# Patient Record
Sex: FEMALE | Race: WHITE | NOT HISPANIC OR LATINO | Employment: OTHER | URBAN - NONMETROPOLITAN AREA
[De-identification: names, ages, dates, MRNs, and addresses within clinical notes are randomized per-mention and may not be internally consistent; named-entity substitution may affect disease eponyms.]

---

## 2017-03-20 RX ORDER — SIMVASTATIN 40 MG
40 TABLET ORAL NIGHTLY
Qty: 90 TABLET | Refills: 3 | Status: SHIPPED | OUTPATIENT
Start: 2017-03-20 | End: 2018-03-31 | Stop reason: SDUPTHER

## 2017-03-24 ENCOUNTER — LAB (OUTPATIENT)
Dept: LAB | Facility: OTHER | Age: 67
End: 2017-03-24

## 2017-03-24 DIAGNOSIS — E55.9 VITAMIN D DEFICIENCY: ICD-10-CM

## 2017-03-24 DIAGNOSIS — I10 ESSENTIAL HYPERTENSION: ICD-10-CM

## 2017-03-24 DIAGNOSIS — E78.5 HYPERLIPIDEMIA: ICD-10-CM

## 2017-03-24 DIAGNOSIS — D53.1 MEGALOBLASTIC ANEMIA: ICD-10-CM

## 2017-03-24 DIAGNOSIS — R73.01 FASTING HYPERGLYCEMIA: ICD-10-CM

## 2017-03-24 DIAGNOSIS — D50.9 IRON DEFICIENCY ANEMIA: ICD-10-CM

## 2017-03-24 LAB
25(OH)D3 SERPL-MCNC: 64.1 NG/ML (ref 30–100)
ALBUMIN SERPL-MCNC: 4.2 G/DL (ref 3.2–5.5)
ALBUMIN/GLOB SERPL: 1.2 G/DL (ref 1–3)
ALP SERPL-CCNC: 41 U/L (ref 15–121)
ALT SERPL W P-5'-P-CCNC: 15 U/L (ref 10–60)
ANION GAP SERPL CALCULATED.3IONS-SCNC: 12 MMOL/L (ref 5–15)
ARTICHOKE IGE QN: 140 MG/DL (ref 0–129)
AST SERPL-CCNC: 18 U/L (ref 10–60)
BASOPHILS # BLD AUTO: 0.03 10*3/MM3 (ref 0–0.2)
BASOPHILS NFR BLD AUTO: 0.4 % (ref 0–2)
BILIRUB SERPL-MCNC: 0.7 MG/DL (ref 0.2–1)
BUN BLD-MCNC: 28 MG/DL (ref 8–25)
BUN/CREAT SERPL: 28 (ref 7–25)
CALCIUM SPEC-SCNC: 10.1 MG/DL (ref 8.4–10.8)
CHLORIDE SERPL-SCNC: 101 MMOL/L (ref 100–112)
CO2 SERPL-SCNC: 25 MMOL/L (ref 20–32)
CREAT BLD-MCNC: 1 MG/DL (ref 0.4–1.3)
DEPRECATED RDW RBC AUTO: 43.3 FL (ref 36.4–46.3)
EOSINOPHIL # BLD AUTO: 0.24 10*3/MM3 (ref 0–0.7)
EOSINOPHIL NFR BLD AUTO: 3.5 % (ref 0–7)
ERYTHROCYTE [DISTWIDTH] IN BLOOD BY AUTOMATED COUNT: 13.5 % (ref 11.5–14.5)
FERRITIN SERPL-MCNC: 16.7 NG/ML (ref 11.1–264)
GFR SERPL CREATININE-BSD FRML MDRD: 55 ML/MIN/1.73 (ref 45–104)
GLOBULIN UR ELPH-MCNC: 3.6 GM/DL (ref 2.5–4.6)
GLUCOSE BLD-MCNC: 96 MG/DL (ref 70–100)
HBA1C MFR BLD: 5.89 % (ref 4–5.6)
HCT VFR BLD AUTO: 37.2 % (ref 35–45)
HGB BLD-MCNC: 12.5 G/DL (ref 12–15.5)
LYMPHOCYTES # BLD AUTO: 1.44 10*3/MM3 (ref 0.6–4.2)
LYMPHOCYTES NFR BLD AUTO: 21.1 % (ref 10–50)
MCH RBC QN AUTO: 30.5 PG (ref 26.5–34)
MCHC RBC AUTO-ENTMCNC: 33.6 G/DL (ref 31.4–36)
MCV RBC AUTO: 90.7 FL (ref 80–98)
MONOCYTES # BLD AUTO: 0.48 10*3/MM3 (ref 0–0.9)
MONOCYTES NFR BLD AUTO: 7 % (ref 0–12)
NEUTROPHILS # BLD AUTO: 4.62 10*3/MM3 (ref 2–8.6)
NEUTROPHILS NFR BLD AUTO: 68 % (ref 37–80)
PLATELET # BLD AUTO: 294 10*3/MM3 (ref 150–450)
PMV BLD AUTO: 8.7 FL (ref 8–12)
POTASSIUM BLD-SCNC: 4 MMOL/L (ref 3.4–5.4)
PROT SERPL-MCNC: 7.8 G/DL (ref 6.7–8.2)
RBC # BLD AUTO: 4.1 10*6/MM3 (ref 3.77–5.16)
SODIUM BLD-SCNC: 138 MMOL/L (ref 134–146)
VIT B12 BLD-MCNC: 670 PG/ML (ref 239–931)
WBC NRBC COR # BLD: 6.81 10*3/MM3 (ref 3.2–9.8)

## 2017-03-24 PROCEDURE — 36415 COLL VENOUS BLD VENIPUNCTURE: CPT | Performed by: INTERNAL MEDICINE

## 2017-03-24 PROCEDURE — 83036 HEMOGLOBIN GLYCOSYLATED A1C: CPT | Performed by: INTERNAL MEDICINE

## 2017-03-24 PROCEDURE — 82607 VITAMIN B-12: CPT | Performed by: INTERNAL MEDICINE

## 2017-03-24 PROCEDURE — 83721 ASSAY OF BLOOD LIPOPROTEIN: CPT | Performed by: INTERNAL MEDICINE

## 2017-03-24 PROCEDURE — 82306 VITAMIN D 25 HYDROXY: CPT | Performed by: INTERNAL MEDICINE

## 2017-03-24 PROCEDURE — 85025 COMPLETE CBC W/AUTO DIFF WBC: CPT | Performed by: INTERNAL MEDICINE

## 2017-03-24 PROCEDURE — 80053 COMPREHEN METABOLIC PANEL: CPT | Performed by: INTERNAL MEDICINE

## 2017-03-24 PROCEDURE — 82728 ASSAY OF FERRITIN: CPT | Performed by: INTERNAL MEDICINE

## 2017-03-31 ENCOUNTER — OFFICE VISIT (OUTPATIENT)
Dept: FAMILY MEDICINE CLINIC | Facility: CLINIC | Age: 67
End: 2017-03-31

## 2017-03-31 VITALS
SYSTOLIC BLOOD PRESSURE: 150 MMHG | HEART RATE: 68 BPM | HEIGHT: 61 IN | TEMPERATURE: 97.6 F | BODY MASS INDEX: 47.05 KG/M2 | WEIGHT: 249.2 LBS | DIASTOLIC BLOOD PRESSURE: 80 MMHG

## 2017-03-31 DIAGNOSIS — I10 ESSENTIAL HYPERTENSION: ICD-10-CM

## 2017-03-31 DIAGNOSIS — Z12.31 ENCOUNTER FOR SCREENING MAMMOGRAM FOR MALIGNANT NEOPLASM OF BREAST: Primary | ICD-10-CM

## 2017-03-31 DIAGNOSIS — E66.01 MORBID OBESITY DUE TO EXCESS CALORIES (HCC): ICD-10-CM

## 2017-03-31 DIAGNOSIS — D50.9 IRON DEFICIENCY ANEMIA, UNSPECIFIED IRON DEFICIENCY ANEMIA TYPE: ICD-10-CM

## 2017-03-31 DIAGNOSIS — D53.1 MEGALOBLASTIC ANEMIA: ICD-10-CM

## 2017-03-31 DIAGNOSIS — E03.9 ACQUIRED HYPOTHYROIDISM: ICD-10-CM

## 2017-03-31 DIAGNOSIS — E55.9 VITAMIN D DEFICIENCY: ICD-10-CM

## 2017-03-31 DIAGNOSIS — E78.5 HYPERLIPIDEMIA, UNSPECIFIED HYPERLIPIDEMIA TYPE: Primary | ICD-10-CM

## 2017-03-31 DIAGNOSIS — R73.01 FASTING HYPERGLYCEMIA: ICD-10-CM

## 2017-03-31 DIAGNOSIS — G47.33 OBSTRUCTIVE SLEEP APNEA SYNDROME: ICD-10-CM

## 2017-03-31 PROCEDURE — 99214 OFFICE O/P EST MOD 30 MIN: CPT | Performed by: INTERNAL MEDICINE

## 2017-03-31 RX ORDER — NIACIN 500 MG
500 TABLET ORAL NIGHTLY
COMMUNITY
End: 2018-08-16

## 2017-03-31 RX ORDER — ASPIRIN 81 MG/1
81 TABLET, CHEWABLE ORAL DAILY
COMMUNITY

## 2017-03-31 NOTE — PROGRESS NOTES
"Subjective     History of Present Illness     Lois Parmar is a 67 y.o. female here for a 6-month follow up on hypothyroidism, hyperlipidemia, hypertriglyceridemia, impaired fasting glucose, osteoarthritis of the knees and hips among other issues complicated by obesity. She continues on CPAP for sleep apnea.  She had a left total knee replacement 12/2015.  She reports the right knee has allowed her to exercise this winter.   She has an appointment with Dr. Augustine next week, for which she reports he may inject the right knee.  She continues to follow with Dr. Boyle, cardiologist.           She reports legs cramps in bilateral lower extremities, most frequently occurring in the morning when she first gets out of bed.  I recommended she take an OTC magnesium supplement once daily.  She takes breaks from statin therapy episodically due to myalgia symptoms.     She is requesting a repeat DEXA.  Last DEXA was completed 03/2014 with normal results.   She decided to wait and have DEXA repeated in 2018.       She was advised by nurse practioner in Dr. Clifton's office to stop her potassium due to a level \"over 900\".  She recommended that she stop the potassium supplement.  In review of labs, her potassium has been and continues to be at goal and I recommended she continue the spironolactone but remain off of the potassium.  I asked her to have the provider send me a copy of office notes.      Blood pressure is above goal.  She checks it at home and systolic is less than 140.  She has lost 13 pounds in the past six months.   She reports she has been following a low carbohydrate diet and exercising at [facility] in [place] where she particpates in water aerobics.     Six months ago, I recommended she try to increase the frequency of her ferrous sulfate to try to get four doses weekly.  She has not been able to tolerate four times weekly and reports taking oral iron 2-3 times weekly.  She feels like she " "can possible tolerate more after stopping narcotic medication, which caused constipation.   Hemoglobin and iron have drifted down with current labs.  I recommended she try four times again and at least three times weekly.      She has reduced her statin to q.o.d. Dosing due to myalgias.      The patient's relevant past medical, surgical, and social history was reviewed in Epic.   Lab results are reviewed with the patient today.  Fasting glucose 196. A1c is 5.89.  Renal and liver function normal.  Vitamin D level is above goal with a OTC vitamin D 5000 unit supplement three times daily.  Potassium is at goal.         Review of Systems   Constitutional: Negative for chills, fatigue and fever.   HENT: Negative for congestion, ear pain, postnasal drip, sinus pressure and sore throat.    Respiratory: Negative for cough, shortness of breath and wheezing.    Cardiovascular: Negative for chest pain, palpitations and leg swelling.   Gastrointestinal: Negative for abdominal pain, blood in stool, constipation, diarrhea, nausea and vomiting.   Endocrine: Negative for cold intolerance, heat intolerance, polydipsia and polyuria.   Genitourinary: Negative for dysuria, frequency, hematuria and urgency.   Skin: Negative for rash.   Neurological: Negative for syncope and weakness.          Objective     Vitals:    03/31/17 1037   BP: 150/80   Pulse: 68   Temp: 97.6 °F (36.4 °C)   TempSrc: Oral   Weight: 249 lb 3.2 oz (113 kg)   Height: 61\" (154.9 cm)       Physical Exam   Constitutional: She is oriented to person, place, and time. She appears well-developed and well-nourished. No distress.   HENT:   Head: Normocephalic and atraumatic.   Nose: Right sinus exhibits no maxillary sinus tenderness and no frontal sinus tenderness. Left sinus exhibits no maxillary sinus tenderness and no frontal sinus tenderness.   Mouth/Throat: Uvula is midline, oropharynx is clear and moist and mucous membranes are normal. No oral lesions. No tonsillar " exudate.   Eyes: Conjunctivae and EOM are normal. Pupils are equal, round, and reactive to light.   Neck: Trachea normal. Neck supple. No JVD present. Carotid bruit is not present. No tracheal deviation present. No thyroid mass and no thyromegaly present.   Cardiovascular: Normal rate, regular rhythm and normal heart sounds.   No extrasystoles are present. PMI is not displaced.    No murmur heard.  Pulmonary/Chest: Effort normal and breath sounds normal. No accessory muscle usage. No respiratory distress. She has no decreased breath sounds. She has no wheezes. She has no rhonchi. She has no rales.   Abdominal: Soft. Bowel sounds are normal. She exhibits no distension. There is no hepatosplenomegaly. There is no tenderness.       Vascular Status -  Her exam exhibits right foot vasculature normal. Her exam exhibits no right foot edema. Her exam exhibits left foot vasculature normal. Her exam exhibits no left foot edema.  Lymphadenopathy:     She has no cervical adenopathy.   Neurological: She is alert and oriented to person, place, and time. No cranial nerve deficit. Coordination normal.   Skin: Skin is warm, dry and intact. No rash noted. No cyanosis. Nails show no clubbing.   Psychiatric: She has a normal mood and affect. Her speech is normal and behavior is normal. Thought content normal.   Vitals reviewed.       Future Appointments  Date Time Provider Department Center   4/18/2017 9:20 AM Curt Augustine MD [facility] None   10/2/2017 11:00 AM Marshal Warner MD [facility] None       PHQ-9 Depression Screening 3/31/2017   Little interest or pleasure in doing things 0   Feeling down, depressed, or hopeless 0   Trouble falling or staying asleep, or sleeping too much 0   Feeling tired or having little energy 1   Poor appetite or overeating 0   Feeling bad about yourself - or that you are a failure or have let yourself or your family down 0   Trouble concentrating on things, such as reading the newspaper or  "watching television 0   Moving or speaking so slowly that other people could have noticed. Or the opposite - being so fidgety or restless that you have been moving around a lot more than usual 0   Thoughts that you would be better off dead, or of hurting yourself in some way 0   PHQ-9 Total Score 1   If you checked off any problems, how difficult have these problems made it for you to do your work, take care of things at home, or get along with other people? Not difficult at all       Assessment/Plan      Decrease OTC vitamin D to 5000 units daily.      I asked her to have the provider she has been seeing in Dr. Clifton's office send me a copy of office notes.  The patient refers to her as a \"hormone specialist\".    Recommended adding an OTC magnesium supplement once daily for the leg cramps.    She can continue to hold the potassium supplement.  Continue the spironolactone.    Encouraged her to continue with diet, exercise, and aggressive weight loss efforts.       Scribed for Dr. Warner by Adore Marrero [facility].     Diagnoses and all orders for this visit:    Hyperlipidemia, unspecified hyperlipidemia type  -     Magnesium; Future  -     Lipid Panel; Future    Essential hypertension  -     Magnesium; Future  -     Comprehensive Metabolic Panel; Future    Morbid obesity due to excess calories  -     Magnesium; Future    Vitamin D deficiency  -     Vitamin D 25 Hydroxy; Future  -     Magnesium; Future    Acquired hypothyroidism - On Synthroid  -     Magnesium; Future  -     TSH; Future  -     T4, Free; Future    Impaired fasting glycaemia  -     Magnesium; Future  -     Hemoglobin A1c; Future    Iron deficiency anemia, unspecified iron deficiency anemia type  -     Ferritin; Future  -     CBC Auto Differential; Future  -     Magnesium; Future    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  -     metFORMIN (GLUCOPHAGE) 500 MG tablet; Take 500 mg by mouth. 2 tabs twice daily  -     aspirin 81 MG chewable tablet; Chew 81 mg Daily.  -     niacin 500 MG tablet; Take 500 mg by mouth Every Night.  -     vitamin d (CHOLECALIFEROL) 5000 UNITS capsule; Take 1 capsule by mouth Daily.      Lab on 03/24/2017   Component Date Value Ref Range Status   • LDL Cholesterol  03/24/2017 140* 0 - 129 mg/dL Final   • Hemoglobin A1C 03/24/2017 5.89* 4 - 5.6 % Final   • WBC 03/24/2017 6.81  3.20 - 9.80 10*3/mm3 Final   • RBC 03/24/2017 4.10  3.77 - 5.16 10*6/mm3 Final   • Hemoglobin 03/24/2017 12.5  12.0 - 15.5 g/dL Final   • Hematocrit 03/24/2017 37.2  35.0 - 45.0 % Final   • MCV 03/24/2017 90.7  80.0 - 98.0 fL Final   • MCH 03/24/2017 30.5  26.5 - 34.0 pg Final   • MCHC 03/24/2017 33.6  31.4 - 36.0 g/dL Final   • RDW 03/24/2017 13.5  11.5 - 14.5 % Final   • RDW-SD 03/24/2017 43.3  36.4 - 46.3 fl Final   • MPV 03/24/2017 8.7  8.0 - 12.0 fL Final   • Platelets 03/24/2017 294  150 - 450 10*3/mm3 Final   • Neutrophil % 03/24/2017 68.0  37.0 - 80.0 % Final   • Lymphocyte % 03/24/2017 21.1  10.0 - 50.0 % Final   • Monocyte % 03/24/2017 7.0  0.0 - 12.0 % Final   • Eosinophil % 03/24/2017 3.5  0.0 - 7.0 % Final   • Basophil % 03/24/2017 0.4  0.0 - 2.0 % Final   • Neutrophils, Absolute 03/24/2017 4.62  2.00 - 8.60 10*3/mm3 Final   • Lymphocytes, Absolute 03/24/2017 1.44  0.60 - 4.20 10*3/mm3 Final   • Monocytes, Absolute 03/24/2017 0.48  0.00 - 0.90 10*3/mm3 Final   • Eosinophils, Absolute 03/24/2017 0.24  0.00 - 0.70 10*3/mm3 Final   • Basophils, Absolute 03/24/2017 0.03  0.00 - 0.20 10*3/mm3 Final   • Vitamin B-12 03/24/2017 670  239 - 931 pg/mL Final   • Glucose 03/24/2017 96  70 - 100 mg/dL Final   • BUN 03/24/2017 28* 8 - 25 mg/dL Final   • Creatinine 03/24/2017 1.00  0.40 - 1.30 mg/dL Final   • Sodium 03/24/2017 138  134 - 146  mmol/L Final   • Potassium 03/24/2017 4.0  3.4 - 5.4 mmol/L Final   • Chloride 03/24/2017 101  100 - 112 mmol/L Final   • CO2 03/24/2017 25.0  20.0 - 32.0 mmol/L Final   • Calcium 03/24/2017 10.1  8.4 - 10.8 mg/dL Final   • Total Protein 03/24/2017 7.8  6.7 - 8.2 g/dL Final   • Albumin 03/24/2017 4.20  3.20 - 5.50 g/dL Final   • ALT (SGPT) 03/24/2017 15  10 - 60 U/L Final   • AST (SGOT) 03/24/2017 18  10 - 60 U/L Final   • Alkaline Phosphatase 03/24/2017 41  15 - 121 U/L Final   • Total Bilirubin 03/24/2017 0.7  0.2 - 1.0 mg/dL Final   • eGFR Non African Amer 03/24/2017 55  45 - 104 mL/min/1.73 Final   • Globulin 03/24/2017 3.6  2.5 - 4.6 gm/dL Final   • A/G Ratio 03/24/2017 1.2  1.0 - 3.0 g/dL Final   • BUN/Creatinine Ratio 03/24/2017 28.0* 7.0 - 25.0 Final   • Anion Gap 03/24/2017 12.0  5.0 - 15.0 mmol/L Final   • Ferritin 03/24/2017 16.70  11.10 - 264.00 ng/mL Final   • 25 Hydroxy, Vitamin D 03/24/2017 64.1  30.0 - 100.0 ng/ml Final   ]

## 2017-04-13 DIAGNOSIS — M17.12 PRIMARY OSTEOARTHRITIS OF LEFT KNEE: Primary | ICD-10-CM

## 2017-04-18 ENCOUNTER — OFFICE VISIT (OUTPATIENT)
Dept: ORTHOPEDIC SURGERY | Facility: CLINIC | Age: 67
End: 2017-04-18

## 2017-04-18 VITALS — WEIGHT: 247.8 LBS | HEIGHT: 61 IN | BODY MASS INDEX: 46.78 KG/M2

## 2017-04-18 DIAGNOSIS — M25.561 CHRONIC PAIN OF RIGHT KNEE: Primary | ICD-10-CM

## 2017-04-18 DIAGNOSIS — G89.29 CHRONIC PAIN OF RIGHT KNEE: Primary | ICD-10-CM

## 2017-04-18 DIAGNOSIS — Z96.652 PRESENCE OF TOTAL LEFT KNEE JOINT PROSTHESIS: ICD-10-CM

## 2017-04-18 DIAGNOSIS — E66.01 MORBID OBESITY WITH BMI OF 45.0-49.9, ADULT (HCC): ICD-10-CM

## 2017-04-18 DIAGNOSIS — E11.9 TYPE 2 DIABETES MELLITUS WITHOUT COMPLICATION, WITHOUT LONG-TERM CURRENT USE OF INSULIN (HCC): ICD-10-CM

## 2017-04-18 DIAGNOSIS — M17.11 PRIMARY OSTEOARTHRITIS OF RIGHT KNEE: ICD-10-CM

## 2017-04-18 PROCEDURE — 20610 DRAIN/INJ JOINT/BURSA W/O US: CPT | Performed by: ORTHOPAEDIC SURGERY

## 2017-04-18 PROCEDURE — 99213 OFFICE O/P EST LOW 20 MIN: CPT | Performed by: ORTHOPAEDIC SURGERY

## 2017-04-18 RX ORDER — POTASSIUM CHLORIDE 750 MG/1
TABLET, EXTENDED RELEASE ORAL
COMMUNITY
Start: 2017-03-04 | End: 2017-04-18

## 2017-04-18 RX ORDER — METFORMIN HYDROCHLORIDE EXTENDED-RELEASE TABLETS 1000 MG/1
TABLET, FILM COATED, EXTENDED RELEASE ORAL
COMMUNITY
Start: 2017-03-13 | End: 2017-04-18

## 2017-04-18 RX ORDER — PHENYLEPHRINE HCL 10 MG/1
10 TABLET, FILM COATED ORAL EVERY 4 HOURS PRN
COMMUNITY
End: 2018-08-16

## 2017-04-18 RX ORDER — UBIDECARENONE 100 MG
100 CAPSULE ORAL DAILY
COMMUNITY
End: 2020-07-17

## 2017-04-18 RX ADMIN — LIDOCAINE HYDROCHLORIDE 2 ML: 10 INJECTION, SOLUTION INFILTRATION; PERINEURAL at 10:47

## 2017-04-18 RX ADMIN — TRIAMCINOLONE ACETONIDE 40 MG: 40 INJECTION, SUSPENSION INTRA-ARTICULAR; INTRAMUSCULAR at 10:47

## 2017-04-18 NOTE — PROGRESS NOTES
Lois Parmar is a 67 y.o. female returns for     Chief Complaint   Patient presents with   • Left Knee - Follow-up     Xray done today       HISTORY OF PRESENT ILLNESS:   patient states doing well no problems.   Occasional popping.  Stable on exam.  She has been having more pain in her right knee recently.     CONCURRENT MEDICAL HISTORY:    Past Medical History:   Diagnosis Date   • Acquired hypothyroidism - On Synthroid    • Allergic rhinitis    • Asthma - mild intermittent    • Carpal tunnel syndrome - Bilateral    • Degeneration of cervical intervertebral disc    • Disorder of lumbar spine    • Essential hypertension    • Fatigue    • Hyperlipidemia - Improved with Zocor    • Impaired fasting glycaemia    • Iron deficiency anemia - Improved    • Lumbar radiculopathy    • Megaloblastic anemia due to vitamin B>12< deficiency    • Morbid obesity    • Osteoarthritis of knee - Bilateral    • Osteoarthritis of multiple joints - Left knee    • Pain in left hip    • Sleep apnea - On CPAP    • Spasm of back muscles    • Vitamin D deficiency        Allergies   Allergen Reactions   • Antihistamines, Chlorpheniramine-Type    • Codeine      Heart race   • Lortab [Hydrocodone-Acetaminophen]      Hallucinations   • Other      Decongestants: Heart Race   • Tape      Skin sores         Current Outpatient Prescriptions:   •  aspirin 81 MG chewable tablet, Chew 81 mg Daily., Disp: , Rfl:   •  Calcium Carb-Cholecalciferol (CALCIUM 600 + D) 600-200 MG-UNIT tablet, 2 tablets daily., Disp: , Rfl:   •  coenzyme Q10 100 MG capsule, Take 100 mg by mouth Daily., Disp: , Rfl:   •  DULoxetine (CYMBALTA) 60 MG capsule, Take 60 mg by mouth daily., Disp: , Rfl:   •  Ferrous Sulfate (IRON) 325 (65 FE) MG tablet, Take 1 tablet by mouth., Disp: , Rfl:   •  lansoprazole (PREVACID) 30 MG capsule, Take 30 mg by mouth every morning., Disp: , Rfl:   •  loratadine (CLARITIN) 10 MG tablet, Take 10 mg by mouth daily., Disp: , Rfl:   •  metFORMIN  (GLUCOPHAGE) 500 MG tablet, Take 500 mg by mouth. 2 tabs twice daily, Disp: , Rfl:   •  niacin 500 MG tablet, Take 500 mg by mouth Every Night., Disp: , Rfl:   •  Nutritional Supplements (DHEA PO), Take 100 mg by mouth., Disp: , Rfl:   •  Omega-3 Fatty Acids (FISH OIL) 1000 MG capsule capsule, Take  by mouth Daily With Breakfast., Disp: , Rfl:   •  phenylephrine (SUDAFED PE) 10 MG tablet, Take 10 mg by mouth Every 4 (Four) Hours As Needed for Congestion., Disp: , Rfl:   •  progesterone (PROMETRIUM) 200 MG capsule, Take 200 mg by mouth 2 (Two) Times a Day., Disp: , Rfl:   •  ranitidine (ZANTAC) 150 MG tablet, Take 1 tablet by mouth Every Night., Disp: 90 tablet, Rfl: 3  •  Red Yeast Rice Extract (RED YEAST RICE PO), Take 1,000 mg by mouth., Disp: , Rfl:   •  simvastatin (ZOCOR) 40 MG tablet, Take 1 tablet by mouth Every Night., Disp: 90 tablet, Rfl: 3  •  spironolactone (ALDACTONE) 25 MG tablet, Take 25 mg by mouth daily. For Fluid., Disp: , Rfl:   •  thyroid 60 MG PO tablet, Take 60 mg by mouth Daily., Disp: , Rfl:   •  valsartan-hydrochlorothiazide (DIOVAN HCT) 320-25 MG per tablet, Take 1 tablet by mouth daily., Disp: , Rfl:   •  vitamin B-12 (CYANOCOBALAMIN) 500 MCG tablet, Take 500 mcg by mouth daily., Disp: , Rfl:   •  vitamin d (CHOLECALIFEROL) 5000 UNITS capsule, Take 1 capsule by mouth Daily., Disp: 30 capsule, Rfl: 11  •  glucose blood test strip, tests BID, Diagnosis: 250.00, 401.9, Disp: , Rfl:     Past Surgical History:   Procedure Laterality Date   • ENDOSCOPY  09/17/2012    Normal esophagus.  Gastritis.  Normal duodenum   • ENDOSCOPY AND COLONOSCOPY  09/17/2012    Internal & external hemorrhoids found.   • KNEE SURGERY  12/07/2015    Left total knee arthroplasty.   • LUMBAR LAMINECTOMY  2011    s/p lumbar laminectomy and fusion   • SHOULDER SURGERY  12/07/2015    Arthroscopy of the left shoudler with rotator cuff repair, Vijay procedure, and subacromial decompression.       ROS  No fevers or chills.   "No chest pain or shortness of air.  No GI or  disturbances.    PHYSICAL EXAMINATION:       Ht 61\" (154.9 cm)  Wt 247 lb 12.8 oz (112 kg)  BMI 46.82 kg/m2    Physical Exam   Constitutional: She is oriented to person, place, and time. She appears well-developed and well-nourished.   Neurological: She is alert and oriented to person, place, and time.   Psychiatric: She has a normal mood and affect. Her behavior is normal. Judgment and thought content normal.       GAIT:     []  Normal  [x]  Antalgic    Assistive device: [x]  None  []  Walker     []  Crutches  []  Cane     []  Wheelchair  []  Stretcher    Right Knee Exam     Tenderness   Right knee tenderness location: diffuse.    Range of Motion   Extension: -5   Flexion: 120     Muscle Strength     The patient has normal right knee strength.    Tests   Drawer:       Anterior - negative    Posterior - negative  Varus: negative  Valgus: negative    Other   Sensation: normal  Pulse: present  Swelling: mild    Comments:  Crepitation on motion.  Mild to moderate pain through arc of motion      Left Knee Exam     Tenderness   The patient is experiencing no tenderness.         Range of Motion   Extension: 0   Flexion: 120     Tests   Drawer:       Anterior - negative       Varus: negative  Valgus: negative    Other   Erythema: absent  Sensation: normal  Pulse: present  Swelling: none              Xr Knee 1 Or 2 View Left    Result Date: 4/18/2017  Narrative: AP bilateral standing with lateral of the left knee shows acceptable position and alignment of left total knee arthroplasty.  No sign of implant loosening or failure.  No acute bony abnormality is noted.  Lateral view shows acceptable sizing with out signs of loosening.  Right knee shows severe osteoarthritic changes with medial joint space collapse and tricompartmental osteoarthritic changes. Impression: Stable left total knee arthroplasty, severe osteoarthritic changes in the right knee with bone-on-bone findings. " 04/18/17 at 12:29 PM by Curt Augustine MD     Xr Knee Standing Left    Result Date: 4/18/2017  Narrative: AP bilateral standing with lateral of the left knee shows acceptable position and alignment of left total knee arthroplasty.  No sign of implant loosening or failure.  No acute bony abnormality is noted.  Lateral view shows acceptable sizing with out signs of loosening.  Right knee shows severe osteoarthritic changes with medial joint space collapse and tricompartmental osteoarthritic changes. Impression: Stable left total knee arthroplasty, severe osteoarthritic changes in the right knee with bone-on-bone findings. 04/18/17 at 12:30 PM by Curt Augustine MD             ASSESSMENT:    Diagnoses and all orders for this visit:    Chronic pain of right knee  -     Large Joint Arthrocentesis    Type 2 diabetes mellitus without complication, without long-term current use of insulin    Primary osteoarthritis of right knee  -     Large Joint Arthrocentesis    Presence of total left knee joint prosthesis    Morbid obesity with BMI of 45.0-49.9, adult    Other orders  -     Discontinue: potassium chloride (K-DUR,KLOR-CON) 10 MEQ CR tablet;   -     Discontinue: metFORMIN (FORTAMET) 1000 MG (OSM) 24 hr tablet;   -     phenylephrine (SUDAFED PE) 10 MG tablet; Take 10 mg by mouth Every 4 (Four) Hours As Needed for Congestion.  -     Nutritional Supplements (DHEA PO); Take 100 mg by mouth.  -     Red Yeast Rice Extract (RED YEAST RICE PO); Take 1,000 mg by mouth.  -     coenzyme Q10 100 MG capsule; Take 100 mg by mouth Daily.        Large Joint Arthrocentesis  Date/Time: 4/18/2017 10:47 AM  Consent given by: patient  Site marked: site marked  Timeout: Immediately prior to procedure a time out was called to verify the correct patient, procedure, equipment, support staff and site/side marked as required   Supporting Documentation  Indications: pain   Procedure Details  Location: knee - R knee  Preparation:  Patient was prepped and draped in the usual sterile fashion  Needle size: 22 G  Approach: anteromedial  Medications administered: 2 mL lidocaine 1 %; 40 mg triamcinolone acetonide 40 MG/ML  Patient tolerance: patient tolerated the procedure well with no immediate complications        PLAN    We will inject the right knee today.  We discussed continued motion and activity as tolerated.  Continued strengthening as tolerated.  We discussed continued arthritic treatments for the right knee as her pain dictates.  No restrictions and follow up on an as-needed basis.        Curt Augustine MD

## 2017-04-20 RX ORDER — TRIAMCINOLONE ACETONIDE 40 MG/ML
40 INJECTION, SUSPENSION INTRA-ARTICULAR; INTRAMUSCULAR
Status: COMPLETED | OUTPATIENT
Start: 2017-04-18 | End: 2017-04-18

## 2017-04-20 RX ORDER — LIDOCAINE HYDROCHLORIDE 10 MG/ML
2 INJECTION, SOLUTION INFILTRATION; PERINEURAL
Status: COMPLETED | OUTPATIENT
Start: 2017-04-18 | End: 2017-04-18

## 2017-05-01 RX ORDER — SPIRONOLACTONE 25 MG/1
TABLET ORAL
Qty: 90 TABLET | Refills: 2 | Status: SHIPPED | OUTPATIENT
Start: 2017-05-01 | End: 2018-04-04 | Stop reason: SDUPTHER

## 2017-05-01 RX ORDER — LANSOPRAZOLE 30 MG
CAPSULE,DELAYED RELEASE (ENTERIC COATED) ORAL
Qty: 90 CAPSULE | Refills: 2 | Status: SHIPPED | OUTPATIENT
Start: 2017-05-01 | End: 2017-11-27 | Stop reason: SDUPTHER

## 2017-05-01 RX ORDER — DULOXETIN HYDROCHLORIDE 60 MG/1
CAPSULE, DELAYED RELEASE ORAL
Qty: 90 CAPSULE | Refills: 2 | Status: SHIPPED | OUTPATIENT
Start: 2017-05-01 | End: 2018-04-11 | Stop reason: SDUPTHER

## 2017-09-20 RX ORDER — RANITIDINE 150 MG/1
TABLET ORAL
Qty: 90 TABLET | Refills: 3 | Status: SHIPPED | OUTPATIENT
Start: 2017-09-20 | End: 2018-11-28 | Stop reason: SDUPTHER

## 2017-09-25 ENCOUNTER — LAB (OUTPATIENT)
Dept: LAB | Facility: OTHER | Age: 67
End: 2017-09-25

## 2017-09-25 DIAGNOSIS — E78.5 HYPERLIPIDEMIA, UNSPECIFIED HYPERLIPIDEMIA TYPE: ICD-10-CM

## 2017-09-25 DIAGNOSIS — G47.33 OBSTRUCTIVE SLEEP APNEA SYNDROME: ICD-10-CM

## 2017-09-25 DIAGNOSIS — E66.01 MORBID OBESITY DUE TO EXCESS CALORIES (HCC): ICD-10-CM

## 2017-09-25 DIAGNOSIS — D53.1 MEGALOBLASTIC ANEMIA: ICD-10-CM

## 2017-09-25 DIAGNOSIS — E55.9 VITAMIN D DEFICIENCY: ICD-10-CM

## 2017-09-25 DIAGNOSIS — I10 ESSENTIAL HYPERTENSION: ICD-10-CM

## 2017-09-25 DIAGNOSIS — D50.9 IRON DEFICIENCY ANEMIA, UNSPECIFIED IRON DEFICIENCY ANEMIA TYPE: ICD-10-CM

## 2017-09-25 DIAGNOSIS — E03.9 ACQUIRED HYPOTHYROIDISM: ICD-10-CM

## 2017-09-25 DIAGNOSIS — R73.01 FASTING HYPERGLYCEMIA: ICD-10-CM

## 2017-09-25 LAB
25(OH)D3 SERPL-MCNC: 40.1 NG/ML (ref 30–100)
ALBUMIN SERPL-MCNC: 3.9 G/DL (ref 3.2–5.5)
ALBUMIN/GLOB SERPL: 1.2 G/DL (ref 1–3)
ALP SERPL-CCNC: 35 U/L (ref 15–121)
ALT SERPL W P-5'-P-CCNC: 12 U/L (ref 10–60)
ANION GAP SERPL CALCULATED.3IONS-SCNC: 10 MMOL/L (ref 5–15)
AST SERPL-CCNC: 18 U/L (ref 10–60)
BASOPHILS # BLD AUTO: 0.04 10*3/MM3 (ref 0–0.2)
BASOPHILS NFR BLD AUTO: 0.5 % (ref 0–2)
BILIRUB SERPL-MCNC: 0.5 MG/DL (ref 0.2–1)
BUN BLD-MCNC: 24 MG/DL (ref 8–25)
BUN/CREAT SERPL: 26.7 (ref 7–25)
CALCIUM SPEC-SCNC: 9.4 MG/DL (ref 8.4–10.8)
CHLORIDE SERPL-SCNC: 101 MMOL/L (ref 100–112)
CHOLEST SERPL-MCNC: 172 MG/DL (ref 150–200)
CO2 SERPL-SCNC: 28 MMOL/L (ref 20–32)
CREAT BLD-MCNC: 0.9 MG/DL (ref 0.4–1.3)
DEPRECATED RDW RBC AUTO: 47.6 FL (ref 36.4–46.3)
EOSINOPHIL # BLD AUTO: 0.36 10*3/MM3 (ref 0–0.7)
EOSINOPHIL NFR BLD AUTO: 4.8 % (ref 0–7)
ERYTHROCYTE [DISTWIDTH] IN BLOOD BY AUTOMATED COUNT: 14.4 % (ref 11.5–14.5)
FERRITIN SERPL-MCNC: 16.3 NG/ML (ref 11.1–264)
GFR SERPL CREATININE-BSD FRML MDRD: 62 ML/MIN/1.73 (ref 45–104)
GLOBULIN UR ELPH-MCNC: 3.2 GM/DL (ref 2.5–4.6)
GLUCOSE BLD-MCNC: 96 MG/DL (ref 70–100)
HBA1C MFR BLD: 5.5 % (ref 4–5.6)
HCT VFR BLD AUTO: 35.3 % (ref 35–45)
HDLC SERPL-MCNC: 39 MG/DL (ref 35–100)
HGB BLD-MCNC: 11.8 G/DL (ref 12–15.5)
LDLC SERPL CALC-MCNC: 93 MG/DL
LDLC/HDLC SERPL: 2.39 {RATIO}
LYMPHOCYTES # BLD AUTO: 1.19 10*3/MM3 (ref 0.6–4.2)
LYMPHOCYTES NFR BLD AUTO: 15.9 % (ref 10–50)
MAGNESIUM SERPL-MCNC: 1.8 MG/DL (ref 1.8–2.5)
MCH RBC QN AUTO: 31.2 PG (ref 26.5–34)
MCHC RBC AUTO-ENTMCNC: 33.4 G/DL (ref 31.4–36)
MCV RBC AUTO: 93.4 FL (ref 80–98)
MONOCYTES # BLD AUTO: 0.53 10*3/MM3 (ref 0–0.9)
MONOCYTES NFR BLD AUTO: 7.1 % (ref 0–12)
NEUTROPHILS # BLD AUTO: 5.37 10*3/MM3 (ref 2–8.6)
NEUTROPHILS NFR BLD AUTO: 71.7 % (ref 37–80)
PLATELET # BLD AUTO: 290 10*3/MM3 (ref 150–450)
PMV BLD AUTO: 9.1 FL (ref 8–12)
POTASSIUM BLD-SCNC: 4.3 MMOL/L (ref 3.4–5.4)
PROT SERPL-MCNC: 7.1 G/DL (ref 6.7–8.2)
RBC # BLD AUTO: 3.78 10*6/MM3 (ref 3.77–5.16)
SODIUM BLD-SCNC: 139 MMOL/L (ref 134–146)
T4 FREE SERPL-MCNC: 0.61 NG/DL (ref 0.78–2.19)
TRIGL SERPL-MCNC: 198 MG/DL (ref 35–160)
TSH SERPL DL<=0.05 MIU/L-ACNC: 3.62 MIU/ML (ref 0.46–4.68)
VIT B12 BLD-MCNC: 807 PG/ML (ref 239–931)
VLDLC SERPL-MCNC: 39.6 MG/DL
WBC NRBC COR # BLD: 7.49 10*3/MM3 (ref 3.2–9.8)

## 2017-09-25 PROCEDURE — 80053 COMPREHEN METABOLIC PANEL: CPT | Performed by: INTERNAL MEDICINE

## 2017-09-25 PROCEDURE — 83735 ASSAY OF MAGNESIUM: CPT | Performed by: INTERNAL MEDICINE

## 2017-09-25 PROCEDURE — 85025 COMPLETE CBC W/AUTO DIFF WBC: CPT | Performed by: INTERNAL MEDICINE

## 2017-09-25 PROCEDURE — 36415 COLL VENOUS BLD VENIPUNCTURE: CPT | Performed by: INTERNAL MEDICINE

## 2017-09-25 PROCEDURE — 82607 VITAMIN B-12: CPT | Performed by: INTERNAL MEDICINE

## 2017-09-25 PROCEDURE — 84443 ASSAY THYROID STIM HORMONE: CPT | Performed by: INTERNAL MEDICINE

## 2017-09-25 PROCEDURE — 84439 ASSAY OF FREE THYROXINE: CPT | Performed by: INTERNAL MEDICINE

## 2017-09-25 PROCEDURE — 83036 HEMOGLOBIN GLYCOSYLATED A1C: CPT | Performed by: INTERNAL MEDICINE

## 2017-09-25 PROCEDURE — 82306 VITAMIN D 25 HYDROXY: CPT | Performed by: INTERNAL MEDICINE

## 2017-09-25 PROCEDURE — 80061 LIPID PANEL: CPT | Performed by: INTERNAL MEDICINE

## 2017-09-25 PROCEDURE — 82728 ASSAY OF FERRITIN: CPT | Performed by: INTERNAL MEDICINE

## 2017-10-03 NOTE — PATIENT INSTRUCTIONS
Exercising to Lose Weight  Exercising can help you to lose weight. In order to lose weight through exercise, you need to do vigorous-intensity exercise. You can tell that you are exercising with vigorous intensity if you are breathing very hard and fast and cannot hold a conversation while exercising.  Moderate-intensity exercise helps to maintain your current weight. You can tell that you are exercising at a moderate level if you have a higher heart rate and faster breathing, but you are still able to hold a conversation.  HOW OFTEN SHOULD I EXERCISE?  Choose an activity that you enjoy and set realistic goals. Your health care provider can help you to make an activity plan that works for you. Exercise regularly as directed by your health care provider. This may include:  · Doing resistance training twice each week, such as:    Push-ups.    Sit-ups.    Lifting weights.    Using resistance bands.  · Doing a given intensity of exercise for a given amount of time. Choose from these options:    150 minutes of moderate-intensity exercise every week.    75 minutes of vigorous-intensity exercise every week.    A mix of moderate-intensity and vigorous-intensity exercise every week.  Children, pregnant women, people who are out of shape, people who are overweight, and older adults may need to consult a health care provider for individual recommendations. If you have any sort of medical condition, be sure to consult your health care provider before starting a new exercise program.  WHAT ARE SOME ACTIVITIES THAT CAN HELP ME TO LOSE WEIGHT?   · Walking at a rate of at least 4.5 miles an hour.  · Jogging or running at a rate of 5 miles per hour.  · Biking at a rate of at least 10 miles per hour.  · Lap swimming.  · Roller-skating or in-line skating.  · Cross-country skiing.  · Vigorous competitive sports, such as football, basketball, and soccer.  · Jumping rope.  · Aerobic dancing.  HOW CAN I BE MORE ACTIVE IN MY DAY-TO-DAY  ACTIVITIES?  · Use the stairs instead of the elevator.  · Take a walk during your lunch break.  · If you drive, park your car farther away from work or school.  · If you take public transportation, get off one stop early and walk the rest of the way.  · Make all of your phone calls while standing up and walking around.  · Get up, stretch, and walk around every 30 minutes throughout the day.  WHAT GUIDELINES SHOULD I FOLLOW WHILE EXERCISING?  · Do not exercise so much that you hurt yourself, feel dizzy, or get very short of breath.  · Consult your health care provider prior to starting a new exercise program.  · Wear comfortable clothes and shoes with good support.  · Drink plenty of water while you exercise to prevent dehydration or heat stroke. Body water is lost during exercise and must be replaced.  · Work out until you breathe faster and your heart beats faster.     This information is not intended to replace advice given to you by your health care provider. Make sure you discuss any questions you have with your health care provider.     Document Released: 01/20/2012 Document Revised: 01/08/2016 Document Reviewed: 05/21/2015  [employer] Interactive Patient Education ©2017 [employer]      Calorie Counting for Weight Loss  Calories are energy you get from the things you eat and drink. Your body uses this energy to keep you going throughout the day. The number of calories you eat affects your weight. When you eat more calories than your body needs, your body stores the extra calories as fat. When you eat fewer calories than your body needs, your body burns fat to get the energy it needs.  Calorie counting means keeping track of how many calories you eat and drink each day. If you make sure to eat fewer calories than your body needs, you should lose weight. In order for calorie counting to work, you will need to eat the number of calories that are right for you in a day to lose a healthy amount of weight per week.  A healthy amount of weight to lose per week is usually 1-2 lb (0.5-0.9 kg). A dietitian can determine how many calories you need in a day and give you suggestions on how to reach your calorie goal.   WHAT IS MY MY PLAN?  My goal is to have __________ calories per day.   If I have this many calories per day, I should lose around __________ pounds per week.  WHAT DO I NEED TO KNOW ABOUT CALORIE COUNTING?  In order to meet your daily calorie goal, you will need to:  · Find out how many calories are in each food you would like to eat. Try to do this before you eat.  · Decide how much of the food you can eat.  · Write down what you ate and how many calories it had. Doing this is called keeping a food log.  WHERE DO I FIND CALORIE INFORMATION?  The number of calories in a food can be found on a Nutrition Facts label. Note that all the information on a label is based on a specific serving of the food. If a food does not have a Nutrition Facts label, try to look up the calories online or ask your dietitian for help.  HOW DO I DECIDE HOW MUCH TO EAT?  To decide how much of the food you can eat, you will need to consider both the number of calories in one serving and the size of one serving. This information can be found on the Nutrition Facts label. If a food does not have a Nutrition Facts label, look up the information online or ask your dietitian for help.  Remember that calories are listed per serving. If you choose to have more than one serving of a food, you will have to multiply the calories per serving by the amount of servings you plan to eat. For example, the label on a package of bread might say that a serving size is 1 slice and that there are 90 calories in a serving. If you eat 1 slice, you will have eaten 90 calories. If you eat 2 slices, you will have eaten 180 calories.  HOW DO I KEEP A FOOD LOG?  After each meal, record the following information in your food log:  · What you ate.  · How much of it you  ate.  · How many calories it had.  · Then, add up your calories.  Keep your food log near you, such as in a small notebook in your pocket. Another option is to use a mobile shad or website. Some programs will calculate calories for you and show you how many calories you have left each time you add an item to the log.  WHAT ARE SOME CALORIE COUNTING TIPS?  · Use your calories on foods and drinks that will fill you up and not leave you hungry. Some examples of this include foods like nuts and nut butters, vegetables, lean proteins, and high-fiber foods (more than 5 g fiber per serving).  · Eat nutritious foods and avoid empty calories. Empty calories are calories you get from foods or beverages that do not have many nutrients, such as candy and soda. It is better to have a nutritious high-calorie food (such as an avocado) than a food with few nutrients (such as a bag of chips).  · Know how many calories are in the foods you eat most often. This way, you do not have to look up how many calories they have each time you eat them.  · Look out for foods that may seem like low-calorie foods but are really high-calorie foods, such as baked goods, soda, and fat-free candy.  · Pay attention to calories in drinks. Drinks such as sodas, specialty coffee drinks, alcohol, and juices have a lot of calories yet do not fill you up. Choose low-calorie drinks like water and diet drinks.  · Focus your calorie counting efforts on higher calorie items. Logging the calories in a garden salad that contains only vegetables is less important than calculating the calories in a milk shake.  · Find a way of tracking calories that works for you. Get creative. Most people who are successful find ways to keep track of how much they eat in a day, even if they do not count every calorie.  WHAT ARE SOME PORTION CONTROL TIPS?  · Know how many calories are in a serving. This will help you know how many servings of a certain food you can have.  · Use a  measuring cup to measure serving sizes. This is helpful when you start out. With time, you will be able to estimate serving sizes for some foods.  · Take some time to put servings of different foods on your favorite plates, bowls, and cups so you know what a serving looks like.  · Try not to eat straight from a bag or box. Doing this can lead to overeating. Put the amount you would like to eat in a cup or on a plate to make sure you are eating the right portion.  · Use smaller plates, glasses, and bowls to prevent overeating. This is a quick and easy way to practice portion control. If your plate is smaller, less food can fit on it.  · Try not to multitask while eating, such as watching TV or using your computer. If it is time to eat, sit down at a table and enjoy your food. Doing this will help you to start recognizing when you are full. It will also make you more aware of what and how much you are eating.  HOW CAN I CALORIE COUNT WHEN EATING OUT?  · Ask for smaller portion sizes or child-sized portions.  · Consider sharing an entree and sides instead of getting your own entree.  · If you get your own entree, eat only half. Ask for a box at the beginning of your meal and put the rest of your entree in it so you are not tempted to eat it.  · Look for the calories on the menu. If calories are listed, choose the lower calorie options.  · Choose dishes that include vegetables, fruits, whole grains, low-fat dairy products, and lean protein. Focusing on smart food choices from each of the 5 food groups can help you stay on track at restaurants.  · Choose items that are boiled, broiled, grilled, or steamed.  · Choose water, milk, unsweetened iced tea, or other drinks without added sugars. If you want an alcoholic beverage, choose a lower calorie option. For example, a regular martha can have up to 700 calories and a glass of wine has around 150.  · Stay away from items that are buttered, battered, fried, or served with  "cream sauce. Items labeled \"crispy\" are usually fried, unless stated otherwise.  · Ask for dressings, sauces, and syrups on the side. These are usually very high in calories, so do not eat much of them.  · Watch out for salads. Many people think salads are a healthy option, but this is often not the case. Many salads come with sullivan, fried chicken, lots of cheese, fried chips, and dressing. All of these items have a lot of calories. If you want a salad, choose a garden salad and ask for grilled meats or steak. Ask for the dressing on the side, or ask for olive oil and vinegar or lemon to use as dressing.  · Estimate how many servings of a food you are given. For example, a serving of cooked rice is ½ cup or about the size of half a tennis ball or one cupcake wrapper. Knowing serving sizes will help you be aware of how much food you are eating at restaurants. The list below tells you how big or small some common portion sizes are based on everyday objects.    1 oz--4 stacked dice.    3 oz--1 deck of cards.    1 tsp--1 dice.    1 Tbsp--½ a Ping-Pong ball.    2 Tbsp--1 Ping-Pong ball.    ½ cup--1 tennis ball or 1 cupcake wrapper.    1 cup--1 baseball.     This information is not intended to replace advice given to you by your health care provider. Make sure you discuss any questions you have with your health care provider.     Document Released: 12/18/2006 Document Revised: 01/08/2016 Document Reviewed: 10/23/2014  [employer] Interactive Patient Education ©2017 [employer]    "

## 2017-10-03 NOTE — PROGRESS NOTES
Subjective        History of Present Illness     Lois Parmar is a 67 y.o. female who presents for 6-month follow up on hypothyroidism, hyperlipidemia, hypertriglyceridemia, impaired fasting glucose, osteoarthritis of the knees and hips among other issues complicated by obesity. She continues on CPAP for sleep apnea.  She continues to follow with Dr. Boyle, cardiologist.           Last DEXA was completed 03/2014 with normal results.   She decided to wait and have DEXA repeated in 2018.       When she was here six months ago, she reported legs cramps in bilateral lower extremities, for which I recommended she take an OTC magnesium supplement once daily.  She takes breaks from statin therapy episodically due to myalgia symptoms.  she reports the muscle cramps and myalgia symptoms are all better.    Six months ago, I had her decrease OTC vitamin D to 5000 units daily.  Vitamin D is at goal on her current oral supplement at 40.1.     The patient's relevant past medical, surgical, and social history was reviewed in Epic.   Lab results are reviewed with the patient today.  CBC unremarkable.   Fasting glucose 96.  A1c is 5.5.  Total cholesterol 172. HDL 39.  LDL 93.  Triglycerides 198. Magnesium 1.8.  Vitamin B-12 at goal at 807.  Liver and renal function normal.  Free T4 level is low at 0.6.  TSH is 3.6.     Review of Systems   Constitutional: Negative for chills, fatigue and fever.   HENT: Negative for congestion, ear pain, postnasal drip, sinus pressure and sore throat.    Respiratory: Negative for cough, shortness of breath and wheezing.    Cardiovascular: Negative for chest pain, palpitations and leg swelling.   Gastrointestinal: Negative for abdominal pain, blood in stool, constipation, diarrhea, nausea and vomiting.   Endocrine: Negative for cold intolerance, heat intolerance, polydipsia and polyuria.   Genitourinary: Negative for dysuria, frequency, hematuria and urgency.   Skin: Negative for rash.  "  Neurological: Negative for syncope and weakness.      Objective     /88  Pulse 80  Temp 98.8 °F (37.1 °C) (Oral)   Ht 61\" (154.9 cm)  Wt 247 lb (112 kg)  BMI 46.67 kg/m2    Physical Exam   Constitutional: She is oriented to person, place, and time. She appears well-developed and well-nourished. No distress.   HENT:   Head: Normocephalic and atraumatic.   Nose: Right sinus exhibits no maxillary sinus tenderness and no frontal sinus tenderness. Left sinus exhibits no maxillary sinus tenderness and no frontal sinus tenderness.   Mouth/Throat: Uvula is midline, oropharynx is clear and moist and mucous membranes are normal. No oral lesions. No tonsillar exudate.   Eyes: Conjunctivae and EOM are normal. Pupils are equal, round, and reactive to light.   Neck: Trachea normal. Neck supple. No JVD present. Carotid bruit is not present. No tracheal deviation present. No thyroid mass and no thyromegaly present.   Cardiovascular: Normal rate, regular rhythm and normal heart sounds.   No extrasystoles are present. PMI is not displaced.    No murmur heard.  Pulmonary/Chest: Effort normal and breath sounds normal. No accessory muscle usage. No respiratory distress. She has no decreased breath sounds. She has no wheezes. She has no rhonchi. She has no rales.   Abdominal: Soft. Bowel sounds are normal. She exhibits no distension. There is no hepatosplenomegaly. There is no tenderness.       Vascular Status -  Her exam exhibits right foot vasculature normal. Her exam exhibits no right foot edema. Her exam exhibits left foot vasculature normal. Her exam exhibits no left foot edema.  Lymphadenopathy:     She has no cervical adenopathy.   Neurological: She is alert and oriented to person, place, and time. No cranial nerve deficit. Coordination normal.   Skin: Skin is warm, dry and intact. No rash noted. No cyanosis. Nails show no clubbing.   Psychiatric: She has a normal mood and affect. Her speech is normal and behavior is " normal. Thought content normal.   Vitals reviewed.          Assessment/Plan      Continue simvastatin and dietary efforts.    Continue metformin and the low carbohydrate diet.  Increase/resume walking for exercise.    Continue the oral iron in the oral B12 replacement.  Continue the magnesium oral replacement.    Continue the Diovan HCT.  Pursue sodium restriction and weight loss.    Her hypothyroidism appears to be undertreated currently.  I reviewed the results with the patient.  She reports that Nuvia Mohan is someone who works in Dr. Clifton's office.  The patient reports that Nuvia Mohan is now managing her hypothyroidism.  She stopped the Synthroid and is treating her with [place] Thyroid.  I suggested that she notify this person of her current labs in order to receive the appropriate medication adjustments.  I briefly discussed the controversy regarding using [place] Thyroid versus levothyroxine.  The patient reports that she thinks she feels better on the [place] Thyroid.  This same individual and Dr. Clifton's office has also started her on progesterone, apparently in the form of a subcutaneous injection.  She is not receiving any estrogen replacement, which I believe would be inappropriate at her age of 67.  I asked her if Dr. kovacs, her gynecologist is aware of this, and she is not sure.  I suggested she discuss this with Dr. Kovacs.    Continue other medications and vitamin and mineral supplements to treat additional medical problems which we addressed today.  She will return in six months for follow up with fasting labs one week prior.     Pursue more aggressive diet, exercise, and weight loss efforts.  Written literature regarding diet and weight loss included in patient's AVS today.      Scribed for Dr. Warner by Adore Marrero, [facility].     Diagnoses and all orders for this visit:    Mixed hyperlipidemia  -     LDL Cholesterol, Direct; Future    Mild intermittent asthma without complication    Essential  hypertension  -     CBC Auto Differential; Future  -     Comprehensive Metabolic Panel; Future    Acquired hypothyroidism - Now on [place] Thyroid managed by Nuvia Head at Dr. 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  -     metFORMIN (FORTAMET) 1000 MG (OSM) 24 hr tablet; Take 1,000 mg by mouth 2 (Two) Times a Day.      Lab on 09/25/2017   Component Date Value Ref Range Status   • Vitamin B-12 09/25/2017 807  239 - 931 pg/mL Final   • Ferritin 09/25/2017 16.30  11.10 - 264.00 ng/mL Final   • WBC 09/25/2017 7.49  3.20 - 9.80 10*3/mm3 Final   • RBC 09/25/2017 3.78  3.77 - 5.16 10*6/mm3 Final   • Hemoglobin 09/25/2017 11.8* 12.0 - 15.5 g/dL Final   • Hematocrit 09/25/2017 35.3  35.0 - 45.0 % Final   • MCV 09/25/2017 93.4  80.0 - 98.0 fL Final   • MCH 09/25/2017 31.2  26.5 - 34.0 pg Final   • MCHC 09/25/2017 33.4  31.4 - 36.0 g/dL Final   • RDW 09/25/2017 14.4  11.5 - 14.5 % Final   • RDW-SD 09/25/2017 47.6* 36.4 - 46.3 fl Final   • MPV 09/25/2017 9.1  8.0 - 12.0 fL Final   • Platelets 09/25/2017 290  150 - 450 10*3/mm3 Final   • Neutrophil % 09/25/2017 71.7  37.0 - 80.0 % Final   • Lymphocyte % 09/25/2017 15.9  10.0 - 50.0 % Final   • Monocyte % 09/25/2017 7.1  0.0 - 12.0 % Final   • Eosinophil % 09/25/2017 4.8  0.0 - 7.0 % Final   • Basophil % 09/25/2017 0.5  0.0 - 2.0 % Final   • Neutrophils, Absolute 09/25/2017 5.37  2.00 - 8.60 10*3/mm3 Final   • Lymphocytes, Absolute 09/25/2017 1.19  0.60 - 4.20 10*3/mm3 Final   • Monocytes, Absolute 09/25/2017 0.53   0.00 - 0.90 10*3/mm3 Final   • Eosinophils, Absolute 09/25/2017 0.36  0.00 - 0.70 10*3/mm3 Final   • Basophils, Absolute 09/25/2017 0.04  0.00 - 0.20 10*3/mm3 Final   • 25 Hydroxy, Vitamin D 09/25/2017 40.1  30.0 - 100.0 ng/ml Final   • Magnesium 09/25/2017 1.8  1.8 - 2.5 mg/dL Final   • Hemoglobin A1C 09/25/2017 5.5  4 - 5.6 % Final   • Total Cholesterol 09/25/2017 172  150 - 200 mg/dL Final   • Triglycerides 09/25/2017 198* 35 - 160 mg/dL Final   • HDL Cholesterol 09/25/2017 39  35 - 100 mg/dL Final   • LDL Cholesterol  09/25/2017 93  mg/dL Final   • VLDL Cholesterol 09/25/2017 39.6  mg/dL Final   • LDL/HDL Ratio 09/25/2017 2.39   Final   • Glucose 09/25/2017 96  70 - 100 mg/dL Final   • BUN 09/25/2017 24  8 - 25 mg/dL Final   • Creatinine 09/25/2017 0.90  0.40 - 1.30 mg/dL Final   • Sodium 09/25/2017 139  134 - 146 mmol/L Final   • Potassium 09/25/2017 4.3  3.4 - 5.4 mmol/L Final   • Chloride 09/25/2017 101  100 - 112 mmol/L Final   • CO2 09/25/2017 28.0  20.0 - 32.0 mmol/L Final   • Calcium 09/25/2017 9.4  8.4 - 10.8 mg/dL Final   • Total Protein 09/25/2017 7.1  6.7 - 8.2 g/dL Final   • Albumin 09/25/2017 3.90  3.20 - 5.50 g/dL Final   • ALT (SGPT) 09/25/2017 12  10 - 60 U/L Final   • AST (SGOT) 09/25/2017 18  10 - 60 U/L Final   • Alkaline Phosphatase 09/25/2017 35  15 - 121 U/L Final   • Total Bilirubin 09/25/2017 0.5  0.2 - 1.0 mg/dL Final   • eGFR Non  Amer 09/25/2017 62  45 - 104 mL/min/1.73 Final   • Globulin 09/25/2017 3.2  2.5 - 4.6 gm/dL Final   • A/G Ratio 09/25/2017 1.2  1.0 - 3.0 g/dL Final   • BUN/Creatinine Ratio 09/25/2017 26.7* 7.0 - 25.0 Final   • Anion Gap 09/25/2017 10.0  5.0 - 15.0 mmol/L Final   • TSH 09/25/2017 3.620  0.460 - 4.680 mIU/mL Final   • Free T4 09/25/2017 0.61* 0.78 - 2.19 ng/dL Final   ]

## 2017-10-04 ENCOUNTER — OFFICE VISIT (OUTPATIENT)
Dept: FAMILY MEDICINE CLINIC | Facility: CLINIC | Age: 67
End: 2017-10-04

## 2017-10-04 ENCOUNTER — CLINICAL SUPPORT (OUTPATIENT)
Dept: FAMILY MEDICINE CLINIC | Facility: CLINIC | Age: 67
End: 2017-10-04

## 2017-10-04 VITALS
SYSTOLIC BLOOD PRESSURE: 140 MMHG | DIASTOLIC BLOOD PRESSURE: 88 MMHG | WEIGHT: 247 LBS | HEIGHT: 61 IN | TEMPERATURE: 98.8 F | HEART RATE: 80 BPM | BODY MASS INDEX: 46.63 KG/M2

## 2017-10-04 DIAGNOSIS — E03.9 ACQUIRED HYPOTHYROIDISM: Chronic | ICD-10-CM

## 2017-10-04 DIAGNOSIS — E66.01 MORBID OBESITY (HCC): Chronic | ICD-10-CM

## 2017-10-04 DIAGNOSIS — E83.42 HYPOMAGNESEMIA: Chronic | ICD-10-CM

## 2017-10-04 DIAGNOSIS — R73.01 FASTING HYPERGLYCEMIA: Chronic | ICD-10-CM

## 2017-10-04 DIAGNOSIS — I10 ESSENTIAL HYPERTENSION: Chronic | ICD-10-CM

## 2017-10-04 DIAGNOSIS — D53.1 MEGALOBLASTIC ANEMIA: Chronic | ICD-10-CM

## 2017-10-04 DIAGNOSIS — G47.33 OBSTRUCTIVE SLEEP APNEA SYNDROME: Chronic | ICD-10-CM

## 2017-10-04 DIAGNOSIS — Z23 PNEUMOCOCCAL VACCINATION ADMINISTERED AT CURRENT VISIT: ICD-10-CM

## 2017-10-04 DIAGNOSIS — Z23 FLU VACCINE NEED: Primary | ICD-10-CM

## 2017-10-04 DIAGNOSIS — D50.8 IRON DEFICIENCY ANEMIA SECONDARY TO INADEQUATE DIETARY IRON INTAKE: Chronic | ICD-10-CM

## 2017-10-04 DIAGNOSIS — E55.9 VITAMIN D DEFICIENCY: ICD-10-CM

## 2017-10-04 DIAGNOSIS — J45.20 MILD INTERMITTENT ASTHMA WITHOUT COMPLICATION: Chronic | ICD-10-CM

## 2017-10-04 DIAGNOSIS — E78.2 MIXED HYPERLIPIDEMIA: Primary | Chronic | ICD-10-CM

## 2017-10-04 PROCEDURE — 90662 IIV NO PRSV INCREASED AG IM: CPT | Performed by: INTERNAL MEDICINE

## 2017-10-04 PROCEDURE — 90670 PCV13 VACCINE IM: CPT | Performed by: INTERNAL MEDICINE

## 2017-10-04 PROCEDURE — 99214 OFFICE O/P EST MOD 30 MIN: CPT | Performed by: INTERNAL MEDICINE

## 2017-10-04 PROCEDURE — G0008 ADMIN INFLUENZA VIRUS VAC: HCPCS | Performed by: INTERNAL MEDICINE

## 2017-10-04 PROCEDURE — G0009 ADMIN PNEUMOCOCCAL VACCINE: HCPCS | Performed by: INTERNAL MEDICINE

## 2017-10-04 RX ORDER — METFORMIN HYDROCHLORIDE EXTENDED-RELEASE TABLETS 1000 MG/1
1000 TABLET, FILM COATED, EXTENDED RELEASE ORAL 2 TIMES DAILY
COMMUNITY
Start: 2017-09-12 | End: 2018-04-11 | Stop reason: ALTCHOICE

## 2017-10-04 RX ORDER — MULTIVITAMIN WITH IRON
1 TABLET ORAL 2 TIMES DAILY
COMMUNITY
End: 2018-08-16

## 2017-11-27 RX ORDER — VALSARTAN AND HYDROCHLOROTHIAZIDE 320; 25 MG/1; MG/1
1 TABLET, FILM COATED ORAL DAILY
Qty: 90 TABLET | Refills: 3 | Status: SHIPPED | OUTPATIENT
Start: 2017-11-27 | End: 2018-07-24 | Stop reason: ALTCHOICE

## 2017-11-27 RX ORDER — LANSOPRAZOLE 30 MG
30 CAPSULE,DELAYED RELEASE (ENTERIC COATED) ORAL DAILY
Qty: 90 CAPSULE | Refills: 3 | Status: SHIPPED | OUTPATIENT
Start: 2017-11-27 | End: 2018-04-04 | Stop reason: SDUPTHER

## 2018-02-28 ENCOUNTER — TRANSCRIBE ORDERS (OUTPATIENT)
Dept: LAB | Facility: OTHER | Age: 68
End: 2018-02-28

## 2018-02-28 DIAGNOSIS — R53.83 OTHER FATIGUE: Primary | ICD-10-CM

## 2018-04-02 RX ORDER — SIMVASTATIN 40 MG
40 TABLET ORAL NIGHTLY
Qty: 90 TABLET | Refills: 0 | Status: SHIPPED | OUTPATIENT
Start: 2018-04-02 | End: 2018-05-10 | Stop reason: SDUPTHER

## 2018-04-04 ENCOUNTER — LAB (OUTPATIENT)
Dept: LAB | Facility: OTHER | Age: 68
End: 2018-04-04

## 2018-04-04 DIAGNOSIS — I10 ESSENTIAL HYPERTENSION: Chronic | ICD-10-CM

## 2018-04-04 DIAGNOSIS — E78.2 MIXED HYPERLIPIDEMIA: Chronic | ICD-10-CM

## 2018-04-04 DIAGNOSIS — D50.8 IRON DEFICIENCY ANEMIA SECONDARY TO INADEQUATE DIETARY IRON INTAKE: Chronic | ICD-10-CM

## 2018-04-04 DIAGNOSIS — E83.42 HYPOMAGNESEMIA: Chronic | ICD-10-CM

## 2018-04-04 DIAGNOSIS — R53.83 OTHER FATIGUE: ICD-10-CM

## 2018-04-04 DIAGNOSIS — E03.9 ACQUIRED HYPOTHYROIDISM: Chronic | ICD-10-CM

## 2018-04-04 DIAGNOSIS — E55.9 VITAMIN D DEFICIENCY: ICD-10-CM

## 2018-04-04 DIAGNOSIS — D53.1 MEGALOBLASTIC ANEMIA: Chronic | ICD-10-CM

## 2018-04-04 LAB
25(OH)D3 SERPL-MCNC: 35.8 NG/ML (ref 30–100)
ALBUMIN SERPL-MCNC: 4.3 G/DL (ref 3.2–5.5)
ALBUMIN/GLOB SERPL: 1.2 G/DL (ref 1–3)
ALP SERPL-CCNC: 44 U/L (ref 15–121)
ALT SERPL W P-5'-P-CCNC: 12 U/L (ref 10–60)
ANION GAP SERPL CALCULATED.3IONS-SCNC: 11 MMOL/L (ref 5–15)
ARTICHOKE IGE QN: 104 MG/DL (ref 0–129)
AST SERPL-CCNC: 20 U/L (ref 10–60)
BASOPHILS # BLD AUTO: 0.03 10*3/MM3 (ref 0–0.2)
BASOPHILS NFR BLD AUTO: 0.4 % (ref 0–2)
BILIRUB SERPL-MCNC: 0.5 MG/DL (ref 0.2–1)
BUN BLD-MCNC: 22 MG/DL (ref 8–25)
BUN/CREAT SERPL: 24.4 (ref 7–25)
CALCIUM SPEC-SCNC: 9.4 MG/DL (ref 8.4–10.8)
CHLORIDE SERPL-SCNC: 99 MMOL/L (ref 100–112)
CO2 SERPL-SCNC: 26 MMOL/L (ref 20–32)
CREAT BLD-MCNC: 0.9 MG/DL (ref 0.4–1.3)
DEPRECATED RDW RBC AUTO: 46.7 FL (ref 36.4–46.3)
EOSINOPHIL # BLD AUTO: 0.23 10*3/MM3 (ref 0–0.7)
EOSINOPHIL NFR BLD AUTO: 2.8 % (ref 0–7)
ERYTHROCYTE [DISTWIDTH] IN BLOOD BY AUTOMATED COUNT: 14.4 % (ref 11.5–14.5)
FERRITIN SERPL-MCNC: 12.5 NG/ML (ref 11.1–264)
FSH SERPL-ACNC: 9.3 MIU/ML
GFR SERPL CREATININE-BSD FRML MDRD: 62 ML/MIN/1.73 (ref 45–104)
GLOBULIN UR ELPH-MCNC: 3.5 GM/DL (ref 2.5–4.6)
GLUCOSE BLD-MCNC: 89 MG/DL (ref 70–100)
HCT VFR BLD AUTO: 37.8 % (ref 35–45)
HGB BLD-MCNC: 12.7 G/DL (ref 12–15.5)
LYMPHOCYTES # BLD AUTO: 1.28 10*3/MM3 (ref 0.6–4.2)
LYMPHOCYTES NFR BLD AUTO: 15.8 % (ref 10–50)
MAGNESIUM SERPL-MCNC: 1.7 MG/DL (ref 1.8–2.5)
MCH RBC QN AUTO: 30.5 PG (ref 26.5–34)
MCHC RBC AUTO-ENTMCNC: 33.6 G/DL (ref 31.4–36)
MCV RBC AUTO: 90.9 FL (ref 80–98)
MONOCYTES # BLD AUTO: 0.56 10*3/MM3 (ref 0–0.9)
MONOCYTES NFR BLD AUTO: 6.9 % (ref 0–12)
NEUTROPHILS # BLD AUTO: 6.01 10*3/MM3 (ref 2–8.6)
NEUTROPHILS NFR BLD AUTO: 74.1 % (ref 37–80)
PLATELET # BLD AUTO: 360 10*3/MM3 (ref 150–450)
PMV BLD AUTO: 8.7 FL (ref 8–12)
POTASSIUM BLD-SCNC: 3.9 MMOL/L (ref 3.4–5.4)
PROT SERPL-MCNC: 7.8 G/DL (ref 6.7–8.2)
RBC # BLD AUTO: 4.16 10*6/MM3 (ref 3.77–5.16)
SODIUM BLD-SCNC: 136 MMOL/L (ref 134–146)
T4 FREE SERPL-MCNC: 0.6 NG/DL (ref 0.78–2.19)
TSH SERPL DL<=0.05 MIU/L-ACNC: 3.24 MIU/ML (ref 0.46–4.68)
VIT B12 BLD-MCNC: 742 PG/ML (ref 239–931)
WBC NRBC COR # BLD: 8.11 10*3/MM3 (ref 3.2–9.8)

## 2018-04-04 PROCEDURE — 82670 ASSAY OF TOTAL ESTRADIOL: CPT | Performed by: NURSE PRACTITIONER

## 2018-04-04 PROCEDURE — 82728 ASSAY OF FERRITIN: CPT | Performed by: INTERNAL MEDICINE

## 2018-04-04 PROCEDURE — 84443 ASSAY THYROID STIM HORMONE: CPT | Performed by: INTERNAL MEDICINE

## 2018-04-04 PROCEDURE — 84144 ASSAY OF PROGESTERONE: CPT | Performed by: NURSE PRACTITIONER

## 2018-04-04 PROCEDURE — 36415 COLL VENOUS BLD VENIPUNCTURE: CPT | Performed by: INTERNAL MEDICINE

## 2018-04-04 PROCEDURE — 84403 ASSAY OF TOTAL TESTOSTERONE: CPT | Performed by: NURSE PRACTITIONER

## 2018-04-04 PROCEDURE — 82306 VITAMIN D 25 HYDROXY: CPT | Performed by: INTERNAL MEDICINE

## 2018-04-04 PROCEDURE — 83735 ASSAY OF MAGNESIUM: CPT | Performed by: INTERNAL MEDICINE

## 2018-04-04 PROCEDURE — 82607 VITAMIN B-12: CPT | Performed by: INTERNAL MEDICINE

## 2018-04-04 PROCEDURE — 83721 ASSAY OF BLOOD LIPOPROTEIN: CPT | Performed by: INTERNAL MEDICINE

## 2018-04-04 PROCEDURE — 80053 COMPREHEN METABOLIC PANEL: CPT | Performed by: INTERNAL MEDICINE

## 2018-04-04 PROCEDURE — 85025 COMPLETE CBC W/AUTO DIFF WBC: CPT | Performed by: INTERNAL MEDICINE

## 2018-04-04 PROCEDURE — 84481 FREE ASSAY (FT-3): CPT | Performed by: NURSE PRACTITIONER

## 2018-04-04 PROCEDURE — 83001 ASSAY OF GONADOTROPIN (FSH): CPT | Performed by: NURSE PRACTITIONER

## 2018-04-04 PROCEDURE — 84439 ASSAY OF FREE THYROXINE: CPT | Performed by: INTERNAL MEDICINE

## 2018-04-04 RX ORDER — SPIRONOLACTONE 25 MG/1
TABLET ORAL
Qty: 90 TABLET | Refills: 1 | Status: SHIPPED | OUTPATIENT
Start: 2018-04-04 | End: 2018-10-17 | Stop reason: SDUPTHER

## 2018-04-04 RX ORDER — LANSOPRAZOLE 30 MG
CAPSULE,DELAYED RELEASE (ENTERIC COATED) ORAL
Qty: 90 CAPSULE | Refills: 1 | Status: SHIPPED | OUTPATIENT
Start: 2018-04-04 | End: 2018-11-28 | Stop reason: SDUPTHER

## 2018-04-06 LAB
ESTRADIOL SERPL HS-MCNC: 136.1 PG/ML
PROGEST SERPL-MCNC: 5 NG/ML
T3FREE SERPL-MCNC: 4.2 PG/ML (ref 2–4.4)
TESTOST SERPL-MCNC: 164 NG/DL (ref 3–41)

## 2018-04-10 NOTE — PROGRESS NOTES
"Subjective        History of Present Illness     Lois Parmar is a 68 y.o. female who presents for 6-month follow up on hypothyroidism, hyperlipidemia, hypertriglyceridemia, osteoarthritis of the knees and hips among other issues complicated by obesity. She continues on CPAP for sleep apnea.  She continues to follow with Dr. Boyle, cardiologist.  She reports compliance with CPAP for sleep apnea.  She has had a history of impaired fasting glucose, but has been able to resolve this currently with reducing carbohydrates.        She struggle with osteoarthritis of the knees.  She had left TKR in 12/2015.  She has had injections of the right knee by Dr. Augustine, which helps relieve pain.     She is having left sided cervical pain radiating down to the level of the left of the elbow.  She describes the pain as \"throbbing pain\"  Pain is producing muscle spasm.  Symptoms are consistent with cervical DJD.  She had surgery in the past by Dr. Cooper.   She has used a topical essential oil, Deep Blue, with some temporary relief of pain.  We will initiate conservative measures with NSAIDs, muscle relaxers, and get x-rays of cervical spine.      Blood pressure is slightly above goal today.  She reports blood pressure normally runs at goal at home.  Weight is up 3 pounds over the past six months.  She plans on going back to the gym and resume water aerobics, which was successful with weight loss in the past.  She normally stops during the winter months.      When she was here six months ago, her hypothyroidism appeared to be undertreated.  I reviewed the results with the patient and she reported that Nuvia Mohan is someone who works in Dr. Clifton's office and she was managing her hypothyroidism.  She stopped the Synthroid and is treating her with Thelma Thyroid.  I suggested that she notify her of her abnormal labs in order to receive the appropriate medication adjustments.  She saw Nuvia Mohan yesterday for a follow up.  Her " "thyroid    She has some eczematous patches of skin on left anterior martinez, for which Dr. Boyle referred her to dermatology.  I asked her to have the dermatologist send a copy of the office note.      She takes iron supplement three days weekly, which has improved her hemoglobin.  She has some occasional bright red blood with stools,, but reports this is usually with bowel movement       She continues on progesterone and continues to follow with Dr. Kovacs for gynecological issues.    Last DEXA was completed 03/2014 with normal results.   She is due a repeat DEXA.   She reports her sisters all have abnormal bone density.      The patient's relevant past medical, surgical, and social history was reviewed in Epic.   Lab results are reviewed with the patient today.  CBC and CMP unremarkable.  Renal and liver function normal.  . Magnesium low normal at 1.7.     Review of Systems   Constitutional: Negative for chills, fatigue and fever.   HENT: Negative for congestion, ear pain, postnasal drip, sinus pressure and sore throat.    Respiratory: Negative for cough, shortness of breath and wheezing.    Cardiovascular: Negative for chest pain, palpitations and leg swelling.   Gastrointestinal: Negative for abdominal pain, blood in stool, constipation, diarrhea, nausea and vomiting.   Endocrine: Negative for cold intolerance, heat intolerance, polydipsia and polyuria.   Genitourinary: Negative for dysuria, frequency, hematuria and urgency.   Skin: Negative for rash.   Neurological: Negative for syncope and weakness.        Objective     Vitals:    04/11/18 1051   BP: 150/90   Pulse: 68   Temp: 98.5 °F (36.9 °C)   TempSrc: Oral   Weight: 114 kg (250 lb 9.6 oz)   Height: 154.9 cm (61\")     Physical Exam   Constitutional: She is oriented to person, place, and time. She appears well-developed and well-nourished. No distress.   Morbid obese female.    HENT:   Head: Normocephalic and atraumatic.   Nose: Right sinus exhibits no " maxillary sinus tenderness and no frontal sinus tenderness. Left sinus exhibits no maxillary sinus tenderness and no frontal sinus tenderness.   Mouth/Throat: Uvula is midline, oropharynx is clear and moist and mucous membranes are normal. No oral lesions. No tonsillar exudate.   Eyes: Conjunctivae and EOM are normal. Pupils are equal, round, and reactive to light.   Neck: Trachea normal. Neck supple. No JVD present. Carotid bruit is not present. No tracheal deviation present. No thyroid mass and no thyromegaly present.   Cardiovascular: Normal rate, regular rhythm, normal heart sounds and intact distal pulses.   No extrasystoles are present. PMI is not displaced.    No murmur heard.  Pulmonary/Chest: Effort normal and breath sounds normal. No accessory muscle usage. No respiratory distress. She has no decreased breath sounds. She has no wheezes. She has no rhonchi. She has no rales.   Abdominal: Soft. Bowel sounds are normal. She exhibits no distension. There is no hepatosplenomegaly. There is no tenderness.   Obese abdomen limits exam.      Vascular Status -  Her right foot exhibits normal foot vasculature  and no edema. Her left foot exhibits normal foot vasculature  and no edema.  Lymphadenopathy:     She has no cervical adenopathy.   Neurological: She is alert and oriented to person, place, and time. No cranial nerve deficit. Coordination normal.   Skin: Skin is warm, dry and intact. No rash noted. No cyanosis. Nails show no clubbing.   Psychiatric: She has a normal mood and affect. Her speech is normal and behavior is normal. Judgment and thought content normal.   Vitals reviewed.      Assessment/Plan      We will initiate conservative measures for the cervical DJD.  She will get x-rays of the cervical spine today.  A prescription is sent for Mobic 15 mg q.d. To take one q.d. with food for up to a month and Zanaflex 4 mg to take 1/2 to 1 q.h.s. p.r.n. muscle spasm.  I demonstrated home exercises for deep  tendon massage to help relieve the cervical muscle spasm.  If no improvement with conservative issues, we will get an MRI.     We will continue to follow the magnesium level, which is low end of normal.      Continue simvastatin and dietary efforts.       Continue metformin and the low carbohydrate diet.  I recommended she resume the water aerobics for exercise.     Continue the oral iron and oral vitamin B12 replacement.       Continue the Diovan HCT.  Pursue sodium restriction and weight loss.    An order is sent for her to schedule repeat DEXA.      Continue other medications and vitamin and mineral supplements to treat additional medical problems which we addressed today.      Return in six months for follow up with fasting labs one week prior.       Scribed for Dr. Warner by Adore Marrero [facility].     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  -     DULoxetine (CYMBALTA) 60 MG capsule; Take 1 capsule by mouth Daily. Brand name only  -     tiZANidine (ZANAFLEX) 4 MG  tablet; 1/2-1 qhs prn muscle spasms  -     meloxicam (MOBIC) 15 MG tablet; Take 1 tablet by mouth Daily As Needed (pain). Take with food        Lab on 04/04/2018   Component Date Value Ref Range Status   • Vitamin B-12 04/04/2018 742  239 - 931 pg/mL Final   • Glucose 04/04/2018 89  70 - 100 mg/dL Final   • BUN 04/04/2018 22  8 - 25 mg/dL Final   • Creatinine 04/04/2018 0.90  0.40 - 1.30 mg/dL Final   • Sodium 04/04/2018 136  134 - 146 mmol/L Final   • Potassium 04/04/2018 3.9  3.4 - 5.4 mmol/L Final   • Chloride 04/04/2018 99* 100 - 112 mmol/L Final   • CO2 04/04/2018 26.0  20.0 - 32.0 mmol/L Final   • Calcium 04/04/2018 9.4  8.4 - 10.8 mg/dL Final   • Total Protein 04/04/2018 7.8  6.7 - 8.2 g/dL Final   • Albumin 04/04/2018 4.30  3.20 - 5.50 g/dL Final   • ALT (SGPT) 04/04/2018 12  10 - 60 U/L Final   • AST (SGOT) 04/04/2018 20  10 - 60 U/L Final   • Alkaline Phosphatase 04/04/2018 44  15 - 121 U/L Final   • Total Bilirubin 04/04/2018 0.5  0.2 - 1.0 mg/dL Final   • eGFR Non African Amer 04/04/2018 62  45 - 104 mL/min/1.73 Final   • Globulin 04/04/2018 3.5  2.5 - 4.6 gm/dL Final   • A/G Ratio 04/04/2018 1.2  1.0 - 3.0 g/dL Final   • BUN/Creatinine Ratio 04/04/2018 24.4  7.0 - 25.0 Final   • Anion Gap 04/04/2018 11.0  5.0 - 15.0 mmol/L Final   • Ferritin 04/04/2018 12.50  11.10 - 264.00 ng/mL Final   • LDL Cholesterol  04/04/2018 104  0 - 129 mg/dL Final   • Magnesium 04/04/2018 1.7* 1.8 - 2.5 mg/dL Final   • 25 Hydroxy, Vitamin D 04/04/2018 35.8  30.0 - 100.0 ng/ml Final   • Free T4 04/04/2018 0.60* 0.78 - 2.19 ng/dL Final   • TSH 04/04/2018 3.240  0.460 - 4.680 mIU/mL Final   • Estradiol 04/06/2018 136.1  pg/mL Final   • Progesterone 04/06/2018 5.0  ng/mL Final   • Testosterone, Total 04/06/2018 164* 3 - 41 ng/dL Final   • T3, Free 04/06/2018 4.2  2.0 - 4.4 pg/mL Final   • FSH 04/04/2018 9.30  mIU/mL Final   • WBC 04/04/2018 8.11  3.20 - 9.80 10*3/mm3 Final   • RBC 04/04/2018 4.16  3.77 - 5.16 10*6/mm3 Final   •  Hemoglobin 04/04/2018 12.7  12.0 - 15.5 g/dL Final   • Hematocrit 04/04/2018 37.8  35.0 - 45.0 % Final   • MCV 04/04/2018 90.9  80.0 - 98.0 fL Final   • MCH 04/04/2018 30.5  26.5 - 34.0 pg Final   • MCHC 04/04/2018 33.6  31.4 - 36.0 g/dL Final   • RDW 04/04/2018 14.4  11.5 - 14.5 % Final   • RDW-SD 04/04/2018 46.7* 36.4 - 46.3 fl Final   • MPV 04/04/2018 8.7  8.0 - 12.0 fL Final   • Platelets 04/04/2018 360  150 - 450 10*3/mm3 Final   • Neutrophil % 04/04/2018 74.1  37.0 - 80.0 % Final   • Lymphocyte % 04/04/2018 15.8  10.0 - 50.0 % Final   • Monocyte % 04/04/2018 6.9  0.0 - 12.0 % Final   • Eosinophil % 04/04/2018 2.8  0.0 - 7.0 % Final   • Basophil % 04/04/2018 0.4  0.0 - 2.0 % Final   • Neutrophils, Absolute 04/04/2018 6.01  2.00 - 8.60 10*3/mm3 Final   • Lymphocytes, Absolute 04/04/2018 1.28  0.60 - 4.20 10*3/mm3 Final   • Monocytes, Absolute 04/04/2018 0.56  0.00 - 0.90 10*3/mm3 Final   • Eosinophils, Absolute 04/04/2018 0.23  0.00 - 0.70 10*3/mm3 Final   • Basophils, Absolute 04/04/2018 0.03  0.00 - 0.20 10*3/mm3 Final   ]

## 2018-04-11 ENCOUNTER — OFFICE VISIT (OUTPATIENT)
Dept: FAMILY MEDICINE CLINIC | Facility: CLINIC | Age: 68
End: 2018-04-11

## 2018-04-11 VITALS
DIASTOLIC BLOOD PRESSURE: 90 MMHG | WEIGHT: 250.6 LBS | HEART RATE: 68 BPM | BODY MASS INDEX: 47.31 KG/M2 | HEIGHT: 61 IN | TEMPERATURE: 98.5 F | SYSTOLIC BLOOD PRESSURE: 150 MMHG

## 2018-04-11 DIAGNOSIS — E03.9 ACQUIRED HYPOTHYROIDISM: Chronic | ICD-10-CM

## 2018-04-11 DIAGNOSIS — G47.33 OBSTRUCTIVE SLEEP APNEA SYNDROME: Chronic | ICD-10-CM

## 2018-04-11 DIAGNOSIS — D53.1 MEGALOBLASTIC ANEMIA: Chronic | ICD-10-CM

## 2018-04-11 DIAGNOSIS — E55.9 VITAMIN D DEFICIENCY: Chronic | ICD-10-CM

## 2018-04-11 DIAGNOSIS — E78.2 MIXED HYPERLIPIDEMIA: Primary | Chronic | ICD-10-CM

## 2018-04-11 DIAGNOSIS — E83.42 HYPOMAGNESEMIA: Chronic | ICD-10-CM

## 2018-04-11 DIAGNOSIS — D50.8 IRON DEFICIENCY ANEMIA SECONDARY TO INADEQUATE DIETARY IRON INTAKE: Chronic | ICD-10-CM

## 2018-04-11 DIAGNOSIS — M47.812 DJD (DEGENERATIVE JOINT DISEASE), CERVICAL: ICD-10-CM

## 2018-04-11 DIAGNOSIS — I10 ESSENTIAL HYPERTENSION: Chronic | ICD-10-CM

## 2018-04-11 DIAGNOSIS — E66.01 MORBID OBESITY (HCC): Chronic | ICD-10-CM

## 2018-04-11 DIAGNOSIS — Z13.820 OSTEOPOROSIS SCREENING: ICD-10-CM

## 2018-04-11 DIAGNOSIS — M62.838 MUSCLE SPASMS OF NECK: ICD-10-CM

## 2018-04-11 PROCEDURE — 99214 OFFICE O/P EST MOD 30 MIN: CPT | Performed by: INTERNAL MEDICINE

## 2018-04-11 RX ORDER — DULOXETIN HYDROCHLORIDE 60 MG/1
60 CAPSULE, DELAYED RELEASE ORAL DAILY
Qty: 14 CAPSULE | Refills: 0 | Status: SHIPPED | OUTPATIENT
Start: 2018-04-11 | End: 2018-08-16 | Stop reason: SDUPTHER

## 2018-04-11 RX ORDER — TIZANIDINE 4 MG/1
TABLET ORAL
Qty: 30 TABLET | Refills: 0 | Status: SHIPPED | OUTPATIENT
Start: 2018-04-11 | End: 2018-08-16

## 2018-04-11 RX ORDER — DULOXETIN HYDROCHLORIDE 60 MG/1
60 CAPSULE, DELAYED RELEASE ORAL DAILY
Qty: 90 CAPSULE | Refills: 3 | Status: SHIPPED | OUTPATIENT
Start: 2018-04-11 | End: 2019-04-03 | Stop reason: SDUPTHER

## 2018-04-11 RX ORDER — DULOXETIN HYDROCHLORIDE 60 MG/1
60 CAPSULE, DELAYED RELEASE ORAL DAILY
Qty: 30 CAPSULE | Refills: 0 | Status: SHIPPED | OUTPATIENT
Start: 2018-04-11 | End: 2018-08-16 | Stop reason: SDUPTHER

## 2018-04-11 RX ORDER — METFORMIN HYDROCHLORIDE 500 MG/1
1000 TABLET, EXTENDED RELEASE ORAL 2 TIMES DAILY
COMMUNITY
End: 2019-04-26 | Stop reason: DRUGHIGH

## 2018-04-11 RX ORDER — MELOXICAM 15 MG/1
15 TABLET ORAL DAILY PRN
Qty: 30 TABLET | Refills: 0 | Status: SHIPPED | OUTPATIENT
Start: 2018-04-11 | End: 2018-08-16

## 2018-04-11 NOTE — PATIENT INSTRUCTIONS
Exercising to Lose Weight  Exercising can help you to lose weight. In order to lose weight through exercise, you need to do vigorous-intensity exercise. You can tell that you are exercising with vigorous intensity if you are breathing very hard and fast and cannot hold a conversation while exercising.  Moderate-intensity exercise helps to maintain your current weight. You can tell that you are exercising at a moderate level if you have a higher heart rate and faster breathing, but you are still able to hold a conversation.  How often should I exercise?  Choose an activity that you enjoy and set realistic goals. Your health care provider can help you to make an activity plan that works for you. Exercise regularly as directed by your health care provider. This may include:  · Doing resistance training twice each week, such as:  ¨ Push-ups.  ¨ Sit-ups.  ¨ Lifting weights.  ¨ Using resistance bands.  · Doing a given intensity of exercise for a given amount of time. Choose from these options:  ¨ 150 minutes of moderate-intensity exercise every week.  ¨ 75 minutes of vigorous-intensity exercise every week.  ¨ A mix of moderate-intensity and vigorous-intensity exercise every week.  Children, pregnant women, people who are out of shape, people who are overweight, and older adults may need to consult a health care provider for individual recommendations. If you have any sort of medical condition, be sure to consult your health care provider before starting a new exercise program.  What are some activities that can help me to lose weight?  · Walking at a rate of at least 4.5 miles an hour.  · Jogging or running at a rate of 5 miles per hour.  · Biking at a rate of at least 10 miles per hour.  · Lap swimming.  · Roller-skating or in-line skating.  · Cross-country skiing.  · Vigorous competitive sports, such as football, basketball, and soccer.  · Jumping rope.  · Aerobic dancing.  How can I be more active in my day-to-day  activities?  · Use the stairs instead of the elevator.  · Take a walk during your lunch break.  · If you drive, park your car farther away from work or school.  · If you take public transportation, get off one stop early and walk the rest of the way.  · Make all of your phone calls while standing up and walking around.  · Get up, stretch, and walk around every 30 minutes throughout the day.  What guidelines should I follow while exercising?  · Do not exercise so much that you hurt yourself, feel dizzy, or get very short of breath.  · Consult your health care provider prior to starting a new exercise program.  · Wear comfortable clothes and shoes with good support.  · Drink plenty of water while you exercise to prevent dehydration or heat stroke. Body water is lost during exercise and must be replaced.  · Work out until you breathe faster and your heart beats faster.  This information is not intended to replace advice given to you by your health care provider. Make sure you discuss any questions you have with your health care provider.  Document Released: 01/20/2012 Document Revised: 05/25/2017 Document Reviewed: 05/21/2015  [employer] Interactive Patient Education © 2017 [employer]      Calorie Counting for Weight Loss  Calories are units of energy. Your body needs a certain amount of calories from food to keep you going throughout the day. When you eat more calories than your body needs, your body stores the extra calories as fat. When you eat fewer calories than your body needs, your body burns fat to get the energy it needs.  Calorie counting means keeping track of how many calories you eat and drink each day. Calorie counting can be helpful if you need to lose weight. If you make sure to eat fewer calories than your body needs, you should lose weight. Ask your health care provider what a healthy weight is for you.  For calorie counting to work, you will need to eat the right number of calories in a day in order  to lose a healthy amount of weight per week. A dietitian can help you determine how many calories you need in a day and will give you suggestions on how to reach your calorie goal.  · A healthy amount of weight to lose per week is usually 1-2 lb (0.5-0.9 kg). This usually means that your daily calorie intake should be reduced by 500-750 calories.  · Eating 1,200 - 1,500 calories per day can help most women lose weight.  · Eating 1,500 - 1,800 calories per day can help most men lose weight.  What is my plan?  My goal is to have __________ calories per day.  If I have this many calories per day, I should lose around __________ pounds per week.  What do I need to know about calorie counting?  In order to meet your daily calorie goal, you will need to:  · Find out how many calories are in each food you would like to eat. Try to do this before you eat.  · Decide how much of the food you plan to eat.  · Write down what you ate and how many calories it had. Doing this is called keeping a food log.  To successfully lose weight, it is important to balance calorie counting with a healthy lifestyle that includes regular activity. Aim for 150 minutes of moderate exercise (such as walking) or 75 minutes of vigorous exercise (such as running) each week.  Where do I find calorie information?     The number of calories in a food can be found on a Nutrition Facts label. If a food does not have a Nutrition Facts label, try to look up the calories online or ask your dietitian for help.  Remember that calories are listed per serving. If you choose to have more than one serving of a food, you will have to multiply the calories per serving by the amount of servings you plan to eat. For example, the label on a package of bread might say that a serving size is 1 slice and that there are 90 calories in a serving. If you eat 1 slice, you will have eaten 90 calories. If you eat 2 slices, you will have eaten 180 calories.  How do I keep a food  "log?  Immediately after each meal, record the following information in your food log:  · What you ate. Don't forget to include toppings, sauces, and other extras on the food.  · How much you ate. This can be measured in cups, ounces, or number of items.  · How many calories each food and drink had.  · The total number of calories in the meal.  Keep your food log near you, such as in a small notebook in your pocket, or use a mobile shad or website. Some programs will calculate calories for you and show you how many calories you have left for the day to meet your goal.  What are some calorie counting tips?  · Use your calories on foods and drinks that will fill you up and not leave you hungry:  ¨ Some examples of foods that fill you up are nuts and nut butters, vegetables, lean proteins, and high-fiber foods like whole grains. High-fiber foods are foods with more than 5 g fiber per serving.  ¨ Drinks such as sodas, specialty coffee drinks, alcohol, and juices have a lot of calories, yet do not fill you up.  · Eat nutritious foods and avoid empty calories. Empty calories are calories you get from foods or beverages that do not have many vitamins or protein, such as candy, sweets, and soda. It is better to have a nutritious high-calorie food (such as an avocado) than a food with few nutrients (such as a bag of chips).  · Know how many calories are in the foods you eat most often. This will help you calculate calorie counts faster.  · Pay attention to calories in drinks. Low-calorie drinks include water and unsweetened drinks.  · Pay attention to nutrition labels for \"low fat\" or \"fat free\" foods. These foods sometimes have the same amount of calories or more calories than the full fat versions. They also often have added sugar, starch, or salt, to make up for flavor that was removed with the fat.  · Find a way of tracking calories that works for you. Get creative. Try different apps or programs if writing down calories " does not work for you.  What are some portion control tips?  · Know how many calories are in a serving. This will help you know how many servings of a certain food you can have.  · Use a measuring cup to measure serving sizes. You could also try weighing out portions on a kitchen scale. With time, you will be able to estimate serving sizes for some foods.  · Take some time to put servings of different foods on your favorite plates, bowls, and cups so you know what a serving looks like.  · Try not to eat straight from a bag or box. Doing this can lead to overeating. Put the amount you would like to eat in a cup or on a plate to make sure you are eating the right portion.  · Use smaller plates, glasses, and bowls to prevent overeating.  · Try not to multitask (for example, watch TV or use your computer) while eating. If it is time to eat, sit down at a table and enjoy your food. This will help you to know when you are full. It will also help you to be aware of what you are eating and how much you are eating.  What are tips for following this plan?  Reading food labels   · Check the calorie count compared to the serving size. The serving size may be smaller than what you are used to eating.  · Check the source of the calories. Make sure the food you are eating is high in vitamins and protein and low in saturated and trans fats.  Shopping   · Read nutrition labels while you shop. This will help you make healthy decisions before you decide to purchase your food.  · Make a grocery list and stick to it.  Cooking   · Try to cook your favorite foods in a healthier way. For example, try baking instead of frying.  · Use low-fat dairy products.  Meal planning   · Use more fruits and vegetables. Half of your plate should be fruits and vegetables.  · Include lean proteins like poultry and fish.  How do I count calories when eating out?  · Ask for smaller portion sizes.  · Consider sharing an entree and sides instead of getting  your own entree.  · If you get your own entree, eat only half. Ask for a box at the beginning of your meal and put the rest of your entree in it so you are not tempted to eat it.  · If calories are listed on the menu, choose the lower calorie options.  · Choose dishes that include vegetables, fruits, whole grains, low-fat dairy products, and lean protein.  · Choose items that are boiled, broiled, grilled, or steamed. Stay away from items that are buttered, battered, fried, or served with cream sauce. Items labeled “crispy” are usually fried, unless stated otherwise.  · Choose water, low-fat milk, unsweetened iced tea, or other drinks without added sugar. If you want an alcoholic beverage, choose a lower calorie option such as a glass of wine or light beer.  · Ask for dressings, sauces, and syrups on the side. These are usually high in calories, so you should limit the amount you eat.  · If you want a salad, choose a garden salad and ask for grilled meats. Avoid extra toppings like sullivan, cheese, or fried items. Ask for the dressing on the side, or ask for olive oil and vinegar or lemon to use as dressing.  · Estimate how many servings of a food you are given. For example, a serving of cooked rice is ½ cup or about the size of half a baseball. Knowing serving sizes will help you be aware of how much food you are eating at restaurants. The list below tells you how big or small some common portion sizes are based on everyday objects:  ¨ 1 oz--4 stacked dice.  ¨ 3 oz--1 deck of cards.  ¨ 1 tsp--1 die.  ¨ 1 Tbsp--½ a ping-pong ball.  ¨ 2 Tbsp--1 ping-pong ball.  ¨ ½ cup--½ baseball.  ¨ 1 cup--1 baseball.  Summary  · Calorie counting means keeping track of how many calories you eat and drink each day. If you eat fewer calories than your body needs, you should lose weight.  · A healthy amount of weight to lose per week is usually 1-2 lb (0.5-0.9 kg). This usually means reducing your daily calorie intake by 500-750  calories.  · The number of calories in a food can be found on a Nutrition Facts label. If a food does not have a Nutrition Facts label, try to look up the calories online or ask your dietitian for help.  · Use your calories on foods and drinks that will fill you up, and not on foods and drinks that will leave you hungry.  · Use smaller plates, glasses, and bowls to prevent overeating.  This information is not intended to replace advice given to you by your health care provider. Make sure you discuss any questions you have with your health care provider.  Document Released: 12/18/2006 Document Revised: 11/17/2017 Document Reviewed: 11/17/2017  [employer] Patient Education © 2017 [employer]

## 2018-05-11 RX ORDER — SIMVASTATIN 40 MG
TABLET ORAL
Qty: 90 TABLET | Refills: 3 | Status: SHIPPED | OUTPATIENT
Start: 2018-05-11 | End: 2020-04-24 | Stop reason: SDUPTHER

## 2018-07-24 RX ORDER — OLMESARTAN MEDOXOMIL AND HYDROCHLOROTHIAZIDE 40/25 40; 25 MG/1; MG/1
1 TABLET ORAL DAILY
Qty: 90 TABLET | Refills: 0 | Status: SHIPPED | OUTPATIENT
Start: 2018-07-24 | End: 2020-10-13 | Stop reason: ALTCHOICE

## 2018-08-16 ENCOUNTER — OFFICE VISIT (OUTPATIENT)
Dept: ORTHOPEDIC SURGERY | Facility: CLINIC | Age: 68
End: 2018-08-16

## 2018-08-16 VITALS — BODY MASS INDEX: 49.07 KG/M2 | WEIGHT: 259.9 LBS | HEIGHT: 61 IN

## 2018-08-16 DIAGNOSIS — M25.552 HIP PAIN, ACUTE, LEFT: ICD-10-CM

## 2018-08-16 DIAGNOSIS — M25.561 CHRONIC PAIN OF BOTH KNEES: ICD-10-CM

## 2018-08-16 DIAGNOSIS — M25.512 ACUTE PAIN OF BOTH SHOULDERS: Primary | ICD-10-CM

## 2018-08-16 DIAGNOSIS — G89.29 CHRONIC PAIN OF BOTH KNEES: ICD-10-CM

## 2018-08-16 DIAGNOSIS — Z96.652 PRESENCE OF TOTAL KNEE JOINT PROSTHESIS, LEFT: ICD-10-CM

## 2018-08-16 DIAGNOSIS — M25.562 CHRONIC PAIN OF BOTH KNEES: ICD-10-CM

## 2018-08-16 DIAGNOSIS — M25.511 ACUTE PAIN OF BOTH SHOULDERS: Primary | ICD-10-CM

## 2018-08-16 DIAGNOSIS — M70.62 TROCHANTERIC BURSITIS OF LEFT HIP: ICD-10-CM

## 2018-08-16 DIAGNOSIS — M25.551 HIP PAIN, ACUTE, RIGHT: ICD-10-CM

## 2018-08-16 PROCEDURE — 99214 OFFICE O/P EST MOD 30 MIN: CPT | Performed by: NURSE PRACTITIONER

## 2018-08-16 PROCEDURE — 20610 DRAIN/INJ JOINT/BURSA W/O US: CPT | Performed by: NURSE PRACTITIONER

## 2018-08-16 RX ORDER — MELOXICAM 15 MG/1
15 TABLET ORAL DAILY
Qty: 30 TABLET | Refills: 1 | Status: SHIPPED | OUTPATIENT
Start: 2018-08-16 | End: 2018-09-19 | Stop reason: SDUPTHER

## 2018-08-16 RX ORDER — ACETAMINOPHEN 500 MG
500 TABLET ORAL EVERY 6 HOURS PRN
COMMUNITY
End: 2019-04-26 | Stop reason: ALTCHOICE

## 2018-08-16 RX ADMIN — LIDOCAINE HYDROCHLORIDE 2 ML: 20 INJECTION, SOLUTION INFILTRATION; PERINEURAL at 12:15

## 2018-08-16 RX ADMIN — TRIAMCINOLONE ACETONIDE 80 MG: 40 INJECTION, SUSPENSION INTRA-ARTICULAR; INTRAMUSCULAR at 12:15

## 2018-08-16 NOTE — PROGRESS NOTES
Lois Parmar is a 68 y.o. female   Primary provider:  Marshal Warner MD       Chief Complaint   Patient presents with   • Left Knee - Pain   • Right Knee - Pain   • Left Hip - Pain   • Right Hip - Pain   • Left Shoulder - Pain   • Right Shoulder - Pain   • Establish Care       HISTORY OF PRESENT ILLNESS:      68-year-old female patient presents to office for evaluation of pain in her bilateral shoulders, pain in her bilateral hips and pain in her bilateral knees after sustaining a fall on a  concrete porch 3 weeks ago.  Patient has a history of a left total knee arthroplasty.  She has also had prior rotator cuff repair in the right shoulder.  Patient states her pain has not really improved since her fall 3 weeks ago.  She is especially having pain in the left hip.  Pain is described as constant and severe.  Pain is described as aching in nature.  Pain is worse with standing, walking, lying on her left side and palpation of the left hip.  Pain improves mildly with rest and ice therapy.  Patient has also been taking Aleve for the past 2 weeks with some mild improvement.      Pain   This is a new problem. The current episode started 1 to 4 weeks ago (About 3 weeks ago, fall on a concrete porch). The problem occurs constantly. The problem has been unchanged. Associated symptoms include arthralgias, joint swelling, myalgias and neck pain. Associated symptoms comments: Aching pain. The symptoms are aggravated by standing and walking (palpation of left hip, lying on left side). She has tried ice, rest and NSAIDs for the symptoms. The treatment provided mild relief.        CONCURRENT MEDICAL HISTORY:    Past Medical History:   Diagnosis Date   • Acquired hypothyroidism - On Synthroid    • Allergic rhinitis    • Anemia    • Asthma - mild intermittent    • Cardiac disease    • Carpal tunnel syndrome - Bilateral    • Colitis    • Degeneration of cervical intervertebral disc    • Diabetes (CMS/HCC)    • Disorder of lumbar  spine    • Essential hypertension    • Fatigue    • Hyperlipidemia - Improved with Zocor    • Impaired fasting glycaemia    • Iron deficiency anemia - Improved    • Lumbar radiculopathy    • Megaloblastic anemia due to vitamin B>12< deficiency    • Morbid obesity (CMS/HCC)    • Osteoarthritis of knee - Bilateral    • Osteoarthritis of multiple joints - Left knee    • Osteoporosis    • Pain in left hip    • Sleep apnea - On CPAP    • Spasm of back muscles    • Thyroid dysfunction    • Vitamin D deficiency        Allergies   Allergen Reactions   • Antihistamines, Chlorpheniramine-Type    • Codeine      Heart race   • Latex    • Lortab [Hydrocodone-Acetaminophen]      Hallucinations   • Other      Decongestants: Heart Race   • Tape      Skin sores         Current Outpatient Prescriptions:   •  acetaminophen (TYLENOL) 500 MG tablet, Take 500 mg by mouth Every 6 (Six) Hours As Needed for Mild Pain ., Disp: , Rfl:   •  aspirin 81 MG chewable tablet, Chew 81 mg Daily., Disp: , Rfl:   •  Calcium Carb-Cholecalciferol (CALCIUM 600 + D) 600-200 MG-UNIT tablet, 2 tablets daily., Disp: , Rfl:   •  coenzyme Q10 100 MG capsule, Take 100 mg by mouth Daily., Disp: , Rfl:   •  DULoxetine (CYMBALTA) 60 MG capsule, Take 1 capsule by mouth Daily. Brand name only, Disp: 90 capsule, Rfl: 3  •  Ferrous Sulfate (IRON) 325 (65 FE) MG tablet, Take 1 tablet by mouth., Disp: , Rfl:   •  glucose blood test strip, tests BID, Diagnosis: 250.00, 401.9, Disp: , Rfl:   •  loratadine (CLARITIN) 10 MG tablet, Take 10 mg by mouth daily., Disp: , Rfl:   •  metFORMIN ER (GLUCOPHAGE-XR) 500 MG 24 hr tablet, Take 500 mg by mouth 2 (Two) Times a Day., Disp: , Rfl:   •  olmesartan-hydrochlorothiazide (BENICAR HCT) 40-25 MG per tablet, Take 1 tablet by mouth Daily., Disp: 90 tablet, Rfl: 0  •  Omega-3 Fatty Acids (FISH OIL) 1000 MG capsule capsule, Take 1,000 mg by mouth 2 (Two) Times a Day With Meals., Disp: , Rfl:   •  PREVACID 30 MG capsule, TAKE 1 CAPSULE  EVERY MORNING, Disp: 90 capsule, Rfl: 1  •  progesterone (PROMETRIUM) 200 MG capsule, Take 200 mg by mouth 2 (Two) Times a Day., Disp: , Rfl:   •  raNITIdine (ZANTAC) 150 MG tablet, TAKE 1 TABLET BY MOUTH EVERY NIGHT., Disp: 90 tablet, Rfl: 3  •  simvastatin (ZOCOR) 40 MG tablet, TAKE 1 TABLET EVERY NIGHT, Disp: 90 tablet, Rfl: 3  •  spironolactone (ALDACTONE) 25 MG tablet, TAKE 1 TABLET EVERY DAY  FOR  FLUID, Disp: 90 tablet, Rfl: 1  •  Testosterone (TESTOPEL) 75 MG implant pellet, by Implant route 1 (One) Time., Disp: , Rfl:   •  thyroid 60 MG PO tablet, Take 60 mg by mouth Daily., Disp: , Rfl:   •  vitamin B-12 (CYANOCOBALAMIN) 500 MCG tablet, Take 500 mcg by mouth daily., Disp: , Rfl:   •  vitamin d (CHOLECALIFEROL) 5000 UNITS capsule, Take 1 capsule by mouth Daily., Disp: 30 capsule, Rfl: 11  •  meloxicam (MOBIC) 15 MG tablet, Take 1 tablet by mouth Daily for 30 days., Disp: 30 tablet, Rfl: 1    Past Surgical History:   Procedure Laterality Date   • CARPAL TUNNEL RELEASE Right    • ENDOSCOPY  09/17/2012    Normal esophagus.  Gastritis.  Normal duodenum   • ENDOSCOPY AND COLONOSCOPY  09/17/2012    Internal & external hemorrhoids found.   • HYSTERECTOMY     • KNEE SURGERY  12/07/2015    Left total knee arthroplasty.   • LUMBAR LAMINECTOMY  2011    s/p lumbar laminectomy and fusion   • NECK SURGERY     • OOPHORECTOMY     • SHOULDER SURGERY  12/07/2015    Arthroscopy of the left shoudler with rotator cuff repair, Vijay procedure, and subacromial decompression.   • SHOULDER SURGERY Right        Family History   Problem Relation Age of Onset   • Breast cancer Mother    • Ovarian cancer Mother    • Breast cancer Sister    • Breast cancer Other    • Heart disease Other    • Hypertension Other    • Lung disease Other    • Diabetes Other    • Cancer Other        Social History     Social History   • Marital status:      Spouse name: N/A   • Number of children: N/A   • Years of education: N/A     Occupational  "History   • Not on file.     Social History Main Topics   • Smoking status: Never Smoker   • Smokeless tobacco: Never Used   • Alcohol use No   • Drug use: No   • Sexual activity: Not on file     Other Topics Concern   • Not on file     Social History Narrative   • No narrative on file        Review of Systems   Constitutional: Negative.    HENT: Positive for hearing loss and postnasal drip.    Eyes: Negative.    Respiratory: Negative.    Cardiovascular: Negative.    Gastrointestinal: Negative.    Endocrine: Negative.    Genitourinary: Negative.    Musculoskeletal: Positive for arthralgias, joint swelling, myalgias and neck pain.        Stiffness, muscle pain. Right shoulder pain. Left shoulder pain. Right hip pain. Left hip pain. Right knee pain. Left hip pain.    Skin: Negative.    Allergic/Immunologic: Negative.    Neurological: Negative.    Hematological: Negative.    Psychiatric/Behavioral: Positive for sleep disturbance.       PHYSICAL EXAMINATION:       Ht 154.9 cm (61\")   Wt 118 kg (259 lb 14.4 oz)   BMI 49.11 kg/m²     Physical Exam   Constitutional: She is oriented to person, place, and time. Vital signs are normal. She appears well-developed and well-nourished. She is active and cooperative. She does not appear ill. No distress.   HENT:   Head: Normocephalic.   Pulmonary/Chest: Effort normal. No respiratory distress.   Abdominal: Soft. She exhibits no distension.   Musculoskeletal: She exhibits edema (Mild, right knee) and tenderness (Right knee (mild), Left hip (moderate)). She exhibits no deformity.        Right knee: She exhibits no effusion.        Left knee: She exhibits no effusion.   Neurological: She is alert and oriented to person, place, and time. GCS eye subscore is 4. GCS verbal subscore is 5. GCS motor subscore is 6.   Skin: Skin is warm, dry and intact. Capillary refill takes less than 2 seconds. No erythema.   Psychiatric: She has a normal mood and affect. Her speech is normal and " behavior is normal. Judgment and thought content normal. Cognition and memory are normal.   Vitals reviewed.      GAIT:     []  Normal  [x]  Antalgic    Assistive device: [x]  None  []  Walker     []  Crutches  []  Cane     []  Wheelchair  []  Stretcher    Right Knee Exam     Tenderness   The patient is experiencing tenderness in the medial joint line (Diffuse (mild)).    Range of Motion   Extension: 0   Flexion: 110     Muscle Strength     The patient has normal right knee strength.    Tests   Malinda:  Medial - negative Lateral - negative  Varus: negative  Valgus: negative    Other   Erythema: absent  Sensation: normal  Pulse: present  Swelling: mild  Other tests: no effusion present    Comments:  Mild pain and crepitus with range of motion. No effusion.  Mild swelling.  Stable joint exam.      Left Knee Exam     Tenderness   The patient is experiencing no tenderness.         Range of Motion   Extension: 0   Flexion: 120     Muscle Strength     The patient has normal left knee strength.    Other   Erythema: absent  Scars: present ( well-healed surgical scar to the anterior knee)  Sensation: normal  Pulse: present  Swelling: mild  Effusion: no effusion present    Comments:  No pain with range of motion.  No effusion.  Mild swelling.  Surgical incision is well-healed.  Stable joint exam.      Right Hip Exam     Tenderness   The patient is experiencing no tenderness.         Range of Motion   The patient has normal right hip ROM.    Muscle Strength   The patient has normal right hip strength.    Tests   CHRISTI: negative  Fadir:  Negative FADIR test    Other   Erythema: absent  Sensation: normal  Pulse: present    Comments:  No pain with range of motion.  Stable joint exam.      Left Hip Exam     Tenderness   The patient is experiencing tenderness in the lateral and greater trochanter.    Range of Motion   The patient has normal left hip ROM.    Muscle Strength   The patient has normal left hip strength.     Tests    CHRISTI: negative  Fadir:  Negative FADIR test    Other   Erythema: absent  Sensation: normal  Pulse: present    Comments:  Mild/minimal pain with range of motion, elicited primarily in the lateral hip.  Stable joint exam.      Right Shoulder Exam     Tenderness   The patient is experiencing tenderness in the acromioclavicular joint (Mild, diffuse).    Range of Motion   Active Abduction: 140   Passive Abduction: 160   Forward Flexion: 170   External Rotation: 80   Internal Rotation 90 degrees: 70     Muscle Strength   Abduction: 4/5   Supraspinatus: 4/5     Tests   Cross Arm: positive (Mild)  Drop Arm: negative  Hawkin's test: positive (Mild)  Impingement: positive (Mild)    Comments:  Mild pain with range of motion.  Overall good motion of the shoulder.  Stable joint exam.      Left Shoulder Exam     Tenderness   Left shoulder tenderness location: Diffuse, mild.    Range of Motion   Active Abduction: 140   Passive Abduction: 160   Forward Flexion: 170   External Rotation: 80   Internal Rotation 90 degrees: 70     Muscle Strength   The patient has normal left shoulder strength.    Tests   Cross Arm: negative  Drop Arm: negative  Hawkin's test: negative  Impingement: negative    Other   Erythema: absent  Sensation: normal  Pulse: present     Comments:  Mild pain with range of motion.  Overall good motion of the shoulder.  Stable joint exam.            Large Joint Arthrocentesis  Date/Time: 8/16/2018 12:15 PM  Consent given by: patient  Site marked: site marked  Timeout: Immediately prior to procedure a time out was called to verify the correct patient, procedure, equipment, support staff and site/side marked as required   Supporting Documentation  Indications: pain   Procedure Details  Location: hip - L greater trochanteric bursa  Preparation: Patient was prepped and draped in the usual sterile fashion  Needle size: 22 G  Approach: lateral  Medications administered: 2 mL lidocaine 2%; 80 mg triamcinolone acetonide 40  MG/ML  Patient tolerance: patient tolerated the procedure well with no immediate complications          Xr Knee Bilateral Ap Standing    Result Date: 8/16/2018  Narrative: AP standing view of bilateral knees reveals postsurgical changes to the left knee status post total knee arthroplasty.  Prostheses are well-positioned with no evidence of hardware failure or loosening noted.  There are significant degenerative changes noted in the right knee with severe loss of medial compartmental joint spacing and bone-on-bone findings.  There are marginal osteophytes noted in both the lateral medial compartments of the right knee.  No significant changes are noted when compared with prior images done on 4/18/2017.  No evidence of acute fracture.  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  No acute radiologic abnormalities are noted at this time.08/16/18 at 4:30 PM by ODILIA Campoverde     Xr Knee 1 Or 2 View Bilateral    Result Date: 8/16/2018  Narrative: Lateral views of the bilateral knees reveal postsurgical changes to the left knee status post knee arthroplasty.  Prostheses are well positioned with no evidence of hardware failure or loosening noted.  No evidence of acute fracture or periprosthetic fracture is noted.  There are significant degenerative changes noted in the right knee with loss of patellofemoral joint spacing and osteophyte formations noted to the superior and inferior poles of the patella.  There are also osteophyte formations noted in the posterior compartment of the knee.  No acute radiologic abnormalities are noted at this time.  No significant changes are noted in the left knee when compared with prior images from 4/18/2017.  No prior images of the right knee are available for comparison.08/16/18 at 4:35 PM by  ODILIA Campoverde     Xr Shoulder 2+ View Bilateral    Result Date: 8/16/2018  Narrative: 3 views of the bilateral shoulders reveal no evidence of acute fracture or dislocation.  There are degenerative changes and hypertrophy noted to the acromioclavicular joint in the right shoulder.  There are milder degenerative changes noted to the acromioclavicular joint in the left shoulder with a distal clavicular spur projecting inferiorly.  Glenohumeral joint spacing is maintained bilaterally.  Visualized lung fields are clear bilaterally.  Soft tissues appear unremarkable.  No acute radiologic abnormalities are noted at this time.  No comparison images are available for review.08/16/18 at 4:41 PM by ODILIA Campoverde     Xr Hips Bilateral With Or Without Pelvis 5 View    Result Date: 8/16/2018  Narrative: AP and lateral views of the bilateral hips and AP view of the pelvis reveal no evidence of acute fracture or dislocation.  There are mild degenerative changes noted in the left hip that appear unchanged from prior imaging done on 3/29/2016.  Joint spacing is well maintained in the right hip.  There are postsurgical changes noted in the lower lumbar spine with evidence of prior fusion and hardware in place.  No significant changes are noted when compared with prior images from 3/29/2016.08/16/18 at 4:49 PM by ODILIA Campoverde         ASSESSMENT:    Diagnoses and all orders for this visit:    Acute pain of both shoulders  -     XR Shoulder 2+ View Bilateral; Future  -     Large Joint Arthrocentesis    Chronic pain of both knees  -     XR Knee 1 or 2 View Bilateral; Future  -     XR Knee Bilateral AP Standing; Future  -     Large Joint Arthrocentesis    Hip pain, acute, left  -     XR Hips Bilateral With or Without Pelvis 5 View; Future  -     Large Joint Arthrocentesis    Hip pain, acute, right    Trochanteric bursitis of left hip    Presence of total knee joint prosthesis, left    Other orders  -     meloxicam  (MOBIC) 15 MG tablet; Take 1 tablet by mouth Daily for 30 days.    PLAN    X-rays of bilateral knees, bilateral shoulders and bilateral hips/pelvis are reviewed today with no acute findings noted.  Patient reports she was concerned about possible fractures due to her fall, considering her pain hasn't really improved much in the past 3 weeks.  Patient is primarily complaining of left hip pain.  It hurts worse when she walks and stands.  It is also tender to palpation on exam today.  Her symptoms are consistent with trochanteric bursitis.  Recommend injection of the left hip bursa today with steroid for pain control. Her shoulder joints, hip joints and knee joints are all stable on exam today.  She is able to ambulate without difficulty. She has been taking Aleve with some mild improvement. Recommend discontinuing Aleve and starting Meloxicam daily for some consistent dosing of NSAIDs. I think this may help all her joint pain better. Discussed with patient that if the hip bursa injection helps, but she continues to have pain in her right knee and shoulders, injections may help them as well. Patient verbalizes understanding. It is mostly her left hip hurting today and she wants to wait and see how much the bursa injection helps. She was also worried about a possible fracture and about the left knee implant, which appears stable on both exam and x-ray. Recommend rest and activity modification as tolerated and based on her pain. Discussed with patient that she can progress her activity as tolerated. Also discussed use of cane for modified weight-bearing as needed. Recommend ice therapy to the left hip intermittently 3 to 4 times daily for 20 minutes at a time to minimize pain/inflammation. Follow up in 4 weeks for recheck.     Return in about 4 weeks (around 9/13/2018) for Recheck.      This document has been electronically signed by ODILIA Campoverde on August 20, 2018 11:31 AM      ODILIA Campoverde

## 2018-08-20 PROBLEM — M25.512 ACUTE PAIN OF BOTH SHOULDERS: Status: ACTIVE | Noted: 2018-08-20

## 2018-08-20 PROBLEM — M25.511 ACUTE PAIN OF BOTH SHOULDERS: Status: ACTIVE | Noted: 2018-08-20

## 2018-08-20 PROBLEM — G89.29 CHRONIC PAIN OF BOTH KNEES: Status: ACTIVE | Noted: 2018-08-20

## 2018-08-20 PROBLEM — M25.561 CHRONIC PAIN OF BOTH KNEES: Status: ACTIVE | Noted: 2018-08-20

## 2018-08-20 PROBLEM — Z96.652 PRESENCE OF TOTAL KNEE JOINT PROSTHESIS, LEFT: Status: ACTIVE | Noted: 2018-08-20

## 2018-08-20 PROBLEM — M70.62 TROCHANTERIC BURSITIS OF LEFT HIP: Status: ACTIVE | Noted: 2018-08-20

## 2018-08-20 PROBLEM — M25.562 CHRONIC PAIN OF BOTH KNEES: Status: ACTIVE | Noted: 2018-08-20

## 2018-08-20 PROBLEM — M25.551 HIP PAIN, ACUTE, RIGHT: Status: ACTIVE | Noted: 2018-08-20

## 2018-08-20 PROBLEM — M25.552 HIP PAIN, ACUTE, LEFT: Status: ACTIVE | Noted: 2018-08-20

## 2018-08-20 RX ORDER — TRIAMCINOLONE ACETONIDE 40 MG/ML
80 INJECTION, SUSPENSION INTRA-ARTICULAR; INTRAMUSCULAR
Status: COMPLETED | OUTPATIENT
Start: 2018-08-16 | End: 2018-08-16

## 2018-08-20 RX ORDER — LIDOCAINE HYDROCHLORIDE 20 MG/ML
2 INJECTION, SOLUTION INFILTRATION; PERINEURAL
Status: COMPLETED | OUTPATIENT
Start: 2018-08-16 | End: 2018-08-16

## 2018-09-19 RX ORDER — MELOXICAM 15 MG/1
TABLET ORAL
Qty: 30 TABLET | Refills: 1 | Status: SHIPPED | OUTPATIENT
Start: 2018-09-19 | End: 2018-11-26

## 2018-10-08 ENCOUNTER — LAB (OUTPATIENT)
Dept: LAB | Facility: OTHER | Age: 68
End: 2018-10-08

## 2018-10-08 DIAGNOSIS — I10 ESSENTIAL HYPERTENSION: Chronic | ICD-10-CM

## 2018-10-08 DIAGNOSIS — E83.42 HYPOMAGNESEMIA: Chronic | ICD-10-CM

## 2018-10-08 DIAGNOSIS — D53.1 MEGALOBLASTIC ANEMIA: Chronic | ICD-10-CM

## 2018-10-08 DIAGNOSIS — E78.2 MIXED HYPERLIPIDEMIA: Chronic | ICD-10-CM

## 2018-10-08 DIAGNOSIS — E55.9 VITAMIN D DEFICIENCY: Chronic | ICD-10-CM

## 2018-10-08 DIAGNOSIS — E03.9 ACQUIRED HYPOTHYROIDISM: Chronic | ICD-10-CM

## 2018-10-08 DIAGNOSIS — D50.8 IRON DEFICIENCY ANEMIA SECONDARY TO INADEQUATE DIETARY IRON INTAKE: Chronic | ICD-10-CM

## 2018-10-08 LAB
25(OH)D3 SERPL-MCNC: 37.9 NG/ML (ref 30–100)
ALBUMIN SERPL-MCNC: 4.2 G/DL (ref 3.5–5)
ALBUMIN/GLOB SERPL: 1.3 G/DL (ref 1.1–1.8)
ALP SERPL-CCNC: 37 U/L (ref 38–126)
ALT SERPL W P-5'-P-CCNC: 14 U/L
ANION GAP SERPL CALCULATED.3IONS-SCNC: 8 MMOL/L (ref 5–15)
AST SERPL-CCNC: 14 U/L (ref 14–36)
BASOPHILS # BLD AUTO: 0.02 10*3/MM3 (ref 0–0.2)
BASOPHILS NFR BLD AUTO: 0.3 % (ref 0–2)
BILIRUB SERPL-MCNC: 0.5 MG/DL (ref 0.2–1.3)
BUN BLD-MCNC: 29 MG/DL (ref 7–17)
BUN/CREAT SERPL: 25.9 (ref 7–25)
CALCIUM SPEC-SCNC: 9.7 MG/DL (ref 8.4–10.2)
CHLORIDE SERPL-SCNC: 106 MMOL/L (ref 98–107)
CHOLEST SERPL-MCNC: 137 MG/DL (ref 150–200)
CO2 SERPL-SCNC: 28 MMOL/L (ref 22–30)
CREAT BLD-MCNC: 1.12 MG/DL (ref 0.52–1.04)
DEPRECATED RDW RBC AUTO: 50.4 FL (ref 36.4–46.3)
EOSINOPHIL # BLD AUTO: 0.2 10*3/MM3 (ref 0–0.7)
EOSINOPHIL NFR BLD AUTO: 2.8 % (ref 0–7)
ERYTHROCYTE [DISTWIDTH] IN BLOOD BY AUTOMATED COUNT: 14.9 % (ref 11.5–14.5)
FERRITIN SERPL-MCNC: 9.9 NG/ML (ref 11.1–264)
GFR SERPL CREATININE-BSD FRML MDRD: 48 ML/MIN/1.73 (ref 45–104)
GLOBULIN UR ELPH-MCNC: 3.3 GM/DL (ref 2.3–3.5)
GLUCOSE BLD-MCNC: 105 MG/DL (ref 74–99)
HCT VFR BLD AUTO: 33.8 % (ref 35–45)
HDLC SERPL-MCNC: 42 MG/DL (ref 40–59)
HGB BLD-MCNC: 11 G/DL (ref 12–15.5)
LDLC SERPL CALC-MCNC: 65 MG/DL
LDLC/HDLC SERPL: 1.54 {RATIO} (ref 0–3.22)
LYMPHOCYTES # BLD AUTO: 1.15 10*3/MM3 (ref 0.6–4.2)
LYMPHOCYTES NFR BLD AUTO: 15.9 % (ref 10–50)
MAGNESIUM SERPL-MCNC: 1.7 MG/DL (ref 1.6–2.3)
MCH RBC QN AUTO: 31.5 PG (ref 26.5–34)
MCHC RBC AUTO-ENTMCNC: 32.5 G/DL (ref 31.4–36)
MCV RBC AUTO: 96.8 FL (ref 80–98)
MONOCYTES # BLD AUTO: 0.61 10*3/MM3 (ref 0–0.9)
MONOCYTES NFR BLD AUTO: 8.4 % (ref 0–12)
NEUTROPHILS # BLD AUTO: 5.24 10*3/MM3 (ref 2–8.6)
NEUTROPHILS NFR BLD AUTO: 72.6 % (ref 37–80)
PLATELET # BLD AUTO: 252 10*3/MM3 (ref 150–450)
PMV BLD AUTO: 8.4 FL (ref 8–12)
POTASSIUM BLD-SCNC: 4.6 MMOL/L (ref 3.4–5)
PROT SERPL-MCNC: 7.5 G/DL (ref 6.3–8.2)
RBC # BLD AUTO: 3.49 10*6/MM3 (ref 3.77–5.16)
SODIUM BLD-SCNC: 142 MMOL/L (ref 137–145)
T4 FREE SERPL-MCNC: 0.73 NG/DL (ref 0.78–2.19)
TRIGL SERPL-MCNC: 151 MG/DL
TSH SERPL DL<=0.05 MIU/L-ACNC: 1.39 MIU/ML (ref 0.46–4.68)
VIT B12 BLD-MCNC: 641 PG/ML (ref 239–931)
VLDLC SERPL-MCNC: 30.2 MG/DL
WBC NRBC COR # BLD: 7.22 10*3/MM3 (ref 3.2–9.8)

## 2018-10-08 PROCEDURE — 84439 ASSAY OF FREE THYROXINE: CPT | Performed by: INTERNAL MEDICINE

## 2018-10-08 PROCEDURE — 82607 VITAMIN B-12: CPT | Performed by: INTERNAL MEDICINE

## 2018-10-08 PROCEDURE — 82306 VITAMIN D 25 HYDROXY: CPT | Performed by: INTERNAL MEDICINE

## 2018-10-08 PROCEDURE — 80053 COMPREHEN METABOLIC PANEL: CPT | Performed by: INTERNAL MEDICINE

## 2018-10-08 PROCEDURE — 82728 ASSAY OF FERRITIN: CPT | Performed by: INTERNAL MEDICINE

## 2018-10-08 PROCEDURE — 83735 ASSAY OF MAGNESIUM: CPT | Performed by: INTERNAL MEDICINE

## 2018-10-08 PROCEDURE — 85025 COMPLETE CBC W/AUTO DIFF WBC: CPT | Performed by: INTERNAL MEDICINE

## 2018-10-08 PROCEDURE — 84443 ASSAY THYROID STIM HORMONE: CPT | Performed by: INTERNAL MEDICINE

## 2018-10-08 PROCEDURE — 80061 LIPID PANEL: CPT | Performed by: INTERNAL MEDICINE

## 2018-10-08 PROCEDURE — 36415 COLL VENOUS BLD VENIPUNCTURE: CPT | Performed by: INTERNAL MEDICINE

## 2018-10-17 ENCOUNTER — OFFICE VISIT (OUTPATIENT)
Dept: FAMILY MEDICINE CLINIC | Facility: CLINIC | Age: 68
End: 2018-10-17

## 2018-10-17 VITALS
HEART RATE: 60 BPM | SYSTOLIC BLOOD PRESSURE: 144 MMHG | WEIGHT: 261.6 LBS | HEIGHT: 61 IN | BODY MASS INDEX: 49.39 KG/M2 | TEMPERATURE: 98.2 F | DIASTOLIC BLOOD PRESSURE: 80 MMHG

## 2018-10-17 DIAGNOSIS — I10 ESSENTIAL HYPERTENSION: Primary | Chronic | ICD-10-CM

## 2018-10-17 DIAGNOSIS — D50.8 IRON DEFICIENCY ANEMIA SECONDARY TO INADEQUATE DIETARY IRON INTAKE: Chronic | ICD-10-CM

## 2018-10-17 DIAGNOSIS — E66.01 MORBID OBESITY (HCC): Chronic | ICD-10-CM

## 2018-10-17 DIAGNOSIS — D53.1 MEGALOBLASTIC ANEMIA: Chronic | ICD-10-CM

## 2018-10-17 DIAGNOSIS — G47.33 OBSTRUCTIVE SLEEP APNEA SYNDROME: Chronic | ICD-10-CM

## 2018-10-17 DIAGNOSIS — E78.2 MIXED HYPERLIPIDEMIA: Chronic | ICD-10-CM

## 2018-10-17 DIAGNOSIS — I25.2 HISTORY OF MYOCARDIAL INFARCT AT AGE GREATER THAN 60 YEARS: Chronic | ICD-10-CM

## 2018-10-17 DIAGNOSIS — E83.42 HYPOMAGNESEMIA: Chronic | ICD-10-CM

## 2018-10-17 DIAGNOSIS — R73.01 FASTING HYPERGLYCEMIA: Chronic | ICD-10-CM

## 2018-10-17 DIAGNOSIS — R73.01 IFG (IMPAIRED FASTING GLUCOSE): Chronic | ICD-10-CM

## 2018-10-17 DIAGNOSIS — E55.9 VITAMIN D DEFICIENCY: Chronic | ICD-10-CM

## 2018-10-17 DIAGNOSIS — E03.9 ACQUIRED HYPOTHYROIDISM: Chronic | ICD-10-CM

## 2018-10-17 PROCEDURE — 99214 OFFICE O/P EST MOD 30 MIN: CPT | Performed by: INTERNAL MEDICINE

## 2018-10-17 PROCEDURE — G0008 ADMIN INFLUENZA VIRUS VAC: HCPCS | Performed by: INTERNAL MEDICINE

## 2018-10-17 PROCEDURE — 90662 IIV NO PRSV INCREASED AG IM: CPT | Performed by: INTERNAL MEDICINE

## 2018-10-17 RX ORDER — SPIRONOLACTONE 25 MG/1
25 TABLET ORAL DAILY
Qty: 90 TABLET | Refills: 3 | Status: SHIPPED | OUTPATIENT
Start: 2018-10-17 | End: 2018-11-28 | Stop reason: SDUPTHER

## 2018-10-17 RX ORDER — LABETALOL 200 MG/1
100 TABLET, FILM COATED ORAL 2 TIMES DAILY
COMMUNITY
End: 2020-10-13 | Stop reason: DRUGHIGH

## 2018-10-17 NOTE — PROGRESS NOTES
Subjective     History of Present Illness     Lois Parmar is a 68 y.o. female who presents 6-month follow up on hypothyroidism, hyperlipidemia, hypertriglyceridemia, osteoarthritis of the knees and hips among other issues complicated by obesity.  She continues on CPAP for sleep apnea.  She continues to follow with Dr. Boyle, cardiologist    She reports onset of bilateral hip pain, shoulder, and knee pain approximately one month ago, which started after a fall.  She was seen by SHARONDA Schofield at Dr. Augustine' office who performed x-rays of bilateral knees, bilateral shoulders and bilateral hips/pelvis, which revealed no acute findings noted.   She was given a steroid injection and Mobic, which is giving some relief.     She is following with nurse practitioner at Dr. Clifton's office who is prescribing hormones including testosterone and reports she has felt much better with the therapy.  She is also following her thyroid function.  She prescribed [place] [facility].        DEXA 04/2018 reveals bone density remains within normal limits.  She is up to date on pneumonia vaccines.  She is given influenza today.     Labs reveal worsening anemia with iron drifting down with ferritin 9.9 despite oral iron three times weekly.  She has had constipation when trying to take the iron more frequently.  She denies melena or evidence of GI blood loss.   She required iron infusions in the past.   Hemoglobin has drifted down from 12.7 to 11 in the past six months.        Dr. Boyle is her cardiologist.  Patient reports Dr. Boyle states she had evidence of mild MI a couple of years ago.  Blood pressure is 144/80.  Dr. Boyle added labetaolol to her Benicar HCT.   She is having some chronic fatigue.  I recommended she contact Dr. Boyle  Weight is up 11 pounds in the past six months.     The patient's relevant past medical, surgical, and social history was reviewed in Epic.   Lab results are reviewed with the patient today.  Fasting  "glucose 105.  Liver function normal.  Thyroid low end of normal range. Total cholesterol 137.   HDL 42.  LDL 65.           Review of Systems   Constitutional: Negative for chills, fatigue and fever.   HENT: Negative for congestion, ear pain, postnasal drip, sinus pressure and sore throat.    Respiratory: Negative for cough, shortness of breath and wheezing.    Cardiovascular: Negative for chest pain, palpitations and leg swelling.   Gastrointestinal: Negative for abdominal pain, blood in stool, constipation, diarrhea, nausea and vomiting.   Endocrine: Negative for cold intolerance, heat intolerance, polydipsia and polyuria.   Genitourinary: Negative for dysuria, frequency, hematuria and urgency.   Skin: Negative for rash.   Neurological: Negative for syncope and weakness.        Objective     Vitals:    10/17/18 1422   BP: 144/80   Pulse: 60   Temp: 98.2 °F (36.8 °C)   TempSrc: Oral   Weight: 119 kg (261 lb 9.6 oz)   Height: 154.9 cm (61\")     Physical Exam   Constitutional: She is oriented to person, place, and time. She appears well-developed and well-nourished. No distress.   Obese female.    HENT:   Head: Normocephalic and atraumatic.   Nose: Right sinus exhibits no maxillary sinus tenderness and no frontal sinus tenderness. Left sinus exhibits no maxillary sinus tenderness and no frontal sinus tenderness.   Mouth/Throat: Uvula is midline, oropharynx is clear and moist and mucous membranes are normal. No oral lesions. No tonsillar exudate.   Eyes: Pupils are equal, round, and reactive to light. Conjunctivae and EOM are normal.   Neck: Trachea normal. Neck supple. No JVD present. Carotid bruit is not present. No tracheal deviation present. No thyroid mass and no thyromegaly present.   Cardiovascular: Normal rate, regular rhythm, normal heart sounds and intact distal pulses.   No extrasystoles are present. PMI is not displaced.    No murmur heard.  Pulmonary/Chest: Effort normal and breath sounds normal. No " accessory muscle usage. No respiratory distress. She has no decreased breath sounds. She has no wheezes. She has no rhonchi. She has no rales.   Abdominal: Soft. Bowel sounds are normal. She exhibits no distension. There is no hepatosplenomegaly. There is no tenderness.   Obese abdomen limits exam.      Vascular Status -  Her right foot exhibits abnormal foot edema (Trace edema bilateral ankles. ). Her right foot exhibits normal foot vasculature . Her left foot exhibits abnormal foot edema. Her left foot exhibits normal foot vasculature .  Lymphadenopathy:     She has no cervical adenopathy.   Neurological: She is alert and oriented to person, place, and time. No cranial nerve deficit. Coordination normal.   Skin: Skin is warm, dry and intact. No rash noted. No cyanosis. Nails show no clubbing.   Psychiatric: She has a normal mood and affect. Her speech is normal and behavior is normal. Judgment and thought content normal.   Vitals reviewed.        Assessment/Plan      Increase the oral iron to daily.  She can use stool softeners for any constipation resulting from the daily oral iron.  We will have her collect stool samples x 3.      Continue simvastatin and dietary efforts.      She is given influenza vaccine today.      She reports Dr. Clifton's office is managing hormones and thyroid function.  She is taking [place] Thyroid.  Her thyroid labs are not at goal.  She is given a copy of the results to take with her to her next visit.  When she returns in 3 months, I would like to talk to her about seeing an endocrinologist     Continue metformin.  Pursue low carbohydrate/low sugar diet and increase physical activity.       Continue other medications and vitamin and mineral supplements to treat additional medical problems which we addressed today. Return in three months for a recheck on the anemia.            Scribed for Dr. Warner by Adore Marrero [facility].     Diagnoses and all orders for this visit:    Essential  "hypertension    Mixed hyperlipidemia    Obstructive sleep apnea syndrome    Morbid obesity (CMS/HCC)    Vitamin D deficiency    Acquired hypothyroidism - On [place] thyroid managed by \"hormone doctor\" in Dr. 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 1/2 tab twice daily  -     spironolactone (ALDACTONE) 25 MG tablet; Take 1 tablet by mouth Daily.  -     Fluzone High Dose =>65Years        Lab on 10/08/2018   Component Date Value Ref Range Status   • Vitamin B-12 10/08/2018 641  239 - 931 pg/mL Final   • WBC 10/08/2018 7.22  3.20 - 9.80 10*3/mm3 Final   • RBC 10/08/2018 3.49* 3.77 - 5.16 10*6/mm3 Final   • Hemoglobin 10/08/2018 11.0* 12.0 - 15.5 g/dL Final   • Hematocrit 10/08/2018 33.8* 35.0 - 45.0 % Final   • MCV 10/08/2018 96.8  80.0 - 98.0 fL Final   • MCH 10/08/2018 31.5  26.5 - 34.0 pg Final   • MCHC 10/08/2018 32.5  31.4 - 36.0 g/dL Final   • RDW 10/08/2018 14.9* 11.5 - 14.5 % Final   • RDW-SD 10/08/2018 50.4* 36.4 - 46.3 fl Final   • MPV 10/08/2018 8.4  8.0 - 12.0 fL Final   • Platelets 10/08/2018 252  150 - 450 10*3/mm3 Final   • Neutrophil % 10/08/2018 72.6  37.0 - 80.0 % Final   • Lymphocyte % 10/08/2018 15.9  10.0 - 50.0 % Final   • Monocyte % 10/08/2018 8.4  0.0 - 12.0 % Final   • Eosinophil % 10/08/2018 2.8  0.0 - 7.0 % Final   • Basophil % 10/08/2018 0.3  0.0 - 2.0 % Final   • Neutrophils, Absolute 10/08/2018 5.24  2.00 - 8.60 10*3/mm3 Final   • Lymphocytes, Absolute 10/08/2018 1.15  0.60 - 4.20 10*3/mm3 Final   • Monocytes, Absolute " 10/08/2018 0.61  0.00 - 0.90 10*3/mm3 Final   • Eosinophils, Absolute 10/08/2018 0.20  0.00 - 0.70 10*3/mm3 Final   • Basophils, Absolute 10/08/2018 0.02  0.00 - 0.20 10*3/mm3 Final   • Glucose 10/08/2018 105* 74 - 99 mg/dL Final   • BUN 10/08/2018 29* 7 - 17 mg/dL Final   • Creatinine 10/08/2018 1.12* 0.52 - 1.04 mg/dL Final   • Sodium 10/08/2018 142  137 - 145 mmol/L Final   • Potassium 10/08/2018 4.6  3.4 - 5.0 mmol/L Final   • Chloride 10/08/2018 106  98 - 107 mmol/L Final   • CO2 10/08/2018 28.0  22.0 - 30.0 mmol/L Final   • Calcium 10/08/2018 9.7  8.4 - 10.2 mg/dL Final   • Total Protein 10/08/2018 7.5  6.3 - 8.2 g/dL Final   • Albumin 10/08/2018 4.20  3.50 - 5.00 g/dL Final   • ALT (SGPT) 10/08/2018 14  <=35 U/L Final   • AST (SGOT) 10/08/2018 14  14 - 36 U/L Final   • Alkaline Phosphatase 10/08/2018 37* 38 - 126 U/L Final   • Total Bilirubin 10/08/2018 0.5  0.2 - 1.3 mg/dL Final   • eGFR Non  Amer 10/08/2018 48  45 - 104 mL/min/1.73 Final   • Globulin 10/08/2018 3.3  2.3 - 3.5 gm/dL Final   • A/G Ratio 10/08/2018 1.3  1.1 - 1.8 g/dL Final   • BUN/Creatinine Ratio 10/08/2018 25.9* 7.0 - 25.0 Final   • Anion Gap 10/08/2018 8.0  5.0 - 15.0 mmol/L Final   • Ferritin 10/08/2018 9.90* 11.10 - 264.00 ng/mL Final   • Total Cholesterol 10/08/2018 137* 150 - 200 mg/dL Final   • Triglycerides 10/08/2018 151* <=150 mg/dL Final   • HDL Cholesterol 10/08/2018 42  40 - 59 mg/dL Final   • LDL Cholesterol  10/08/2018 65  <=100 mg/dL Final   • VLDL Cholesterol 10/08/2018 30.2  mg/dL Final   • LDL/HDL Ratio 10/08/2018 1.54  0.00 - 3.22 Final   • TSH 10/08/2018 1.390  0.460 - 4.680 mIU/mL Final   • Free T4 10/08/2018 0.73* 0.78 - 2.19 ng/dL Final   • 25 Hydroxy, Vitamin D 10/08/2018 37.9  30.0 - 100.0 ng/ml Final   • Magnesium 10/08/2018 1.7  1.6 - 2.3 mg/dL Final   ]

## 2018-10-17 NOTE — PATIENT INSTRUCTIONS
Exercising to Lose Weight  Exercising can help you to lose weight. In order to lose weight through exercise, you need to do vigorous-intensity exercise. You can tell that you are exercising with vigorous intensity if you are breathing very hard and fast and cannot hold a conversation while exercising.  Moderate-intensity exercise helps to maintain your current weight. You can tell that you are exercising at a moderate level if you have a higher heart rate and faster breathing, but you are still able to hold a conversation.  How often should I exercise?  Choose an activity that you enjoy and set realistic goals. Your health care provider can help you to make an activity plan that works for you. Exercise regularly as directed by your health care provider. This may include:  · Doing resistance training twice each week, such as:  ? Push-ups.  ? Sit-ups.  ? Lifting weights.  ? Using resistance bands.  · Doing a given intensity of exercise for a given amount of time. Choose from these options:  ? 150 minutes of moderate-intensity exercise every week.  ? 75 minutes of vigorous-intensity exercise every week.  ? A mix of moderate-intensity and vigorous-intensity exercise every week.    Children, pregnant women, people who are out of shape, people who are overweight, and older adults may need to consult a health care provider for individual recommendations. If you have any sort of medical condition, be sure to consult your health care provider before starting a new exercise program.  What are some activities that can help me to lose weight?  · Walking at a rate of at least 4.5 miles an hour.  · Jogging or running at a rate of 5 miles per hour.  · Biking at a rate of at least 10 miles per hour.  · Lap swimming.  · Roller-skating or in-line skating.  · Cross-country skiing.  · Vigorous competitive sports, such as football, basketball, and soccer.  · Jumping rope.  · Aerobic dancing.  How can I be more active in my day-to-day  activities?  · Use the stairs instead of the elevator.  · Take a walk during your lunch break.  · If you drive, park your car farther away from work or school.  · If you take public transportation, get off one stop early and walk the rest of the way.  · Make all of your phone calls while standing up and walking around.  · Get up, stretch, and walk around every 30 minutes throughout the day.  What guidelines should I follow while exercising?  · Do not exercise so much that you hurt yourself, feel dizzy, or get very short of breath.  · Consult your health care provider prior to starting a new exercise program.  · Wear comfortable clothes and shoes with good support.  · Drink plenty of water while you exercise to prevent dehydration or heat stroke. Body water is lost during exercise and must be replaced.  · Work out until you breathe faster and your heart beats faster.  This information is not intended to replace advice given to you by your health care provider. Make sure you discuss any questions you have with your health care provider.  Document Released: 01/20/2012 Document Revised: 05/25/2017 Document Reviewed: 05/21/2015  [employer] Interactive Patient Education © 2018 [employer]      Calorie Counting for Weight Loss  Calories are units of energy. Your body needs a certain amount of calories from food to keep you going throughout the day. When you eat more calories than your body needs, your body stores the extra calories as fat. When you eat fewer calories than your body needs, your body burns fat to get the energy it needs.  Calorie counting means keeping track of how many calories you eat and drink each day. Calorie counting can be helpful if you need to lose weight. If you make sure to eat fewer calories than your body needs, you should lose weight. Ask your health care provider what a healthy weight is for you.  For calorie counting to work, you will need to eat the right number of calories in a day in order  to lose a healthy amount of weight per week. A dietitian can help you determine how many calories you need in a day and will give you suggestions on how to reach your calorie goal.  · A healthy amount of weight to lose per week is usually 1-2 lb (0.5-0.9 kg). This usually means that your daily calorie intake should be reduced by 500-750 calories.  · Eating 1,200 - 1,500 calories per day can help most women lose weight.  · Eating 1,500 - 1,800 calories per day can help most men lose weight.    What do I need to know about calorie counting?  In order to meet your daily calorie goal, you will need to:  · Find out how many calories are in each food you would like to eat. Try to do this before you eat.  · Decide how much of the food you plan to eat.  · Write down what you ate and how many calories it had. Doing this is called keeping a food log.    To successfully lose weight, it is important to balance calorie counting with a healthy lifestyle that includes regular activity. Aim for 150 minutes of moderate exercise (such as walking) or 75 minutes of vigorous exercise (such as running) each week.  Where do I find calorie information?    The number of calories in a food can be found on a Nutrition Facts label. If a food does not have a Nutrition Facts label, try to look up the calories online or ask your dietitian for help.  Remember that calories are listed per serving. If you choose to have more than one serving of a food, you will have to multiply the calories per serving by the amount of servings you plan to eat. For example, the label on a package of bread might say that a serving size is 1 slice and that there are 90 calories in a serving. If you eat 1 slice, you will have eaten 90 calories. If you eat 2 slices, you will have eaten 180 calories.  How do I keep a food log?  Immediately after each meal, record the following information in your food log:  · What you ate. Don't forget to include toppings, sauces, and  "other extras on the food.  · How much you ate. This can be measured in cups, ounces, or number of items.  · How many calories each food and drink had.  · The total number of calories in the meal.    Keep your food log near you, such as in a small notebook in your pocket, or use a mobile shad or website. Some programs will calculate calories for you and show you how many calories you have left for the day to meet your goal.  What are some calorie counting tips?  · Use your calories on foods and drinks that will fill you up and not leave you hungry:  ? Some examples of foods that fill you up are nuts and nut butters, vegetables, lean proteins, and high-fiber foods like whole grains. High-fiber foods are foods with more than 5 g fiber per serving.  ? Drinks such as sodas, specialty coffee drinks, alcohol, and juices have a lot of calories, yet do not fill you up.  · Eat nutritious foods and avoid empty calories. Empty calories are calories you get from foods or beverages that do not have many vitamins or protein, such as candy, sweets, and soda. It is better to have a nutritious high-calorie food (such as an avocado) than a food with few nutrients (such as a bag of chips).  · Know how many calories are in the foods you eat most often. This will help you calculate calorie counts faster.  · Pay attention to calories in drinks. Low-calorie drinks include water and unsweetened drinks.  · Pay attention to nutrition labels for \"low fat\" or \"fat free\" foods. These foods sometimes have the same amount of calories or more calories than the full fat versions. They also often have added sugar, starch, or salt, to make up for flavor that was removed with the fat.  · Find a way of tracking calories that works for you. Get creative. Try different apps or programs if writing down calories does not work for you.  What are some portion control tips?  · Know how many calories are in a serving. This will help you know how many servings of " a certain food you can have.  · Use a measuring cup to measure serving sizes. You could also try weighing out portions on a kitchen scale. With time, you will be able to estimate serving sizes for some foods.  · Take some time to put servings of different foods on your favorite plates, bowls, and cups so you know what a serving looks like.  · Try not to eat straight from a bag or box. Doing this can lead to overeating. Put the amount you would like to eat in a cup or on a plate to make sure you are eating the right portion.  · Use smaller plates, glasses, and bowls to prevent overeating.  · Try not to multitask (for example, watch TV or use your computer) while eating. If it is time to eat, sit down at a table and enjoy your food. This will help you to know when you are full. It will also help you to be aware of what you are eating and how much you are eating.  What are tips for following this plan?  Reading food labels  · Check the calorie count compared to the serving size. The serving size may be smaller than what you are used to eating.  · Check the source of the calories. Make sure the food you are eating is high in vitamins and protein and low in saturated and trans fats.  Shopping  · Read nutrition labels while you shop. This will help you make healthy decisions before you decide to purchase your food.  · Make a grocery list and stick to it.  Cooking  · Try to cook your favorite foods in a healthier way. For example, try baking instead of frying.  · Use low-fat dairy products.  Meal planning  · Use more fruits and vegetables. Half of your plate should be fruits and vegetables.  · Include lean proteins like poultry and fish.  How do I count calories when eating out?  · Ask for smaller portion sizes.  · Consider sharing an entree and sides instead of getting your own entree.  · If you get your own entree, eat only half. Ask for a box at the beginning of your meal and put the rest of your entree in it so you are  not tempted to eat it.  · If calories are listed on the menu, choose the lower calorie options.  · Choose dishes that include vegetables, fruits, whole grains, low-fat dairy products, and lean protein.  · Choose items that are boiled, broiled, grilled, or steamed. Stay away from items that are buttered, battered, fried, or served with cream sauce. Items labeled “crispy” are usually fried, unless stated otherwise.  · Choose water, low-fat milk, unsweetened iced tea, or other drinks without added sugar. If you want an alcoholic beverage, choose a lower calorie option such as a glass of wine or light beer.  · Ask for dressings, sauces, and syrups on the side. These are usually high in calories, so you should limit the amount you eat.  · If you want a salad, choose a garden salad and ask for grilled meats. Avoid extra toppings like sullivan, cheese, or fried items. Ask for the dressing on the side, or ask for olive oil and vinegar or lemon to use as dressing.  · Estimate how many servings of a food you are given. For example, a serving of cooked rice is ½ cup or about the size of half a baseball. Knowing serving sizes will help you be aware of how much food you are eating at restaurants. The list below tells you how big or small some common portion sizes are based on everyday objects:  ? 1 oz--4 stacked dice.  ? 3 oz--1 deck of cards.  ? 1 tsp--1 die.  ? 1 Tbsp--½ a ping-pong ball.  ? 2 Tbsp--1 ping-pong ball.  ? ½ cup--½ baseball.  ? 1 cup--1 baseball.  Summary  · Calorie counting means keeping track of how many calories you eat and drink each day. If you eat fewer calories than your body needs, you should lose weight.  · A healthy amount of weight to lose per week is usually 1-2 lb (0.5-0.9 kg). This usually means reducing your daily calorie intake by 500-750 calories.  · The number of calories in a food can be found on a Nutrition Facts label. If a food does not have a Nutrition Facts label, try to look up the calories  online or ask your dietitian for help.  · Use your calories on foods and drinks that will fill you up, and not on foods and drinks that will leave you hungry.  · Use smaller plates, glasses, and bowls to prevent overeating.  This information is not intended to replace advice given to you by your health care provider. Make sure you discuss any questions you have with your health care provider.  Document Released: 12/18/2006 Document Revised: 11/17/2017 Document Reviewed: 11/17/2017  [employer] Interactive Patient Education © 2018 [employer]

## 2018-10-25 ENCOUNTER — LAB (OUTPATIENT)
Dept: LAB | Facility: OTHER | Age: 68
End: 2018-10-25

## 2018-10-25 DIAGNOSIS — D50.8 IRON DEFICIENCY ANEMIA SECONDARY TO INADEQUATE DIETARY IRON INTAKE: Chronic | ICD-10-CM

## 2018-10-25 LAB
COLLECT DATE SP2 STL: ABNORMAL
COLLECT DATE SP3 STL: ABNORMAL
COLLECT DATE STL: ABNORMAL
HEMOCCULT STL QL: NEGATIVE
HEMOCCULT STL QL: NEGATIVE
HEMOCCULT STL QL: POSITIVE
Lab: ABNORMAL

## 2018-10-25 PROCEDURE — 82272 OCCULT BLD FECES 1-3 TESTS: CPT | Performed by: INTERNAL MEDICINE

## 2018-10-26 ENCOUNTER — TELEPHONE (OUTPATIENT)
Dept: FAMILY MEDICINE CLINIC | Facility: CLINIC | Age: 68
End: 2018-10-26

## 2018-10-26 DIAGNOSIS — R19.5 OCCULT BLOOD POSITIVE STOOL: Primary | ICD-10-CM

## 2018-10-26 NOTE — TELEPHONE ENCOUNTER
----- Message from Marshal Warner MD sent at 10/25/2018  2:17 PM CDT -----  Inform the patient that one of her 3 stool samples were Hemoccult positive.  In light of this and the decreased hemoglobin, I recommend referral to Dr. Sutton or her surgeon of choice for repeat colonoscopy.  Last colonoscopy was in 2012.  EB

## 2018-11-06 ENCOUNTER — CONSULT (OUTPATIENT)
Dept: SURGERY | Facility: CLINIC | Age: 68
End: 2018-11-06

## 2018-11-06 VITALS
HEART RATE: 94 BPM | WEIGHT: 261 LBS | DIASTOLIC BLOOD PRESSURE: 82 MMHG | SYSTOLIC BLOOD PRESSURE: 142 MMHG | TEMPERATURE: 98.4 F | BODY MASS INDEX: 49.28 KG/M2 | HEIGHT: 61 IN

## 2018-11-06 DIAGNOSIS — R19.5 HEME POSITIVE STOOL: ICD-10-CM

## 2018-11-06 DIAGNOSIS — D64.9 ANEMIA, UNSPECIFIED TYPE: Primary | ICD-10-CM

## 2018-11-06 PROCEDURE — 99204 OFFICE O/P NEW MOD 45 MIN: CPT | Performed by: SURGERY

## 2018-11-06 RX ORDER — DEXTROSE AND SODIUM CHLORIDE 5; .45 G/100ML; G/100ML
100 INJECTION, SOLUTION INTRAVENOUS CONTINUOUS
Status: CANCELLED | OUTPATIENT
Start: 2018-11-30

## 2018-11-06 NOTE — PATIENT INSTRUCTIONS

## 2018-11-06 NOTE — PROGRESS NOTES
Subjective   Lois Parmar is a 68 y.o. female     History of Present Illness recently noted to have mild iron deficiency anemia and patient was noted to have heme positive stool on one of 3 cards.  Patient had a similar problem proximal to 6 years ago and EGD and colonoscopy at that time done by the GI service.  Has not had any workup since then.  Denies any abdominal pain.  She occasionally has some bright red bleeding per rectum thought secondary to hemorrhoids.  No family history of colon cancer no upper GI symptoms.  Not on any blood thinners        Review of Systems   Constitutional: Negative.    HENT: Positive for hearing loss.    Eyes:        Vision Changes   Respiratory: Positive for shortness of breath.    Cardiovascular: Positive for palpitations.   Gastrointestinal: Positive for blood in stool and constipation.   Endocrine: Negative.    Genitourinary: Negative.    Musculoskeletal: Positive for arthralgias and back pain.        Muscle Weakness,Bilateral Leg Pain   Skin: Negative.    Allergic/Immunologic: Negative.    Neurological: Positive for headaches.   Hematological: Negative.    Psychiatric/Behavioral: Negative.      Past Medical History:   Diagnosis Date   • Acquired hypothyroidism - On Synthroid    • Allergic rhinitis    • Anemia    • Asthma - mild intermittent    • Cardiac disease    • Carpal tunnel syndrome - Bilateral    • Colitis    • Degeneration of cervical intervertebral disc    • Diabetes (CMS/HCC)    • Disorder of lumbar spine    • Essential hypertension    • Fatigue    • History of myocardial infarct at age greater than 60 years 10/17/2018   • Hyperlipidemia - Improved with Zocor    • IFG (impaired fasting glucose) 10/17/2018   • Impaired fasting glycaemia    • Iron deficiency anemia - Improved    • Lumbar radiculopathy    • Megaloblastic anemia due to vitamin B>12< deficiency    • Morbid obesity (CMS/HCC)    • Osteoarthritis of knee - Bilateral    • Osteoarthritis of multiple joints -  Left knee    • Osteoporosis    • Pain in left hip    • Sleep apnea - On CPAP    • Spasm of back muscles    • Thyroid dysfunction    • Vitamin D deficiency      Past Surgical History:   Procedure Laterality Date   • CARPAL TUNNEL RELEASE Right    • ENDOSCOPY  09/17/2012    Normal esophagus.  Gastritis.  Normal duodenum   • ENDOSCOPY AND COLONOSCOPY  09/17/2012    Internal & external hemorrhoids found.   • HYSTERECTOMY     • KNEE SURGERY  12/07/2015    Left total knee arthroplasty.   • LUMBAR LAMINECTOMY  2011    s/p lumbar laminectomy and fusion   • NECK SURGERY     • OOPHORECTOMY     • SHOULDER SURGERY  12/07/2015    Arthroscopy of the left shoudler with rotator cuff repair, Vijay procedure, and subacromial decompression.   • SHOULDER SURGERY Right      Family History   Problem Relation Age of Onset   • Breast cancer Mother    • Ovarian cancer Mother    • Breast cancer Sister    • Breast cancer Other    • Heart disease Other    • Hypertension Other    • Lung disease Other    • Diabetes Other    • Cancer Other      Social History     Social History   • Marital status:      Spouse name: N/A   • Number of children: N/A   • Years of education: N/A     Occupational History   • Not on file.     Social History Main Topics   • Smoking status: Never Smoker   • Smokeless tobacco: Never Used   • Alcohol use No   • Drug use: No   • Sexual activity: Not on file     Other Topics Concern   • Not on file     Social History Narrative   • No narrative on file     Allergies   Allergen Reactions   • Lortab [Hydrocodone-Acetaminophen] Hallucinations   • Codeine Unknown (See Comments)     Heart race   • Antihistamines, Chlorpheniramine-Type Unknown (See Comments)     Heart races   • Latex Rash   • Other Unknown (See Comments)     Decongestants: Heart Race   • Tape Rash       Home Medications:  Prior to Admission medications    Medication Sig Start Date End Date Taking? Authorizing Provider   acetaminophen (TYLENOL) 500 MG tablet  Take 500 mg by mouth Every 6 (Six) Hours As Needed for Mild Pain .   Yes Artis Smith MD   aspirin 81 MG chewable tablet Chew 81 mg Daily.   Yes Artis Smith MD   Calcium Carb-Cholecalciferol (CALCIUM 600 + D) 600-200 MG-UNIT tablet 2 tablets daily.   Yes Artis Smith MD   coenzyme Q10 100 MG capsule Take 100 mg by mouth Daily.   Yes Artis Smith MD   DULoxetine (CYMBALTA) 60 MG capsule Take 1 capsule by mouth Daily. Brand name only 4/11/18  Yes Marshal Warner MD   Ferrous Sulfate (IRON) 325 (65 FE) MG tablet Take 1 tablet by mouth.   Yes Artis Smith MD   glucose blood test strip tests BID, Diagnosis: 250.00, 401.9   Yes Artis Smith MD   labetalol (NORMODYNE) 200 MG tablet Take 200 mg by mouth. 1/2 tab twice daily   Yes Artis Smith MD   loratadine (CLARITIN) 10 MG tablet Take 10 mg by mouth daily.   Yes Artis Smith MD   meloxicam (MOBIC) 15 MG tablet TAKE 1 TABLET BY MOUTH DAILY 9/19/18  Yes Cari Boss APRN   metFORMIN ER (GLUCOPHAGE-XR) 500 MG 24 hr tablet Take 1,000 mg by mouth 2 (Two) Times a Day.   Yes Artis Smith MD   olmesartan-hydrochlorothiazide (BENICAR HCT) 40-25 MG per tablet Take 1 tablet by mouth Daily. 7/24/18  Yes Marshal Warner MD   Omega-3 Fatty Acids (FISH OIL) 1000 MG capsule capsule Take 1,000 mg by mouth 2 (Two) Times a Day With Meals.   Yes Artis Smith MD   PREVACID 30 MG capsule TAKE 1 CAPSULE EVERY MORNING 4/4/18  Yes Marshal Warner MD   progesterone (PROMETRIUM) 200 MG capsule Take 200 mg by mouth 2 (Two) Times a Day.   Yes Artis Smith MD   raNITIdine (ZANTAC) 150 MG tablet TAKE 1 TABLET BY MOUTH EVERY NIGHT. 9/20/17  Yes Marshal Warner MD   simvastatin (ZOCOR) 40 MG tablet TAKE 1 TABLET EVERY NIGHT 5/11/18  Yes Marshal Warner MD   spironolactone (ALDACTONE) 25 MG tablet Take 1 tablet by mouth Daily. 10/17/18  Yes Marshal Warner MD   Testosterone (TESTOPEL) 75 MG implant pellet by  Implant route 1 (One) Time.   Yes ProviderArtis MD   thyroid 60 MG PO tablet Take 60 mg by mouth Daily.   Yes Provider, MD Arits   vitamin B-12 (CYANOCOBALAMIN) 500 MCG tablet Take 500 mcg by mouth daily.   Yes Artis Smith MD   vitamin d (CHOLECALIFEROL) 5000 UNITS capsule Take 1,000 Units by mouth Daily. 3/31/17  Yes Marshal Warner MD       Objective   Physical Exam   Constitutional: She is oriented to person, place, and time. She appears well-developed and well-nourished. No distress.   HENT:   Head: Normocephalic and atraumatic.   Nose: Nose normal.   Eyes: Conjunctivae are normal.   Neck: Normal range of motion. No tracheal deviation present. No thyromegaly present.   Cardiovascular: Normal rate, regular rhythm and normal heart sounds.    No murmur heard.  Pulmonary/Chest: Effort normal and breath sounds normal. No respiratory distress. She has no wheezes. She has no rales. She exhibits no tenderness.   Abdominal: Soft. She exhibits no distension. There is no tenderness. There is no rebound and no guarding. No hernia.   Musculoskeletal: She exhibits no tenderness or deformity.   Neurological: She is alert and oriented to person, place, and time.   Skin: Skin is warm and dry. No rash noted.   Psychiatric: She has a normal mood and affect. Her behavior is normal. Judgment and thought content normal.   Vitals reviewed.      Assessment/Plan iron deficiency anemia and heme positive stool.  Agree EGD and colonoscopies appropriate.  I fully discussed both procedures with her along with alternatives risk and benefits.  We also discussed the prep and the importance of the prep.  She clearly understands and wishes to proceed with both EGD and colonoscopy      The primary encounter diagnosis was Anemia, unspecified type. A diagnosis of Heme positive stool was also pertinent to this visit.                     This document has been electronically signed by Jimmie Sutton MD on November 6, 2018  10:29 AM

## 2018-11-08 ENCOUNTER — TELEPHONE (OUTPATIENT)
Dept: SURGERY | Facility: CLINIC | Age: 68
End: 2018-11-08

## 2018-11-08 NOTE — TELEPHONE ENCOUNTER
Is [facility] for cscope  11/30/18.. Has a artificial knee and will have to take antibiotics before procedure...     Will you call into clinic pharm  [phone] and let pt know when this is done

## 2018-11-14 DIAGNOSIS — Z12.31 ENCOUNTER FOR SCREENING MAMMOGRAM FOR MALIGNANT NEOPLASM OF BREAST: Primary | ICD-10-CM

## 2018-11-28 RX ORDER — SPIRONOLACTONE 25 MG/1
TABLET ORAL
Qty: 90 TABLET | Refills: 1 | Status: SHIPPED | OUTPATIENT
Start: 2018-11-28 | End: 2019-06-24 | Stop reason: SDUPTHER

## 2018-11-28 RX ORDER — RANITIDINE 150 MG/1
TABLET ORAL
Qty: 90 TABLET | Refills: 3 | Status: SHIPPED | OUTPATIENT
Start: 2018-11-28 | End: 2019-04-19 | Stop reason: SDUPTHER

## 2018-11-28 RX ORDER — LANSOPRAZOLE 30 MG
CAPSULE,DELAYED RELEASE (ENTERIC COATED) ORAL
Qty: 90 CAPSULE | Refills: 1 | Status: SHIPPED | OUTPATIENT
Start: 2018-11-28 | End: 2019-04-24 | Stop reason: SDUPTHER

## 2018-11-30 ENCOUNTER — ANESTHESIA (OUTPATIENT)
Dept: GASTROENTEROLOGY | Facility: HOSPITAL | Age: 68
End: 2018-11-30

## 2018-11-30 ENCOUNTER — ANESTHESIA EVENT (OUTPATIENT)
Dept: GASTROENTEROLOGY | Facility: HOSPITAL | Age: 68
End: 2018-11-30

## 2018-11-30 ENCOUNTER — HOSPITAL ENCOUNTER (OUTPATIENT)
Facility: HOSPITAL | Age: 68
Setting detail: HOSPITAL OUTPATIENT SURGERY
Discharge: HOME | End: 2018-11-30
Attending: SURGERY | Admitting: SURGERY

## 2018-11-30 VITALS
HEART RATE: 79 BPM | OXYGEN SATURATION: 96 % | BODY MASS INDEX: 48.24 KG/M2 | SYSTOLIC BLOOD PRESSURE: 120 MMHG | RESPIRATION RATE: 16 BRPM | DIASTOLIC BLOOD PRESSURE: 57 MMHG | WEIGHT: 255.5 LBS | HEIGHT: 61 IN

## 2018-11-30 DIAGNOSIS — D64.9 ANEMIA, UNSPECIFIED TYPE: ICD-10-CM

## 2018-11-30 DIAGNOSIS — R19.5 HEME POSITIVE STOOL: ICD-10-CM

## 2018-11-30 LAB — GLUCOSE BLDC GLUCOMTR-MCNC: 108 MG/DL (ref 70–130)

## 2018-11-30 PROCEDURE — 45380 COLONOSCOPY AND BIOPSY: CPT | Performed by: SURGERY

## 2018-11-30 PROCEDURE — 88305 TISSUE EXAM BY PATHOLOGIST: CPT | Performed by: SURGERY

## 2018-11-30 PROCEDURE — 88305 TISSUE EXAM BY PATHOLOGIST: CPT | Performed by: PATHOLOGY

## 2018-11-30 PROCEDURE — 25010000002 PROPOFOL 10 MG/ML EMULSION: Performed by: NURSE ANESTHETIST, CERTIFIED REGISTERED

## 2018-11-30 PROCEDURE — 82962 GLUCOSE BLOOD TEST: CPT

## 2018-11-30 RX ORDER — PROMETHAZINE HYDROCHLORIDE 25 MG/ML
12.5 INJECTION, SOLUTION INTRAMUSCULAR; INTRAVENOUS ONCE AS NEEDED
Status: DISCONTINUED | OUTPATIENT
Start: 2018-11-30 | End: 2018-11-30 | Stop reason: HOSPADM

## 2018-11-30 RX ORDER — PROPOFOL 10 MG/ML
VIAL (ML) INTRAVENOUS AS NEEDED
Status: DISCONTINUED | OUTPATIENT
Start: 2018-11-30 | End: 2018-11-30 | Stop reason: SURG

## 2018-11-30 RX ORDER — PROMETHAZINE HYDROCHLORIDE 25 MG/1
25 SUPPOSITORY RECTAL ONCE AS NEEDED
Status: DISCONTINUED | OUTPATIENT
Start: 2018-11-30 | End: 2018-11-30 | Stop reason: HOSPADM

## 2018-11-30 RX ORDER — DEXTROSE AND SODIUM CHLORIDE 5; .45 G/100ML; G/100ML
100 INJECTION, SOLUTION INTRAVENOUS CONTINUOUS
Status: DISCONTINUED | OUTPATIENT
Start: 2018-11-30 | End: 2018-11-30 | Stop reason: HOSPADM

## 2018-11-30 RX ORDER — ONDANSETRON 2 MG/ML
4 INJECTION INTRAMUSCULAR; INTRAVENOUS ONCE AS NEEDED
Status: DISCONTINUED | OUTPATIENT
Start: 2018-11-30 | End: 2018-11-30 | Stop reason: HOSPADM

## 2018-11-30 RX ORDER — LIDOCAINE HYDROCHLORIDE 10 MG/ML
INJECTION, SOLUTION INFILTRATION; PERINEURAL AS NEEDED
Status: DISCONTINUED | OUTPATIENT
Start: 2018-11-30 | End: 2018-11-30 | Stop reason: SURG

## 2018-11-30 RX ORDER — PROMETHAZINE HYDROCHLORIDE 25 MG/1
25 TABLET ORAL ONCE AS NEEDED
Status: DISCONTINUED | OUTPATIENT
Start: 2018-11-30 | End: 2018-11-30 | Stop reason: HOSPADM

## 2018-11-30 RX ORDER — DEXAMETHASONE SODIUM PHOSPHATE 4 MG/ML
8 INJECTION, SOLUTION INTRA-ARTICULAR; INTRALESIONAL; INTRAMUSCULAR; INTRAVENOUS; SOFT TISSUE ONCE AS NEEDED
Status: DISCONTINUED | OUTPATIENT
Start: 2018-11-30 | End: 2018-11-30 | Stop reason: HOSPADM

## 2018-11-30 RX ADMIN — PROPOFOL 50 MG: 10 INJECTION, EMULSION INTRAVENOUS at 10:40

## 2018-11-30 RX ADMIN — PROPOFOL 50 MG: 10 INJECTION, EMULSION INTRAVENOUS at 10:08

## 2018-11-30 RX ADMIN — PROPOFOL 50 MG: 10 INJECTION, EMULSION INTRAVENOUS at 10:10

## 2018-11-30 RX ADMIN — PROPOFOL 50 MG: 10 INJECTION, EMULSION INTRAVENOUS at 10:27

## 2018-11-30 RX ADMIN — PROPOFOL 50 MG: 10 INJECTION, EMULSION INTRAVENOUS at 10:18

## 2018-11-30 RX ADMIN — PROPOFOL 50 MG: 10 INJECTION, EMULSION INTRAVENOUS at 10:32

## 2018-11-30 RX ADMIN — LIDOCAINE HYDROCHLORIDE 50 MG: 10 INJECTION, SOLUTION INFILTRATION; PERINEURAL at 10:06

## 2018-11-30 RX ADMIN — PROPOFOL 50 MG: 10 INJECTION, EMULSION INTRAVENOUS at 10:36

## 2018-11-30 RX ADMIN — PROPOFOL 50 MG: 10 INJECTION, EMULSION INTRAVENOUS at 10:09

## 2018-11-30 RX ADMIN — DEXTROSE AND SODIUM CHLORIDE 100 ML/HR: 5; 450 INJECTION, SOLUTION INTRAVENOUS at 08:30

## 2018-11-30 RX ADMIN — PROPOFOL 50 MG: 10 INJECTION, EMULSION INTRAVENOUS at 10:20

## 2018-11-30 RX ADMIN — PROPOFOL 50 MG: 10 INJECTION, EMULSION INTRAVENOUS at 10:12

## 2018-11-30 RX ADMIN — PROPOFOL 50 MG: 10 INJECTION, EMULSION INTRAVENOUS at 10:22

## 2018-11-30 RX ADMIN — PROPOFOL 50 MG: 10 INJECTION, EMULSION INTRAVENOUS at 10:25

## 2018-11-30 RX ADMIN — PROPOFOL 50 MG: 10 INJECTION, EMULSION INTRAVENOUS at 10:29

## 2018-11-30 RX ADMIN — PROPOFOL 50 MG: 10 INJECTION, EMULSION INTRAVENOUS at 10:15

## 2018-11-30 NOTE — ANESTHESIA PREPROCEDURE EVALUATION
Anesthesia Evaluation     NPO Solid Status: > 8 hours  NPO Liquid Status: > 8 hours           Airway   Mallampati: III  TM distance: >3 FB  Neck ROM: full  No difficulty expected  Dental - normal exam     Pulmonary - normal exam   Cardiovascular - normal exam        Neuro/Psych  GI/Hepatic/Renal/Endo      Musculoskeletal     Abdominal    Substance History      OB/GYN          Other                        Anesthesia Plan    ASA 3     MAC     intravenous induction   Anesthetic plan, all risks, benefits, and alternatives have been provided, discussed and informed consent has been obtained with: patient.

## 2018-11-30 NOTE — ANESTHESIA POSTPROCEDURE EVALUATION
Patient: Lois Parmar    Procedure Summary     Date:  11/30/18 Room / Location:  [facility] ENDOSCOPY 2 / [facility] ENDOSCOPY    Anesthesia Start:  1006 Anesthesia Stop:  1044    Procedures:       COLONOSCOPY (N/A )      ESOPHAGOGASTRODUODENOSCOPY WITH ANESTHESIA (N/A ) Diagnosis:       Heme positive stool      Anemia, unspecified type      (Heme positive stool [R19.5])      (Anemia, unspecified type [D64.9])    Surgeon:  Jimmie Sutton MD Provider:  Mendoza Hyde CRNA    Anesthesia Type:  MAC ASA Status:  3          Anesthesia Type: MAC  Last vitals  BP   147/72 (11/30/18 0818)   Temp       Pulse   90 (11/30/18 0818)   Resp   18 (11/30/18 0818)     SpO2   95 % (11/30/18 0818)     Post Anesthesia Care and Evaluation    Patient location during evaluation: bedside  Patient participation: complete - patient cannot participate  Level of consciousness: awake  Pain score: 0  Pain management: adequate  Airway patency: patent  Anesthetic complications: No anesthetic complications  PONV Status: none  Cardiovascular status: acceptable  Respiratory status: acceptable  Hydration status: acceptable

## 2018-12-03 PROBLEM — D12.6 TUBULAR ADENOMA OF COLON: Chronic | Status: ACTIVE | Noted: 2018-12-03

## 2018-12-03 LAB
LAB AP CASE REPORT: NORMAL
PATH REPORT.FINAL DX SPEC: NORMAL
PATH REPORT.GROSS SPEC: NORMAL

## 2018-12-07 ENCOUNTER — OFFICE VISIT (OUTPATIENT)
Dept: SURGERY | Facility: CLINIC | Age: 68
End: 2018-12-07

## 2018-12-07 VITALS
DIASTOLIC BLOOD PRESSURE: 84 MMHG | WEIGHT: 260.2 LBS | BODY MASS INDEX: 49.12 KG/M2 | SYSTOLIC BLOOD PRESSURE: 132 MMHG | TEMPERATURE: 98.5 F | HEART RATE: 87 BPM | HEIGHT: 61 IN

## 2018-12-07 DIAGNOSIS — D64.9 ANEMIA, UNSPECIFIED TYPE: Primary | ICD-10-CM

## 2018-12-07 DIAGNOSIS — Z86.010 HX OF ADENOMATOUS COLONIC POLYPS: ICD-10-CM

## 2018-12-07 PROCEDURE — 99024 POSTOP FOLLOW-UP VISIT: CPT | Performed by: SURGERY

## 2018-12-07 RX ORDER — EZETIMIBE 10 MG/1
10 TABLET ORAL DAILY
COMMUNITY
End: 2018-12-12

## 2018-12-07 RX ORDER — RANITIDINE 150 MG/1
150 TABLET ORAL NIGHTLY
COMMUNITY
End: 2020-05-29 | Stop reason: RX

## 2018-12-07 RX ORDER — DOCUSATE SODIUM 100 MG/1
100 CAPSULE, LIQUID FILLED ORAL 2 TIMES DAILY PRN
COMMUNITY

## 2018-12-07 NOTE — PATIENT INSTRUCTIONS

## 2018-12-07 NOTE — PROGRESS NOTES
68-year-old female here to follow-up after recent EGD and colonoscopy.  This was done for history of anemia.  Patient had a similar workup few years ago that was unremarkable.  On EGD she had a small hiatal hernia and she had mild gastritis and biopsy was H. pylori negative.  On colonoscopy her prep was adequate to identify larger polyps and she had 2 small 4 mm smaller adenomatous polyps removed from her ascending colon.  She has had some diverticulosis in sigmoid colon.  I went over these findings with her.  She will follow up with her primary care provider regarding further workup.  We talked about considering referral to GI to evaluate her small bowel.  She should have another colonoscopy in 3 years based on these adenomatous polyps found her colon.

## 2018-12-12 ENCOUNTER — OFFICE VISIT (OUTPATIENT)
Dept: ORTHOPEDIC SURGERY | Facility: CLINIC | Age: 68
End: 2018-12-12

## 2018-12-12 VITALS — HEIGHT: 61 IN | WEIGHT: 258 LBS | BODY MASS INDEX: 48.71 KG/M2

## 2018-12-12 DIAGNOSIS — M25.512 CHRONIC LEFT SHOULDER PAIN: Primary | ICD-10-CM

## 2018-12-12 DIAGNOSIS — M25.562 CHRONIC PAIN OF BOTH KNEES: ICD-10-CM

## 2018-12-12 DIAGNOSIS — M25.552 HIP PAIN, CHRONIC, LEFT: ICD-10-CM

## 2018-12-12 DIAGNOSIS — G89.29 CHRONIC BILATERAL LOW BACK PAIN WITH BILATERAL SCIATICA: ICD-10-CM

## 2018-12-12 DIAGNOSIS — M54.41 CHRONIC BILATERAL LOW BACK PAIN WITH BILATERAL SCIATICA: ICD-10-CM

## 2018-12-12 DIAGNOSIS — Z96.652 PRESENCE OF TOTAL KNEE JOINT PROSTHESIS, LEFT: ICD-10-CM

## 2018-12-12 DIAGNOSIS — M25.561 CHRONIC PAIN OF BOTH KNEES: ICD-10-CM

## 2018-12-12 DIAGNOSIS — M51.36 DEGENERATIVE DISC DISEASE, LUMBAR: ICD-10-CM

## 2018-12-12 DIAGNOSIS — M25.551 HIP PAIN, CHRONIC, RIGHT: ICD-10-CM

## 2018-12-12 DIAGNOSIS — G89.29 HIP PAIN, CHRONIC, RIGHT: ICD-10-CM

## 2018-12-12 DIAGNOSIS — M54.42 CHRONIC BILATERAL LOW BACK PAIN WITH BILATERAL SCIATICA: ICD-10-CM

## 2018-12-12 DIAGNOSIS — M70.62 TROCHANTERIC BURSITIS OF LEFT HIP: ICD-10-CM

## 2018-12-12 DIAGNOSIS — G89.29 CHRONIC LEFT SHOULDER PAIN: Primary | ICD-10-CM

## 2018-12-12 DIAGNOSIS — G89.29 CHRONIC PAIN OF BOTH KNEES: ICD-10-CM

## 2018-12-12 DIAGNOSIS — G89.29 HIP PAIN, CHRONIC, LEFT: ICD-10-CM

## 2018-12-12 PROCEDURE — 96372 THER/PROPH/DIAG INJ SC/IM: CPT | Performed by: NURSE PRACTITIONER

## 2018-12-12 PROCEDURE — 99214 OFFICE O/P EST MOD 30 MIN: CPT | Performed by: NURSE PRACTITIONER

## 2018-12-12 RX ORDER — KETOROLAC TROMETHAMINE 30 MG/ML
60 INJECTION, SOLUTION INTRAMUSCULAR; INTRAVENOUS ONCE
Status: COMPLETED | OUTPATIENT
Start: 2018-12-12 | End: 2018-12-12

## 2018-12-12 RX ORDER — TRAMADOL HYDROCHLORIDE 50 MG/1
50 TABLET ORAL EVERY 8 HOURS PRN
Qty: 40 TABLET | Refills: 0 | Status: SHIPPED | OUTPATIENT
Start: 2018-12-12 | End: 2018-12-22

## 2018-12-12 RX ORDER — TRIAMCINOLONE ACETONIDE 40 MG/ML
80 INJECTION, SUSPENSION INTRA-ARTICULAR; INTRAMUSCULAR ONCE
Status: COMPLETED | OUTPATIENT
Start: 2018-12-12 | End: 2018-12-12

## 2018-12-12 RX ADMIN — KETOROLAC TROMETHAMINE 60 MG: 30 INJECTION, SOLUTION INTRAMUSCULAR; INTRAVENOUS at 12:07

## 2018-12-12 RX ADMIN — TRIAMCINOLONE ACETONIDE 80 MG: 40 INJECTION, SUSPENSION INTRA-ARTICULAR; INTRAMUSCULAR at 12:06

## 2018-12-12 NOTE — PROGRESS NOTES
"Lois Parmar is a 68 y.o. female returns for     Chief Complaint   Patient presents with   • Left Shoulder - Follow-up   • Right Shoulder - Follow-up   • Left Hip - Follow-up   • Right Hip - Follow-up   • Right Knee - Follow-up   • Left Knee - Follow-up       HISTORY OF PRESENT ILLNESS: Patient presents to office for follow up of bilateral hip pain and low back pain. Patient has not been seen in office since 8/16/2018. She sustained a fall 3 weeks prior to that with increased pain. She reports she has not improved since last office visit. She primarily complains of lumbar and thoracic spine pain and bilateral hip pain that radiates into her legs. She has increased pain with activity. Patient was given a left hip bursa injection for symptoms consistent with trochanteric bursitis at last office visit on 8/16/2018, which helped with the tenderness in her hip but not her current pain. The patient has a history of degenerative disc disease and prior back surgeries. She also continues to complain of some left shoulder pain.      CONCURRENT MEDICAL HISTORY:    The following portions of the patient's history were reviewed and updated as appropriate: allergies, current medications, past family history, past medical history, past social history, past surgical history and problem list.     ROS  No fevers or chills.  No chest pain or shortness of air.  No GI or  disturbances. Back pain. Left hip pain. Right hip pain. Left shoulder pain.     PHYSICAL EXAMINATION:       Ht 154.9 cm (61\")   Wt 117 kg (258 lb)   BMI 48.75 kg/m²     Physical Exam   Constitutional: She is oriented to person, place, and time. Vital signs are normal. She appears well-developed and well-nourished. She is active and cooperative. She does not appear ill. No distress.   HENT:   Head: Normocephalic.   Pulmonary/Chest: Effort normal. No respiratory distress.   Abdominal: Soft. She exhibits no distension.   Musculoskeletal: She exhibits tenderness " (Lumbar spine, Left shoulder). She exhibits no edema or deformity.   Neurological: She is alert and oriented to person, place, and time. GCS eye subscore is 4. GCS verbal subscore is 5. GCS motor subscore is 6.   Skin: Skin is warm, dry and intact. Capillary refill takes less than 2 seconds. No erythema.   Psychiatric: She has a normal mood and affect. Her speech is normal and behavior is normal. Judgment and thought content normal. Cognition and memory are normal.   Vitals reviewed.      GAIT:     []  Normal  [x]  Antalgic    Assistive device: []  None  []  Walker     []  Crutches  [x]  Cane     []  Wheelchair  []  Stretcher    Back Exam     Tenderness   The patient is experiencing tenderness in the lumbar, sacroiliac and thoracic.    Range of Motion   Extension: abnormal   Flexion: abnormal   Rotation right: abnormal   Rotation left: abnormal     Muscle Strength   Right Quadriceps:  4/5   Left Quadriceps:  4/5     Tests   Straight leg raise right: positive at 90 deg  Straight leg raise left: positive at 90 deg    Reflexes   Patellar: normal    Other   Sensation: normal  Gait: antalgic   Erythema: no back redness  Scars: present (well-healed surgical scar lumbar spine)    Comments:  Pain and limitations with range of motion. No focal neurological deficits noted.       Right Shoulder Exam     Tenderness   The patient is experiencing no tenderness.    Range of Motion   Active abduction: 130   Passive abduction: 150   Extension: 40   External rotation: 70   Forward flexion: 120   Internal rotation 90 degrees: 70     Muscle Strength   Abduction: 4/5   Supraspinatus: 4/5     Tests   Beverly test: negative  Cross arm: positive  Impingement: negative  Drop arm: negative    Comments:  Mild pain and limitations with range of motion. Stable joint exam.       Left Shoulder Exam     Tenderness   The patient is experiencing tenderness in the acromioclavicular joint (Mild, diffuse).    Range of Motion   Active abduction: 100    Passive abduction: 140   Extension: 40   External rotation: 70   Forward flexion: 120   Internal rotation 90 degrees: 60     Muscle Strength   Abduction: 4/5   Supraspinatus: 4/5     Tests   Beverly test: positive  Cross arm: positive  Impingement: positive  Drop arm: negative    Other   Erythema: absent  Sensation: normal  Pulse: present     Comments:  Pain and limitations with range of motion. Stable joint exam.             Xr Spine Lumbar Ap & Lateral    Result Date: 12/13/2018  Narrative: PROCEDURE: Lumbar spine 2 views REASON FOR EXAM: low back pain, M54.42 Lumbago with sciatica, left side M54.41 Lumbago with sciatica, right side G89.29 Other chronic pain FINDINGS: Postsurgical changes are seen at the L4-L5 and L5-S1 disc space level consistent with prior discectomy with bone graft placement at the L4-5 disc space level and metallic intervertebral fusion device at L5-S1 level. Bilateral posterior marshall fusion from the L4 through the S1 vertebral body. Satisfactory anatomical alignment. Postsurgical changes consistent with bilateral decompressive laminectomies at the L5 vertebral body level.. Lumbar spine curvature convex left which may be positional versus secondary to levoscoliosis. Otherwise vertebral body heights and alignment are unremarkable. There is no evidence of acute fracture or dislocation. T12-L1, L1-L2, and L2-L3 moderate to severe loss of disc space heights with associated anterior and lateral osteophytosis consistent with moderate to severe degenerative disc disease these levels. Otherwise intervertebral disc spaces are intact.     Impression: 1. Extensive lower lumbar spine postsurgical changes as described above.. 2.Lumbar spine curvature convex left which may be positional versus secondary to levoscoliosis. . 3.T12-L1, L1-L2, and L2-L3 moderate to severe loss of disc space heights with associated anterior and lateral osteophytosis consistent with moderate to severe degenerative disc disease  these levels.. Electronically signed by:  Mitch Jimenez MD  12/13/2018 9:19 AM CST Workstation: [id number]    Xr Hips Bilateral With Or Without Pelvis 5 View    Result Date: 12/13/2018  Narrative: Procedure: AP pelvis and bilateral hip 2 views Reason for exam: Bilateral hip pain. FINDINGS: Bony structures of the pelvis and bilateral hips are normal. There is no evidence of fracture or dislocation. Soft tissue structures unremarkable.     Impression: Negative pelvis and bilateral hips. Electronically signed by:  Mitch Jimenez MD  12/13/2018 9:21 AM CST Workstation: [id number]        ASSESSMENT:    Diagnoses and all orders for this visit:    Chronic left shoulder pain  -     triamcinolone acetonide (KENALOG-40) injection 80 mg; Inject 2 mL into the appropriate muscle as directed by prescriber 1 (One) Time.  -     ketorolac (TORADOL) injection 60 mg; Inject 2 mL into the appropriate muscle as directed by prescriber 1 (One) Time.    Chronic pain of both knees  -     triamcinolone acetonide (KENALOG-40) injection 80 mg; Inject 2 mL into the appropriate muscle as directed by prescriber 1 (One) Time.  -     ketorolac (TORADOL) injection 60 mg; Inject 2 mL into the appropriate muscle as directed by prescriber 1 (One) Time.    Trochanteric bursitis of left hip  -     triamcinolone acetonide (KENALOG-40) injection 80 mg; Inject 2 mL into the appropriate muscle as directed by prescriber 1 (One) Time.  -     ketorolac (TORADOL) injection 60 mg; Inject 2 mL into the appropriate muscle as directed by prescriber 1 (One) Time.    Presence of total knee joint prosthesis, left  -     triamcinolone acetonide (KENALOG-40) injection 80 mg; Inject 2 mL into the appropriate muscle as directed by prescriber 1 (One) Time.  -     ketorolac (TORADOL) injection 60 mg; Inject 2 mL into the appropriate muscle as directed by prescriber 1 (One) Time.    Hip pain, chronic, left  -     XR Hips Bilateral With or Without Pelvis 5 View; Future  -     triamcinolone  acetonide (KENALOG-40) injection 80 mg; Inject 2 mL into the appropriate muscle as directed by prescriber 1 (One) Time.  -     ketorolac (TORADOL) injection 60 mg; Inject 2 mL into the appropriate muscle as directed by prescriber 1 (One) Time.    Hip pain, chronic, right  -     XR Hips Bilateral With or Without Pelvis 5 View; Future  -     triamcinolone acetonide (KENALOG-40) injection 80 mg; Inject 2 mL into the appropriate muscle as directed by prescriber 1 (One) Time.  -     ketorolac (TORADOL) injection 60 mg; Inject 2 mL into the appropriate muscle as directed by prescriber 1 (One) Time.    Chronic bilateral low back pain with bilateral sciatica  -     XR Spine Lumbar AP & Lateral  -     triamcinolone acetonide (KENALOG-40) injection 80 mg; Inject 2 mL into the appropriate muscle as directed by prescriber 1 (One) Time.  -     ketorolac (TORADOL) injection 60 mg; Inject 2 mL into the appropriate muscle as directed by prescriber 1 (One) Time.    Degenerative disc disease, lumbar    Other orders  -     traMADol (ULTRAM) 50 MG tablet; Take 1 tablet by mouth Every 8 (Eight) Hours As Needed for Moderate Pain  for up to 10 days.    PLAN    X-rays of lumbar spine and bilateral hips/pelvis reviewed with no acute findings noted. There are extensive degenerative changes noted in the lumbar spine that appear to have progressed since her last x-rays were done. We discussed that this is likely the source of her increased back and bilateral hip pain. The implanted hardware appears stable. The patient wants to follow up with her neurosurgeon at this point due to these advanced degenerative changes. I have offered to order MRI imaging but she wants to wait and see what imaging her neurosurgeon recommends. Recommend rest and activity modification as tolerated and based on her pain. Recommend continued use of her cane for modified weightbearing, improved balance and ambulatory assistance. Recommend alternating use of ice and/or  heat therapy to the low back as needed to minimize pain. Continue Meloxicam daily as previously prescribed. Tramadol is prescribed today to take as needed for moderate to severe pain. Follow up as needed for any new or worsening symptoms or any concerns. Patient can also follow up in regards to her left shoulder, although this is currently the lesser of her complaints. Otherwise, follow up with neurosurgery for further workup and treatment recommendations for her back pain.     This patient has tried and failed a course of multiple treatment modalities which include the use of anti-inflammatories, acetaminophen, rest/activity modification, heat therapy and modified weightbearing with use of a cane. Therefore, I will recommend a course of narcotic pain medication for this patient until their pain has been sufficiently reduced to a level that I deem acceptable to be treated with alternative treatment options. [facility] reviewed # [id number].     Return if symptoms worsen or fail to improve.        This document has been electronically signed by ODILIA Campoverde on December 17, 2018 8:14 PM      ODILIA Campoverde

## 2018-12-17 PROBLEM — M51.36 DEGENERATIVE DISC DISEASE, LUMBAR: Status: ACTIVE | Noted: 2018-12-17

## 2019-01-10 ENCOUNTER — LAB (OUTPATIENT)
Dept: LAB | Facility: OTHER | Age: 69
End: 2019-01-10

## 2019-01-10 DIAGNOSIS — D50.8 IRON DEFICIENCY ANEMIA SECONDARY TO INADEQUATE DIETARY IRON INTAKE: Chronic | ICD-10-CM

## 2019-01-10 LAB
BASOPHILS # BLD AUTO: 0.02 10*3/MM3 (ref 0–0.2)
BASOPHILS NFR BLD AUTO: 0.2 % (ref 0–2)
DEPRECATED RDW RBC AUTO: 47.2 FL (ref 36.4–46.3)
EOSINOPHIL # BLD AUTO: 0.17 10*3/MM3 (ref 0–0.7)
EOSINOPHIL NFR BLD AUTO: 1.8 % (ref 0–7)
ERYTHROCYTE [DISTWIDTH] IN BLOOD BY AUTOMATED COUNT: 14.1 % (ref 11.5–14.5)
FERRITIN SERPL-MCNC: 15.7 NG/ML (ref 11.1–264)
HCT VFR BLD AUTO: 35.8 % (ref 35–45)
HGB BLD-MCNC: 11.7 G/DL (ref 12–15.5)
IRON 24H UR-MRATE: 80 MCG/DL (ref 37–170)
IRON SATN MFR SERPL: 20 % (ref 15–50)
LYMPHOCYTES # BLD AUTO: 1.52 10*3/MM3 (ref 0.6–4.2)
LYMPHOCYTES NFR BLD AUTO: 15.9 % (ref 10–50)
MCH RBC QN AUTO: 31.4 PG (ref 26.5–34)
MCHC RBC AUTO-ENTMCNC: 32.7 G/DL (ref 31.4–36)
MCV RBC AUTO: 96 FL (ref 80–98)
MONOCYTES # BLD AUTO: 0.83 10*3/MM3 (ref 0–0.9)
MONOCYTES NFR BLD AUTO: 8.7 % (ref 0–12)
NEUTROPHILS # BLD AUTO: 6.99 10*3/MM3 (ref 2–8.6)
NEUTROPHILS NFR BLD AUTO: 73.4 % (ref 37–80)
PLATELET # BLD AUTO: 307 10*3/MM3 (ref 150–450)
PMV BLD AUTO: 8.8 FL (ref 8–12)
RBC # BLD AUTO: 3.73 10*6/MM3 (ref 3.77–5.16)
TIBC SERPL-MCNC: 402 MCG/DL (ref 265–497)
WBC NRBC COR # BLD: 9.53 10*3/MM3 (ref 3.2–9.8)

## 2019-01-10 PROCEDURE — 85025 COMPLETE CBC W/AUTO DIFF WBC: CPT | Performed by: INTERNAL MEDICINE

## 2019-01-10 PROCEDURE — 83540 ASSAY OF IRON: CPT | Performed by: INTERNAL MEDICINE

## 2019-01-10 PROCEDURE — 82728 ASSAY OF FERRITIN: CPT | Performed by: INTERNAL MEDICINE

## 2019-01-10 PROCEDURE — 83550 IRON BINDING TEST: CPT | Performed by: INTERNAL MEDICINE

## 2019-01-10 PROCEDURE — 36415 COLL VENOUS BLD VENIPUNCTURE: CPT | Performed by: INTERNAL MEDICINE

## 2019-01-17 ENCOUNTER — OFFICE VISIT (OUTPATIENT)
Dept: FAMILY MEDICINE CLINIC | Facility: CLINIC | Age: 69
End: 2019-01-17

## 2019-01-17 VITALS
OXYGEN SATURATION: 97 % | BODY MASS INDEX: 48.94 KG/M2 | SYSTOLIC BLOOD PRESSURE: 140 MMHG | WEIGHT: 259.2 LBS | HEART RATE: 69 BPM | TEMPERATURE: 98.2 F | HEIGHT: 61 IN | DIASTOLIC BLOOD PRESSURE: 77 MMHG

## 2019-01-17 DIAGNOSIS — E78.2 MIXED HYPERLIPIDEMIA: Chronic | ICD-10-CM

## 2019-01-17 DIAGNOSIS — D50.8 IRON DEFICIENCY ANEMIA SECONDARY TO INADEQUATE DIETARY IRON INTAKE: Primary | Chronic | ICD-10-CM

## 2019-01-17 DIAGNOSIS — R73.01 IFG (IMPAIRED FASTING GLUCOSE): Chronic | ICD-10-CM

## 2019-01-17 DIAGNOSIS — T88.7XXA MEDICATION SIDE EFFECT: ICD-10-CM

## 2019-01-17 DIAGNOSIS — E66.01 MORBID OBESITY (HCC): Chronic | ICD-10-CM

## 2019-01-17 DIAGNOSIS — D12.6 TUBULAR ADENOMA OF COLON: Chronic | ICD-10-CM

## 2019-01-17 DIAGNOSIS — E55.9 VITAMIN D DEFICIENCY: Chronic | ICD-10-CM

## 2019-01-17 DIAGNOSIS — K59.03 DRUG-INDUCED CONSTIPATION: ICD-10-CM

## 2019-01-17 DIAGNOSIS — E03.9 ACQUIRED HYPOTHYROIDISM: Chronic | ICD-10-CM

## 2019-01-17 DIAGNOSIS — E83.42 HYPOMAGNESEMIA: Chronic | ICD-10-CM

## 2019-01-17 DIAGNOSIS — I10 ESSENTIAL HYPERTENSION: Chronic | ICD-10-CM

## 2019-01-17 DIAGNOSIS — D53.1 MEGALOBLASTIC ANEMIA: Chronic | ICD-10-CM

## 2019-01-17 DIAGNOSIS — G47.62 NOCTURNAL LEG CRAMPS: ICD-10-CM

## 2019-01-17 PROBLEM — K57.30 DIVERTICULOSIS OF LARGE INTESTINE: Chronic | Status: ACTIVE | Noted: 2019-01-17

## 2019-01-17 PROCEDURE — 99214 OFFICE O/P EST MOD 30 MIN: CPT | Performed by: INTERNAL MEDICINE

## 2019-01-17 RX ORDER — POLYETHYLENE GLYCOL 3350 17 G/17G
17 POWDER, FOR SOLUTION ORAL DAILY
COMMUNITY
Start: 2019-01-17 | End: 2019-11-22

## 2019-01-17 NOTE — PATIENT INSTRUCTIONS

## 2019-01-17 NOTE — PROGRESS NOTES
Subjective     Lois Parmar is a 69 y.o. female.     History of Present Illness     Lois Parmar is a 69-year-old patient with multiple complex medical issues including hypothyroidism, hyperlipidemia, hypertriglyceridemia, impaired fasting glucose osteoarthritis of the knees and hips, morbid obesity, and iron deficiency anemia and vitamin B12 deficiency anemia, among other issues complicated by obesity.  She continues on CPAP for sleep apnea.  She continues to follow with Dr. Boyle, cardiologist.    The patient is here for three-month follow-up of a worsened iron deficiency anemia, among other problems.  Since her last visit, she has received upper and lower GI workup from Dr. Sutton, which revealed some mild gastritis and 2 adenomatous polyps, one of which was tubular adenoma.  He recommended a repeat colonoscopy in 3 years.  The patient has difficulty tolerating oral iron, which produces GI upset and constipation.  Passing the hard stool has produced several episodes of rectal outlet bleeding this past month.  She has been taking ferrous sulfate daily for the past 3 months and her ferritin and hemoglobin have improved, but she has been battling constipation and nausea frequently.  She is taking OTC stool softener 3 times daily and daily fiber therapy.  She has not tried daily MiraLAX.  We discussed getting evaluated for IV iron administration due to her iron side effects.  Her baseline hemoglobin is approximately 12.7.  3 months ago, hemoglobin was down to 11.0.  Current hemoglobin is partially improved at 11.7.     She has been following with a nurse practitioner at Dr. Clifton's office who is prescribing hormones including testosterone and reports she has felt much better with the therapy.  She is also following her thyroid function.  She has prescribed [place] Thyroid, which I find questionable.      In 3 months, she will be due for her next set of extensive labs to follow her multiple medical issues.  I  "have ordered those labs today.    Her blood pressure control is reasonable today.  Her weight is up 4 pounds in the past 6 weeks.  She is not currently meeting her diet, exercise, or weight loss goals.    She reports a recent increase in frequent nocturnal leg cramps.  There were no electrolyte abnormalities and her last set of labs 3 months ago.  He continues calcium supplement and magnesium supplement.  I suspect the nocturnal leg cramps are primarily a side effect of daily statin therapy.    Review of Systems   Constitutional: Negative for chills, fatigue and fever.   HENT: Negative for congestion, ear pain, postnasal drip, sinus pressure and sore throat.    Respiratory: Negative for cough, shortness of breath and wheezing.    Cardiovascular: Positive for leg swelling. Negative for chest pain and palpitations.   Gastrointestinal: Positive for constipation and nausea. Negative for abdominal pain, blood in stool, diarrhea and vomiting.   Endocrine: Negative for cold intolerance, heat intolerance, polydipsia and polyuria.   Genitourinary: Negative for dysuria, frequency, hematuria and urgency.   Musculoskeletal: Positive for arthralgias.   Skin: Negative for rash.   Neurological: Negative for syncope and weakness.       Objective     /77 (BP Location: Left arm, Patient Position: Sitting, Cuff Size: Adult)   Pulse 69   Temp 98.2 °F (36.8 °C) (Oral)   Ht 154.9 cm (61\")   Wt 118 kg (259 lb 3.2 oz)   SpO2 97%   BMI 48.98 kg/m²     Physical Exam   Constitutional: She is oriented to person, place, and time. She appears well-developed and well-nourished. No distress.   Pleasant, intelligent, obese female.    HENT:   Head: Normocephalic and atraumatic.   Nose: Right sinus exhibits no maxillary sinus tenderness and no frontal sinus tenderness. Left sinus exhibits no maxillary sinus tenderness and no frontal sinus tenderness.   Mouth/Throat: Uvula is midline, oropharynx is clear and moist and mucous membranes are " normal. No oral lesions. No tonsillar exudate.   Eyes: Conjunctivae and EOM are normal. Pupils are equal, round, and reactive to light.   Neck: Trachea normal. Neck supple. No JVD present. Carotid bruit is not present. No tracheal deviation present. No thyroid mass and no thyromegaly present.   Cardiovascular: Normal rate, regular rhythm, normal heart sounds and intact distal pulses.  No extrasystoles are present. PMI is not displaced.   No murmur heard.  Pulmonary/Chest: Effort normal and breath sounds normal. No accessory muscle usage. No respiratory distress. She has no decreased breath sounds. She has no wheezes. She has no rhonchi. She has no rales.   Abdominal: Soft. Bowel sounds are normal. She exhibits no distension. There is no hepatosplenomegaly. There is no tenderness.   Obese abdomen limits exam.  Abdomen feels constipated.     Vascular Status -  Her right foot exhibits abnormal foot edema (Trace edema bilateral ankles. ). Her right foot exhibits normal foot vasculature . Her left foot exhibits abnormal foot edema. Her left foot exhibits normal foot vasculature .  Lymphadenopathy:     She has no cervical adenopathy.   Neurological: She is alert and oriented to person, place, and time. No cranial nerve deficit. Coordination normal.   Skin: Skin is warm, dry and intact. No rash noted. No cyanosis. Nails show no clubbing.   Psychiatric: She has a normal mood and affect. Her speech is normal and behavior is normal. Judgment and thought content normal.   Vitals reviewed.      PHQ-2/PHQ-9 Depression Screening 1/17/2019   Little interest or pleasure in doing things 0   Feeling down, depressed, or hopeless 0   Trouble falling or staying asleep, or sleeping too much -   Feeling tired or having little energy -   Poor appetite or overeating -   Feeling bad about yourself - or that you are a failure or have let yourself or your family down -   Trouble concentrating on things, such as reading the newspaper or  watching television -   Moving or speaking so slowly that other people could have noticed. Or the opposite - being so fidgety or restless that you have been moving around a lot more than usual -   Thoughts that you would be better off dead, or of hurting yourself in some way -   Total Score 0   If you checked off any problems, how difficult have these problems made it for you to do your work, take care of things at home, or get along with other people? -       Assessment/Plan     Continue ferrous sulfate 325 mg daily.  Continue oral vitamin B12 daily.  Refer to Dr. De La Torre to evaluate the iron deficiency anemia in this patient tolerating oral iron quite poorly.  Consideration for IV iron therapy.    Continue docusate stool softener 100 mg 3 times a day.  Stop the Benefiber.  Start MiraLAX 1 capful daily.  Drink lots of water and walk daily for exercise.  Try to eat high fiber foods daily.  I suggested glycerin suppositories to use for hard stool whenever her bowels are not moving regularly.  She has been having some rectal bleeding with defecation when passing the hard stool.    Continue the current doses of Benicar HCT and spironolactone.    Continue simvastatin 40 mg, but take a 1 week break from the medication and then restart every other day to see if that improves her leg cramps.    Continue her other current medications and vitamin and mineral supplements to treat the multiple medical issues we addressed today.    Return to clinic in 3 months with fasting labs prior.    Diagnoses and all orders for this visit:    Iron deficiency anemia secondary to inadequate dietary iron intake  -     Ambulatory Referral to Hematology / Oncology  -     Ferritin; Future    Megaloblastic anemia due to vitamin B>12< deficiency  -     Vitamin B12; Future    Tubular adenoma of colon - removed during colonoscopy, 11/2018.    Morbid obesity (CMS/HCC)    Medication side effect    Drug-induced constipation    IFG (impaired fasting  "glucose)  -     Hemoglobin A1c; Future    Mixed hyperlipidemia  -     LDL Cholesterol, Direct; Future    Essential hypertension  -     CBC Auto Differential; Future  -     Comprehensive Metabolic Panel; Future    Vitamin D deficiency  -     Vitamin D 25 Hydroxy; Future    Hypomagnesemia  -     Magnesium; Future    Acquired hypothyroidism - On [place] thyroid managed by \"hormone doctor\" in Dr. Clifton's office  -     TSH; Future  -     T4, free; Future    Nocturnal leg cramps    Other orders  -     polyethylene glycol (MIRALAX) packet; Take 17 g by mouth Daily.        Lab on 01/10/2019   Component Date Value Ref Range Status   • WBC 01/10/2019 9.53  3.20 - 9.80 10*3/mm3 Final   • RBC 01/10/2019 3.73* 3.77 - 5.16 10*6/mm3 Final   • Hemoglobin 01/10/2019 11.7* 12.0 - 15.5 g/dL Final   • Hematocrit 01/10/2019 35.8  35.0 - 45.0 % Final   • MCV 01/10/2019 96.0  80.0 - 98.0 fL Final   • MCH 01/10/2019 31.4  26.5 - 34.0 pg Final   • MCHC 01/10/2019 32.7  31.4 - 36.0 g/dL Final   • RDW 01/10/2019 14.1  11.5 - 14.5 % Final   • RDW-SD 01/10/2019 47.2* 36.4 - 46.3 fl Final   • MPV 01/10/2019 8.8  8.0 - 12.0 fL Final   • Platelets 01/10/2019 307  150 - 450 10*3/mm3 Final   • Neutrophil % 01/10/2019 73.4  37.0 - 80.0 % Final   • Lymphocyte % 01/10/2019 15.9  10.0 - 50.0 % Final   • Monocyte % 01/10/2019 8.7  0.0 - 12.0 % Final   • Eosinophil % 01/10/2019 1.8  0.0 - 7.0 % Final   • Basophil % 01/10/2019 0.2  0.0 - 2.0 % Final   • Neutrophils, Absolute 01/10/2019 6.99  2.00 - 8.60 10*3/mm3 Final   • Lymphocytes, Absolute 01/10/2019 1.52  0.60 - 4.20 10*3/mm3 Final   • Monocytes, Absolute 01/10/2019 0.83  0.00 - 0.90 10*3/mm3 Final   • Eosinophils, Absolute 01/10/2019 0.17  0.00 - 0.70 10*3/mm3 Final   • Basophils, Absolute 01/10/2019 0.02  0.00 - 0.20 10*3/mm3 Final   • Ferritin 01/10/2019 15.70  11.10 - 264.00 ng/mL Final   • Iron 01/10/2019 80  37 - 170 mcg/dL Final   • TIBC 01/10/2019 402  265 - 497 mcg/dL Final   • Iron Saturation " 01/10/2019 20  15 - 50 % Final   ]

## 2019-01-24 ENCOUNTER — OFFICE VISIT (OUTPATIENT)
Dept: HEMATOLOGY/ONCOLOGY | Facility: CLINIC | Age: 69
End: 2019-01-24

## 2019-01-24 VITALS
HEART RATE: 92 BPM | DIASTOLIC BLOOD PRESSURE: 78 MMHG | RESPIRATION RATE: 22 BRPM | WEIGHT: 256 LBS | TEMPERATURE: 98.2 F | SYSTOLIC BLOOD PRESSURE: 134 MMHG | HEIGHT: 61 IN | BODY MASS INDEX: 48.33 KG/M2

## 2019-01-24 DIAGNOSIS — D50.0 IRON DEFICIENCY ANEMIA DUE TO CHRONIC BLOOD LOSS: Primary | ICD-10-CM

## 2019-01-24 DIAGNOSIS — T45.4X5A ADVERSE EFFECT OF IRON, INITIAL ENCOUNTER: ICD-10-CM

## 2019-01-24 LAB
BASOPHILS # BLD AUTO: 0.03 10*3/MM3 (ref 0–0.2)
BASOPHILS NFR BLD AUTO: 0.3 % (ref 0–2)
DEPRECATED RDW RBC AUTO: 45.8 FL (ref 36.4–46.3)
EOSINOPHIL # BLD AUTO: 0.19 10*3/MM3 (ref 0–0.7)
EOSINOPHIL NFR BLD AUTO: 2.2 % (ref 0–7)
ERYTHROCYTE [DISTWIDTH] IN BLOOD BY AUTOMATED COUNT: 13.9 % (ref 11.5–14.5)
HCT VFR BLD AUTO: 35.8 % (ref 35–45)
HGB BLD-MCNC: 11.7 G/DL (ref 12–15.5)
LYMPHOCYTES # BLD AUTO: 1.41 10*3/MM3 (ref 0.6–4.2)
LYMPHOCYTES NFR BLD AUTO: 16.2 % (ref 10–50)
MCH RBC QN AUTO: 31 PG (ref 26.5–34)
MCHC RBC AUTO-ENTMCNC: 32.7 G/DL (ref 31.4–36)
MCV RBC AUTO: 94.7 FL (ref 80–98)
MONOCYTES # BLD AUTO: 0.6 10*3/MM3 (ref 0–0.9)
MONOCYTES NFR BLD AUTO: 6.9 % (ref 0–12)
NEUTROPHILS # BLD AUTO: 6.5 10*3/MM3 (ref 2–8.6)
NEUTROPHILS NFR BLD AUTO: 74.4 % (ref 37–80)
PLATELET # BLD AUTO: 298 10*3/MM3 (ref 150–450)
PMV BLD AUTO: 8.8 FL (ref 8–12)
RBC # BLD AUTO: 3.78 10*6/MM3 (ref 3.77–5.16)
WBC NRBC COR # BLD: 8.73 10*3/MM3 (ref 3.2–9.8)

## 2019-01-24 PROCEDURE — 82607 VITAMIN B-12: CPT | Performed by: INTERNAL MEDICINE

## 2019-01-24 PROCEDURE — 85025 COMPLETE CBC W/AUTO DIFF WBC: CPT | Performed by: INTERNAL MEDICINE

## 2019-01-24 PROCEDURE — 36415 COLL VENOUS BLD VENIPUNCTURE: CPT | Performed by: INTERNAL MEDICINE

## 2019-01-24 PROCEDURE — 82746 ASSAY OF FOLIC ACID SERUM: CPT | Performed by: INTERNAL MEDICINE

## 2019-01-24 PROCEDURE — 99204 OFFICE O/P NEW MOD 45 MIN: CPT | Performed by: INTERNAL MEDICINE

## 2019-01-24 RX ORDER — SODIUM CHLORIDE 9 MG/ML
250 INJECTION, SOLUTION INTRAVENOUS ONCE
Status: CANCELLED | OUTPATIENT
Start: 2019-01-31

## 2019-01-24 RX ORDER — DIPHENHYDRAMINE HCL 25 MG
25 CAPSULE ORAL ONCE
Status: CANCELLED | OUTPATIENT
Start: 2019-01-31

## 2019-01-24 RX ORDER — FAMOTIDINE 10 MG/ML
20 INJECTION, SOLUTION INTRAVENOUS ONCE
Status: CANCELLED | OUTPATIENT
Start: 2019-01-31

## 2019-01-24 NOTE — PROGRESS NOTES
DATE OF CONSULT: 1/24/2019    REQUESTING SOURCE:  Marshal Warner MD      REASON FOR CONSULTATION:  Iron deficiency anemia, inability to tolerate iron by mouth      HISTORY OF PRESENT ILLNESS:   69-year-old female with medical program consisting of hypertension, dyslipidemia, diabetes, hypothyroidism on Synthroid, coronary artery disease has ongoing problem with iron deficiency since age 18.  Patient had a hysterectomy at age 30 after that iron deficiency improved for a while.  Since 2013 patient is having recurrent iron deficiency for which patient had evaluation done with EGD and colonoscopy including most recent one in November 2018.  Patient was started on ferrous sulfate 1 tablet by mouth daily since October 2018.  Ferrous sulfate is giving her severe stomach upset and constipation.  She also complains of hemorrhoidal bleed when she gets constipated badly.  Denies any nosebleeds.  Denies any hemoptysis.  Denies any hemorrhoidal bleed.  She is not constipated.  Denies any recent weight loss.  Denies any fevers or night sweats.    PAST MEDICAL HISTORY:    Past Medical History:   Diagnosis Date   • Acquired hypothyroidism - On Synthroid    • Allergic rhinitis    • Anemia    • Asthma - mild intermittent    • Cardiac disease    • Carpal tunnel syndrome - Bilateral    • Colitis    • Degeneration of cervical intervertebral disc    • Diabetes (CMS/HCC)    • Disorder of lumbar spine    • Diverticulosis of large intestine 1/17/2019   • Essential hypertension    • Fatigue    • History of myocardial infarct at age greater than 60 years 10/17/2018   • Hyperlipidemia - Improved with Zocor    • IFG (impaired fasting glucose) 10/17/2018   • Impaired fasting glycaemia    • Iron deficiency anemia - Improved    • Lumbar radiculopathy    • Megaloblastic anemia due to vitamin B>12< deficiency    • Morbid obesity (CMS/HCC)    • Osteoarthritis of knee - Bilateral    • Osteoarthritis of multiple joints - Left knee    • Osteoporosis     • Pain in left hip    • Sleep apnea - On CPAP    • Spasm of back muscles    • Thyroid dysfunction    • Tubular adenoma of colon - removed during colonoscopy, 11/2018. 12/3/2018   • Vitamin D deficiency        PAST SURGICAL HISTORY:  Past Surgical History:   Procedure Laterality Date   • CARPAL TUNNEL RELEASE Right    • COLONOSCOPY N/A 11/30/2018    Procedure: COLONOSCOPY;  Surgeon: Jimmie Sutton MD;  Location: [facility] ENDOSCOPY;  Service: General   • ENDOSCOPY  09/17/2012    Normal esophagus.  Gastritis.  Normal duodenum   • ENDOSCOPY N/A 11/30/2018    Procedure: ESOPHAGOGASTRODUODENOSCOPY WITH ANESTHESIA;  Surgeon: Jimmie Sutton MD;  Location: [facility] ENDOSCOPY;  Service: General   • ENDOSCOPY AND COLONOSCOPY  09/17/2012    Internal & external hemorrhoids found.   • HYSTERECTOMY     • JOINT REPLACEMENT      left knee   • LUMBAR LAMINECTOMY  2011    s/p lumbar laminectomy and fusion   • NECK SURGERY     • OOPHORECTOMY     • SHOULDER SURGERY  12/07/2015    Arthroscopy of the left shoudler with rotator cuff repair, Vijay procedure, and subacromial decompression.   • SHOULDER SURGERY Right    • TOTAL KNEE ARTHROPLASTY Left        ALLERGIES:    Allergies   Allergen Reactions   • Lortab [Hydrocodone-Acetaminophen] Hallucinations   • Codeine Unknown (See Comments)     Heart race   • Antihistamines, Chlorpheniramine-Type Unknown (See Comments)     Heart races   • Latex Rash   • Other Unknown (See Comments)     Decongestants: Heart Race   • Tape Rash       SOCIAL HISTORY:   Social History     Tobacco Use   • Smoking status: Never Smoker   • Smokeless tobacco: Never Used   Substance Use Topics   • Alcohol use: No   • Drug use: No       CURRENT MEDICATIONS:    Current Outpatient Medications   Medication Sig Dispense Refill   • acetaminophen (TYLENOL) 500 MG tablet Take 500 mg by mouth Every 6 (Six) Hours As Needed for Mild Pain .     • aspirin 81 MG chewable tablet Chew 81 mg Daily.     • Calcium  Carb-Cholecalciferol (CALCIUM 600 + D) 600-200 MG-UNIT tablet Take 2 tablets by mouth Daily.     • coenzyme Q10 100 MG capsule Take 100 mg by mouth Daily.     • docusate sodium (COLACE) 100 MG capsule Take 100 mg by mouth.     • DULoxetine (CYMBALTA) 60 MG capsule Take 1 capsule by mouth Daily. Brand name only 90 capsule 3   • Ferrous Sulfate (IRON) 325 (65 FE) MG tablet Take 1 tablet by mouth Daily.     • glucose blood test strip tests BID, Diagnosis: 250.00, 401.9     • labetalol (NORMODYNE) 200 MG tablet Take 100 mg by mouth 2 (Two) Times a Day. 1/2 tab twice daily      • loratadine (CLARITIN) 10 MG tablet Take 10 mg by mouth daily.     • metFORMIN ER (GLUCOPHAGE-XR) 500 MG 24 hr tablet Take 1,000 mg by mouth 2 (Two) Times a Day.     • olmesartan-hydrochlorothiazide (BENICAR HCT) 40-25 MG per tablet Take 1 tablet by mouth Daily. 90 tablet 0   • Omega-3 Fatty Acids (FISH OIL) 1000 MG capsule capsule Take 1,000 mg by mouth 2 (Two) Times a Day With Meals.     • polyethylene glycol (MIRALAX) packet Take 17 g by mouth Daily.     • PREVACID 30 MG capsule TAKE 1 CAPSULE EVERY MORNING 90 capsule 1   • progesterone (PROMETRIUM) 200 MG capsule Take 200 mg by mouth 2 (Two) Times a Day.     • raNITIdine (ZANTAC) 150 MG tablet TAKE 1 TABLET EVERY NIGHT 90 tablet 3   • raNITIdine (ZANTAC) 150 MG tablet Take 150 mg by mouth Every Night.     • simvastatin (ZOCOR) 40 MG tablet TAKE 1 TABLET EVERY NIGHT 90 tablet 3   • spironolactone (ALDACTONE) 25 MG tablet TAKE 1 TABLET EVERY DAY  FOR  FLUID 90 tablet 1   • Testosterone (TESTOPEL) 75 MG implant pellet by Implant route 1 (One) Time.     • thyroid 60 MG PO tablet Take 60 mg by mouth Daily.     • vitamin B-12 (CYANOCOBALAMIN) 500 MCG tablet Take 500 mcg by mouth daily.     • vitamin d (CHOLECALIFEROL) 5000 UNITS capsule Take 1,000 Units by mouth Daily. 30 capsule 11     No current facility-administered medications for this visit.         HOME MEDICATIONS:   Current Outpatient  Medications on File Prior to Visit   Medication Sig Dispense Refill   • acetaminophen (TYLENOL) 500 MG tablet Take 500 mg by mouth Every 6 (Six) Hours As Needed for Mild Pain .     • aspirin 81 MG chewable tablet Chew 81 mg Daily.     • Calcium Carb-Cholecalciferol (CALCIUM 600 + D) 600-200 MG-UNIT tablet Take 2 tablets by mouth Daily.     • coenzyme Q10 100 MG capsule Take 100 mg by mouth Daily.     • docusate sodium (COLACE) 100 MG capsule Take 100 mg by mouth.     • DULoxetine (CYMBALTA) 60 MG capsule Take 1 capsule by mouth Daily. Brand name only 90 capsule 3   • Ferrous Sulfate (IRON) 325 (65 FE) MG tablet Take 1 tablet by mouth Daily.     • glucose blood test strip tests BID, Diagnosis: 250.00, 401.9     • labetalol (NORMODYNE) 200 MG tablet Take 100 mg by mouth 2 (Two) Times a Day. 1/2 tab twice daily      • loratadine (CLARITIN) 10 MG tablet Take 10 mg by mouth daily.     • metFORMIN ER (GLUCOPHAGE-XR) 500 MG 24 hr tablet Take 1,000 mg by mouth 2 (Two) Times a Day.     • olmesartan-hydrochlorothiazide (BENICAR HCT) 40-25 MG per tablet Take 1 tablet by mouth Daily. 90 tablet 0   • Omega-3 Fatty Acids (FISH OIL) 1000 MG capsule capsule Take 1,000 mg by mouth 2 (Two) Times a Day With Meals.     • polyethylene glycol (MIRALAX) packet Take 17 g by mouth Daily.     • PREVACID 30 MG capsule TAKE 1 CAPSULE EVERY MORNING 90 capsule 1   • progesterone (PROMETRIUM) 200 MG capsule Take 200 mg by mouth 2 (Two) Times a Day.     • raNITIdine (ZANTAC) 150 MG tablet TAKE 1 TABLET EVERY NIGHT 90 tablet 3   • raNITIdine (ZANTAC) 150 MG tablet Take 150 mg by mouth Every Night.     • simvastatin (ZOCOR) 40 MG tablet TAKE 1 TABLET EVERY NIGHT 90 tablet 3   • spironolactone (ALDACTONE) 25 MG tablet TAKE 1 TABLET EVERY DAY  FOR  FLUID 90 tablet 1   • Testosterone (TESTOPEL) 75 MG implant pellet by Implant route 1 (One) Time.     • thyroid 60 MG PO tablet Take 60 mg by mouth Daily.     • vitamin B-12 (CYANOCOBALAMIN) 500 MCG tablet  "Take 500 mcg by mouth daily.     • vitamin d (CHOLECALIFEROL) 5000 UNITS capsule Take 1,000 Units by mouth Daily. 30 capsule 11     No current facility-administered medications on file prior to visit.        FAMILY HISTORY:    Family History   Problem Relation Age of Onset   • Breast cancer Mother    • Ovarian cancer Mother    • Breast cancer Sister    • Breast cancer Other    • Heart disease Other    • Hypertension Other    • Lung disease Other    • Diabetes Other    • Cancer Other        Mother was diagnosed with breast cancer.  Mother was also diagnosed with either ovarian or cervical cancer for which she had a hysterectomy.  Sister was diagnosed with bilateral breast cancer.  Another sister had lymphoma, non-Hodgkin's lymphoma.  One niece was diagnosed with breast cancer.        REVIEW OF SYSTEMS:      CONSTITUTIONAL:  Complains of fatigue. Denies any fever, chills or weight loss.     EYES: No visual disturbances. No discharge. No new lesions    ENMT:  No epistaxis, mouth sores or difficulty swallowing.    RESPIRATORY:  No new shortness of breath. No new cough or hemoptysis.    CARDIOVASCULAR:  No chest pain or palpitations.    GASTROINTESTINAL: Complains of nausea and severe constipation with iron tablet.  Complains of hemorrhoidal bleeding when she gets constipated.     GENITOURINARY: No Dysuria or Hematuria.    MUSCULOSKELETAL:  Complains of diffuse arthritic pain affecting multiple joints.    LYMPHATICS:  Denies any abnormal swollen glands anywhere in the body.    NEUROLOGICAL : No tingling or numbness. No headache or dizziness. No seizures or balance problems.    SKIN: Positive for Vitiligo    ENDOCRINE : No new heat or cold intolerance. No new polyuria . No polydipsia.        PHYSICAL EXAMINATION:      VITAL SIGNS:  Resp 22   Ht 154.9 cm (61\")   Wt 116 kg (256 lb)   BMI 48.37 kg/m²       01/24/19  1528   Weight: 116 kg (256 lb)       ECOG performance status: 2    CONSTITUTIONAL:  Not in any " distress.    EYES: Mild conjunctival Pallor. No Icterus. No Pterygium. Extraocular Movements intact.No ptosis.    ENMT:  Normocephalic, Atraumatic.No Facial Asymmetry noted.    NECK:  No adenopathy.Trachea midline. NO JVD.    RESPIRATORY:  Fair air entry bilateral. No rhonchi or wheezing.Fair respiratory effort.    CARDIOVASCULAR:  S1, S2. Regular rate and rhythm. No murmur or gallop appreciated.    ABDOMEN:  Soft, obese, nontender. Bowel sounds present in all four quadrants.  No Hepatosplenomegaly appreciated.    MUSCULOSKELETAL:  No edema.No Calf Tenderness.Decreased range of motion.    NEUROLOGIC:    No  Motor or sensory deficit appreciated. Cranial Nerves 2-12 grossly intact.    SKIN : Vitiligo present in neck. Skin is warm and moist to touch.    LYMPHATICS: No new enlarged lymph nodes in neck or supraclavicular area.    PSYCHIATRY: Alert, awake and oriented ×3.Normal affect.  Normal judgment.  Makes good eye contact.          DIAGNOSTIC DATA:    WBC   Date Value Ref Range Status   01/10/2019 9.53 3.20 - 9.80 10*3/mm3 Final     RBC   Date Value Ref Range Status   01/10/2019 3.73 (L) 3.77 - 5.16 10*6/mm3 Final     Hemoglobin   Date Value Ref Range Status   01/10/2019 11.7 (L) 12.0 - 15.5 g/dL Final     Hematocrit   Date Value Ref Range Status   01/10/2019 35.8 35.0 - 45.0 % Final     MCV   Date Value Ref Range Status   01/10/2019 96.0 80.0 - 98.0 fL Final     MCH   Date Value Ref Range Status   01/10/2019 31.4 26.5 - 34.0 pg Final     MCHC   Date Value Ref Range Status   01/10/2019 32.7 31.4 - 36.0 g/dL Final     RDW   Date Value Ref Range Status   01/10/2019 14.1 11.5 - 14.5 % Final     RDW-SD   Date Value Ref Range Status   01/10/2019 47.2 (H) 36.4 - 46.3 fl Final     MPV   Date Value Ref Range Status   01/10/2019 8.8 8.0 - 12.0 fL Final     Platelets   Date Value Ref Range Status   01/10/2019 307 150 - 450 10*3/mm3 Final     Neutrophil %   Date Value Ref Range Status   01/10/2019 73.4 37.0 - 80.0 % Final      Lymphocyte %   Date Value Ref Range Status   01/10/2019 15.9 10.0 - 50.0 % Final     Monocyte %   Date Value Ref Range Status   01/10/2019 8.7 0.0 - 12.0 % Final     Eosinophil %   Date Value Ref Range Status   01/10/2019 1.8 0.0 - 7.0 % Final     Basophil %   Date Value Ref Range Status   01/10/2019 0.2 0.0 - 2.0 % Final     Immature Granulocyte Rel %   Date Value Ref Range Status   12/02/2015 0.30 0.00 - 0.50 % Final     Neutrophils, Absolute   Date Value Ref Range Status   01/10/2019 6.99 2.00 - 8.60 10*3/mm3 Final     Lymphocytes, Absolute   Date Value Ref Range Status   01/10/2019 1.52 0.60 - 4.20 10*3/mm3 Final     Monocytes, Absolute   Date Value Ref Range Status   01/10/2019 0.83 0.00 - 0.90 10*3/mm3 Final     Eosinophils, Absolute   Date Value Ref Range Status   01/10/2019 0.17 0.00 - 0.70 10*3/mm3 Final     Basophils, Absolute   Date Value Ref Range Status   01/10/2019 0.02 0.00 - 0.20 10*3/mm3 Final     Immature Granulocytes Absolute   Date Value Ref Range Status   12/02/2015 0.020 0.005 - 0.022 x1000/uL Final     nRBC   Date Value Ref Range Status   09/26/2016 0  Final   09/26/2016 0  Final     Glucose   Date Value Ref Range Status   10/08/2018 105 (H) 74 - 99 mg/dL Final     Sodium   Date Value Ref Range Status   10/08/2018 142 137 - 145 mmol/L Final     Potassium   Date Value Ref Range Status   10/08/2018 4.6 3.4 - 5.0 mmol/L Final     CO2   Date Value Ref Range Status   10/08/2018 28.0 22.0 - 30.0 mmol/L Final     Chloride   Date Value Ref Range Status   10/08/2018 106 98 - 107 mmol/L Final     Anion Gap   Date Value Ref Range Status   10/08/2018 8.0 5.0 - 15.0 mmol/L Final     Creatinine   Date Value Ref Range Status   10/08/2018 1.12 (H) 0.52 - 1.04 mg/dL Final     BUN   Date Value Ref Range Status   10/08/2018 29 (H) 7 - 17 mg/dL Final     BUN/Creatinine Ratio   Date Value Ref Range Status   10/08/2018 25.9 (H) 7.0 - 25.0 Final     Calcium   Date Value Ref Range Status   10/08/2018 9.7 8.4 -  10.2 mg/dL Final     eGFR Non  Amer   Date Value Ref Range Status   10/08/2018 48 45 - 104 mL/min/1.73 Final     Alkaline Phosphatase   Date Value Ref Range Status   10/08/2018 37 (L) 38 - 126 U/L Final     Total Protein   Date Value Ref Range Status   10/08/2018 7.5 6.3 - 8.2 g/dL Final     ALT (SGPT)   Date Value Ref Range Status   10/08/2018 14 <=35 U/L Final     AST (SGOT)   Date Value Ref Range Status   10/08/2018 14 14 - 36 U/L Final     Total Bilirubin   Date Value Ref Range Status   10/08/2018 0.5 0.2 - 1.3 mg/dL Final     Albumin   Date Value Ref Range Status   10/08/2018 4.20 3.50 - 5.00 g/dL Final     Globulin   Date Value Ref Range Status   10/08/2018 3.3 2.3 - 3.5 gm/dL Final     Lab Results   Component Value Date    IRON 80 01/10/2019    TIBC 402 01/10/2019    LABIRON 20 01/10/2019    FERRITIN 15.70 01/10/2019    OBETAAQO99 641 10/08/2018     No results found for: , LABCA2, AFPTM, HCGQUANT, , CHROMGRNA, [id number], CEA, REFLABREPO]        Pathology :  Pathology report from November 30, 2018 showed:  Component    Final Diagnosis   1.  GASTRIC ANTRUM, MUCOSAL BIOPSY:  MILD CHRONIC GASTRITIS.  NO EVIDENCE OF HELICOBACTER PYLORI.     2.  TUBULAR ADENOMA, ASCENDING COLON.         ASSESSMENT AND PLAN:      1.  Iron deficiency anemia:  -Patient was found to have occult blood positive in stool in October 2018.  Patient was also found to be anemic with a hemoglobin of 11.  -Patient was started on ferrous sulfate 1 tablet by mouth daily by mouth.  Patient is not able to tolerate iron secondary to nausea as well as severe constipation that's making her hemorrhoids bleed.  -Patient had evaluation done by EGD and colonoscopy by Dr. Sutton on November 30, 2018.  EGD showed gastritis, colonoscopy showed polyp without any evidence of bleeding or cancer.  -Most recent anemia workup done on January 10, 2019 shows ferritin is still low normal at 15.  Hemoglobin was 11.7  -Since patient is not able to  tolerate iron by mouth, plan is to start her on intravenous Feraheme.  -Side effect of intravenous Feraheme including allergic reaction with discussed with patient.  -We will start intravenous Feraheme next week once we get approval through the insurance company.  -We will do repeat blood work today consisting of CBC, B12 and folate.  Patient remains on vitamin B12 supplement by mouth at this point.  -We will ask patient to return to clinic in 2 months with a repeat anemia workup and CBC to be done one day prior to next clinic visit.    2.  Hypothyroidism    3.  Hypertension    4.  Dyslipidemia    5.  Genetic counseling:  -Patient has at least 3 family member who is a breast cancer.  It was discuss with patient that she should find out from her sister whether she was ever tested for any germline mutation.  If not genetic counseling was recommended.    6.  Health Maintainence: Patient does not smoke.  Had a colonoscopy and EGD in November 2018.  Had a mammogram in December 2018.  Had a total abdominal hysterectomy at age 30, does not and Pap smear.                Thank you for this consultation.    Ranjeet De La Torre MD  1/24/2019  3:34 PM          EMR Dragon/Transcription disclaimer:   Much of this encounter note is an electronic transcription/translation of spoken language to printed text. The electronic translation of spoken language may permit erroneous, or at times, nonsensical words or phrases to be inadvertently transcribed; Although I have reviewed the note for such errors, some may still exist.

## 2019-01-25 LAB
FOLATE SERPL-MCNC: 10.6 NG/ML (ref 2.76–21)
VIT B12 BLD-MCNC: 485 PG/ML (ref 239–931)

## 2019-01-25 RX ORDER — FOLIC ACID 1 MG/1
1 TABLET ORAL DAILY
Qty: 90 TABLET | Refills: 1 | Status: SHIPPED | OUTPATIENT
Start: 2019-01-25 | End: 2019-07-25 | Stop reason: SDUPTHER

## 2019-01-25 RX ORDER — LANOLIN ALCOHOL/MO/W.PET/CERES
1000 CREAM (GRAM) TOPICAL DAILY
Qty: 90 TABLET | Refills: 1 | Status: SHIPPED | OUTPATIENT
Start: 2019-01-25 | End: 2019-06-17 | Stop reason: SDUPTHER

## 2019-01-28 ENCOUNTER — TELEPHONE (OUTPATIENT)
Dept: ONCOLOGY | Facility: HOSPITAL | Age: 69
End: 2019-01-28

## 2019-01-28 NOTE — TELEPHONE ENCOUNTER
----- Message from Ranjeet De La Torre MD sent at 1/25/2019  4:58 PM CST -----  Please let patient know, I have sent prescription for vitamin B12 thousand micrograms by mouth daily and folic acid 1 mg by mouth daily to her pharmacy.  She needs to start taking it.  Thank you

## 2019-02-01 ENCOUNTER — APPOINTMENT (OUTPATIENT)
Dept: ONCOLOGY | Facility: HOSPITAL | Age: 69
End: 2019-02-01

## 2019-02-08 ENCOUNTER — INFUSION (OUTPATIENT)
Dept: ONCOLOGY | Facility: HOSPITAL | Age: 69
End: 2019-02-08

## 2019-02-08 VITALS
TEMPERATURE: 98.1 F | SYSTOLIC BLOOD PRESSURE: 132 MMHG | HEART RATE: 86 BPM | RESPIRATION RATE: 18 BRPM | DIASTOLIC BLOOD PRESSURE: 60 MMHG

## 2019-02-08 DIAGNOSIS — T45.4X5A ADVERSE EFFECT OF IRON, INITIAL ENCOUNTER: ICD-10-CM

## 2019-02-08 DIAGNOSIS — D50.0 IRON DEFICIENCY ANEMIA DUE TO CHRONIC BLOOD LOSS: Primary | ICD-10-CM

## 2019-02-08 PROCEDURE — 96374 THER/PROPH/DIAG INJ IV PUSH: CPT | Performed by: INTERNAL MEDICINE

## 2019-02-08 PROCEDURE — 96375 TX/PRO/DX INJ NEW DRUG ADDON: CPT | Performed by: INTERNAL MEDICINE

## 2019-02-08 PROCEDURE — 63710000001 DIPHENHYDRAMINE PER 50 MG: Performed by: INTERNAL MEDICINE

## 2019-02-08 PROCEDURE — 25010000002 FERUMOXYTOL 510 MG/17ML SOLUTION 510 MG VIAL: Performed by: INTERNAL MEDICINE

## 2019-02-08 RX ORDER — FAMOTIDINE 10 MG/ML
20 INJECTION, SOLUTION INTRAVENOUS ONCE
Status: CANCELLED | OUTPATIENT
Start: 2019-02-08

## 2019-02-08 RX ORDER — FAMOTIDINE 10 MG/ML
20 INJECTION, SOLUTION INTRAVENOUS ONCE
Status: COMPLETED | OUTPATIENT
Start: 2019-02-08 | End: 2019-02-08

## 2019-02-08 RX ORDER — DIPHENHYDRAMINE HCL 25 MG
25 CAPSULE ORAL ONCE
Status: CANCELLED | OUTPATIENT
Start: 2019-02-08

## 2019-02-08 RX ORDER — SODIUM CHLORIDE 9 MG/ML
250 INJECTION, SOLUTION INTRAVENOUS ONCE
Status: COMPLETED | OUTPATIENT
Start: 2019-02-08 | End: 2019-02-08

## 2019-02-08 RX ORDER — DIPHENHYDRAMINE HCL 25 MG
25 CAPSULE ORAL ONCE
Status: COMPLETED | OUTPATIENT
Start: 2019-02-08 | End: 2019-02-08

## 2019-02-08 RX ORDER — SODIUM CHLORIDE 9 MG/ML
250 INJECTION, SOLUTION INTRAVENOUS ONCE
Status: CANCELLED | OUTPATIENT
Start: 2019-02-08

## 2019-02-08 RX ADMIN — FAMOTIDINE 20 MG: 10 INJECTION INTRAVENOUS at 08:27

## 2019-02-08 RX ADMIN — SODIUM CHLORIDE 250 ML: 9 INJECTION, SOLUTION INTRAVENOUS at 08:26

## 2019-02-08 RX ADMIN — FERUMOXYTOL 510 MG: 510 INJECTION INTRAVENOUS at 08:45

## 2019-02-08 RX ADMIN — DIPHENHYDRAMINE HYDROCHLORIDE 25 MG: 25 CAPSULE ORAL at 08:27

## 2019-02-15 ENCOUNTER — INFUSION (OUTPATIENT)
Dept: ONCOLOGY | Facility: HOSPITAL | Age: 69
End: 2019-02-15

## 2019-02-15 VITALS
DIASTOLIC BLOOD PRESSURE: 65 MMHG | SYSTOLIC BLOOD PRESSURE: 147 MMHG | TEMPERATURE: 98.2 F | HEART RATE: 88 BPM | RESPIRATION RATE: 18 BRPM

## 2019-02-15 DIAGNOSIS — D50.0 IRON DEFICIENCY ANEMIA DUE TO CHRONIC BLOOD LOSS: Primary | ICD-10-CM

## 2019-02-15 DIAGNOSIS — T45.4X5A ADVERSE EFFECT OF IRON, INITIAL ENCOUNTER: ICD-10-CM

## 2019-02-15 PROCEDURE — 96374 THER/PROPH/DIAG INJ IV PUSH: CPT | Performed by: INTERNAL MEDICINE

## 2019-02-15 PROCEDURE — 96375 TX/PRO/DX INJ NEW DRUG ADDON: CPT | Performed by: INTERNAL MEDICINE

## 2019-02-15 PROCEDURE — 25010000002 FERUMOXYTOL 510 MG/17ML SOLUTION 510 MG VIAL: Performed by: INTERNAL MEDICINE

## 2019-02-15 PROCEDURE — 63710000001 DIPHENHYDRAMINE PER 50 MG: Performed by: INTERNAL MEDICINE

## 2019-02-15 RX ORDER — DIPHENHYDRAMINE HCL 25 MG
25 CAPSULE ORAL ONCE
Status: CANCELLED | OUTPATIENT
Start: 2019-02-15

## 2019-02-15 RX ORDER — FAMOTIDINE 10 MG/ML
20 INJECTION, SOLUTION INTRAVENOUS ONCE
Status: CANCELLED | OUTPATIENT
Start: 2019-02-15

## 2019-02-15 RX ORDER — SODIUM CHLORIDE 9 MG/ML
250 INJECTION, SOLUTION INTRAVENOUS ONCE
Status: CANCELLED | OUTPATIENT
Start: 2019-02-15

## 2019-02-15 RX ORDER — DIPHENHYDRAMINE HCL 25 MG
25 CAPSULE ORAL ONCE
Status: COMPLETED | OUTPATIENT
Start: 2019-02-15 | End: 2019-02-15

## 2019-02-15 RX ORDER — FAMOTIDINE 10 MG/ML
20 INJECTION, SOLUTION INTRAVENOUS ONCE
Status: COMPLETED | OUTPATIENT
Start: 2019-02-15 | End: 2019-02-15

## 2019-02-15 RX ORDER — SODIUM CHLORIDE 9 MG/ML
250 INJECTION, SOLUTION INTRAVENOUS ONCE
Status: COMPLETED | OUTPATIENT
Start: 2019-02-15 | End: 2019-02-15

## 2019-02-15 RX ADMIN — SODIUM CHLORIDE 250 ML: 9 INJECTION, SOLUTION INTRAVENOUS at 09:33

## 2019-02-15 RX ADMIN — DIPHENHYDRAMINE HYDROCHLORIDE 25 MG: 25 CAPSULE ORAL at 09:33

## 2019-02-15 RX ADMIN — FAMOTIDINE 20 MG: 10 INJECTION INTRAVENOUS at 09:33

## 2019-02-15 RX ADMIN — FERUMOXYTOL 510 MG: 510 INJECTION INTRAVENOUS at 09:47

## 2019-03-21 ENCOUNTER — LAB (OUTPATIENT)
Dept: LAB | Facility: OTHER | Age: 69
End: 2019-03-21

## 2019-03-21 DIAGNOSIS — D50.0 IRON DEFICIENCY ANEMIA DUE TO CHRONIC BLOOD LOSS: ICD-10-CM

## 2019-03-21 DIAGNOSIS — I10 ESSENTIAL HYPERTENSION: Chronic | ICD-10-CM

## 2019-03-21 DIAGNOSIS — E55.9 VITAMIN D DEFICIENCY: Chronic | ICD-10-CM

## 2019-03-21 DIAGNOSIS — T45.4X5A ADVERSE EFFECT OF IRON, INITIAL ENCOUNTER: ICD-10-CM

## 2019-03-21 DIAGNOSIS — E78.2 MIXED HYPERLIPIDEMIA: Chronic | ICD-10-CM

## 2019-03-21 DIAGNOSIS — D50.8 IRON DEFICIENCY ANEMIA SECONDARY TO INADEQUATE DIETARY IRON INTAKE: Chronic | ICD-10-CM

## 2019-03-21 DIAGNOSIS — E83.42 HYPOMAGNESEMIA: Chronic | ICD-10-CM

## 2019-03-21 DIAGNOSIS — R73.01 IFG (IMPAIRED FASTING GLUCOSE): Chronic | ICD-10-CM

## 2019-03-21 DIAGNOSIS — D53.1 MEGALOBLASTIC ANEMIA: Chronic | ICD-10-CM

## 2019-03-21 DIAGNOSIS — E03.9 ACQUIRED HYPOTHYROIDISM: Chronic | ICD-10-CM

## 2019-03-21 LAB
25(OH)D3 SERPL-MCNC: 26.9 NG/ML (ref 30–100)
ALBUMIN SERPL-MCNC: 4.4 G/DL (ref 3.5–5)
ALBUMIN/GLOB SERPL: 1.5 G/DL (ref 1.1–1.8)
ALP SERPL-CCNC: 43 U/L (ref 38–126)
ALT SERPL W P-5'-P-CCNC: 16 U/L
ANION GAP SERPL CALCULATED.3IONS-SCNC: 8 MMOL/L (ref 5–15)
ARTICHOKE IGE QN: 87 MG/DL (ref 1–129)
AST SERPL-CCNC: 15 U/L (ref 14–36)
BASOPHILS # BLD AUTO: 0.04 10*3/MM3 (ref 0–0.2)
BASOPHILS NFR BLD AUTO: 0.6 % (ref 0–2)
BILIRUB SERPL-MCNC: 0.5 MG/DL (ref 0.2–1.3)
BUN BLD-MCNC: 28 MG/DL (ref 7–17)
BUN/CREAT SERPL: 24.6 (ref 7–25)
CALCIUM SPEC-SCNC: 9.6 MG/DL (ref 8.4–10.2)
CHLORIDE SERPL-SCNC: 101 MMOL/L (ref 98–107)
CO2 SERPL-SCNC: 29 MMOL/L (ref 22–30)
CREAT BLD-MCNC: 1.14 MG/DL (ref 0.52–1.04)
DEPRECATED RDW RBC AUTO: 49.6 FL (ref 36.4–46.3)
EOSINOPHIL # BLD AUTO: 0.24 10*3/MM3 (ref 0–0.7)
EOSINOPHIL NFR BLD AUTO: 3.7 % (ref 0–7)
ERYTHROCYTE [DISTWIDTH] IN BLOOD BY AUTOMATED COUNT: 14.6 % (ref 11.5–14.5)
FERRITIN SERPL-MCNC: 105 NG/ML (ref 11.1–264)
FOLATE SERPL-MCNC: >20 NG/ML (ref 2.76–21)
GFR SERPL CREATININE-BSD FRML MDRD: 47 ML/MIN/1.73 (ref 39–90)
GLOBULIN UR ELPH-MCNC: 2.9 GM/DL (ref 2.3–3.5)
GLUCOSE BLD-MCNC: 105 MG/DL (ref 74–99)
HBA1C MFR BLD: 5.8 % (ref 4–5.6)
HCT VFR BLD AUTO: 32.8 % (ref 35–45)
HGB BLD-MCNC: 10.6 G/DL (ref 12–15.5)
IRON 24H UR-MRATE: 119 MCG/DL (ref 37–170)
IRON SATN MFR SERPL: 38 % (ref 15–50)
LYMPHOCYTES # BLD AUTO: 1.12 10*3/MM3 (ref 0.6–4.2)
LYMPHOCYTES NFR BLD AUTO: 17.5 % (ref 10–50)
MAGNESIUM SERPL-MCNC: 1.7 MG/DL (ref 1.6–2.3)
MCH RBC QN AUTO: 31.3 PG (ref 26.5–34)
MCHC RBC AUTO-ENTMCNC: 32.3 G/DL (ref 31.4–36)
MCV RBC AUTO: 96.8 FL (ref 80–98)
MONOCYTES # BLD AUTO: 0.49 10*3/MM3 (ref 0–0.9)
MONOCYTES NFR BLD AUTO: 7.6 % (ref 0–12)
NEUTROPHILS # BLD AUTO: 4.52 10*3/MM3 (ref 2–8.6)
NEUTROPHILS NFR BLD AUTO: 70.6 % (ref 37–80)
PLATELET # BLD AUTO: 281 10*3/MM3 (ref 150–450)
PMV BLD AUTO: 8.6 FL (ref 8–12)
POTASSIUM BLD-SCNC: 4.5 MMOL/L (ref 3.4–5)
PROT SERPL-MCNC: 7.3 G/DL (ref 6.3–8.2)
RBC # BLD AUTO: 3.39 10*6/MM3 (ref 3.77–5.16)
SODIUM BLD-SCNC: 138 MMOL/L (ref 137–145)
T4 FREE SERPL-MCNC: 0.7 NG/DL (ref 0.78–2.19)
TIBC SERPL-MCNC: 315 MCG/DL (ref 265–497)
TSH SERPL DL<=0.05 MIU/L-ACNC: 2.81 MIU/ML (ref 0.46–4.68)
VIT B12 BLD-MCNC: 663 PG/ML (ref 239–931)
WBC NRBC COR # BLD: 6.41 10*3/MM3 (ref 3.2–9.8)

## 2019-03-21 PROCEDURE — 80053 COMPREHEN METABOLIC PANEL: CPT | Performed by: INTERNAL MEDICINE

## 2019-03-21 PROCEDURE — 85025 COMPLETE CBC W/AUTO DIFF WBC: CPT | Performed by: INTERNAL MEDICINE

## 2019-03-21 PROCEDURE — 83540 ASSAY OF IRON: CPT | Performed by: INTERNAL MEDICINE

## 2019-03-21 PROCEDURE — 82306 VITAMIN D 25 HYDROXY: CPT | Performed by: INTERNAL MEDICINE

## 2019-03-21 PROCEDURE — 36415 COLL VENOUS BLD VENIPUNCTURE: CPT | Performed by: INTERNAL MEDICINE

## 2019-03-21 PROCEDURE — 84443 ASSAY THYROID STIM HORMONE: CPT | Performed by: INTERNAL MEDICINE

## 2019-03-21 PROCEDURE — 83721 ASSAY OF BLOOD LIPOPROTEIN: CPT | Performed by: INTERNAL MEDICINE

## 2019-03-21 PROCEDURE — 84439 ASSAY OF FREE THYROXINE: CPT | Performed by: INTERNAL MEDICINE

## 2019-03-21 PROCEDURE — 82607 VITAMIN B-12: CPT | Performed by: INTERNAL MEDICINE

## 2019-03-21 PROCEDURE — 83735 ASSAY OF MAGNESIUM: CPT | Performed by: INTERNAL MEDICINE

## 2019-03-21 PROCEDURE — 83036 HEMOGLOBIN GLYCOSYLATED A1C: CPT | Performed by: INTERNAL MEDICINE

## 2019-03-21 PROCEDURE — 82728 ASSAY OF FERRITIN: CPT | Performed by: INTERNAL MEDICINE

## 2019-03-21 PROCEDURE — 82746 ASSAY OF FOLIC ACID SERUM: CPT | Performed by: INTERNAL MEDICINE

## 2019-03-21 PROCEDURE — 83550 IRON BINDING TEST: CPT | Performed by: INTERNAL MEDICINE

## 2019-03-28 ENCOUNTER — OFFICE VISIT (OUTPATIENT)
Dept: HEMATOLOGY/ONCOLOGY | Facility: CLINIC | Age: 69
End: 2019-03-28

## 2019-03-28 VITALS
BODY MASS INDEX: 48.33 KG/M2 | HEART RATE: 92 BPM | SYSTOLIC BLOOD PRESSURE: 118 MMHG | TEMPERATURE: 98.3 F | HEIGHT: 61 IN | WEIGHT: 256 LBS | RESPIRATION RATE: 22 BRPM | DIASTOLIC BLOOD PRESSURE: 78 MMHG

## 2019-03-28 DIAGNOSIS — D53.1 MEGALOBLASTIC ANEMIA: Chronic | ICD-10-CM

## 2019-03-28 DIAGNOSIS — T45.4X5A ADVERSE EFFECT OF IRON, INITIAL ENCOUNTER: ICD-10-CM

## 2019-03-28 DIAGNOSIS — D50.0 IRON DEFICIENCY ANEMIA DUE TO CHRONIC BLOOD LOSS: Primary | ICD-10-CM

## 2019-03-28 PROCEDURE — G8417 CALC BMI ABV UP PARAM F/U: HCPCS | Performed by: INTERNAL MEDICINE

## 2019-03-28 PROCEDURE — 1123F ACP DISCUSS/DSCN MKR DOCD: CPT | Performed by: INTERNAL MEDICINE

## 2019-03-28 PROCEDURE — 99214 OFFICE O/P EST MOD 30 MIN: CPT | Performed by: INTERNAL MEDICINE

## 2019-03-28 NOTE — PROGRESS NOTES
DATE OF VISIT: 3/28/2019        REASON FOR VISIT: Anemia with iron deficiency, vitamin B12 deficiency, elevated creatinine      HISTORY OF PRESENT ILLNESS:    69-year-old female with medical program consisting of hypertension, dyslipidemia, diabetes, hypothyroidism on Synthroid, coronary artery disease has ongoing problem with iron deficiency since age 18.  Patient was initially seen in consultation on January 24, 2019.  In view of inability to take iron by mouth, patient received 2 dose of intravenous Feraheme on February 8 2019 and February 15, 2019.  Patient is here for 2-month follow-up appointment today.  Still complains of intermittent blood in the stool due to hemorrhoidal bleeding.  States her fatigue is improved.  Denies any new lymph node enlargement.        PAST MEDICAL HISTORY:    Past Medical History:   Diagnosis Date   • Acquired hypothyroidism - On Synthroid    • Allergic rhinitis    • Anemia    • Asthma - mild intermittent    • Cardiac disease    • Carpal tunnel syndrome - Bilateral    • Colitis    • Degeneration of cervical intervertebral disc    • Diabetes (CMS/HCC)    • Disorder of lumbar spine    • Diverticulosis of large intestine 1/17/2019   • Essential hypertension    • Fatigue    • History of myocardial infarct at age greater than 60 years 10/17/2018   • Hyperlipidemia - Improved with Zocor    • IFG (impaired fasting glucose) 10/17/2018   • Impaired fasting glycaemia    • Iron deficiency anemia - Improved    • Lumbar radiculopathy    • Megaloblastic anemia due to vitamin B>12< deficiency    • Morbid obesity (CMS/HCC)    • Osteoarthritis of knee - Bilateral    • Osteoarthritis of multiple joints - Left knee    • Osteoporosis    • Pain in left hip    • Sleep apnea - On CPAP    • Spasm of back muscles    • Thyroid dysfunction    • Tubular adenoma of colon - removed during colonoscopy, 11/2018. 12/3/2018   • Vitamin D deficiency        SOCIAL HISTORY:    Social History     Tobacco Use   • Smoking  status: Never Smoker   • Smokeless tobacco: Never Used   Substance Use Topics   • Alcohol use: No   • Drug use: No       Surgical History :  Past Surgical History:   Procedure Laterality Date   • CARPAL TUNNEL RELEASE Right    • COLONOSCOPY N/A 11/30/2018    Procedure: COLONOSCOPY;  Surgeon: Jimmie Sutton MD;  Location: [facility] ENDOSCOPY;  Service: General   • ENDOSCOPY  09/17/2012    Normal esophagus.  Gastritis.  Normal duodenum   • ENDOSCOPY N/A 11/30/2018    Procedure: ESOPHAGOGASTRODUODENOSCOPY WITH ANESTHESIA;  Surgeon: Jimmie Sutton MD;  Location: [facility] ENDOSCOPY;  Service: General   • ENDOSCOPY AND COLONOSCOPY  09/17/2012    Internal & external hemorrhoids found.   • HYSTERECTOMY     • JOINT REPLACEMENT      left knee   • LUMBAR LAMINECTOMY  2011    s/p lumbar laminectomy and fusion   • NECK SURGERY     • OOPHORECTOMY     • SHOULDER SURGERY  12/07/2015    Arthroscopy of the left shoudler with rotator cuff repair, Vijay procedure, and subacromial decompression.   • SHOULDER SURGERY Right    • TOTAL KNEE ARTHROPLASTY Left        ALLERGIES:    Allergies   Allergen Reactions   • Lortab [Hydrocodone-Acetaminophen] Hallucinations   • Codeine Unknown (See Comments)     Heart race   • Antihistamines, Chlorpheniramine-Type Unknown (See Comments)     Heart races   • Latex Rash   • Other Unknown (See Comments)     Decongestants: Heart Race   • Tape Rash         FAMILY HISTORY:  Family History   Problem Relation Age of Onset   • Breast cancer Mother    • Ovarian cancer Mother    • Breast cancer Sister    • Breast cancer Other    • Heart disease Other    • Hypertension Other    • Lung disease Other    • Diabetes Other    • Cancer Other            REVIEW OF SYSTEMS:      CONSTITUTIONAL:   States her fatigue is improved after intravenous Feraheme. Denies any fever, chills or weight loss.      EYES: No visual disturbances. No discharge. No new lesions     ENMT:  No epistaxis, mouth sores or difficulty  "swallowing.     RESPIRATORY:  No new shortness of breath. No new cough or hemoptysis.     CARDIOVASCULAR:  No chest pain or palpitations.     GASTROINTESTINAL: Complains of nausea and severe constipation with iron tablet.  Complains of hemorrhoidal bleeding when she gets constipated.      GENITOURINARY: No Dysuria or Hematuria.     MUSCULOSKELETAL:  Complains of diffuse arthritic pain affecting multiple joints.     LYMPHATICS:  Denies any abnormal swollen glands anywhere in the body.     NEUROLOGICAL : No tingling or numbness. No headache or dizziness. No seizures or balance problems.     SKIN: Positive for Vitiligo     ENDOCRINE : No new heat or cold intolerance. No new polyuria . No polydipsia.          PHYSICAL EXAMINATION:      VITAL SIGNS:  /78   Pulse 92   Temp 98.3 °F (36.8 °C)   Resp 22   Ht 154.9 cm (61\")   Wt 116 kg (256 lb)   BMI 48.37 kg/m²       03/28/19  1355   Weight: 116 kg (256 lb)         ECOG performance status: 2     CONSTITUTIONAL:  Not in any distress.     EYES: Mild conjunctival Pallor. No Icterus. No Pterygium. Extraocular Movements intact.No ptosis.     ENMT:  Normocephalic, Atraumatic.No Facial Asymmetry noted.     NECK:  No adenopathy.Trachea midline. NO JVD.     RESPIRATORY:  Fair air entry bilateral. No rhonchi or wheezing.Fair respiratory effort.     CARDIOVASCULAR:  S1, S2. Regular rate and rhythm. No murmur or gallop appreciated.     ABDOMEN:  Soft, obese, nontender. Bowel sounds present in all four quadrants.  No Hepatosplenomegaly appreciated.     MUSCULOSKELETAL:  No edema.No Calf Tenderness.Decreased range of motion.     NEUROLOGIC:    No  Motor or sensory deficit appreciated. Cranial Nerves 2-12 grossly intact.     SKIN : Vitiligo present in neck. Skin is warm and moist to touch.     LYMPHATICS: No new enlarged lymph nodes in neck or supraclavicular area.     PSYCHIATRY: Alert, awake and oriented ×3.Normal affect.  Normal judgment.  Makes good eye " contact.          DIAGNOSTIC DATA:    Glucose   Date Value Ref Range Status   03/21/2019 105 (H) 74 - 99 mg/dL Final     Sodium   Date Value Ref Range Status   03/21/2019 138 137 - 145 mmol/L Final     Potassium   Date Value Ref Range Status   03/21/2019 4.5 3.4 - 5.0 mmol/L Final     CO2   Date Value Ref Range Status   03/21/2019 29.0 22.0 - 30.0 mmol/L Final     Chloride   Date Value Ref Range Status   03/21/2019 101 98 - 107 mmol/L Final     Anion Gap   Date Value Ref Range Status   03/21/2019 8.0 5.0 - 15.0 mmol/L Final     Creatinine   Date Value Ref Range Status   03/21/2019 1.14 (H) 0.52 - 1.04 mg/dL Final     BUN   Date Value Ref Range Status   03/21/2019 28 (H) 7 - 17 mg/dL Final     BUN/Creatinine Ratio   Date Value Ref Range Status   03/21/2019 24.6 7.0 - 25.0 Final     Calcium   Date Value Ref Range Status   03/21/2019 9.6 8.4 - 10.2 mg/dL Final     eGFR Non  Amer   Date Value Ref Range Status   03/21/2019 47 39 - 90 mL/min/1.73 Final     Alkaline Phosphatase   Date Value Ref Range Status   03/21/2019 43 38 - 126 U/L Final     Total Protein   Date Value Ref Range Status   03/21/2019 7.3 6.3 - 8.2 g/dL Final     ALT (SGPT)   Date Value Ref Range Status   03/21/2019 16 <=35 U/L Final     AST (SGOT)   Date Value Ref Range Status   03/21/2019 15 14 - 36 U/L Final     Total Bilirubin   Date Value Ref Range Status   03/21/2019 0.5 0.2 - 1.3 mg/dL Final     Albumin   Date Value Ref Range Status   03/21/2019 4.40 3.50 - 5.00 g/dL Final     Globulin   Date Value Ref Range Status   03/21/2019 2.9 2.3 - 3.5 gm/dL Final     Lab Results   Component Value Date    WBC 6.41 03/21/2019    HGB 10.6 (L) 03/21/2019    HCT 32.8 (L) 03/21/2019    MCV 96.8 03/21/2019     03/21/2019     Lab Results   Component Value Date    NEUTROABS 4.52 03/21/2019    IRON 119 03/21/2019    TIBC 315 03/21/2019    LABIRON 38 03/21/2019    FERRITIN 105.00 03/21/2019    [id number] 663 03/21/2019    FOLATE >20.00 03/21/2019     No  results found for: , LABCA2, AFPTM, HCGQUANT, , CHROMGRNA, [id number], CEA, REFLABREPO        Pathology :  Pathology report from November 30, 2018 showed:  Component     Final Diagnosis   1.  GASTRIC ANTRUM, MUCOSAL BIOPSY:  MILD CHRONIC GASTRITIS.  NO EVIDENCE OF HELICOBACTER PYLORI.     2.  TUBULAR ADENOMA, ASCENDING COLON.            ASSESSMENT AND PLAN:       1.  Iron deficiency anemia:  -Patient was found to have occult blood positive in stool in October 2018.  Patient was also found to be anemic with a hemoglobin of 11.  -Patient was started on ferrous sulfate 1 tablet by mouth daily by mouth.  Patient is not able to tolerate iron secondary to nausea as well as severe constipation that's making her hemorrhoids bleed.  -Patient had evaluation done by EGD and colonoscopy by Dr. Sutton on November 30, 2018.  EGD showed gastritis, colonoscopy showed polyp without any evidence of bleeding or cancer.  - anemia workup done on January 10, 2019 shows ferritin is still low normal at 15.  Hemoglobin was 11.7  -Since patient is not able to tolerate iron by mouth, plan is to start her on intravenous Feraheme.  -Patient received 2 dose of intravenous Feraheme on February 8, 2019 and February 15, 2019.  -Anemia workup done on March 21, 2019 shows hemoglobin has decreased to 10.6.  Iron studies, ferritin are adequate at this point of trying infusion.  -Vitamin B12 is mildly improved to 663.  Recommend continuing with B12 supplement at this point.  -We will ask patient to return to clinic in 2 months with repeat CBC and anemia workup to be done on that day.  -If blood work done in 2 weeks from now shows adequate amount of iron B12 and folate and if patient remains anemic, she will need bone marrow biopsy done to rule out any bone marrow pathology.  This recommendation was discussed with patient.      2.  Hypothyroidism     3.  Hypertension     4.  Dyslipidemia     5.  Genetic counseling:  -Patient has at least 3  family member who is a breast cancer.  It was discuss with patient that she should find out from her sister whether she was ever tested for any germline mutation.    -States her sister was never tested for any genetic testing.  -Patient was offered to get seen by genetic counselor at [facility].  She will call our office with her decision about that.     6.  Health Maintainence: Patient does not smoke.  Had a colonoscopy and EGD in November 2018.  Had a mammogram in December 2018.  Had a total abdominal hysterectomy at age 30, does not and Pap smear.    7.Advance Care Planning: For now patient remains full code and is able to make her decisions.  Patient has health care surrogate mentioned on chart.    8.BMI:Patient's Body mass index is 48.37 kg/m². BMI is higher than reference range.  Patient is aware of elevated BMI.             Ranjeet De La Torre MD  3/28/2019  2:14 PM        EMR Dragon/Transcription disclaimer:   Much of this encounter note is an electronic transcription/translation of spoken language to printed text. The electronic translation of spoken language may permit erroneous, or at times, nonsensical words or phrases to be inadvertently transcribed; Although I have reviewed the note for such errors, some may still exist.

## 2019-04-19 ENCOUNTER — OFFICE VISIT (OUTPATIENT)
Dept: FAMILY MEDICINE CLINIC | Facility: CLINIC | Age: 69
End: 2019-04-19

## 2019-04-19 VITALS
HEIGHT: 61 IN | DIASTOLIC BLOOD PRESSURE: 70 MMHG | TEMPERATURE: 98.6 F | WEIGHT: 256 LBS | HEART RATE: 64 BPM | SYSTOLIC BLOOD PRESSURE: 120 MMHG | BODY MASS INDEX: 48.33 KG/M2

## 2019-04-19 DIAGNOSIS — Z00.00 MEDICARE ANNUAL WELLNESS VISIT, INITIAL: Primary | ICD-10-CM

## 2019-04-19 DIAGNOSIS — E66.01 MORBID OBESITY (HCC): Chronic | ICD-10-CM

## 2019-04-19 DIAGNOSIS — E03.9 ACQUIRED HYPOTHYROIDISM: Chronic | ICD-10-CM

## 2019-04-19 DIAGNOSIS — D12.6 TUBULAR ADENOMA OF COLON: Chronic | ICD-10-CM

## 2019-04-19 DIAGNOSIS — E55.9 VITAMIN D DEFICIENCY: Chronic | ICD-10-CM

## 2019-04-19 DIAGNOSIS — E83.42 HYPOMAGNESEMIA: Chronic | ICD-10-CM

## 2019-04-19 DIAGNOSIS — E78.2 MIXED HYPERLIPIDEMIA: Chronic | ICD-10-CM

## 2019-04-19 DIAGNOSIS — D50.0 IRON DEFICIENCY ANEMIA DUE TO CHRONIC BLOOD LOSS: Chronic | ICD-10-CM

## 2019-04-19 DIAGNOSIS — R73.01 IFG (IMPAIRED FASTING GLUCOSE): Chronic | ICD-10-CM

## 2019-04-19 DIAGNOSIS — J45.20 MILD INTERMITTENT ASTHMA WITHOUT COMPLICATION: Chronic | ICD-10-CM

## 2019-04-19 DIAGNOSIS — I10 ESSENTIAL HYPERTENSION: Chronic | ICD-10-CM

## 2019-04-19 DIAGNOSIS — D53.1 MEGALOBLASTIC ANEMIA: Chronic | ICD-10-CM

## 2019-04-19 DIAGNOSIS — G47.33 OBSTRUCTIVE SLEEP APNEA SYNDROME: Chronic | ICD-10-CM

## 2019-04-19 PROCEDURE — G0438 PPPS, INITIAL VISIT: HCPCS | Performed by: INTERNAL MEDICINE

## 2019-04-19 PROCEDURE — 99214 OFFICE O/P EST MOD 30 MIN: CPT | Performed by: INTERNAL MEDICINE

## 2019-04-19 PROCEDURE — 96160 PT-FOCUSED HLTH RISK ASSMT: CPT | Performed by: INTERNAL MEDICINE

## 2019-04-19 RX ORDER — LEVOTHYROXINE SODIUM 0.07 MG/1
75 TABLET ORAL DAILY
Qty: 90 TABLET | Refills: 3 | Status: SHIPPED | OUTPATIENT
Start: 2019-04-19 | End: 2020-10-09

## 2019-04-19 NOTE — PATIENT INSTRUCTIONS
Medicare Wellness  Personal Prevention Plan of Service     Date of Office Visit:  2019  Encounter Provider:  Marshal Warner MD  Place of Service:  [facility]  Patient Name: Lois Parmar  :  1950    As part of the Medicare Wellness portion of your visit today, we are providing you with this personalized preventive plan of services (PPPS). This plan is based upon recommendations of the [place] Preventive Services Task Force (USPSTF) and the Advisory Committee on Immunization Practices (ACIP).    This lists the preventive care services that should be considered, and provides dates of when you are due. Items listed as completed are up-to-date and do not require any further intervention.    Health Maintenance   Topic Date Due   • TDAP/TD VACCINES (1 - Tdap) 1968   • ZOSTER VACCINE (2 of 3) 2011   • HEPATITIS C SCREENING  2016   • MEDICARE ANNUAL WELLNESS  2016   • INFLUENZA VACCINE  2019   • LIPID PANEL  2020   • DXA SCAN  2020   • MAMMOGRAM  2020   • COLONOSCOPY  2028   • PNEUMOCOCCAL VACCINES (65+ LOW/MEDIUM RISK)  Completed       No orders of the defined types were placed in this encounter.      Return in about 6 months (around 10/19/2019) for Next scheduled follow up.          Exercising to Lose Weight  Exercising can help you to lose weight. In order to lose weight through exercise, you need to do vigorous-intensity exercise. You can tell that you are exercising with vigorous intensity if you are breathing very hard and fast and cannot hold a conversation while exercising.  Moderate-intensity exercise helps to maintain your current weight. You can tell that you are exercising at a moderate level if you have a higher heart rate and faster breathing, but you are still able to hold a conversation.  How often should I exercise?  Choose an activity that you enjoy and set realistic goals. Your health  care provider can help you to make an activity plan that works for you. Exercise regularly as directed by your health care provider. This may include:  · Doing resistance training twice each week, such as:  ? Push-ups.  ? Sit-ups.  ? Lifting weights.  ? Using resistance bands.  · Doing a given intensity of exercise for a given amount of time. Choose from these options:  ? 150 minutes of moderate-intensity exercise every week.  ? 75 minutes of vigorous-intensity exercise every week.  ? A mix of moderate-intensity and vigorous-intensity exercise every week.    Children, pregnant women, people who are out of shape, people who are overweight, and older adults may need to consult a health care provider for individual recommendations. If you have any sort of medical condition, be sure to consult your health care provider before starting a new exercise program.  What are some activities that can help me to lose weight?  · Walking at a rate of at least 4.5 miles an hour.  · Jogging or running at a rate of 5 miles per hour.  · Biking at a rate of at least 10 miles per hour.  · Lap swimming.  · Roller-skating or in-line skating.  · Cross-country skiing.  · Vigorous competitive sports, such as football, basketball, and soccer.  · Jumping rope.  · Aerobic dancing.  How can I be more active in my day-to-day activities?  · Use the stairs instead of the elevator.  · Take a walk during your lunch break.  · If you drive, park your car farther away from work or school.  · If you take public transportation, get off one stop early and walk the rest of the way.  · Make all of your phone calls while standing up and walking around.  · Get up, stretch, and walk around every 30 minutes throughout the day.  What guidelines should I follow while exercising?  · Do not exercise so much that you hurt yourself, feel dizzy, or get very short of breath.  · Consult your health care provider prior to starting a new exercise program.  · Wear  comfortable clothes and shoes with good support.  · Drink plenty of water while you exercise to prevent dehydration or heat stroke. Body water is lost during exercise and must be replaced.  · Work out until you breathe faster and your heart beats faster.  This information is not intended to replace advice given to you by your health care provider. Make sure you discuss any questions you have with your health care provider.  Document Released: 01/20/2012 Document Revised: 05/25/2017 Document Reviewed: 05/21/2015  [employer] Patient Education © 2019 [employer]      Calorie Counting for Weight Loss  Calories are units of energy. Your body needs a certain amount of calories from food to keep you going throughout the day. When you eat more calories than your body needs, your body stores the extra calories as fat. When you eat fewer calories than your body needs, your body burns fat to get the energy it needs.  Calorie counting means keeping track of how many calories you eat and drink each day. Calorie counting can be helpful if you need to lose weight. If you make sure to eat fewer calories than your body needs, you should lose weight. Ask your health care provider what a healthy weight is for you.  For calorie counting to work, you will need to eat the right number of calories in a day in order to lose a healthy amount of weight per week. A dietitian can help you determine how many calories you need in a day and will give you suggestions on how to reach your calorie goal.  · A healthy amount of weight to lose per week is usually 1-2 lb (0.5-0.9 kg). This usually means that your daily calorie intake should be reduced by 500-750 calories.  · Eating 1,200 - 1,500 calories per day can help most women lose weight.  · Eating 1,500 - 1,800 calories per day can help most men lose weight.    What is my plan?  My goal is to have __________ calories per day.  If I have this many calories per day, I should lose around  __________ pounds per week.  What do I need to know about calorie counting?  In order to meet your daily calorie goal, you will need to:  · Find out how many calories are in each food you would like to eat. Try to do this before you eat.  · Decide how much of the food you plan to eat.  · Write down what you ate and how many calories it had. Doing this is called keeping a food log.    To successfully lose weight, it is important to balance calorie counting with a healthy lifestyle that includes regular activity. Aim for 150 minutes of moderate exercise (such as walking) or 75 minutes of vigorous exercise (such as running) each week.  Where do I find calorie information?    The number of calories in a food can be found on a Nutrition Facts label. If a food does not have a Nutrition Facts label, try to look up the calories online or ask your dietitian for help.  Remember that calories are listed per serving. If you choose to have more than one serving of a food, you will have to multiply the calories per serving by the amount of servings you plan to eat. For example, the label on a package of bread might say that a serving size is 1 slice and that there are 90 calories in a serving. If you eat 1 slice, you will have eaten 90 calories. If you eat 2 slices, you will have eaten 180 calories.  How do I keep a food log?  Immediately after each meal, record the following information in your food log:  · What you ate. Don't forget to include toppings, sauces, and other extras on the food.  · How much you ate. This can be measured in cups, ounces, or number of items.  · How many calories each food and drink had.  · The total number of calories in the meal.    Keep your food log near you, such as in a small notebook in your pocket, or use a mobile shad or website. Some programs will calculate calories for you and show you how many calories you have left for the day to meet your goal.  What are some calorie counting tips?  · Use  "your calories on foods and drinks that will fill you up and not leave you hungry:  ? Some examples of foods that fill you up are nuts and nut butters, vegetables, lean proteins, and high-fiber foods like whole grains. High-fiber foods are foods with more than 5 g fiber per serving.  ? Drinks such as sodas, specialty coffee drinks, alcohol, and juices have a lot of calories, yet do not fill you up.  · Eat nutritious foods and avoid empty calories. Empty calories are calories you get from foods or beverages that do not have many vitamins or protein, such as candy, sweets, and soda. It is better to have a nutritious high-calorie food (such as an avocado) than a food with few nutrients (such as a bag of chips).  · Know how many calories are in the foods you eat most often. This will help you calculate calorie counts faster.  · Pay attention to calories in drinks. Low-calorie drinks include water and unsweetened drinks.  · Pay attention to nutrition labels for \"low fat\" or \"fat free\" foods. These foods sometimes have the same amount of calories or more calories than the full fat versions. They also often have added sugar, starch, or salt, to make up for flavor that was removed with the fat.  · Find a way of tracking calories that works for you. Get creative. Try different apps or programs if writing down calories does not work for you.  What are some portion control tips?  · Know how many calories are in a serving. This will help you know how many servings of a certain food you can have.  · Use a measuring cup to measure serving sizes. You could also try weighing out portions on a kitchen scale. With time, you will be able to estimate serving sizes for some foods.  · Take some time to put servings of different foods on your favorite plates, bowls, and cups so you know what a serving looks like.  · Try not to eat straight from a bag or box. Doing this can lead to overeating. Put the amount you would like to eat in a cup " or on a plate to make sure you are eating the right portion.  · Use smaller plates, glasses, and bowls to prevent overeating.  · Try not to multitask (for example, watch TV or use your computer) while eating. If it is time to eat, sit down at a table and enjoy your food. This will help you to know when you are full. It will also help you to be aware of what you are eating and how much you are eating.  What are tips for following this plan?  Reading food labels  · Check the calorie count compared to the serving size. The serving size may be smaller than what you are used to eating.  · Check the source of the calories. Make sure the food you are eating is high in vitamins and protein and low in saturated and trans fats.  Shopping  · Read nutrition labels while you shop. This will help you make healthy decisions before you decide to purchase your food.  · Make a grocery list and stick to it.  Cooking  · Try to cook your favorite foods in a healthier way. For example, try baking instead of frying.  · Use low-fat dairy products.  Meal planning  · Use more fruits and vegetables. Half of your plate should be fruits and vegetables.  · Include lean proteins like poultry and fish.  How do I count calories when eating out?  · Ask for smaller portion sizes.  · Consider sharing an entree and sides instead of getting your own entree.  · If you get your own entree, eat only half. Ask for a box at the beginning of your meal and put the rest of your entree in it so you are not tempted to eat it.  · If calories are listed on the menu, choose the lower calorie options.  · Choose dishes that include vegetables, fruits, whole grains, low-fat dairy products, and lean protein.  · Choose items that are boiled, broiled, grilled, or steamed. Stay away from items that are buttered, battered, fried, or served with cream sauce. Items labeled “crispy” are usually fried, unless stated otherwise.  · Choose water, low-fat milk, unsweetened iced  tea, or other drinks without added sugar. If you want an alcoholic beverage, choose a lower calorie option such as a glass of wine or light beer.  · Ask for dressings, sauces, and syrups on the side. These are usually high in calories, so you should limit the amount you eat.  · If you want a salad, choose a garden salad and ask for grilled meats. Avoid extra toppings like sullivan, cheese, or fried items. Ask for the dressing on the side, or ask for olive oil and vinegar or lemon to use as dressing.  · Estimate how many servings of a food you are given. For example, a serving of cooked rice is ½ cup or about the size of half a baseball. Knowing serving sizes will help you be aware of how much food you are eating at restaurants. The list below tells you how big or small some common portion sizes are based on everyday objects:  ? 1 oz--4 stacked dice.  ? 3 oz--1 deck of cards.  ? 1 tsp--1 die.  ? 1 Tbsp--½ a ping-pong ball.  ? 2 Tbsp--1 ping-pong ball.  ? ½ cup--½ baseball.  ? 1 cup--1 baseball.  Summary  · Calorie counting means keeping track of how many calories you eat and drink each day. If you eat fewer calories than your body needs, you should lose weight.  · A healthy amount of weight to lose per week is usually 1-2 lb (0.5-0.9 kg). This usually means reducing your daily calorie intake by 500-750 calories.  · The number of calories in a food can be found on a Nutrition Facts label. If a food does not have a Nutrition Facts label, try to look up the calories online or ask your dietitian for help.  · Use your calories on foods and drinks that will fill you up, and not on foods and drinks that will leave you hungry.  · Use smaller plates, glasses, and bowls to prevent overeating.  This information is not intended to replace advice given to you by your health care provider. Make sure you discuss any questions you have with your health care provider.  Document Released: 12/18/2006 Document Revised: 11/17/2017 Document  Reviewed: 11/17/2017  [employer] Interactive Patient Education © 2019 [employer]

## 2019-04-19 NOTE — PROGRESS NOTES
Subjective        History of Present Illness     Lois Parmar is a 69 y.o. female with multiple complex medical issues who presents for 6-month follow up on hypothyroidism, hyperlipidemia, hypertriglyceridemia, impaired fasting glucose osteoarthritis of the knees and hips, morbid obesity, and iron deficiency anemia and vitamin B12 deficiency anemia, among other issues complicated by obesity.  She continues on CPAP for sleep apnea, which has been followed by Dr. London.  She is need of a new provider to follow sleep apnea.  She continues to follow with Dr. Boyle, cardiologist    Labs six months ago revealed iron deficiency anemia, for which I had her increase the oral iron to daily.  She had upper and lower GI workup 11/2018 by Dr. Sutton, which revealed some mild gastritis and 2 adenomatous polyps, one of which was tubular adenoma with repeat recommended in three years.  She had difficulty tolerating oral iron, which produced GI upset and constipation.  After taking ferrous sulfate daily for t 3 months,  ferritin and hemoglobin improved, although, she battled constipation and nausea frequently.  We referred to Dr. De La Torre to evaluate the iron deficiency anemia in this patient tolerating oral iron quite poorly.  Due to inability to take iron by mouth, patient received 2 dose of intravenous Feraheme on February 8 2019 and February 15, 2019.  Hemoglobin has drifted back down to 10.6, although, iron and B-12 remains at goal.  She continues on prescription B-12.  She has an appointment with Dr. De La Torre scheduled next month to re-evaluate. He plans to proceed with bone marrow biopsy to rule out any bone marrow pathology.      Vitamin D has drifted down with 1000 units of OTC vitamin D.  I recommended increasing her dose to 2000 daily.     She is requesting a referral for gynecological issues.  I recommended Elyse Umanzor.  Patient is in agreement.     Thyroid is not at goal on her current dose of [place] Thyroid.   "Free T4 is low at 0.70. Dr. Clifton's office was previously managing hormones and thyroid function, although, she reports she has not been following with his nurse practitioner to address the thyroid.  She would like for me to manage her hypothyroidism. I recommended switching her [place] Thyroid to Synthroid.  Patient is in agreement.      Weight is down 5 pounds in the past six months.  I continue to recommend diet, exercise and weight loss efforts.      DEXA 04/2018 reveals bone density remains within normal limits.  She is up to date on pneumonia vaccines.    The patient's relevant past medical, surgical, and social history was reviewed in Epic.   Lab results are reviewed with the patient today.  Fasting glucose 105. A1c is 5.8.  Normal renal and liver function.  LDL 87.  Vitamin B-12 at goal.        Review of Systems   Constitutional: Negative for chills, fatigue and fever.   HENT: Negative for congestion, ear pain, postnasal drip, sinus pressure and sore throat.    Respiratory: Negative for cough, shortness of breath and wheezing.    Cardiovascular: Negative for chest pain, palpitations and leg swelling.   Gastrointestinal: Negative for abdominal pain, blood in stool, constipation, diarrhea, nausea and vomiting.   Endocrine: Negative for cold intolerance, heat intolerance, polydipsia and polyuria.   Genitourinary: Negative for dysuria, frequency, hematuria and urgency.   Skin: Negative for rash.   Neurological: Negative for syncope and weakness.        Objective      Vitals:    04/19/19 1052   BP: 120/70   Pulse: 64   Temp: 98.6 °F (37 °C)   TempSrc: Oral   Weight: 116 kg (256 lb)   Height: 154.9 cm (61\")       Physical Exam   Constitutional: She is oriented to person, place, and time. She appears well-developed and well-nourished. No distress.   Morbidly obese female.    HENT:   Head: Normocephalic and atraumatic.   Nose: Right sinus exhibits no maxillary sinus tenderness and no frontal sinus tenderness. Left " sinus exhibits no maxillary sinus tenderness and no frontal sinus tenderness.   Mouth/Throat: Uvula is midline, oropharynx is clear and moist and mucous membranes are normal. No oral lesions. No tonsillar exudate.   Crowded posterior oropharynx.     Eyes: Conjunctivae and EOM are normal. Pupils are equal, round, and reactive to light.   Neck: Trachea normal. Neck supple. No JVD present. Carotid bruit is not present. No tracheal deviation present. No thyroid mass and no thyromegaly present.   Cardiovascular: Normal rate, regular rhythm, normal heart sounds and intact distal pulses.  No extrasystoles are present. PMI is not displaced.   No murmur heard.  Pulmonary/Chest: Effort normal and breath sounds normal. No accessory muscle usage. No respiratory distress. She has no decreased breath sounds. She has no wheezes. She has no rhonchi. She has no rales.   Abdominal: Soft. Bowel sounds are normal. She exhibits no distension. There is no hepatosplenomegaly. There is no tenderness.   Obese abdomen limits exam.      Vascular Status -  Her right foot exhibits normal foot vasculature  and no edema (Trace edema bilateral ankles with mild evidence of chronic venous insufficiency and multiple varicosities. ). Her left foot exhibits normal foot vasculature  and no edema.  Lymphadenopathy:     She has no cervical adenopathy.   Neurological: She is alert and oriented to person, place, and time. No cranial nerve deficit. Coordination normal.   Skin: Skin is warm, dry and intact. No rash noted. No cyanosis. Nails show no clubbing.   Psychiatric: She has a normal mood and affect. Her speech is normal and behavior is normal. Judgment and thought content normal.   Vitals reviewed.      Assessment/Plan      Continue to follow with Dr. De La Torre to address the anemia.     We will refer to Dr. Jacob Quick to follow obstructive sleep apnea.  She was seeing Dr. London in the past.      Stop the [place] thyroid and start levothyroxine 75 mcg.   Intensify diet, exercise and weight loss efforts.  She is requesting I follow her hypothyroidism, formerly managed by the nurse practitioner at Dr. Clifton's office.    We will arrange an appointment with Elyse Umanzor to address GYN issues.     Continue simvastatin and dietary efforts.    Increase vitamin D to 2000 units daily.         Continue metformin.  Intensify diet, exercise and weight loss efforts with  low carbohydrate/low sugar diet and increasuing physical activity.        Continue other medications and vitamin and mineral supplements to treat additional medical problems which we addressed today. Return in six months for routine follow up with fasting labs one week prior.           Scribed for Dr. Warner by Adore Marrero [facility].     Diagnoses and all orders for this visit:    Medicare annual wellness visit, initial    Essential hypertension  -     CBC Auto Differential; Future  -     Comprehensive Metabolic Panel; Future    Mixed hyperlipidemia  -     Lipid Panel; Future    Mild intermittent asthma without complication    Obstructive sleep apnea syndrome  -     Ambulatory Referral to Sleep Medicine    Morbid obesity (CMS/HCC)    Tubular adenoma of colon - removed during colonoscopy, 11/2018.    Vitamin D deficiency  -     Vitamin D 25 Hydroxy; Future    Acquired hypothyroidism  -     TSH; Future  -     T4, free; Future    IFG (impaired fasting glucose)  -     Comprehensive Metabolic Panel; Future  -     Hemoglobin A1c; Future    Iron deficiency anemia due to chronic blood loss  -     Ferritin; Future  -     Iron Profile; Future    Megaloblastic anemia due to vitamin B>12< deficiency  -     Vitamin B12; Future    Hypomagnesemia  -     Magnesium; Future    Other orders  -     levothyroxine (SYNTHROID, LEVOTHROID) 75 MCG tablet; Take 1 tablet by mouth Daily.  -     Cholecalciferol (VITAMIN D) 1000 units tablet; Take 1 tablet by mouth Daily.        No visits with results within 3 Week(s) from this  visit.   Latest known visit with results is:   Lab on 03/21/2019   Component Date Value Ref Range Status   • Glucose 03/21/2019 105* 74 - 99 mg/dL Final   • BUN 03/21/2019 28* 7 - 17 mg/dL Final   • Creatinine 03/21/2019 1.14* 0.52 - 1.04 mg/dL Final   • Sodium 03/21/2019 138  137 - 145 mmol/L Final   • Potassium 03/21/2019 4.5  3.4 - 5.0 mmol/L Final   • Chloride 03/21/2019 101  98 - 107 mmol/L Final   • CO2 03/21/2019 29.0  22.0 - 30.0 mmol/L Final   • Calcium 03/21/2019 9.6  8.4 - 10.2 mg/dL Final   • Total Protein 03/21/2019 7.3  6.3 - 8.2 g/dL Final   • Albumin 03/21/2019 4.40  3.50 - 5.00 g/dL Final   • ALT (SGPT) 03/21/2019 16  <=35 U/L Final   • AST (SGOT) 03/21/2019 15  14 - 36 U/L Final   • Alkaline Phosphatase 03/21/2019 43  38 - 126 U/L Final   • Total Bilirubin 03/21/2019 0.5  0.2 - 1.3 mg/dL Final   • eGFR Non African Amer 03/21/2019 47  39 - 90 mL/min/1.73 Final   • Globulin 03/21/2019 2.9  2.3 - 3.5 gm/dL Final   • A/G Ratio 03/21/2019 1.5  1.1 - 1.8 g/dL Final   • BUN/Creatinine Ratio 03/21/2019 24.6  7.0 - 25.0 Final   • Anion Gap 03/21/2019 8.0  5.0 - 15.0 mmol/L Final   • Ferritin 03/21/2019 105.00  11.10 - 264.00 ng/mL Final   • Hemoglobin A1C 03/21/2019 5.8* 4 - 5.6 % Final   • LDL Cholesterol  03/21/2019 87  1 - 129 mg/dL Final   • Vitamin B-12 03/21/2019 663  239 - 931 pg/mL Final   • 25 Hydroxy, Vitamin D 03/21/2019 26.9* 30.0 - 100.0 ng/ml Final   • TSH 03/21/2019 2.810  0.460 - 4.680 mIU/mL Final   • Free T4 03/21/2019 0.70* 0.78 - 2.19 ng/dL Final   • Magnesium 03/21/2019 1.7  1.6 - 2.3 mg/dL Final   • Iron 03/21/2019 119  37 - 170 mcg/dL Final   • TIBC 03/21/2019 315  265 - 497 mcg/dL Final   • Iron Saturation 03/21/2019 38  15 - 50 % Final   • Folate 03/21/2019 >20.00  2.76 - 21.00 ng/mL Final   • WBC 03/21/2019 6.41  3.20 - 9.80 10*3/mm3 Final   • RBC 03/21/2019 3.39* 3.77 - 5.16 10*6/mm3 Final   • Hemoglobin 03/21/2019 10.6* 12.0 - 15.5 g/dL Final   • Hematocrit 03/21/2019 32.8* 35.0  - 45.0 % Final   • MCV 03/21/2019 96.8  80.0 - 98.0 fL Final   • MCH 03/21/2019 31.3  26.5 - 34.0 pg Final   • MCHC 03/21/2019 32.3  31.4 - 36.0 g/dL Final   • RDW 03/21/2019 14.6* 11.5 - 14.5 % Final   • RDW-SD 03/21/2019 49.6* 36.4 - 46.3 fl Final   • MPV 03/21/2019 8.6  8.0 - 12.0 fL Final   • Platelets 03/21/2019 281  150 - 450 10*3/mm3 Final   • Neutrophil % 03/21/2019 70.6  37.0 - 80.0 % Final   • Lymphocyte % 03/21/2019 17.5  10.0 - 50.0 % Final   • Monocyte % 03/21/2019 7.6  0.0 - 12.0 % Final   • Eosinophil % 03/21/2019 3.7  0.0 - 7.0 % Final   • Basophil % 03/21/2019 0.6  0.0 - 2.0 % Final   • Neutrophils, Absolute 03/21/2019 4.52  2.00 - 8.60 10*3/mm3 Final   • Lymphocytes, Absolute 03/21/2019 1.12  0.60 - 4.20 10*3/mm3 Final   • Monocytes, Absolute 03/21/2019 0.49  0.00 - 0.90 10*3/mm3 Final   • Eosinophils, Absolute 03/21/2019 0.24  0.00 - 0.70 10*3/mm3 Final   • Basophils, Absolute 03/21/2019 0.04  0.00 - 0.20 10*3/mm3 Final   ]

## 2019-04-19 NOTE — PROGRESS NOTES
QUICK REFERENCE INFORMATION:  The ABCs of the Annual Wellness Visit    Initial Medicare Wellness Visit    HEALTH RISK ASSESSMENT    : 1950    Recent Hospitalizations:  No hospitalization(s) within the last year..  ccc      Current Medical Providers:  Patient Care Team:  Marshal Warner MD as PCP - General        Smoking Status:  Social History     Tobacco Use   Smoking Status Never Smoker   Smokeless Tobacco Never Used       Alcohol Consumption:  Social History     Substance and Sexual Activity   Alcohol Use No       Depression Screen:   PHQ-2/PHQ-9 Depression Screening 2019   Little interest or pleasure in doing things 0   Feeling down, depressed, or hopeless 0   Trouble falling or staying asleep, or sleeping too much 0   Feeling tired or having little energy 0   Poor appetite or overeating 0   Feeling bad about yourself - or that you are a failure or have let yourself or your family down 0   Trouble concentrating on things, such as reading the newspaper or watching television 0   Moving or speaking so slowly that other people could have noticed. Or the opposite - being so fidgety or restless that you have been moving around a lot more than usual 0   Thoughts that you would be better off dead, or of hurting yourself in some way 0   Total Score 0   If you checked off any problems, how difficult have these problems made it for you to do your work, take care of things at home, or get along with other people? -       Health Habits and Functional and Cognitive Screening:  Functional & Cognitive Status 2019   Do you have difficulty preparing food and eating? No   Do you have difficulty bathing yourself, getting dressed or grooming yourself? No   Do you have difficulty using the toilet? No   Do you have difficulty moving around from place to place? No   Do you have trouble with steps or getting out of a bed or a chair? No   In the past year have you fallen or experienced a near fall? No   Current Diet  Well Balanced Diet   Dental Exam Up to date   Eye Exam Up to date   Exercise (times per week) 7 times per week   Current Exercise Activities Include Housecleaning   Do you need help using the phone?  No   Are you deaf or do you have serious difficulty hearing?  Yes   Do you need help with transportation? No   Do you need help shopping? No   Do you need help preparing meals?  No   Do you need help with housework?  No   Do you need help with laundry? No   Do you need help taking your medications? No   Do you need help managing money? No   Do you ever drive or ride in a car without wearing a seat belt? No   Have you felt unusual stress, anger or loneliness in the last month? No   Who do you live with? Spouse   If you need help, do you have trouble finding someone available to you? No   Have you been bothered in the last four weeks by sexual problems? No   Do you have difficulty concentrating, remembering or making decisions? No           Does the patient have evidence of cognitive impairment? No    Asiprin use counseling: Taking ASA appropriately as indicated      Recent Lab Results:       Lab Results   Component Value Date    HGBA1C 5.8 (H) 03/21/2019     Lab Results   Component Value Date    CHOL 137 (L) 10/08/2018    TRIG 151 (H) 10/08/2018    HDL 42 10/08/2018    VLDL 30.2 10/08/2018    LDLHDL 1.54 10/08/2018           Age-appropriate Screening Schedule:  Refer to the list below for future screening recommendations based on patient's age, sex and/or medical conditions. Orders for these recommended tests are listed in the plan section. The patient has been provided with a written plan.    Health Maintenance   Topic Date Due   • TDAP/TD VACCINES (1 - Tdap) 01/16/1968   • ZOSTER VACCINE (2 of 3) 07/01/2011   • INFLUENZA VACCINE  08/01/2019   • LIPID PANEL  03/21/2020   • DXA SCAN  04/18/2020   • MAMMOGRAM  12/27/2020   • COLONOSCOPY  11/30/2028   • PNEUMOCOCCAL VACCINES (65+ LOW/MEDIUM RISK)  Completed         Subjective   History of Present Illness    Lois Parmar is a 69 y.o. female who presents for an Annual Wellness Visit.    The following portions of the patient's history were reviewed and updated as appropriate:   She  has a past medical history of Acquired hypothyroidism - On Synthroid, Allergic rhinitis, Anemia, Asthma - mild intermittent, Cardiac disease, Carpal tunnel syndrome - Bilateral, Colitis, Degeneration of cervical intervertebral disc, Diabetes (CMS/HCC), Disorder of lumbar spine, Diverticulosis of large intestine (1/17/2019), Essential hypertension, Fatigue, History of myocardial infarct at age greater than 60 years (10/17/2018), Hyperlipidemia - Improved with Zocor, IFG (impaired fasting glucose) (10/17/2018), Impaired fasting glycaemia, Iron deficiency anemia - Improved, Lumbar radiculopathy, Megaloblastic anemia due to vitamin B>12< deficiency, Morbid obesity (CMS/HCC), Osteoarthritis of knee - Bilateral, Osteoarthritis of multiple joints - Left knee, Osteoporosis, Pain in left hip, Sleep apnea - On CPAP, Spasm of back muscles, Thyroid dysfunction, Tubular adenoma of colon - removed during colonoscopy, 11/2018. (12/3/2018), and Vitamin D deficiency.  She does not have any pertinent problems on file.  She  has a past surgical history that includes endoscopy and colonoscopy (09/17/2012); Lumbar laminectomy (2011); Esophagogastroduodenoscopy (09/17/2012); Hysterectomy; Oophorectomy; Shoulder surgery (12/07/2015); Shoulder surgery (Right); Carpal tunnel release (Right); Neck surgery; Joint replacement; Total knee arthroplasty (Left); Colonoscopy (N/A, 11/30/2018); and Esophagogastroduodenoscopy (N/A, 11/30/2018).  Her family history includes Breast cancer in her mother, other, and sister; Cancer in her other; Diabetes in her other; Heart disease in her other; Hypertension in her other; Lung disease in her other; Ovarian cancer in her mother.  She  reports that she has never smoked. She has never  used smokeless tobacco. She reports that she does not drink alcohol or use drugs.  Current Outpatient Medications   Medication Sig Dispense Refill   • acetaminophen (TYLENOL) 500 MG tablet Take 500 mg by mouth Every 6 (Six) Hours As Needed for Mild Pain .     • aspirin 81 MG chewable tablet Chew 81 mg Daily.     • Calcium Carb-Cholecalciferol (CALCIUM 600 + D) 600-200 MG-UNIT tablet Take 2 tablets by mouth Daily.     • coenzyme Q10 100 MG capsule Take 100 mg by mouth Daily.     • CYMBALTA 60 MG capsule TAKE 1 CAPSULE EVERY DAY 90 capsule 3   • docusate sodium (COLACE) 100 MG capsule Take 100 mg by mouth.     • folic acid (FOLVITE) 1 MG tablet Take 1 tablet by mouth Daily. 90 tablet 1   • glucose blood test strip tests BID, Diagnosis: 250.00, 401.9     • labetalol (NORMODYNE) 200 MG tablet Take 100 mg by mouth 2 (Two) Times a Day. 1/2 tab twice daily      • loratadine (CLARITIN) 10 MG tablet Take 10 mg by mouth daily.     • metFORMIN ER (GLUCOPHAGE-XR) 500 MG 24 hr tablet Take 1,000 mg by mouth 2 (Two) Times a Day.     • olmesartan-hydrochlorothiazide (BENICAR HCT) 40-25 MG per tablet Take 1 tablet by mouth Daily. 90 tablet 0   • Omega-3 Fatty Acids (FISH OIL) 1000 MG capsule capsule Take 1,000 mg by mouth 2 (Two) Times a Day With Meals.     • polyethylene glycol (MIRALAX) packet Take 17 g by mouth Daily.     • PREVACID 30 MG capsule TAKE 1 CAPSULE EVERY MORNING 90 capsule 1   • raNITIdine (ZANTAC) 150 MG tablet Take 150 mg by mouth Every Night.     • simvastatin (ZOCOR) 40 MG tablet TAKE 1 TABLET EVERY NIGHT 90 tablet 3   • spironolactone (ALDACTONE) 25 MG tablet TAKE 1 TABLET EVERY DAY  FOR  FLUID 90 tablet 1   • vitamin B-12 (CYANOCOBALAMIN) 1000 MCG tablet Take 1 tablet by mouth Daily. 90 tablet 1   • Cholecalciferol (VITAMIN D) 1000 units tablet Take 1 tablet by mouth Daily.     • levothyroxine (SYNTHROID, LEVOTHROID) 75 MCG tablet Take 1 tablet by mouth Daily. 90 tablet 3     No current facility-administered  medications for this visit.      Current Outpatient Medications on File Prior to Visit   Medication Sig   • acetaminophen (TYLENOL) 500 MG tablet Take 500 mg by mouth Every 6 (Six) Hours As Needed for Mild Pain .   • aspirin 81 MG chewable tablet Chew 81 mg Daily.   • Calcium Carb-Cholecalciferol (CALCIUM 600 + D) 600-200 MG-UNIT tablet Take 2 tablets by mouth Daily.   • coenzyme Q10 100 MG capsule Take 100 mg by mouth Daily.   • CYMBALTA 60 MG capsule TAKE 1 CAPSULE EVERY DAY   • docusate sodium (COLACE) 100 MG capsule Take 100 mg by mouth.   • folic acid (FOLVITE) 1 MG tablet Take 1 tablet by mouth Daily.   • glucose blood test strip tests BID, Diagnosis: 250.00, 401.9   • labetalol (NORMODYNE) 200 MG tablet Take 100 mg by mouth 2 (Two) Times a Day. 1/2 tab twice daily    • loratadine (CLARITIN) 10 MG tablet Take 10 mg by mouth daily.   • metFORMIN ER (GLUCOPHAGE-XR) 500 MG 24 hr tablet Take 1,000 mg by mouth 2 (Two) Times a Day.   • olmesartan-hydrochlorothiazide (BENICAR HCT) 40-25 MG per tablet Take 1 tablet by mouth Daily.   • Omega-3 Fatty Acids (FISH OIL) 1000 MG capsule capsule Take 1,000 mg by mouth 2 (Two) Times a Day With Meals.   • polyethylene glycol (MIRALAX) packet Take 17 g by mouth Daily.   • PREVACID 30 MG capsule TAKE 1 CAPSULE EVERY MORNING   • raNITIdine (ZANTAC) 150 MG tablet Take 150 mg by mouth Every Night.   • simvastatin (ZOCOR) 40 MG tablet TAKE 1 TABLET EVERY NIGHT   • spironolactone (ALDACTONE) 25 MG tablet TAKE 1 TABLET EVERY DAY  FOR  FLUID   • vitamin B-12 (CYANOCOBALAMIN) 1000 MCG tablet Take 1 tablet by mouth Daily.   • [DISCONTINUED] thyroid 60 MG PO tablet Take 60 mg by mouth Daily.   • [DISCONTINUED] vitamin d (CHOLECALIFEROL) 5000 UNITS capsule Take 1,000 Units by mouth Daily.   • Cholecalciferol (VITAMIN D) 1000 units tablet Take 1 tablet by mouth Daily.   • [DISCONTINUED] Ferrous Sulfate (IRON) 325 (65 FE) MG tablet Take 1 tablet by mouth Daily.   • [DISCONTINUED]  progesterone (PROMETRIUM) 200 MG capsule Take 200 mg by mouth 2 (Two) Times a Day.   • [DISCONTINUED] raNITIdine (ZANTAC) 150 MG tablet TAKE 1 TABLET EVERY NIGHT   • [DISCONTINUED] Testosterone (TESTOPEL) 75 MG implant pellet by Implant route 1 (One) Time.   • [DISCONTINUED] vitamin B-12 (CYANOCOBALAMIN) 500 MCG tablet Take 500 mcg by mouth daily.     No current facility-administered medications on file prior to visit.      She is allergic to lortab [hydrocodone-acetaminophen]; codeine; antihistamines, chlorpheniramine-type; latex; other; and tape..    Outpatient Medications Prior to Visit   Medication Sig Dispense Refill   • acetaminophen (TYLENOL) 500 MG tablet Take 500 mg by mouth Every 6 (Six) Hours As Needed for Mild Pain .     • aspirin 81 MG chewable tablet Chew 81 mg Daily.     • Calcium Carb-Cholecalciferol (CALCIUM 600 + D) 600-200 MG-UNIT tablet Take 2 tablets by mouth Daily.     • coenzyme Q10 100 MG capsule Take 100 mg by mouth Daily.     • CYMBALTA 60 MG capsule TAKE 1 CAPSULE EVERY DAY 90 capsule 3   • docusate sodium (COLACE) 100 MG capsule Take 100 mg by mouth.     • folic acid (FOLVITE) 1 MG tablet Take 1 tablet by mouth Daily. 90 tablet 1   • glucose blood test strip tests BID, Diagnosis: 250.00, 401.9     • labetalol (NORMODYNE) 200 MG tablet Take 100 mg by mouth 2 (Two) Times a Day. 1/2 tab twice daily      • loratadine (CLARITIN) 10 MG tablet Take 10 mg by mouth daily.     • metFORMIN ER (GLUCOPHAGE-XR) 500 MG 24 hr tablet Take 1,000 mg by mouth 2 (Two) Times a Day.     • olmesartan-hydrochlorothiazide (BENICAR HCT) 40-25 MG per tablet Take 1 tablet by mouth Daily. 90 tablet 0   • Omega-3 Fatty Acids (FISH OIL) 1000 MG capsule capsule Take 1,000 mg by mouth 2 (Two) Times a Day With Meals.     • polyethylene glycol (MIRALAX) packet Take 17 g by mouth Daily.     • PREVACID 30 MG capsule TAKE 1 CAPSULE EVERY MORNING 90 capsule 1   • raNITIdine (ZANTAC) 150 MG tablet Take 150 mg by mouth Every  Night.     • simvastatin (ZOCOR) 40 MG tablet TAKE 1 TABLET EVERY NIGHT 90 tablet 3   • spironolactone (ALDACTONE) 25 MG tablet TAKE 1 TABLET EVERY DAY  FOR  FLUID 90 tablet 1   • vitamin B-12 (CYANOCOBALAMIN) 1000 MCG tablet Take 1 tablet by mouth Daily. 90 tablet 1   • thyroid 60 MG PO tablet Take 60 mg by mouth Daily.     • vitamin d (CHOLECALIFEROL) 5000 UNITS capsule Take 1,000 Units by mouth Daily. 30 capsule 11   • Cholecalciferol (VITAMIN D) 1000 units tablet Take 1 tablet by mouth Daily.     • Ferrous Sulfate (IRON) 325 (65 FE) MG tablet Take 1 tablet by mouth Daily.     • progesterone (PROMETRIUM) 200 MG capsule Take 200 mg by mouth 2 (Two) Times a Day.     • raNITIdine (ZANTAC) 150 MG tablet TAKE 1 TABLET EVERY NIGHT 90 tablet 3   • Testosterone (TESTOPEL) 75 MG implant pellet by Implant route 1 (One) Time.     • vitamin B-12 (CYANOCOBALAMIN) 500 MCG tablet Take 500 mcg by mouth daily.       No facility-administered medications prior to visit.        Patient Active Problem List   Diagnosis   • Vitamin D deficiency   • Spasm of back muscles   • Sleep apnea - On CPAP   • Pain in left hip   • Osteoarthritis of multiple joints - Left knee   • Osteoarthritis of knee - Bilateral   • Obesity   • Morbid obesity (CMS/HCC)   • Megaloblastic anemia due to vitamin B>12< deficiency   • Lumbar radiculopathy   • Iron deficiency anemia - Improved   • Hyperlipidemia - Improved with Zocor   • Fatigue   • Essential hypertension   • Degeneration of cervical intervertebral disc   • Disorder of lumbar spine   • Carpal tunnel syndrome - Bilateral   • Asthma - mild intermittent   • Allergic rhinitis   • Acquired hypothyroidism   • Hypomagnesemia   • Trochanteric bursitis of left hip   • Hip pain, acute, right   • Hip pain, acute, left   • Chronic pain of both knees   • Acute pain of both shoulders   • Presence of total knee joint prosthesis, left   • IFG (impaired fasting glucose)   • History of myocardial infarct at age greater  "than 60 years   • Heme positive stool   • Anemia   • Tubular adenoma of colon - removed during colonoscopy, 11/2018.   • Hx of adenomatous colonic polyps   • Presence of total left knee joint prosthesis   • Chronic bilateral low back pain with bilateral sciatica   • Degenerative disc disease, lumbar   • Diverticulosis of large intestine   • Adverse effect of iron   • Iron deficiency anemia due to chronic blood loss   • Iron adverse reaction       Advance Care Planning:  Patient does not have an advance directive - information provided to the patient today    Identification of Risk Factors:  Risk factors include: weight .    Review of Systems    Compared to one year ago, the patient feels her physical health is better.  Compared to one year ago, the patient feels her mental health is better.    Objective     Physical Exam    Vitals:    04/19/19 1052   BP: 120/70   Pulse: 64   Temp: 98.6 °F (37 °C)   TempSrc: Oral   Weight: 116 kg (256 lb)   Height: 154.9 cm (61\")   PainSc:   4   PainLoc: Neck       Patient's Body mass index is 48.37 kg/m². BMI is above normal parameters. Recommendations include: exercise counseling and nutrition counseling.      Assessment/Plan   Patient Self-Management and Personalized Health Advice  The patient has been provided with information about: diet and exercise and preventive services including:   · Advance directive.    Visit Diagnoses:    ICD-10-CM ICD-9-CM   1. Medicare annual wellness visit, initial Z00.00 V70.0   2. Essential hypertension I10 401.9   3. Mixed hyperlipidemia E78.2 272.2   4. Mild intermittent asthma without complication J45.20 493.90   5. Obstructive sleep apnea syndrome G47.33 327.23   6. Morbid obesity (CMS/[facility]) E66.01 278.01   7. Tubular adenoma of colon - removed during colonoscopy, 11/2018. D12.6 211.3   8. Vitamin D deficiency E55.9 268.9   9. Acquired hypothyroidism E03.9 244.9   10. IFG (impaired fasting glucose) R73.01 790.21   11. Iron deficiency anemia due " to chronic blood loss D50.0 280.0   12. Megaloblastic anemia due to vitamin B>12< deficiency D53.1 281.9   13. Hypomagnesemia E83.42 275.2       Orders Placed This Encounter   Procedures   • CBC Auto Differential     Standing Status:   Future     Standing Expiration Date:   4/19/2020   • Comprehensive Metabolic Panel     Standing Status:   Future     Standing Expiration Date:   4/19/2020   • Hemoglobin A1c     Standing Status:   Future     Standing Expiration Date:   4/19/2020   • Ferritin     Standing Status:   Future     Standing Expiration Date:   4/19/2020   • Iron Profile     Standing Status:   Future     Standing Expiration Date:   4/19/2020   • Lipid Panel     Standing Status:   Future     Standing Expiration Date:   4/19/2020   • Magnesium     Standing Status:   Future     Standing Expiration Date:   4/19/2020   • Vitamin B12     Standing Status:   Future     Standing Expiration Date:   4/19/2020   • Vitamin D 25 Hydroxy     Standing Status:   Future     Standing Expiration Date:   4/19/2020   • TSH     Standing Status:   Future     Standing Expiration Date:   4/19/2020   • T4, free     Standing Status:   Future     Standing Expiration Date:   4/19/2020   • Ambulatory Referral to Sleep Medicine     Referral Priority:   Routine     Referral Type:   Consultation     Referral Reason:   Specialty Services Required     Referred to Provider:   Jacob Quick MD     Requested Specialty:   Sleep Medicine     Number of Visits Requested:   1       Outpatient Encounter Medications as of 4/19/2019   Medication Sig Dispense Refill   • acetaminophen (TYLENOL) 500 MG tablet Take 500 mg by mouth Every 6 (Six) Hours As Needed for Mild Pain .     • aspirin 81 MG chewable tablet Chew 81 mg Daily.     • Calcium Carb-Cholecalciferol (CALCIUM 600 + D) 600-200 MG-UNIT tablet Take 2 tablets by mouth Daily.     • coenzyme Q10 100 MG capsule Take 100 mg by mouth Daily.     • CYMBALTA 60 MG capsule TAKE 1 CAPSULE EVERY DAY 90 capsule  3   • docusate sodium (COLACE) 100 MG capsule Take 100 mg by mouth.     • folic acid (FOLVITE) 1 MG tablet Take 1 tablet by mouth Daily. 90 tablet 1   • glucose blood test strip tests BID, Diagnosis: 250.00, 401.9     • labetalol (NORMODYNE) 200 MG tablet Take 100 mg by mouth 2 (Two) Times a Day. 1/2 tab twice daily      • loratadine (CLARITIN) 10 MG tablet Take 10 mg by mouth daily.     • metFORMIN ER (GLUCOPHAGE-XR) 500 MG 24 hr tablet Take 1,000 mg by mouth 2 (Two) Times a Day.     • olmesartan-hydrochlorothiazide (BENICAR HCT) 40-25 MG per tablet Take 1 tablet by mouth Daily. 90 tablet 0   • Omega-3 Fatty Acids (FISH OIL) 1000 MG capsule capsule Take 1,000 mg by mouth 2 (Two) Times a Day With Meals.     • polyethylene glycol (MIRALAX) packet Take 17 g by mouth Daily.     • PREVACID 30 MG capsule TAKE 1 CAPSULE EVERY MORNING 90 capsule 1   • raNITIdine (ZANTAC) 150 MG tablet Take 150 mg by mouth Every Night.     • simvastatin (ZOCOR) 40 MG tablet TAKE 1 TABLET EVERY NIGHT 90 tablet 3   • spironolactone (ALDACTONE) 25 MG tablet TAKE 1 TABLET EVERY DAY  FOR  FLUID 90 tablet 1   • vitamin B-12 (CYANOCOBALAMIN) 1000 MCG tablet Take 1 tablet by mouth Daily. 90 tablet 1   • [DISCONTINUED] thyroid 60 MG PO tablet Take 60 mg by mouth Daily.     • [DISCONTINUED] vitamin d (CHOLECALIFEROL) 5000 UNITS capsule Take 1,000 Units by mouth Daily. 30 capsule 11   • Cholecalciferol (VITAMIN D) 1000 units tablet Take 1 tablet by mouth Daily.     • levothyroxine (SYNTHROID, LEVOTHROID) 75 MCG tablet Take 1 tablet by mouth Daily. 90 tablet 3   • [DISCONTINUED] Ferrous Sulfate (IRON) 325 (65 FE) MG tablet Take 1 tablet by mouth Daily.     • [DISCONTINUED] progesterone (PROMETRIUM) 200 MG capsule Take 200 mg by mouth 2 (Two) Times a Day.     • [DISCONTINUED] raNITIdine (ZANTAC) 150 MG tablet TAKE 1 TABLET EVERY NIGHT 90 tablet 3   • [DISCONTINUED] Testosterone (TESTOPEL) 75 MG implant pellet by Implant route 1 (One) Time.     •  [DISCONTINUED] vitamin B-12 (CYANOCOBALAMIN) 500 MCG tablet Take 500 mcg by mouth daily.       No facility-administered encounter medications on file as of 4/19/2019.        Reviewed use of high risk medication in the elderly: not applicable  Reviewed for potential of harmful drug interactions in the elderly: not applicable    Follow Up:  Return in about 6 months (around 10/19/2019) for Next scheduled follow up.     An After Visit Summary and PPPS with all of these plans were given to the patient.

## 2019-04-24 RX ORDER — LANSOPRAZOLE 30 MG
30 CAPSULE,DELAYED RELEASE (ENTERIC COATED) ORAL EVERY MORNING
Qty: 90 CAPSULE | Refills: 3 | Status: SHIPPED | OUTPATIENT
Start: 2019-04-24 | End: 2019-04-24 | Stop reason: SDUPTHER

## 2019-04-24 RX ORDER — LANSOPRAZOLE 30 MG
30 CAPSULE,DELAYED RELEASE (ENTERIC COATED) ORAL EVERY MORNING
Qty: 30 CAPSULE | Refills: 0 | Status: SHIPPED | OUTPATIENT
Start: 2019-04-24 | End: 2020-04-24 | Stop reason: SDUPTHER

## 2019-04-26 ENCOUNTER — OFFICE VISIT (OUTPATIENT)
Dept: OBSTETRICS AND GYNECOLOGY | Facility: CLINIC | Age: 69
End: 2019-04-26

## 2019-04-26 VITALS
BODY MASS INDEX: 47.5 KG/M2 | WEIGHT: 251.6 LBS | DIASTOLIC BLOOD PRESSURE: 78 MMHG | HEART RATE: 81 BPM | HEIGHT: 61 IN | SYSTOLIC BLOOD PRESSURE: 124 MMHG

## 2019-04-26 DIAGNOSIS — Z01.419 ENCOUNTER FOR GYNECOLOGICAL EXAMINATION: Primary | ICD-10-CM

## 2019-04-26 DIAGNOSIS — Z12.72 VAGINAL PAP SMEAR: ICD-10-CM

## 2019-04-26 PROBLEM — M79.7 FIBROMYALGIA: Status: ACTIVE | Noted: 2019-04-26

## 2019-04-26 PROCEDURE — G0101 CA SCREEN;PELVIC/BREAST EXAM: HCPCS | Performed by: NURSE PRACTITIONER

## 2019-04-26 PROCEDURE — G0123 SCREEN CERV/VAG THIN LAYER: HCPCS | Performed by: NURSE PRACTITIONER

## 2019-04-26 NOTE — PROGRESS NOTES
"Subjective   Chief Complaint   Patient presents with   • Gynecologic Exam     well woman annual visit     Lois Parmar is a 69 y.o. year old  presenting to be seen for her annual exam.  Today she has no complaints.    She is sexually active.  In the past 12 months there has not been new sexual partners.  Condoms are not typically used.  She would not like to be screened for STD's at today's exam.     She exercises regularly: no.  She wears her seat belt:yes.  She has concerns about domestic violence: no.  She is taking Vit D and Calcium:yes  Last colonoscopy: 2018  Last DEXA: 18  Last MM18  Last PAP: possibly  but unsure.   Hx of abnormal pap: No      SURGICAL MENOPAUSE  LMP:  Hysterectomy w/BSO    OB History      Para Term  AB Living    2 2 2          SAB TAB Ectopic Molar Multiple Live Births                         No Additional Complaints Reported    The following portions of the patient's history were reviewed and updated as appropriate:problem list, current medications, allergies, past family history, past medical history, past social history and past surgical history.    Social History    Tobacco Use      Smoking status: Never Smoker      Smokeless tobacco: Never Used    Review of Systems   Constitutional: Negative.  Negative for chills and fever.   Respiratory: Negative.    Cardiovascular: Negative.    Gastrointestinal: Negative.         Hemorrhoids and constipation secondary to iron   Endocrine: Negative.         Hypothyroidism   Genitourinary: Negative.  Negative for pelvic pain and vaginal discharge.        Fibrocystic breast   Musculoskeletal: Positive for myalgias (fibromyalgia).   Skin: Negative.    Neurological: Negative for dizziness, syncope, light-headedness and headaches.   Psychiatric/Behavioral: Negative for suicidal ideas. The patient is not nervous/anxious.          Objective   /78   Pulse 81   Ht 154.9 cm (61\")   Wt 114 kg (251 lb " 9.6 oz)   LMP  (LMP Unknown) Comment: 1980 w BSO  Breastfeeding? No   BMI 47.54 kg/m²     General:  well developed; well nourished  no acute distress  obese - Body mass index is 47.54 kg/m².   Skin:  numerous nevi and seborrheic keratosis   Cardiac: Heart sounds are normal.  Regular rate and rhythm without murmur, gallop or rub.   Resp:  Normal expansion.  Clear to auscultation.  No rales, rhonchi, or wheezing.   Thyroid: not examined   Breasts:  Examined in supine position  Symmetric without masses or skin dimpling  Nipples normal without inversion, lesions or discharge  There are no palpable axillary nodes   Abdomen: soft, non-tender; no masses  no umbilical or inguinal hernias are present  no hepato-splenomegaly  Normal findings: bowel sounds normal   Psych: alert,oriented, in NAD with a full range of affect, normal behavior and no psychotic features   Pelvis: Clinical staff was present for exam  External genitalia:  normal appearance of the external genitalia including Bartholin's and [employer] [place] [employer]'s glands.  :  urethral meatus normal;  Vaginal:  atrophic mucosal changes are present;  Cervix:  absent.  Uterus:  absent.  Adnexa:  absent, bilateral.  Rectal:  digital rectal exam not performed; anus visually normal appearing. external hemorrhoids present;     Lab Review   No data reviewed    Imaging  DEXA  Mammogram report       Diagnoses and all orders for this visit:    Encounter for gynecological examination  -     Liquid-based Pap Smear, Screening    Vaginal Pap smear  -     Liquid-based Pap Smear, Screening    Pap results: I will send card in mail or call if abnormal. RTC every 2 years for well woman exam per Medicare Guidelines. MMG annually and DEXA every 2 years. RTC sooner with any concerns. Pt agrees with this plan of care.     This note was electronically signed.    Elyse Padilla, APRN  April 26, 2019

## 2019-05-07 LAB
GEN CATEG CVX/VAG CYTO-IMP: NORMAL
LAB AP CASE REPORT: NORMAL
LAB AP GYN ADDITIONAL INFORMATION: NORMAL
PATH INTERP SPEC-IMP: NORMAL
STAT OF ADQ CVX/VAG CYTO-IMP: NORMAL

## 2019-05-17 ENCOUNTER — LAB (OUTPATIENT)
Dept: LAB | Facility: OTHER | Age: 69
End: 2019-05-17

## 2019-05-17 DIAGNOSIS — T45.4X5A ADVERSE EFFECT OF IRON, INITIAL ENCOUNTER: ICD-10-CM

## 2019-05-17 DIAGNOSIS — D50.0 IRON DEFICIENCY ANEMIA DUE TO CHRONIC BLOOD LOSS: ICD-10-CM

## 2019-05-17 DIAGNOSIS — D53.1 MEGALOBLASTIC ANEMIA: ICD-10-CM

## 2019-05-17 LAB
ALBUMIN SERPL-MCNC: 4.2 G/DL (ref 3.5–5)
ALBUMIN/GLOB SERPL: 1.4 G/DL (ref 1.1–1.8)
ALP SERPL-CCNC: 42 U/L (ref 38–126)
ALT SERPL W P-5'-P-CCNC: 16 U/L
ANION GAP SERPL CALCULATED.3IONS-SCNC: 8 MMOL/L (ref 5–15)
AST SERPL-CCNC: 16 U/L (ref 14–36)
BASOPHILS # BLD AUTO: 0.02 10*3/MM3 (ref 0–0.2)
BASOPHILS NFR BLD AUTO: 0.3 % (ref 0–1.5)
BILIRUB SERPL-MCNC: 0.3 MG/DL (ref 0.2–1.3)
BUN BLD-MCNC: 28 MG/DL (ref 7–23)
BUN/CREAT SERPL: 20.4 (ref 7–25)
CALCIUM SPEC-SCNC: 9.3 MG/DL (ref 8.4–10.2)
CHLORIDE SERPL-SCNC: 105 MMOL/L (ref 101–112)
CO2 SERPL-SCNC: 27 MMOL/L (ref 22–30)
CREAT BLD-MCNC: 1.37 MG/DL (ref 0.52–1.04)
DEPRECATED RDW RBC AUTO: 47.2 FL (ref 37–54)
EOSINOPHIL # BLD AUTO: 0.33 10*3/MM3 (ref 0–0.4)
EOSINOPHIL NFR BLD AUTO: 5 % (ref 0.3–6.2)
ERYTHROCYTE [DISTWIDTH] IN BLOOD BY AUTOMATED COUNT: 13.7 % (ref 12.3–15.4)
FERRITIN SERPL-MCNC: 64.84 NG/ML (ref 13–150)
GFR SERPL CREATININE-BSD FRML MDRD: 38 ML/MIN/1.73 (ref 39–90)
GLOBULIN UR ELPH-MCNC: 3.1 GM/DL (ref 2.3–3.5)
GLUCOSE BLD-MCNC: 102 MG/DL (ref 70–99)
HCT VFR BLD AUTO: 32.7 % (ref 34–46.6)
HGB BLD-MCNC: 10.8 G/DL (ref 12–15.9)
IRON 24H UR-MRATE: 90 MCG/DL (ref 37–145)
IRON SATN MFR SERPL: 24 % (ref 20–50)
LYMPHOCYTES # BLD AUTO: 1.35 10*3/MM3 (ref 0.7–3.1)
LYMPHOCYTES NFR BLD AUTO: 20.5 % (ref 19.6–45.3)
MCH RBC QN AUTO: 32.2 PG (ref 26.6–33)
MCHC RBC AUTO-ENTMCNC: 33 G/DL (ref 31.5–35.7)
MCV RBC AUTO: 97.6 FL (ref 79–97)
MONOCYTES # BLD AUTO: 0.65 10*3/MM3 (ref 0.1–0.9)
MONOCYTES NFR BLD AUTO: 9.8 % (ref 5–12)
NEUTROPHILS # BLD AUTO: 4.25 10*3/MM3 (ref 1.7–7)
NEUTROPHILS NFR BLD AUTO: 64.4 % (ref 42.7–76)
PLATELET # BLD AUTO: 269 10*3/MM3 (ref 140–450)
PMV BLD AUTO: 8.6 FL (ref 6–12)
POTASSIUM BLD-SCNC: 4.3 MMOL/L (ref 3.4–5)
PROT SERPL-MCNC: 7.3 G/DL (ref 6.3–8.6)
RBC # BLD AUTO: 3.35 10*6/MM3 (ref 3.77–5.28)
SODIUM BLD-SCNC: 140 MMOL/L (ref 137–145)
TIBC SERPL-MCNC: 375 MCG/DL (ref 298–536)
TRANSFERRIN SERPL-MCNC: 252 MG/DL (ref 200–360)
WBC NRBC COR # BLD: 6.6 10*3/MM3 (ref 3.4–10.8)

## 2019-05-17 PROCEDURE — 36415 COLL VENOUS BLD VENIPUNCTURE: CPT | Performed by: INTERNAL MEDICINE

## 2019-05-17 PROCEDURE — 82746 ASSAY OF FOLIC ACID SERUM: CPT | Performed by: INTERNAL MEDICINE

## 2019-05-17 PROCEDURE — 83540 ASSAY OF IRON: CPT | Performed by: INTERNAL MEDICINE

## 2019-05-17 PROCEDURE — 80053 COMPREHEN METABOLIC PANEL: CPT | Performed by: INTERNAL MEDICINE

## 2019-05-17 PROCEDURE — 82728 ASSAY OF FERRITIN: CPT | Performed by: INTERNAL MEDICINE

## 2019-05-17 PROCEDURE — 82607 VITAMIN B-12: CPT | Performed by: INTERNAL MEDICINE

## 2019-05-17 PROCEDURE — 85025 COMPLETE CBC W/AUTO DIFF WBC: CPT | Performed by: INTERNAL MEDICINE

## 2019-05-17 PROCEDURE — 84466 ASSAY OF TRANSFERRIN: CPT | Performed by: INTERNAL MEDICINE

## 2019-05-18 LAB
FOLATE SERPL-MCNC: >20 NG/ML (ref 4.78–24.2)
VIT B12 BLD-MCNC: 919 PG/ML (ref 211–946)

## 2019-05-23 ENCOUNTER — OFFICE VISIT (OUTPATIENT)
Dept: HEMATOLOGY/ONCOLOGY | Facility: CLINIC | Age: 69
End: 2019-05-23

## 2019-05-23 VITALS
SYSTOLIC BLOOD PRESSURE: 116 MMHG | WEIGHT: 256 LBS | RESPIRATION RATE: 20 BRPM | HEIGHT: 61 IN | HEART RATE: 76 BPM | BODY MASS INDEX: 48.33 KG/M2 | DIASTOLIC BLOOD PRESSURE: 82 MMHG

## 2019-05-23 DIAGNOSIS — N18.9 ACUTE KIDNEY INJURY SUPERIMPOSED ON CKD (HCC): ICD-10-CM

## 2019-05-23 DIAGNOSIS — D53.1 MEGALOBLASTIC ANEMIA: Chronic | ICD-10-CM

## 2019-05-23 DIAGNOSIS — T45.4X5A ADVERSE EFFECT OF IRON, INITIAL ENCOUNTER: ICD-10-CM

## 2019-05-23 DIAGNOSIS — D50.0 IRON DEFICIENCY ANEMIA DUE TO CHRONIC BLOOD LOSS: Primary | Chronic | ICD-10-CM

## 2019-05-23 DIAGNOSIS — N17.9 ACUTE KIDNEY INJURY SUPERIMPOSED ON CKD (HCC): ICD-10-CM

## 2019-05-23 PROCEDURE — G9903 PT SCRN TBCO ID AS NON USER: HCPCS | Performed by: INTERNAL MEDICINE

## 2019-05-23 PROCEDURE — 99214 OFFICE O/P EST MOD 30 MIN: CPT | Performed by: INTERNAL MEDICINE

## 2019-05-23 PROCEDURE — 1123F ACP DISCUSS/DSCN MKR DOCD: CPT | Performed by: INTERNAL MEDICINE

## 2019-05-23 PROCEDURE — G8417 CALC BMI ABV UP PARAM F/U: HCPCS | Performed by: INTERNAL MEDICINE

## 2019-05-23 NOTE — PROGRESS NOTES
DATE OF VISIT: 5/23/2019        REASON FOR VISIT: Anemia with iron deficiency, vitamin B12 deficiency, elevated creatinine      HISTORY OF PRESENT ILLNESS:    69-year-old female with medical program consisting of hypertension, dyslipidemia, diabetes, hypothyroidism on Synthroid, coronary artery disease has ongoing problem with iron deficiency since age 18.  Patient was initially seen in consultation on January 24, 2019.  In view of inability to take iron by mouth, patient received 2 dose of intravenous Feraheme on February 8 2019 and February 15, 2019.  Patient is here for 2-month follow-up appointment today.  Still complains of intermittent blood in the stool due to hemorrhoidal bleeding.  States her fatigue is improved.  Denies any new lymph node enlargement.        PAST MEDICAL HISTORY:    Past Medical History:   Diagnosis Date   • Acquired hypothyroidism - On Synthroid    • Allergic rhinitis    • Anemia    • Asthma - mild intermittent    • Cardiac disease    • Carpal tunnel syndrome - Bilateral    • Colitis    • Degeneration of cervical intervertebral disc    • Diabetes (CMS/HCC)    • Disorder of lumbar spine    • Diverticulosis of large intestine 1/17/2019   • Essential hypertension    • Fatigue    • History of myocardial infarct at age greater than 60 years 10/17/2018   • Hyperlipidemia - Improved with Zocor    • IFG (impaired fasting glucose) 10/17/2018   • Impaired fasting glycaemia    • Iron deficiency anemia - Improved    • Lumbar radiculopathy    • Megaloblastic anemia due to vitamin B>12< deficiency    • Morbid obesity (CMS/HCC)    • Osteoarthritis of knee - Bilateral    • Osteoarthritis of multiple joints - Left knee    • Osteoporosis    • Pain in left hip    • Sleep apnea - On CPAP    • Spasm of back muscles    • Thyroid dysfunction    • Tubular adenoma of colon - removed during colonoscopy, 11/2018. 12/3/2018   • Vitamin D deficiency        SOCIAL HISTORY:    Social History     Tobacco Use   • Smoking  status: Never Smoker   • Smokeless tobacco: Never Used   Substance Use Topics   • Alcohol use: No   • Drug use: No       Surgical History :  Past Surgical History:   Procedure Laterality Date   • CARPAL TUNNEL RELEASE Right    • COLONOSCOPY N/A 11/30/2018    Procedure: COLONOSCOPY;  Surgeon: Jimmie Sutton MD;  Location: [facility] ENDOSCOPY;  Service: General   • ENDOSCOPY  09/17/2012    Normal esophagus.  Gastritis.  Normal duodenum   • ENDOSCOPY N/A 11/30/2018    Procedure: ESOPHAGOGASTRODUODENOSCOPY WITH ANESTHESIA;  Surgeon: Jimmie Sutton MD;  Location: [facility] ENDOSCOPY;  Service: General   • ENDOSCOPY AND COLONOSCOPY  09/17/2012    Internal & external hemorrhoids found.   • HYSTERECTOMY  1980    w bso   • JOINT REPLACEMENT      left knee   • LUMBAR LAMINECTOMY  2011    s/p lumbar laminectomy and fusion   • NECK SURGERY     • OOPHORECTOMY  1980   • SHOULDER SURGERY  12/07/2015    Arthroscopy of the left shoudler with rotator cuff repair, Vijay procedure, and subacromial decompression.   • SHOULDER SURGERY Right    • TOTAL KNEE ARTHROPLASTY Left        ALLERGIES:    Allergies   Allergen Reactions   • Lortab [Hydrocodone-Acetaminophen] Hallucinations   • Codeine Unknown (See Comments)     Heart race   • Antihistamines, Chlorpheniramine-Type Unknown (See Comments)     Heart races   • Latex Rash   • Other Unknown (See Comments)     Decongestants: Heart Race   • Tape Rash         FAMILY HISTORY:  Family History   Problem Relation Age of Onset   • Breast cancer Mother    • Ovarian cancer Mother    • Breast cancer Sister    • Breast cancer Other    • Heart disease Other    • Hypertension Other    • Lung disease Other    • Diabetes Other    • Cancer Other            REVIEW OF SYSTEMS:      CONSTITUTIONAL:   Denies any excessive fatigue. Denies any fever, chills or weight loss.      EYES: No visual disturbances. No discharge. No new lesions     ENMT:  No epistaxis, mouth sores or difficulty  "swallowing.     RESPIRATORY:  No new shortness of breath. No new cough or hemoptysis.     CARDIOVASCULAR:  No chest pain or palpitations.     GASTROINTESTINAL: Complains of nausea and severe constipation with iron tablet.  Complains of hemorrhoidal bleeding when she gets constipated.      GENITOURINARY: No Dysuria or Hematuria.     MUSCULOSKELETAL:  Complains of diffuse arthritic pain affecting multiple joints.     LYMPHATICS:  Denies any abnormal swollen glands anywhere in the body.     NEUROLOGICAL : No tingling or numbness. No headache or dizziness. No seizures or balance problems.     SKIN: Positive for Vitiligo     ENDOCRINE : No new heat or cold intolerance. No new polyuria . No polydipsia.          PHYSICAL EXAMINATION:      VITAL SIGNS:  /82   Pulse 76   Resp 20   Ht 154.9 cm (61\")   Wt 116 kg (256 lb)   LMP  (LMP Unknown) Comment: 1980 w BSO  BMI 48.37 kg/m²       05/23/19  1424   Weight: 116 kg (256 lb)         ECOG performance status: 2     CONSTITUTIONAL:  Not in any distress.     EYES: Mild conjunctival Pallor. No Icterus. No Pterygium. Extraocular Movements intact.No ptosis.     ENMT:  Normocephalic, Atraumatic.No Facial Asymmetry noted.     NECK:  No adenopathy.Trachea midline. NO JVD.     RESPIRATORY:  Fair air entry bilateral. No rhonchi or wheezing.Fair respiratory effort.     CARDIOVASCULAR:  S1, S2. Regular rate and rhythm. No murmur or gallop appreciated.     ABDOMEN:  Soft, obese, nontender. Bowel sounds present in all four quadrants.  No Hepatosplenomegaly appreciated.     MUSCULOSKELETAL:  No edema.No Calf Tenderness.Decreased range of motion.     NEUROLOGIC:    No  Motor or sensory deficit appreciated. Cranial Nerves 2-12 grossly intact.     SKIN : Vitiligo present in neck. Skin is warm and moist to touch.     LYMPHATICS: No new enlarged lymph nodes in neck or supraclavicular area.     PSYCHIATRY: Alert, awake and oriented ×3.Normal affect.  Normal judgment.  Makes good eye " contact.          DIAGNOSTIC DATA:    Glucose   Date Value Ref Range Status   05/17/2019 102 (H) 70 - 99 mg/dL Final     Sodium   Date Value Ref Range Status   05/17/2019 140 137 - 145 mmol/L Final     Potassium   Date Value Ref Range Status   05/17/2019 4.3 3.4 - 5.0 mmol/L Final     CO2   Date Value Ref Range Status   05/17/2019 27.0 22.0 - 30.0 mmol/L Final     Chloride   Date Value Ref Range Status   05/17/2019 105 101 - 112 mmol/L Final     Anion Gap   Date Value Ref Range Status   05/17/2019 8.0 5.0 - 15.0 mmol/L Final     Creatinine   Date Value Ref Range Status   05/17/2019 1.37 (H) 0.52 - 1.04 mg/dL Final     BUN   Date Value Ref Range Status   05/17/2019 28 (H) 7 - 23 mg/dL Final     BUN/Creatinine Ratio   Date Value Ref Range Status   05/17/2019 20.4 7.0 - 25.0 Final     Calcium   Date Value Ref Range Status   05/17/2019 9.3 8.4 - 10.2 mg/dL Final     eGFR Non  Amer   Date Value Ref Range Status   05/17/2019 38 (L) 39 - 90 mL/min/1.73 Final     Alkaline Phosphatase   Date Value Ref Range Status   05/17/2019 42 38 - 126 U/L Final     Total Protein   Date Value Ref Range Status   05/17/2019 7.3 6.3 - 8.6 g/dL Final     ALT (SGPT)   Date Value Ref Range Status   05/17/2019 16 <=35 U/L Final     AST (SGOT)   Date Value Ref Range Status   05/17/2019 16 14 - 36 U/L Final     Total Bilirubin   Date Value Ref Range Status   05/17/2019 0.3 0.2 - 1.3 mg/dL Final     Albumin   Date Value Ref Range Status   05/17/2019 4.20 3.50 - 5.00 g/dL Final     Globulin   Date Value Ref Range Status   05/17/2019 3.1 2.3 - 3.5 gm/dL Final     Lab Results   Component Value Date    WBC 6.60 05/17/2019    HGB 10.8 (L) 05/17/2019    HCT 32.7 (L) 05/17/2019    MCV 97.6 (H) 05/17/2019     05/17/2019     Lab Results   Component Value Date    NEUTROABS 4.25 05/17/2019    IRON 90 05/17/2019    TIBC 375 05/17/2019    LABIRON 24 05/17/2019    FERRITIN 64.84 05/17/2019    YFLZEGNV35 919 05/17/2019    FOLATE >20.00 05/17/2019      No results found for: , LABCA2, AFPTM, HCGQUANT, , CHROMGRNA, 9ZQPM63AGX, CEA, REFLABREPO        Pathology :  Pathology report from November 30, 2018 showed:  Component     Final Diagnosis   1.  GASTRIC ANTRUM, MUCOSAL BIOPSY:  MILD CHRONIC GASTRITIS.  NO EVIDENCE OF HELICOBACTER PYLORI.     2.  TUBULAR ADENOMA, ASCENDING COLON.            ASSESSMENT AND PLAN:       1.  Iron deficiency anemia:  -Patient was found to have occult blood positive in stool in October 2018.  Patient was also found to be anemic with a hemoglobin of 11.  -Patient was started on ferrous sulfate 1 tablet by mouth daily by mouth.  Patient is not able to tolerate iron secondary to nausea as well as severe constipation that's making her hemorrhoids bleed.  -Patient had evaluation done by EGD and colonoscopy by Dr. Sutton on November 30, 2018.  EGD showed gastritis, colonoscopy showed polyp without any evidence of bleeding or cancer.  - anemia workup done on January 10, 2019 shows ferritin is still low normal at 15.  Hemoglobin was 11.7  -Since patient is not able to tolerate iron by mouth, plan is to start her on intravenous Feraheme.  -Patient received 2 dose of intravenous Feraheme on February 8, 2019 and February 15, 2019.  -Anemia work-up done on May 17, 2019 shows hemoglobin is 10.8 with adequate amount of iron B12 and folate.  -Recommend continuing with B12 and folic acid supplement for now.  -Due to persistent anemia despite of correction of iron B12 and folate, need for bone marrow biopsy was discussed with patient to rule out any bone marrow pathology like myelodysplasia.  Only other differential at this point remains anemia from chronic kidney disease stage III.  After detailed discussion, patient wants to hold off on bone marrow biopsy at this point.  -We will asked patient to return to clinic in 2 months with repeat CBC and anemia work-up to be done prior to that.  At any point with worsening anemia next step would be  bone marrow biopsy.     2.  Hypothyroidism     3.  Hypertension     4.  Acute on chronic kidney disease:  -Patient's creatinine is worsened to 1.34.  Patient was notified about elevated creatinine and need for increasing hydration.     5.  Genetic counseling:  -Patient has at least 3 family member who is a breast cancer.  It was discuss with patient that she should find out from her sister whether she was ever tested for any germline mutation.    -States her sister was never tested for any genetic testing.  -Patient was offered to get seen by genetic counselor at [place].  She does not want to do genetic counseling at this point.     6.  Health Maintainence: Patient does not smoke.  Had a colonoscopy and EGD in November 2018.  Had a mammogram in December 2018.  Had a total abdominal hysterectomy at age 30, does not and Pap smear.    7.Advance Care Planning: For now patient remains full code and is able to make her decisions.  Patient has health care surrogate mentioned on chart.    8.BMI:Patient's Body mass index is 48.37 kg/m². BMI is higher than reference range.  Patient is aware of elevated BMI.             Ranjeet De La Torre MD  5/23/2019  2:35 PM        EMR Dragon/Transcription disclaimer:   Much of this encounter note is an electronic transcription/translation of spoken language to printed text. The electronic translation of spoken language may permit erroneous, or at times, nonsensical words or phrases to be inadvertently transcribed; Although I have reviewed the note for such errors, some may still exist.

## 2019-06-18 RX ORDER — LANOLIN ALCOHOL/MO/W.PET/CERES
1000 CREAM (GRAM) TOPICAL DAILY
Qty: 90 TABLET | Refills: 1 | Status: SHIPPED | OUTPATIENT
Start: 2019-06-18 | End: 2019-07-25 | Stop reason: SDUPTHER

## 2019-06-25 ENCOUNTER — CONSULT (OUTPATIENT)
Dept: SLEEP MEDICINE | Facility: HOSPITAL | Age: 69
End: 2019-06-25

## 2019-06-25 VITALS
OXYGEN SATURATION: 95 % | WEIGHT: 256.3 LBS | HEIGHT: 61 IN | BODY MASS INDEX: 48.39 KG/M2 | SYSTOLIC BLOOD PRESSURE: 163 MMHG | HEART RATE: 68 BPM | DIASTOLIC BLOOD PRESSURE: 82 MMHG

## 2019-06-25 DIAGNOSIS — G47.33 OBSTRUCTIVE SLEEP APNEA, ADULT: Primary | ICD-10-CM

## 2019-06-25 DIAGNOSIS — G25.81 RESTLESS LEGS SYNDROME (RLS): ICD-10-CM

## 2019-06-25 PROCEDURE — 99204 OFFICE O/P NEW MOD 45 MIN: CPT | Performed by: INTERNAL MEDICINE

## 2019-06-25 NOTE — PROGRESS NOTES
New Patient Sleep Medicine Consultation    Encounter Date: 6/25/2019         Patient's PCP: Marshal Warner MD  Referring provider: Marshal Warner MD  Reason for consultation chief complaint: known LUDWIG - was being seen in [facility] but the physician lo longer comes there.    Lois Parmar is a 69 y.o. female who admits to High blod pressure, excessive daytime sleepiness, sleepy driving and restless legs at night. She was diagnosed with LUDWIG ~ 16 years ago and has been stable on therapy since that time.   She denies cataplexy, sleep paralysis, or hypnagogic hallucinations. Her bedtime is ~ 2215. She  falls asleep after 10 minutes, and is up 0 times per night. She wakes up ~ 0715. She endorses 8.75 hours of sleep. She drinks 0.5 cups of coffee, 1 teas, and 0 sodas per day. She drinks 0 alcoholic beverages per week. She is not a current smoker. She does not take sedatives or hypnotics. She has no sleepiness with driving. She naps some days.    Restless legs bother ~ 4/7  nights    [place] - 12    PAP Data:    Time frame: 06/18/2012 - 12/28/20196   Compliance 91.4 %  Average use on days used: 7hrs 22 min  Percent of days with usage greater than or equal to 4 hours: 90.9%  PAP range : 17 cm H2O  Average 90% pressure: 17 cmH2O  Leak: 1 seconds  Average AHI 0.9 events/hr  Mask type: Nasal  DME: was [employer] - will change to [facility]    Past Medical History:   Diagnosis Date   • Acquired hypothyroidism - On Synthroid    • Allergic rhinitis    • Anemia    • Asthma - mild intermittent    • Cardiac disease    • Carpal tunnel syndrome - Bilateral    • Colitis    • Degeneration of cervical intervertebral disc    • Diabetes (CMS/HCC)    • Disorder of lumbar spine    • Diverticulosis of large intestine 1/17/2019   • Essential hypertension    • Fatigue    • History of myocardial infarct at age greater than 59 years 10/17/2018   • Hyperlipidemia - Improved with Zocor    • IFG (impaired fasting glucose) 10/17/2018   • Impaired  fasting glycaemia    • Iron deficiency anemia - Improved    • Lumbar radiculopathy    • Megaloblastic anemia due to vitamin B>12< deficiency    • Morbid obesity (CMS/HCC)    • Osteoarthritis of knee - Bilateral    • Osteoarthritis of multiple joints - Left knee    • Osteoporosis    • Pain in left hip    • Sleep apnea - On CPAP    • Spasm of back muscles    • Thyroid dysfunction    • Tubular adenoma of colon - removed during colonoscopy, 11/2018. 12/3/2018   • Vitamin D deficiency      Social History     Socioeconomic History   • Marital status:      Spouse name: Not on file   • Number of children: Not on file   • Years of education: Not on file   • Highest education level: Not on file   Tobacco Use   • Smoking status: Never Smoker   • Smokeless tobacco: Never Used   Substance and Sexual Activity   • Alcohol use: No   • Drug use: No   • Sexual activity: Yes     Partners: Male     Birth control/protection: Surgical     Family History   Problem Relation Age of Onset   • Breast cancer Mother    • Ovarian cancer Mother    • Breast cancer Sister    • Breast cancer Other    • Heart disease Other    • Hypertension Other    • Lung disease Other    • Diabetes Other    • Cancer Other    , 2 kids  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      Vitals:    06/25/19 1321   BP: 163/82   Pulse: 68   SpO2: 95%           06/25/19  1321   Weight: 116  "kg (256 lb 4.8 oz)       Body mass index is 48.45 kg/m². Patient's Body mass index is 48.45 kg/m². BMI is above normal parameters. Recommendations include: referral to primary care.  Neck circumference: 16\"          General: Alert. Cooperative. Well developed. No acute distress.             Head:  Normocephalic. Symmetrical. Atraumatic.              Eyes: Sclera clear. No icterus. PERRLA. Normal EOM.             Ears: No deformities. Normal hearing.             Nose: No septal deviation. No drainage.          Throat: No oral lesions. No thrush. Moist mucous membranes. Trachea midline    Tongue is enlarged    Dentition is good       Pharynx: Posterior pharyngeal pillars are narrow    Mallampati score of IV (only hard palate visible)    Pharynx is normal with unrermarkable tonsils   Chest Wall:  Normal shape. Symmetric expansion with respiration. No tenderness.          Lungs:  Clear to auscultation bilaterally. No wheezes. No rhonchi. No rales. Respirations regular, even and unlabored.            Heart:  Regular rhythm and normal rate. Normal S1 and S2. No murmur.     Abdomen:  Soft, non-tender and non-distended. Normal bowel sounds. No masses.  Extremities:  Moves all extremities well. No edema.           Pulses: Pulses palpable and equal bilaterally.               Skin: Dry. Intact. No bleeding. No rash.           Neuro: Moves all 4 extremities and cranial nerves grossly intact.  Psychiatric: Normal mood and affect.      Current Outpatient Medications:   •  aspirin 81 MG chewable tablet, Chew 81 mg Daily., Disp: , Rfl:   •  Calcium Carb-Cholecalciferol (CALCIUM 600 + D) 600-200 MG-UNIT tablet, Take 2 tablets by mouth Daily., Disp: , Rfl:   •  Cholecalciferol (VITAMIN D) 1000 units tablet, Take 1 tablet by mouth Daily., Disp: , Rfl:   •  coenzyme Q10 100 MG capsule, Take 100 mg by mouth Daily., Disp: , Rfl:   •  CYMBALTA 60 MG capsule, TAKE 1 CAPSULE EVERY DAY, Disp: 90 capsule, Rfl: 3  •  docusate sodium (COLACE) " 100 MG capsule, Take 100 mg by mouth., Disp: , Rfl:   •  folic acid (FOLVITE) 1 MG tablet, Take 1 tablet by mouth Daily., Disp: 90 tablet, Rfl: 1  •  glucose blood test strip, tests BID, Diagnosis: 250.00, 401.9, Disp: , Rfl:   •  labetalol (NORMODYNE) 200 MG tablet, Take 100 mg by mouth 2 (Two) Times a Day. 1/2 tab twice daily , Disp: , Rfl:   •  levothyroxine (SYNTHROID, LEVOTHROID) 75 MCG tablet, Take 1 tablet by mouth Daily., Disp: 90 tablet, Rfl: 3  •  loratadine (CLARITIN) 10 MG tablet, Take 10 mg by mouth daily., Disp: , Rfl:   •  metFORMIN (GLUCOPHAGE) 1000 MG tablet, Take 1,000 mg by mouth 2 (Two) Times a Day With Meals., Disp: , Rfl:   •  olmesartan-hydrochlorothiazide (BENICAR HCT) 40-25 MG per tablet, Take 1 tablet by mouth Daily., Disp: 90 tablet, Rfl: 0  •  Omega-3 Fatty Acids (FISH OIL) 1000 MG capsule capsule, Take 1,000 mg by mouth 2 (Two) Times a Day With Meals., Disp: , Rfl:   •  polyethylene glycol (MIRALAX) packet, Take 17 g by mouth Daily., Disp: , Rfl:   •  PREVACID 30 MG capsule, Take 1 capsule by mouth Every Morning., Disp: 30 capsule, Rfl: 0  •  raNITIdine (ZANTAC) 150 MG tablet, Take 150 mg by mouth Every Night., Disp: , Rfl:   •  simvastatin (ZOCOR) 40 MG tablet, TAKE 1 TABLET EVERY NIGHT, Disp: 90 tablet, Rfl: 3  •  spironolactone (ALDACTONE) 25 MG tablet, TAKE 1 TABLET EVERY DAY  FOR  FLUID, Disp: 90 tablet, Rfl: 1  •  vitamin B-12 (CYANOCOBALAMIN) 1000 MCG tablet, TAKE 1 TABLET BY MOUTH DAILY, Disp: 90 tablet, Rfl: 1    WBC   Date Value Ref Range Status   05/17/2019 6.60 3.40 - 10.80 10*3/mm3 Final     RBC   Date Value Ref Range Status   05/17/2019 3.35 (L) 3.77 - 5.28 10*6/mm3 Final     Hemoglobin   Date Value Ref Range Status   05/17/2019 10.8 (L) 12.0 - 15.9 g/dL Final     Hematocrit   Date Value Ref Range Status   05/17/2019 32.7 (L) 34.0 - 46.6 % Final     MCV   Date Value Ref Range Status   05/17/2019 97.6 (H) 79.0 - 97.0 fL Final     MCH   Date Value Ref Range Status    05/17/2019 32.2 26.6 - 33.0 pg Final     MCHC   Date Value Ref Range Status   05/17/2019 33.0 31.5 - 35.7 g/dL Final     RDW   Date Value Ref Range Status   05/17/2019 13.7 12.3 - 15.4 % Final     RDW-SD   Date Value Ref Range Status   05/17/2019 47.2 37.0 - 54.0 fl Final     MPV   Date Value Ref Range Status   05/17/2019 8.6 6.0 - 12.0 fL Final     Platelets   Date Value Ref Range Status   05/17/2019 269 140 - 450 10*3/mm3 Final     Neutrophil %   Date Value Ref Range Status   05/17/2019 64.4 42.7 - 76.0 % Final     Lymphocyte %   Date Value Ref Range Status   05/17/2019 20.5 19.6 - 45.3 % Final     Monocyte %   Date Value Ref Range Status   05/17/2019 9.8 5.0 - 12.0 % Final     Eosinophil %   Date Value Ref Range Status   05/17/2019 5.0 0.3 - 6.2 % Final     Basophil %   Date Value Ref Range Status   05/17/2019 0.3 0.0 - 1.5 % Final     Immature Granulocyte Rel %   Date Value Ref Range Status   12/02/2015 0.30 0.00 - 0.50 % Final     Neutrophils, Absolute   Date Value Ref Range Status   05/17/2019 4.25 1.70 - 7.00 10*3/mm3 Final     Lymphocytes, Absolute   Date Value Ref Range Status   05/17/2019 1.35 0.70 - 3.10 10*3/mm3 Final     Monocytes, Absolute   Date Value Ref Range Status   05/17/2019 0.65 0.10 - 0.90 10*3/mm3 Final     Eosinophils, Absolute   Date Value Ref Range Status   05/17/2019 0.33 0.00 - 0.40 10*3/mm3 Final     Basophils, Absolute   Date Value Ref Range Status   05/17/2019 0.02 0.00 - 0.20 10*3/mm3 Final     Immature Granulocytes Absolute   Date Value Ref Range Status   12/02/2015 0.020 0.005 - 0.022 x1000/uL Final     nRBC   Date Value Ref Range Status   09/26/2016 0  Final   09/26/2016 0  Final       ASSESSMENT:  1. Obstructive sleep apnea Established, stable (1)  1. Script for new supplies  2. RTC in 6 months with compliance check  3. Records form [facility]      RTC in 6 months         This document has been electronically signed by Jacob Quick MD on June 25, 2019         CC:  Marshal Warner MD Bandy, Eric L, MD

## 2019-06-26 RX ORDER — SPIRONOLACTONE 25 MG/1
TABLET ORAL
Qty: 90 TABLET | Refills: 1 | Status: SHIPPED | OUTPATIENT
Start: 2019-06-26 | End: 2020-04-24 | Stop reason: SDUPTHER

## 2019-07-18 ENCOUNTER — LAB (OUTPATIENT)
Dept: LAB | Facility: OTHER | Age: 69
End: 2019-07-18

## 2019-07-18 DIAGNOSIS — T45.4X5A ADVERSE EFFECT OF IRON, INITIAL ENCOUNTER: ICD-10-CM

## 2019-07-18 DIAGNOSIS — D53.1 MEGALOBLASTIC ANEMIA: ICD-10-CM

## 2019-07-18 DIAGNOSIS — D50.0 IRON DEFICIENCY ANEMIA DUE TO CHRONIC BLOOD LOSS: ICD-10-CM

## 2019-07-18 LAB
ALBUMIN SERPL-MCNC: 4.3 G/DL (ref 3.5–5)
ALBUMIN/GLOB SERPL: 1.2 G/DL (ref 1.1–1.8)
ALP SERPL-CCNC: 45 U/L (ref 38–126)
ALT SERPL W P-5'-P-CCNC: 14 U/L
ANION GAP SERPL CALCULATED.3IONS-SCNC: 7 MMOL/L (ref 5–15)
AST SERPL-CCNC: 20 U/L (ref 14–36)
BASOPHILS # BLD AUTO: 0.03 10*3/MM3 (ref 0–0.2)
BASOPHILS NFR BLD AUTO: 0.4 % (ref 0–1.5)
BILIRUB SERPL-MCNC: 0.4 MG/DL (ref 0.2–1.3)
BUN BLD-MCNC: 25 MG/DL (ref 7–23)
BUN/CREAT SERPL: 19.5 (ref 7–25)
CALCIUM SPEC-SCNC: 10 MG/DL (ref 8.4–10.2)
CHLORIDE SERPL-SCNC: 104 MMOL/L (ref 101–112)
CO2 SERPL-SCNC: 30 MMOL/L (ref 22–30)
CREAT BLD-MCNC: 1.28 MG/DL (ref 0.52–1.04)
DEPRECATED RDW RBC AUTO: 45.6 FL (ref 37–54)
EOSINOPHIL # BLD AUTO: 0.42 10*3/MM3 (ref 0–0.4)
EOSINOPHIL NFR BLD AUTO: 5.5 % (ref 0.3–6.2)
ERYTHROCYTE [DISTWIDTH] IN BLOOD BY AUTOMATED COUNT: 13.5 % (ref 12.3–15.4)
GFR SERPL CREATININE-BSD FRML MDRD: 41 ML/MIN/1.73 (ref 39–90)
GLOBULIN UR ELPH-MCNC: 3.5 GM/DL (ref 2.3–3.5)
GLUCOSE BLD-MCNC: 91 MG/DL (ref 70–99)
HCT VFR BLD AUTO: 34.7 % (ref 34–46.6)
HGB BLD-MCNC: 11.8 G/DL (ref 12–15.9)
LYMPHOCYTES # BLD AUTO: 1.39 10*3/MM3 (ref 0.7–3.1)
LYMPHOCYTES NFR BLD AUTO: 18.3 % (ref 19.6–45.3)
MCH RBC QN AUTO: 32.9 PG (ref 26.6–33)
MCHC RBC AUTO-ENTMCNC: 34 G/DL (ref 31.5–35.7)
MCV RBC AUTO: 96.7 FL (ref 79–97)
MONOCYTES # BLD AUTO: 0.65 10*3/MM3 (ref 0.1–0.9)
MONOCYTES NFR BLD AUTO: 8.6 % (ref 5–12)
NEUTROPHILS # BLD AUTO: 5.11 10*3/MM3 (ref 1.7–7)
NEUTROPHILS NFR BLD AUTO: 67.2 % (ref 42.7–76)
PLATELET # BLD AUTO: 287 10*3/MM3 (ref 140–450)
PMV BLD AUTO: 8.8 FL (ref 6–12)
POTASSIUM BLD-SCNC: 4.4 MMOL/L (ref 3.4–5)
PROT SERPL-MCNC: 7.8 G/DL (ref 6.3–8.6)
RBC # BLD AUTO: 3.59 10*6/MM3 (ref 3.77–5.28)
SODIUM BLD-SCNC: 141 MMOL/L (ref 137–145)
WBC NRBC COR # BLD: 7.6 10*3/MM3 (ref 3.4–10.8)

## 2019-07-18 PROCEDURE — 85025 COMPLETE CBC W/AUTO DIFF WBC: CPT | Performed by: INTERNAL MEDICINE

## 2019-07-18 PROCEDURE — 36415 COLL VENOUS BLD VENIPUNCTURE: CPT | Performed by: INTERNAL MEDICINE

## 2019-07-18 PROCEDURE — 82746 ASSAY OF FOLIC ACID SERUM: CPT | Performed by: INTERNAL MEDICINE

## 2019-07-18 PROCEDURE — 82728 ASSAY OF FERRITIN: CPT | Performed by: INTERNAL MEDICINE

## 2019-07-18 PROCEDURE — 80053 COMPREHEN METABOLIC PANEL: CPT | Performed by: INTERNAL MEDICINE

## 2019-07-18 PROCEDURE — 83540 ASSAY OF IRON: CPT | Performed by: INTERNAL MEDICINE

## 2019-07-18 PROCEDURE — 84466 ASSAY OF TRANSFERRIN: CPT | Performed by: INTERNAL MEDICINE

## 2019-07-18 PROCEDURE — 82607 VITAMIN B-12: CPT | Performed by: INTERNAL MEDICINE

## 2019-07-19 LAB
FERRITIN SERPL-MCNC: 34.27 NG/ML (ref 13–150)
FOLATE SERPL-MCNC: >20 NG/ML (ref 4.78–24.2)
IRON 24H UR-MRATE: 85 MCG/DL (ref 37–145)
IRON SATN MFR SERPL: 20 % (ref 20–50)
TIBC SERPL-MCNC: 429 MCG/DL (ref 298–536)
TRANSFERRIN SERPL-MCNC: 288 MG/DL (ref 200–360)
VIT B12 BLD-MCNC: 616 PG/ML (ref 211–946)

## 2019-07-25 ENCOUNTER — OFFICE VISIT (OUTPATIENT)
Dept: HEMATOLOGY/ONCOLOGY | Facility: CLINIC | Age: 69
End: 2019-07-25

## 2019-07-25 VITALS
DIASTOLIC BLOOD PRESSURE: 60 MMHG | WEIGHT: 256 LBS | HEART RATE: 70 BPM | RESPIRATION RATE: 18 BRPM | BODY MASS INDEX: 48.4 KG/M2 | SYSTOLIC BLOOD PRESSURE: 122 MMHG

## 2019-07-25 DIAGNOSIS — D50.0 IRON DEFICIENCY ANEMIA DUE TO CHRONIC BLOOD LOSS: Chronic | ICD-10-CM

## 2019-07-25 DIAGNOSIS — D53.1 MEGALOBLASTIC ANEMIA: Chronic | ICD-10-CM

## 2019-07-25 DIAGNOSIS — E55.9 VITAMIN D DEFICIENCY: Chronic | ICD-10-CM

## 2019-07-25 DIAGNOSIS — N17.9 ACUTE KIDNEY INJURY SUPERIMPOSED ON CKD (HCC): Primary | ICD-10-CM

## 2019-07-25 DIAGNOSIS — N18.9 ACUTE KIDNEY INJURY SUPERIMPOSED ON CKD (HCC): Primary | ICD-10-CM

## 2019-07-25 DIAGNOSIS — T45.4X5A ADVERSE EFFECT OF IRON, INITIAL ENCOUNTER: ICD-10-CM

## 2019-07-25 PROCEDURE — 1123F ACP DISCUSS/DSCN MKR DOCD: CPT | Performed by: INTERNAL MEDICINE

## 2019-07-25 PROCEDURE — G8731 PAIN NEG NO PLAN: HCPCS | Performed by: INTERNAL MEDICINE

## 2019-07-25 PROCEDURE — 99214 OFFICE O/P EST MOD 30 MIN: CPT | Performed by: INTERNAL MEDICINE

## 2019-07-25 PROCEDURE — G0463 HOSPITAL OUTPT CLINIC VISIT: HCPCS | Performed by: INTERNAL MEDICINE

## 2019-07-25 PROCEDURE — G9903 PT SCRN TBCO ID AS NON USER: HCPCS | Performed by: INTERNAL MEDICINE

## 2019-07-25 RX ORDER — FOLIC ACID 1 MG/1
1 TABLET ORAL DAILY
Qty: 90 TABLET | Refills: 1 | Status: SHIPPED | OUTPATIENT
Start: 2019-07-25 | End: 2019-10-24 | Stop reason: SDUPTHER

## 2019-07-25 RX ORDER — LANOLIN ALCOHOL/MO/W.PET/CERES
1000 CREAM (GRAM) TOPICAL DAILY
Qty: 90 TABLET | Refills: 1 | Status: SHIPPED | OUTPATIENT
Start: 2019-07-25 | End: 2020-10-16

## 2019-07-25 NOTE — PROGRESS NOTES
DATE OF VISIT: 7/25/2019        REASON FOR VISIT: Anemia with iron deficiency, vitamin B12 deficiency, elevated creatinine      HISTORY OF PRESENT ILLNESS:    69-year-old female with medical program consisting of hypertension, dyslipidemia, diabetes, hypothyroidism on Synthroid, coronary artery disease has ongoing problem with iron deficiency since age 18.  Patient was initially seen in consultation on January 24, 2019.  In view of inability to take iron by mouth, patient received 2 dose of intravenous Feraheme on February 8 2019 and February 15, 2019.  Patient is here for 3-month follow-up appointment today.  Still complains of intermittent blood in the stool due to hemorrhoidal bleeding.  States her fatigue is improved.  Denies any new lymph node enlargement.        PAST MEDICAL HISTORY:    Past Medical History:   Diagnosis Date   • Acquired hypothyroidism - On Synthroid    • Allergic rhinitis    • Anemia    • Asthma - mild intermittent    • Cardiac disease    • Carpal tunnel syndrome - Bilateral    • Colitis    • Degeneration of cervical intervertebral disc    • Diabetes (CMS/HCC)    • Disorder of lumbar spine    • Diverticulosis of large intestine 1/17/2019   • Essential hypertension    • Fatigue    • History of myocardial infarct at age greater than 60 years 10/17/2018   • Hyperlipidemia - Improved with Zocor    • IFG (impaired fasting glucose) 10/17/2018   • Impaired fasting glycaemia    • Iron deficiency anemia - Improved    • Lumbar radiculopathy    • Megaloblastic anemia due to vitamin B>12< deficiency    • Morbid obesity (CMS/HCC)    • Osteoarthritis of knee - Bilateral    • Osteoarthritis of multiple joints - Left knee    • Osteoporosis    • Pain in left hip    • Sleep apnea - On CPAP    • Spasm of back muscles    • Thyroid dysfunction    • Tubular adenoma of colon - removed during colonoscopy, 11/2018. 12/3/2018   • Vitamin D deficiency        SOCIAL HISTORY:    Social History     Tobacco Use   • Smoking  status: Never Smoker   • Smokeless tobacco: Never Used   Substance Use Topics   • Alcohol use: No   • Drug use: No       Surgical History :  Past Surgical History:   Procedure Laterality Date   • CARPAL TUNNEL RELEASE Right    • COLONOSCOPY N/A 11/30/2018    Procedure: COLONOSCOPY;  Surgeon: Jimmie Sutton MD;  Location: [facility] ENDOSCOPY;  Service: General   • ENDOSCOPY  09/17/2012    Normal esophagus.  Gastritis.  Normal duodenum   • ENDOSCOPY N/A 11/30/2018    Procedure: ESOPHAGOGASTRODUODENOSCOPY WITH ANESTHESIA;  Surgeon: Jimmie Sutton MD;  Location: [facility] ENDOSCOPY;  Service: General   • ENDOSCOPY AND COLONOSCOPY  09/17/2012    Internal & external hemorrhoids found.   • HYSTERECTOMY  1980    w bso   • JOINT REPLACEMENT      left knee   • LUMBAR LAMINECTOMY  2011    s/p lumbar laminectomy and fusion   • NECK SURGERY     • OOPHORECTOMY  1980   • SHOULDER SURGERY  12/07/2015    Arthroscopy of the left shoudler with rotator cuff repair, Vijay procedure, and subacromial decompression.   • SHOULDER SURGERY Right    • TOTAL KNEE ARTHROPLASTY Left        ALLERGIES:    Allergies   Allergen Reactions   • Lortab [Hydrocodone-Acetaminophen] Hallucinations   • Codeine Unknown (See Comments)     Heart race   • Antihistamines, Chlorpheniramine-Type Unknown (See Comments)     Heart races   • Latex Rash   • Other Unknown (See Comments)     Decongestants: Heart Race   • Tape Rash         FAMILY HISTORY:  Family History   Problem Relation Age of Onset   • Breast cancer Mother    • Ovarian cancer Mother    • Breast cancer Sister    • Breast cancer Other    • Heart disease Other    • Hypertension Other    • Lung disease Other    • Diabetes Other    • Cancer Other            REVIEW OF SYSTEMS:      CONSTITUTIONAL:   Denies any excessive fatigue. Denies any fever, chills or weight loss.      EYES: No visual disturbances. No discharge. No new lesions     ENMT:  No epistaxis, mouth sores or difficulty  swallowing.     RESPIRATORY:  No new shortness of breath. No new cough or hemoptysis.     CARDIOVASCULAR:  No chest pain or palpitations.     GASTROINTESTINAL: Complains of nausea and severe constipation with iron tablet.  Complains of hemorrhoidal bleeding when she gets constipated.      GENITOURINARY: No Dysuria or Hematuria.     MUSCULOSKELETAL:  Complains of diffuse arthritic pain affecting multiple joints.     LYMPHATICS:  Denies any abnormal swollen glands anywhere in the body.     NEUROLOGICAL : No tingling or numbness. No headache or dizziness. No seizures or balance problems.     SKIN: Positive for Vitiligo     ENDOCRINE : No new heat or cold intolerance. No new polyuria . No polydipsia.          PHYSICAL EXAMINATION:      VITAL SIGNS:  /60   Pulse 70   Resp 18   Wt 116 kg (256 lb)   LMP  (LMP Unknown) Comment: 1980 w BSO  BMI 48.40 kg/m²       07/25/19  1447   Weight: 116 kg (256 lb)         ECOG performance status: 2     CONSTITUTIONAL:  Not in any distress.     EYES: Mild conjunctival Pallor. No Icterus. No Pterygium. Extraocular Movements intact.No ptosis.     ENMT:  Normocephalic, Atraumatic.No Facial Asymmetry noted.     NECK:  No adenopathy.Trachea midline. NO JVD.     RESPIRATORY:  Fair air entry bilateral. No rhonchi or wheezing.Fair respiratory effort.     CARDIOVASCULAR:  S1, S2. Regular rate and rhythm. No murmur or gallop appreciated.     ABDOMEN:  Soft, obese, nontender. Bowel sounds present in all four quadrants.  No Hepatosplenomegaly appreciated.     MUSCULOSKELETAL:  No edema.No Calf Tenderness.Decreased range of motion.     NEUROLOGIC:    No  Motor or sensory deficit appreciated. Cranial Nerves 2-12 grossly intact.     SKIN : Vitiligo present in neck. Skin is warm and moist to touch.     LYMPHATICS: No new enlarged lymph nodes in neck or supraclavicular area.     PSYCHIATRY: Alert, awake and oriented ×3.Normal affect.  Normal judgment.  Makes good eye  contact.          DIAGNOSTIC DATA:    Glucose   Date Value Ref Range Status   07/18/2019 91 70 - 99 mg/dL Final     Sodium   Date Value Ref Range Status   07/18/2019 141 137 - 145 mmol/L Final     Potassium   Date Value Ref Range Status   07/18/2019 4.4 3.4 - 5.0 mmol/L Final     CO2   Date Value Ref Range Status   07/18/2019 30.0 22.0 - 30.0 mmol/L Final     Chloride   Date Value Ref Range Status   07/18/2019 104 101 - 112 mmol/L Final     Anion Gap   Date Value Ref Range Status   07/18/2019 7.0 5.0 - 15.0 mmol/L Final     Creatinine   Date Value Ref Range Status   07/18/2019 1.28 (H) 0.52 - 1.04 mg/dL Final     BUN   Date Value Ref Range Status   07/18/2019 25 (H) 7 - 23 mg/dL Final     BUN/Creatinine Ratio   Date Value Ref Range Status   07/18/2019 19.5 7.0 - 25.0 Final     Calcium   Date Value Ref Range Status   07/18/2019 10.0 8.4 - 10.2 mg/dL Final     eGFR Non  Amer   Date Value Ref Range Status   07/18/2019 41 39 - 90 mL/min/1.73 Final     Alkaline Phosphatase   Date Value Ref Range Status   07/18/2019 45 38 - 126 U/L Final     Total Protein   Date Value Ref Range Status   07/18/2019 7.8 6.3 - 8.6 g/dL Final     ALT (SGPT)   Date Value Ref Range Status   07/18/2019 14 <=35 U/L Final     AST (SGOT)   Date Value Ref Range Status   07/18/2019 20 14 - 36 U/L Final     Total Bilirubin   Date Value Ref Range Status   07/18/2019 0.4 0.2 - 1.3 mg/dL Final     Albumin   Date Value Ref Range Status   07/18/2019 4.30 3.50 - 5.00 g/dL Final     Globulin   Date Value Ref Range Status   07/18/2019 3.5 2.3 - 3.5 gm/dL Final     Lab Results   Component Value Date    WBC 7.60 07/18/2019    HGB 11.8 (L) 07/18/2019    HCT 34.7 07/18/2019    MCV 96.7 07/18/2019     07/18/2019     Lab Results   Component Value Date    NEUTROABS 5.11 07/18/2019    IRON 85 07/18/2019    TIBC 429 07/18/2019    LABIRON 20 07/18/2019    FERRITIN 34.27 07/18/2019    [id number] 616 07/18/2019    FOLATE >20.00 07/18/2019     No results  found for: , LABCA2, AFPTM, HCGQUANT, , CHROMGRNA, [id number], CEA, REFLABREPO        Pathology :  Pathology report from November 30, 2018 showed:  Component     Final Diagnosis   1.  GASTRIC ANTRUM, MUCOSAL BIOPSY:  MILD CHRONIC GASTRITIS.  NO EVIDENCE OF HELICOBACTER PYLORI.     2.  TUBULAR ADENOMA, ASCENDING COLON.            ASSESSMENT AND PLAN:       1.  Iron deficiency anemia:  -Patient was found to have occult blood positive in stool in October 2018.  Patient was also found to be anemic with a hemoglobin of 11.  -Patient was started on ferrous sulfate 1 tablet by mouth daily by mouth.  Patient is not able to tolerate iron secondary to nausea as well as severe constipation that's making her hemorrhoids bleed.  -Patient had evaluation done by EGD and colonoscopy by Dr. Sutton on November 30, 2018.  EGD showed gastritis, colonoscopy showed polyp without any evidence of bleeding or cancer.  - anemia workup done on January 10, 2019 shows ferritin is still low normal at 15.  Hemoglobin was 11.7  -Since patient is not able to tolerate iron by mouth, plan is to start her on intravenous Feraheme.  -Patient received 2 dose of intravenous Feraheme on February 8, 2019 and February 15, 2019.  -Anemia work-up done on July 18, 2019 shows hemoglobin is 11.8 with adequate amount of iron B12 and folate.  -Recommend continuing with B12 and folic acid supplement for now.  -We will asked patient to return to clinic in 3 months with repeat CBC and anemia work-up to be done prior to that.  At any point with worsening anemia next step would be bone marrow biopsy.     2.  Hypothyroidism     3.  Hypertension     4.  Acute on chronic kidney disease:  -Patient's creatinine is worsened to 1.28.  Patient was notified about elevated creatinine and need for increasing hydration.     5.  Genetic counseling:  -Patient has at least 3 family member who is a breast cancer.  It was discuss with patient that she should find out from her  sister whether she was ever tested for any germline mutation.    -States her sister was never tested for any genetic testing.  -Patient was offered to get seen by genetic counselor at [place].  She does not want to do genetic counseling at this point.     6.  Health Maintainence: Patient does not smoke.  Had a colonoscopy and EGD in November 2018.  Had a mammogram in December 2018.  Had a total abdominal hysterectomy at age 30, does not and Pap smear.    7.Advance Care Planning: For now patient remains full code and is able to make her decisions.  Patient has health care surrogate mentioned on chart.    8.BMI:Patient's Body mass index is 48.4 kg/m². BMI is higher than reference range.  Patient is aware of elevated BMI.    9.  Pain assessment:  -Patient denies any pain today.             Ranjeet De La Torre MD  7/25/2019  3:00 PM        EMR Dragon/Transcription disclaimer:   Much of this encounter note is an electronic transcription/translation of spoken language to printed text. The electronic translation of spoken language may permit erroneous, or at times, nonsensical words or phrases to be inadvertently transcribed; Although I have reviewed the note for such errors, some may still exist.

## 2019-10-15 ENCOUNTER — LAB (OUTPATIENT)
Dept: LAB | Facility: OTHER | Age: 69
End: 2019-10-15

## 2019-10-15 DIAGNOSIS — E78.2 MIXED HYPERLIPIDEMIA: Chronic | ICD-10-CM

## 2019-10-15 DIAGNOSIS — E83.42 HYPOMAGNESEMIA: Chronic | ICD-10-CM

## 2019-10-15 DIAGNOSIS — D53.1 MEGALOBLASTIC ANEMIA: Chronic | ICD-10-CM

## 2019-10-15 DIAGNOSIS — E55.9 VITAMIN D DEFICIENCY: Chronic | ICD-10-CM

## 2019-10-15 DIAGNOSIS — N17.9 ACUTE KIDNEY INJURY SUPERIMPOSED ON CKD (HCC): ICD-10-CM

## 2019-10-15 DIAGNOSIS — R73.01 IFG (IMPAIRED FASTING GLUCOSE): Chronic | ICD-10-CM

## 2019-10-15 DIAGNOSIS — N18.9 ACUTE KIDNEY INJURY SUPERIMPOSED ON CKD (HCC): ICD-10-CM

## 2019-10-15 DIAGNOSIS — I10 ESSENTIAL HYPERTENSION: Chronic | ICD-10-CM

## 2019-10-15 DIAGNOSIS — E03.9 ACQUIRED HYPOTHYROIDISM: Chronic | ICD-10-CM

## 2019-10-15 DIAGNOSIS — D50.0 IRON DEFICIENCY ANEMIA DUE TO CHRONIC BLOOD LOSS: Chronic | ICD-10-CM

## 2019-10-15 LAB
ALBUMIN SERPL-MCNC: 4.3 G/DL (ref 3.5–5)
ALBUMIN/GLOB SERPL: 1.3 G/DL (ref 1.1–1.8)
ALP SERPL-CCNC: 41 U/L (ref 38–126)
ALT SERPL W P-5'-P-CCNC: 16 U/L
ANION GAP SERPL CALCULATED.3IONS-SCNC: 11 MMOL/L (ref 5–15)
AST SERPL-CCNC: 22 U/L (ref 14–36)
BASOPHILS # BLD AUTO: 0.03 10*3/MM3 (ref 0–0.2)
BASOPHILS NFR BLD AUTO: 0.4 % (ref 0–1.5)
BILIRUB SERPL-MCNC: 0.4 MG/DL (ref 0.2–1.3)
BUN BLD-MCNC: 30 MG/DL (ref 7–23)
BUN/CREAT SERPL: 26.8 (ref 7–25)
CALCIUM SPEC-SCNC: 10 MG/DL (ref 8.4–10.2)
CHLORIDE SERPL-SCNC: 103 MMOL/L (ref 101–112)
CHOLEST SERPL-MCNC: 206 MG/DL (ref 150–200)
CO2 SERPL-SCNC: 28 MMOL/L (ref 22–30)
CREAT BLD-MCNC: 1.12 MG/DL (ref 0.52–1.04)
DEPRECATED RDW RBC AUTO: 48.2 FL (ref 37–54)
EOSINOPHIL # BLD AUTO: 0.36 10*3/MM3 (ref 0–0.4)
EOSINOPHIL NFR BLD AUTO: 5.2 % (ref 0.3–6.2)
ERYTHROCYTE [DISTWIDTH] IN BLOOD BY AUTOMATED COUNT: 14.1 % (ref 12.3–15.4)
GFR SERPL CREATININE-BSD FRML MDRD: 48 ML/MIN/1.73 (ref 39–90)
GLOBULIN UR ELPH-MCNC: 3.2 GM/DL (ref 2.3–3.5)
GLUCOSE BLD-MCNC: 99 MG/DL (ref 70–99)
HCT VFR BLD AUTO: 34.6 % (ref 34–46.6)
HDLC SERPL-MCNC: 36 MG/DL (ref 40–59)
HGB BLD-MCNC: 11.3 G/DL (ref 12–15.9)
LDLC SERPL CALC-MCNC: 121 MG/DL
LDLC/HDLC SERPL: 3.37 {RATIO} (ref 0–3.22)
LYMPHOCYTES # BLD AUTO: 1.26 10*3/MM3 (ref 0.7–3.1)
LYMPHOCYTES NFR BLD AUTO: 18.2 % (ref 19.6–45.3)
MAGNESIUM SERPL-MCNC: 2 MG/DL (ref 1.6–2.3)
MCH RBC QN AUTO: 31.7 PG (ref 26.6–33)
MCHC RBC AUTO-ENTMCNC: 32.7 G/DL (ref 31.5–35.7)
MCV RBC AUTO: 96.9 FL (ref 79–97)
MONOCYTES # BLD AUTO: 0.52 10*3/MM3 (ref 0.1–0.9)
MONOCYTES NFR BLD AUTO: 7.5 % (ref 5–12)
NEUTROPHILS # BLD AUTO: 4.75 10*3/MM3 (ref 1.7–7)
NEUTROPHILS NFR BLD AUTO: 68.7 % (ref 42.7–76)
PLATELET # BLD AUTO: 253 10*3/MM3 (ref 140–450)
PMV BLD AUTO: 9.1 FL (ref 6–12)
POTASSIUM BLD-SCNC: 4.9 MMOL/L (ref 3.4–5)
PROT SERPL-MCNC: 7.5 G/DL (ref 6.3–8.6)
RBC # BLD AUTO: 3.57 10*6/MM3 (ref 3.77–5.28)
SODIUM BLD-SCNC: 142 MMOL/L (ref 137–145)
TRIGL SERPL-MCNC: 243 MG/DL
VLDLC SERPL-MCNC: 48.6 MG/DL
WBC NRBC COR # BLD: 6.92 10*3/MM3 (ref 3.4–10.8)

## 2019-10-15 PROCEDURE — 84466 ASSAY OF TRANSFERRIN: CPT | Performed by: INTERNAL MEDICINE

## 2019-10-15 PROCEDURE — 83735 ASSAY OF MAGNESIUM: CPT | Performed by: INTERNAL MEDICINE

## 2019-10-15 PROCEDURE — 82728 ASSAY OF FERRITIN: CPT | Performed by: INTERNAL MEDICINE

## 2019-10-15 PROCEDURE — 84443 ASSAY THYROID STIM HORMONE: CPT | Performed by: INTERNAL MEDICINE

## 2019-10-15 PROCEDURE — 80061 LIPID PANEL: CPT | Performed by: INTERNAL MEDICINE

## 2019-10-15 PROCEDURE — 83036 HEMOGLOBIN GLYCOSYLATED A1C: CPT | Performed by: INTERNAL MEDICINE

## 2019-10-15 PROCEDURE — 83540 ASSAY OF IRON: CPT | Performed by: INTERNAL MEDICINE

## 2019-10-15 PROCEDURE — 36415 COLL VENOUS BLD VENIPUNCTURE: CPT | Performed by: INTERNAL MEDICINE

## 2019-10-15 PROCEDURE — 80053 COMPREHEN METABOLIC PANEL: CPT | Performed by: INTERNAL MEDICINE

## 2019-10-15 PROCEDURE — 82607 VITAMIN B-12: CPT | Performed by: INTERNAL MEDICINE

## 2019-10-15 PROCEDURE — 85025 COMPLETE CBC W/AUTO DIFF WBC: CPT | Performed by: INTERNAL MEDICINE

## 2019-10-15 PROCEDURE — 82306 VITAMIN D 25 HYDROXY: CPT | Performed by: INTERNAL MEDICINE

## 2019-10-15 PROCEDURE — 84439 ASSAY OF FREE THYROXINE: CPT | Performed by: INTERNAL MEDICINE

## 2019-10-16 LAB
25(OH)D3 SERPL-MCNC: 32.7 NG/ML (ref 30–100)
FERRITIN SERPL-MCNC: 29.4 NG/ML (ref 13–150)
HBA1C MFR BLD: 6.26 % (ref 4.8–5.6)
IRON 24H UR-MRATE: 105 MCG/DL (ref 37–145)
IRON SATN MFR SERPL: 25 % (ref 20–50)
T4 FREE SERPL-MCNC: 1.1 NG/DL (ref 0.93–1.7)
TIBC SERPL-MCNC: 426 MCG/DL (ref 298–536)
TRANSFERRIN SERPL-MCNC: 286 MG/DL (ref 200–360)
TSH SERPL DL<=0.05 MIU/L-ACNC: 2.49 UIU/ML (ref 0.27–4.2)
VIT B12 BLD-MCNC: 1007 PG/ML (ref 211–946)

## 2019-10-22 ENCOUNTER — LAB (OUTPATIENT)
Dept: LAB | Facility: OTHER | Age: 69
End: 2019-10-22

## 2019-10-22 DIAGNOSIS — D50.0 IRON DEFICIENCY ANEMIA DUE TO CHRONIC BLOOD LOSS: ICD-10-CM

## 2019-10-22 DIAGNOSIS — D53.1 MEGALOBLASTIC ANEMIA: ICD-10-CM

## 2019-10-22 DIAGNOSIS — N18.9 ACUTE KIDNEY INJURY SUPERIMPOSED ON CKD (HCC): ICD-10-CM

## 2019-10-22 DIAGNOSIS — N17.9 ACUTE KIDNEY INJURY SUPERIMPOSED ON CKD (HCC): ICD-10-CM

## 2019-10-22 LAB
ALBUMIN SERPL-MCNC: 4.4 G/DL (ref 3.5–5)
ALBUMIN/GLOB SERPL: 1.4 G/DL (ref 1.1–1.8)
ALP SERPL-CCNC: 38 U/L (ref 38–126)
ALT SERPL W P-5'-P-CCNC: 17 U/L
ANION GAP SERPL CALCULATED.3IONS-SCNC: 8 MMOL/L (ref 5–15)
AST SERPL-CCNC: 22 U/L (ref 14–36)
BASOPHILS # BLD AUTO: 0.03 10*3/MM3 (ref 0–0.2)
BASOPHILS NFR BLD AUTO: 0.5 % (ref 0–1.5)
BILIRUB SERPL-MCNC: 0.5 MG/DL (ref 0.2–1.3)
BUN BLD-MCNC: 32 MG/DL (ref 7–23)
BUN/CREAT SERPL: 20.8 (ref 7–25)
CALCIUM SPEC-SCNC: 9.8 MG/DL (ref 8.4–10.2)
CHLORIDE SERPL-SCNC: 103 MMOL/L (ref 101–112)
CO2 SERPL-SCNC: 29 MMOL/L (ref 22–30)
CREAT BLD-MCNC: 1.54 MG/DL (ref 0.52–1.04)
DEPRECATED RDW RBC AUTO: 47.1 FL (ref 37–54)
EOSINOPHIL # BLD AUTO: 0.3 10*3/MM3 (ref 0–0.4)
EOSINOPHIL NFR BLD AUTO: 4.6 % (ref 0.3–6.2)
ERYTHROCYTE [DISTWIDTH] IN BLOOD BY AUTOMATED COUNT: 14.1 % (ref 12.3–15.4)
GFR SERPL CREATININE-BSD FRML MDRD: 33 ML/MIN/1.73 (ref 39–90)
GLOBULIN UR ELPH-MCNC: 3.2 GM/DL (ref 2.3–3.5)
GLUCOSE BLD-MCNC: 97 MG/DL (ref 70–99)
HCT VFR BLD AUTO: 33.6 % (ref 34–46.6)
HGB BLD-MCNC: 11.2 G/DL (ref 12–15.9)
LYMPHOCYTES # BLD AUTO: 1.25 10*3/MM3 (ref 0.7–3.1)
LYMPHOCYTES NFR BLD AUTO: 19.1 % (ref 19.6–45.3)
MCH RBC QN AUTO: 31.8 PG (ref 26.6–33)
MCHC RBC AUTO-ENTMCNC: 33.3 G/DL (ref 31.5–35.7)
MCV RBC AUTO: 95.5 FL (ref 79–97)
MONOCYTES # BLD AUTO: 0.58 10*3/MM3 (ref 0.1–0.9)
MONOCYTES NFR BLD AUTO: 8.9 % (ref 5–12)
NEUTROPHILS # BLD AUTO: 4.39 10*3/MM3 (ref 1.7–7)
NEUTROPHILS NFR BLD AUTO: 66.9 % (ref 42.7–76)
PLATELET # BLD AUTO: 270 10*3/MM3 (ref 140–450)
PMV BLD AUTO: 8.5 FL (ref 6–12)
POTASSIUM BLD-SCNC: 4.6 MMOL/L (ref 3.4–5)
PROT SERPL-MCNC: 7.6 G/DL (ref 6.3–8.6)
RBC # BLD AUTO: 3.52 10*6/MM3 (ref 3.77–5.28)
SODIUM BLD-SCNC: 140 MMOL/L (ref 137–145)
WBC NRBC COR # BLD: 6.55 10*3/MM3 (ref 3.4–10.8)

## 2019-10-22 PROCEDURE — 82607 VITAMIN B-12: CPT | Performed by: INTERNAL MEDICINE

## 2019-10-22 PROCEDURE — 80053 COMPREHEN METABOLIC PANEL: CPT | Performed by: INTERNAL MEDICINE

## 2019-10-22 PROCEDURE — 84466 ASSAY OF TRANSFERRIN: CPT | Performed by: INTERNAL MEDICINE

## 2019-10-22 PROCEDURE — 85025 COMPLETE CBC W/AUTO DIFF WBC: CPT | Performed by: INTERNAL MEDICINE

## 2019-10-22 PROCEDURE — 36415 COLL VENOUS BLD VENIPUNCTURE: CPT | Performed by: INTERNAL MEDICINE

## 2019-10-22 PROCEDURE — 82728 ASSAY OF FERRITIN: CPT | Performed by: INTERNAL MEDICINE

## 2019-10-22 PROCEDURE — 82746 ASSAY OF FOLIC ACID SERUM: CPT | Performed by: INTERNAL MEDICINE

## 2019-10-22 PROCEDURE — 83540 ASSAY OF IRON: CPT | Performed by: INTERNAL MEDICINE

## 2019-10-23 ENCOUNTER — OFFICE VISIT (OUTPATIENT)
Dept: FAMILY MEDICINE CLINIC | Facility: CLINIC | Age: 69
End: 2019-10-23

## 2019-10-23 VITALS
HEIGHT: 61 IN | WEIGHT: 254 LBS | BODY MASS INDEX: 47.95 KG/M2 | HEART RATE: 74 BPM | TEMPERATURE: 98.6 F | DIASTOLIC BLOOD PRESSURE: 74 MMHG | OXYGEN SATURATION: 99 % | SYSTOLIC BLOOD PRESSURE: 122 MMHG

## 2019-10-23 DIAGNOSIS — D50.8 IRON DEFICIENCY ANEMIA SECONDARY TO INADEQUATE DIETARY IRON INTAKE: Chronic | ICD-10-CM

## 2019-10-23 DIAGNOSIS — N18.30 STAGE 3 CHRONIC KIDNEY DISEASE (HCC): Chronic | ICD-10-CM

## 2019-10-23 DIAGNOSIS — I10 ESSENTIAL HYPERTENSION: Primary | Chronic | ICD-10-CM

## 2019-10-23 DIAGNOSIS — M79.7 FIBROMYALGIA: ICD-10-CM

## 2019-10-23 DIAGNOSIS — D12.6 TUBULAR ADENOMA OF COLON: Chronic | ICD-10-CM

## 2019-10-23 DIAGNOSIS — M48.061 SPINAL STENOSIS OF LUMBAR REGION, UNSPECIFIED WHETHER NEUROGENIC CLAUDICATION PRESENT: ICD-10-CM

## 2019-10-23 DIAGNOSIS — R73.01 IFG (IMPAIRED FASTING GLUCOSE): Chronic | ICD-10-CM

## 2019-10-23 DIAGNOSIS — E66.01 MORBID OBESITY (HCC): Chronic | ICD-10-CM

## 2019-10-23 DIAGNOSIS — E78.2 MIXED HYPERLIPIDEMIA: ICD-10-CM

## 2019-10-23 DIAGNOSIS — I42.9 CARDIOMYOPATHY, UNSPECIFIED TYPE (HCC): Chronic | ICD-10-CM

## 2019-10-23 DIAGNOSIS — D53.1 MEGALOBLASTIC ANEMIA: Chronic | ICD-10-CM

## 2019-10-23 DIAGNOSIS — E03.9 ACQUIRED HYPOTHYROIDISM: Chronic | ICD-10-CM

## 2019-10-23 DIAGNOSIS — E55.9 VITAMIN D DEFICIENCY: Chronic | ICD-10-CM

## 2019-10-23 LAB
FERRITIN SERPL-MCNC: 31.4 NG/ML (ref 13–150)
FOLATE SERPL-MCNC: >20 NG/ML (ref 4.78–24.2)
IRON 24H UR-MRATE: 116 MCG/DL (ref 37–145)
IRON SATN MFR SERPL: 26 % (ref 20–50)
TIBC SERPL-MCNC: 438 MCG/DL (ref 298–536)
TRANSFERRIN SERPL-MCNC: 294 MG/DL (ref 200–360)
VIT B12 BLD-MCNC: 1270 PG/ML (ref 211–946)

## 2019-10-23 PROCEDURE — G0008 ADMIN INFLUENZA VIRUS VAC: HCPCS | Performed by: INTERNAL MEDICINE

## 2019-10-23 PROCEDURE — 99214 OFFICE O/P EST MOD 30 MIN: CPT | Performed by: INTERNAL MEDICINE

## 2019-10-23 PROCEDURE — 90653 IIV ADJUVANT VACCINE IM: CPT | Performed by: INTERNAL MEDICINE

## 2019-10-23 NOTE — PATIENT INSTRUCTIONS

## 2019-10-23 NOTE — PROGRESS NOTES
Subjective        History of Present Illness     Lois Parmar is a 69 y.o. female with multiple complex medical issues who presents for 6-month follow up on hypothyroidism, hyperlipidemia, hypertriglyceridemia, impaired fasting glucose osteoarthritis of the knees and hips, morbid obesity, and iron deficiency anemia and vitamin B12 deficiency anemia, among other issues complicated by obesity.  She continues on CPAP for sleep apnea now managed by Dr. Jacob Quick.   She continues to follow with Dr. Boyle, cardiologist.  She continues to follow with Dr. De La Torre to address the anemia.  She reports fibromyalgia pain is relatively stable.  Cardiomyopathy followed by Dr. Boyle, cardiologist, every six months         She has chronic lumbar back pain with history of lumbar laminectomy 2011.  She is reporting left hip pain, radiating down the left lower extremity at times, for which she is going to schedule an appointment with Dr. Augustine.  I encouraged her to get this scheduled soon.   I recommended she make sure Dr. Augustine is aware of her declining renal function.      She had upper and lower GI workup 11/2018 by Dr. Sutton, which revealed some mild gastritis and 2 adenomatous polyps, one of which was tubular adenoma with repeat colonoscopy recommended in three years, making her due in 2021.     We referred her to Dr. De La Torre to evaluate the iron deficiency anemia in this patient tolerating oral iron quite poorly.  Due to inability to take iron by mouth, patient received 2 dose of intravenous Feraheme on February 8 2019 and February 15, 2019.  Hemoglobin has drifted back down to 10.6, although, iron and B-12 remains at goal.  She continues on prescription B-12.  She has an appointment with Dr. De La Torre scheduled next month to re-evaluate. He plans to proceed with bone marrow biopsy to rule out any bone marrow pathology.      Six months ago, I recommended she stop the [place] thyroid and started levothyroxine 75 mcg.  Thyroid  function normal range with the change.     She takes Benefiber with occasional use of Colace to manage constipation.      DEXA 04/2018 reveals bone density remains within normal limits.  She continues on calcium and vitamin D    Weight is down 2 pounds in the past six months.  She walks with her walker outdoors for exercise.  Walking with the cane was difficult and painful due to the chronic knee pain.      Labs reveal progression of impaired fasting glucose.  She has not taken her metformin for over a week after running out of the prescription.  I will get her restarted, although, we will need to decrease the dose due to her worsening renal function.  Serum creatinine has increased to 1.5.  In discussion, she has been taking Aleve one tablet 4-5 times weekly. I explained she will need to avoid NSAIDs and other nephrotoxic agents and recommended extra strength Tylenol one q 4h.  or Tylenol Arthritis two tablets twice daily.    The patient's relevant past medical, surgical, and social history was reviewed in Epic.   Lab results are reviewed with the patient today. CBC reveals hemoglobin slightly below goal at 11.2.  Fasting glucose 97. A1c has progressed to 6.26.  Normal liver function.  Total cholesterol 206.  HDL 36.  .  Triglycerides above goal at 244 with LDL/HDL ratio 3.37.  Vitamin D improved.  Vitamin B-12 and magnesium at goal with oral supplements.      Review of Systems   Constitutional: Negative for chills, fatigue and fever.   HENT: Negative for congestion, ear pain, postnasal drip, sinus pressure and sore throat.    Respiratory: Negative for cough, shortness of breath and wheezing.    Cardiovascular: Negative for chest pain, palpitations and leg swelling.   Gastrointestinal: Negative for abdominal pain, blood in stool, constipation, diarrhea, nausea and vomiting.   Endocrine: Negative for cold intolerance, heat intolerance, polydipsia and polyuria.   Genitourinary: Negative for dysuria, frequency,  "hematuria and urgency.   Skin: Negative for rash.   Neurological: Negative for syncope and weakness.        Objective     Visit Vitals  /74   Pulse 74   Temp 98.6 °F (37 °C) (Oral)   Ht 154.7 cm (60.9\")   Wt 115 kg (254 lb)   LMP  (LMP Unknown) Comment: 1980 w BSO   SpO2 99%   BMI 48.15 kg/m²     Physical Exam   Constitutional: She is oriented to person, place, and time. She appears well-developed and well-nourished. No distress.   Morbidly obese female.    HENT:   Head: Normocephalic and atraumatic.   Nose: Right sinus exhibits no maxillary sinus tenderness and no frontal sinus tenderness. Left sinus exhibits no maxillary sinus tenderness and no frontal sinus tenderness.   Mouth/Throat: Uvula is midline, oropharynx is clear and moist and mucous membranes are normal. No oral lesions. No tonsillar exudate.   Crowded posterior oropharynx.  Clear postnasal drip.    Eyes: Conjunctivae and EOM are normal. Pupils are equal, round, and reactive to light.   Allergy changes to the eyes.     Neck: Trachea normal. Neck supple. No JVD present. Carotid bruit is not present. No tracheal deviation present. No thyroid mass and no thyromegaly present.   Cardiovascular: Normal rate, regular rhythm, normal heart sounds and intact distal pulses.  No extrasystoles are present. PMI is not displaced.   No murmur heard.  Pulmonary/Chest: Effort normal and breath sounds normal. No accessory muscle usage. No respiratory distress. She has no decreased breath sounds. She has no wheezes. She has no rhonchi. She has no rales.   Abdominal: Soft. Bowel sounds are normal. She exhibits no distension. There is no hepatosplenomegaly. There is no tenderness.   Obese abdomen limits exam.      Vascular Status -  Her right foot exhibits abnormal foot edema (Trace edema bilateral lower extremities wtih evidence of chronic venous insufficiency. ). Her right foot exhibits normal foot vasculature . Her left foot exhibits abnormal foot edema. Her left " foot exhibits normal foot vasculature .  Lymphadenopathy:     She has no cervical adenopathy.   Neurological: She is alert and oriented to person, place, and time. No cranial nerve deficit. Coordination normal.   Skin: Skin is warm, dry and intact. No rash noted. No cyanosis. Nails show no clubbing.   Psychiatric: She has a normal mood and affect. Her speech is normal and behavior is normal. Judgment and thought content normal.   Vitals reviewed.          Assessment/Plan      I will not add iron and B-12 levels with next set of labs since these are being checked every 4-6  months by Dr. De La Torre.     Due to the new problem of stage III chronic kidney disease, I am decreasing her metformin dose to 500 mg b.i.d.  She will need to agressively intensify diet, exercise and weight loss efforts, especially with the decreased dose of metformin.   Avoid NSAIDs and other nephrotoxic agents.     After informed verbal consent, patient is given influenza vaccine today without difficulty.  She tolerated well.     Continue to follow with Dr. De La Torre to address the anemia.  She greatly benefited from IV iron approximately 9 months ago.  She seems to achieve no benefit from oral iron, which she tolerates very poorly due to GI side effects.     Continue follow up visits with Dr. Jacob Quick to follow obstructive sleep apnea.       Continue  levothyroxine 75 mcg.       Continue simvastatin and dietary efforts.     Continue oral vitamin D and calcium.     Continue other medications and vitamin and mineral supplements to treat additional medical problems which we addressed today.     Return in six months for routine follow up with fasting labs one week prior.            Scribed for Dr. Warner by Adore Marrero [facility].      Diagnoses and all orders for this visit:    Essential hypertension  -     CBC Auto Differential; Future  -     Comprehensive Metabolic Panel; Future    Mixed hyperlipidemia  -     LDL Cholesterol, Direct; Future  -      Triglycerides; Future    Morbid obesity (CMS/HCC)    Tubular adenoma of colon - removed during colonoscopy, 11/2018.    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  -     Fluad Tri 65yr+        Lab on 10/22/2019   Component Date Value Ref Range Status   • WBC 10/22/2019 6.55  3.40 - 10.80 10*3/mm3 Final   • RBC 10/22/2019 3.52* 3.77 - 5.28 10*6/mm3 Final   • Hemoglobin 10/22/2019 11.2* 12.0 - 15.9 g/dL Final   • Hematocrit 10/22/2019 33.6* 34.0 - 46.6 % Final   • MCV 10/22/2019 95.5  79.0 - 97.0 fL Final   • MCH 10/22/2019 31.8  26.6 - 33.0 pg Final   • MCHC 10/22/2019 33.3  31.5 - 35.7 g/dL Final   • RDW 10/22/2019 14.1  12.3 - 15.4 % Final   • RDW-SD 10/22/2019 47.1  37.0 - 54.0 fl Final   • MPV 10/22/2019 8.5  6.0 - 12.0 fL Final   • Platelets 10/22/2019 270  140 - 450 10*3/mm3 Final   • Neutrophil % 10/22/2019 66.9  42.7 - 76.0 % Final   • Lymphocyte % 10/22/2019 19.1* 19.6 - 45.3 % Final   • Monocyte % 10/22/2019 8.9  5.0 - 12.0 % Final   • Eosinophil % 10/22/2019 4.6  0.3 - 6.2 % Final   • Basophil % 10/22/2019 0.5  0.0 - 1.5 % Final   • Neutrophils, Absolute 10/22/2019 4.39  1.70 - 7.00 10*3/mm3 Final   • Lymphocytes, Absolute 10/22/2019 1.25  0.70 - 3.10 10*3/mm3 Final   • Monocytes, Absolute 10/22/2019 0.58  0.10 - 0.90 10*3/mm3 Final   • Eosinophils, Absolute 10/22/2019 0.30  0.00 - 0.40  10*3/mm3 Final   • Basophils, Absolute 10/22/2019 0.03  0.00 - 0.20 10*3/mm3 Final   Lab on 10/15/2019   Component Date Value Ref Range Status   • Glucose 10/22/2019 97  70 - 99 mg/dL Final   • BUN 10/22/2019 32* 7 - 23 mg/dL Final   • Creatinine 10/22/2019 1.54* 0.52 - 1.04 mg/dL Final   • Sodium 10/22/2019 140  137 - 145 mmol/L Final   • Potassium 10/22/2019 4.6  3.4 - 5.0 mmol/L Final   • Chloride 10/22/2019 103  101 - 112 mmol/L Final   • CO2 10/22/2019 29.0  22.0 - 30.0 mmol/L Final   • Calcium 10/22/2019 9.8  8.4 - 10.2 mg/dL Final   • Total Protein 10/22/2019 7.6  6.3 - 8.6 g/dL Final   • Albumin 10/22/2019 4.40  3.50 - 5.00 g/dL Final   • ALT (SGPT) 10/22/2019 17  <=35 U/L Final   • AST (SGOT) 10/22/2019 22  14 - 36 U/L Final   • Alkaline Phosphatase 10/22/2019 38  38 - 126 U/L Final   • Total Bilirubin 10/22/2019 0.5  0.2 - 1.3 mg/dL Final   • eGFR Non African Amer 10/22/2019 33* 39 - 90 mL/min/1.73 Final   • Globulin 10/22/2019 3.2  2.3 - 3.5 gm/dL Final   • A/G Ratio 10/22/2019 1.4  1.1 - 1.8 g/dL Final   • BUN/Creatinine Ratio 10/22/2019 20.8  7.0 - 25.0 Final   • Anion Gap 10/22/2019 8.0  5.0 - 15.0 mmol/L Final   • Iron 10/22/2019 116  37 - 145 mcg/dL Final   • Iron Saturation 10/22/2019 26  20 - 50 % Final   • Transferrin 10/22/2019 294  200 - 360 mg/dL Final   • TIBC 10/22/2019 438  298 - 536 mcg/dL Final   • Ferritin 10/22/2019 31.40  13.00 - 150.00 ng/mL Final   • Folate 10/22/2019 >20.00  4.78 - 24.20 ng/mL Final   • Vitamin B-12 10/22/2019 1,270* 211 - 946 pg/mL Final   • WBC 10/15/2019 6.92  3.40 - 10.80 10*3/mm3 Final   • RBC 10/15/2019 3.57* 3.77 - 5.28 10*6/mm3 Final   • Hemoglobin 10/15/2019 11.3* 12.0 - 15.9 g/dL Final   • Hematocrit 10/15/2019 34.6  34.0 - 46.6 % Final   • MCV 10/15/2019 96.9  79.0 - 97.0 fL Final   • MCH 10/15/2019 31.7  26.6 - 33.0 pg Final   • MCHC 10/15/2019 32.7  31.5 - 35.7 g/dL Final   • RDW 10/15/2019 14.1  12.3 - 15.4 % Final   • RDW-SD 10/15/2019 48.2  37.0 -  54.0 fl Final   • MPV 10/15/2019 9.1  6.0 - 12.0 fL Final   • Platelets 10/15/2019 253  140 - 450 10*3/mm3 Final   • Neutrophil % 10/15/2019 68.7  42.7 - 76.0 % Final   • Lymphocyte % 10/15/2019 18.2* 19.6 - 45.3 % Final   • Monocyte % 10/15/2019 7.5  5.0 - 12.0 % Final   • Eosinophil % 10/15/2019 5.2  0.3 - 6.2 % Final   • Basophil % 10/15/2019 0.4  0.0 - 1.5 % Final   • Neutrophils, Absolute 10/15/2019 4.75  1.70 - 7.00 10*3/mm3 Final   • Lymphocytes, Absolute 10/15/2019 1.26  0.70 - 3.10 10*3/mm3 Final   • Monocytes, Absolute 10/15/2019 0.52  0.10 - 0.90 10*3/mm3 Final   • Eosinophils, Absolute 10/15/2019 0.36  0.00 - 0.40 10*3/mm3 Final   • Basophils, Absolute 10/15/2019 0.03  0.00 - 0.20 10*3/mm3 Final   • Glucose 10/15/2019 99  70 - 99 mg/dL Final   • BUN 10/15/2019 30* 7 - 23 mg/dL Final   • Creatinine 10/15/2019 1.12* 0.52 - 1.04 mg/dL Final   • Sodium 10/15/2019 142  137 - 145 mmol/L Final   • Potassium 10/15/2019 4.9  3.4 - 5.0 mmol/L Final   • Chloride 10/15/2019 103  101 - 112 mmol/L Final   • CO2 10/15/2019 28.0  22.0 - 30.0 mmol/L Final   • Calcium 10/15/2019 10.0  8.4 - 10.2 mg/dL Final   • Total Protein 10/15/2019 7.5  6.3 - 8.6 g/dL Final   • Albumin 10/15/2019 4.30  3.50 - 5.00 g/dL Final   • ALT (SGPT) 10/15/2019 16  <=35 U/L Final   • AST (SGOT) 10/15/2019 22  14 - 36 U/L Final   • Alkaline Phosphatase 10/15/2019 41  38 - 126 U/L Final   • Total Bilirubin 10/15/2019 0.4  0.2 - 1.3 mg/dL Final   • eGFR Non African Amer 10/15/2019 48  39 - 90 mL/min/1.73 Final   • Globulin 10/15/2019 3.2  2.3 - 3.5 gm/dL Final   • A/G Ratio 10/15/2019 1.3  1.1 - 1.8 g/dL Final   • BUN/Creatinine Ratio 10/15/2019 26.8* 7.0 - 25.0 Final   • Anion Gap 10/15/2019 11.0  5.0 - 15.0 mmol/L Final   • Hemoglobin A1C 10/15/2019 6.26* 4.80 - 5.60 % Final   • Ferritin 10/15/2019 29.40  13.00 - 150.00 ng/mL Final   • Iron 10/15/2019 105  37 - 145 mcg/dL Final   • Iron Saturation 10/15/2019 25  20 - 50 % Final   • Transferrin  10/15/2019 286  200 - 360 mg/dL Final   • TIBC 10/15/2019 426  298 - 536 mcg/dL Final   • Total Cholesterol 10/15/2019 206* 150 - 200 mg/dL Final   • Triglycerides 10/15/2019 243* <=150 mg/dL Final   • HDL Cholesterol 10/15/2019 36* 40 - 59 mg/dL Final   • LDL Cholesterol  10/15/2019 121* <=100 mg/dL Final   • VLDL Cholesterol 10/15/2019 48.6  mg/dL Final   • LDL/HDL Ratio 10/15/2019 3.37* 0.00 - 3.22 Final   • Magnesium 10/15/2019 2.0  1.6 - 2.3 mg/dL Final   • Vitamin B-12 10/15/2019 1,007* 211 - 946 pg/mL Final   • 25 Hydroxy, Vitamin D 10/15/2019 32.7  30.0 - 100.0 ng/ml Final   • TSH 10/15/2019 2.490  0.270 - 4.200 uIU/mL Final   • Free T4 10/15/2019 1.10  0.93 - 1.70 ng/dL Final   ]

## 2019-10-24 ENCOUNTER — OFFICE VISIT (OUTPATIENT)
Dept: HEMATOLOGY/ONCOLOGY | Facility: CLINIC | Age: 69
End: 2019-10-24

## 2019-10-24 VITALS
SYSTOLIC BLOOD PRESSURE: 110 MMHG | DIASTOLIC BLOOD PRESSURE: 62 MMHG | BODY MASS INDEX: 47.39 KG/M2 | RESPIRATION RATE: 18 BRPM | WEIGHT: 250 LBS

## 2019-10-24 DIAGNOSIS — N18.9 ACUTE KIDNEY INJURY SUPERIMPOSED ON CKD (HCC): ICD-10-CM

## 2019-10-24 DIAGNOSIS — D50.9 IRON DEFICIENCY ANEMIA, UNSPECIFIED IRON DEFICIENCY ANEMIA TYPE: Primary | ICD-10-CM

## 2019-10-24 DIAGNOSIS — N17.9 ACUTE KIDNEY INJURY SUPERIMPOSED ON CKD (HCC): ICD-10-CM

## 2019-10-24 PROCEDURE — G0463 HOSPITAL OUTPT CLINIC VISIT: HCPCS | Performed by: INTERNAL MEDICINE

## 2019-10-24 PROCEDURE — 1123F ACP DISCUSS/DSCN MKR DOCD: CPT | Performed by: INTERNAL MEDICINE

## 2019-10-24 PROCEDURE — 99214 OFFICE O/P EST MOD 30 MIN: CPT | Performed by: INTERNAL MEDICINE

## 2019-10-24 PROCEDURE — G8731 PAIN NEG NO PLAN: HCPCS | Performed by: INTERNAL MEDICINE

## 2019-10-24 PROCEDURE — G9903 PT SCRN TBCO ID AS NON USER: HCPCS | Performed by: INTERNAL MEDICINE

## 2019-10-24 RX ORDER — FOLIC ACID 1 MG/1
1 TABLET ORAL DAILY
Qty: 90 TABLET | Refills: 1 | Status: SHIPPED | OUTPATIENT
Start: 2019-10-24 | End: 2020-07-10

## 2019-10-24 NOTE — PROGRESS NOTES
DATE OF VISIT: 10/24/2019        REASON FOR VISIT: Anemia with iron deficiency, vitamin B12 deficiency, elevated creatinine      HISTORY OF PRESENT ILLNESS:    69-year-old female with medical program consisting of hypertension, dyslipidemia, diabetes, hypothyroidism on Synthroid, coronary artery disease has ongoing problem with iron deficiency since age 18.  Patient was initially seen in consultation on January 24, 2019.  In view of inability to take iron by mouth, patient received 2 dose of intravenous Feraheme on February 8 2019 and February 15, 2019.  Patient is here for 3-month follow-up appointment today.   States her fatigue is improved.  Denies any new lymph node enlargement.        PAST MEDICAL HISTORY:    Past Medical History:   Diagnosis Date   • Acquired hypothyroidism - On Synthroid    • Allergic rhinitis    • Anemia    • Asthma - mild intermittent    • Cardiac disease    • Carpal tunnel syndrome - Bilateral    • Colitis    • Degeneration of cervical intervertebral disc    • Diabetes (CMS/HCC)    • Disorder of lumbar spine    • Diverticulosis of large intestine 1/17/2019   • Essential hypertension    • Essential hypertension    • Fatigue    • History of myocardial infarct at age greater than 60 years 10/17/2018   • Hyperlipidemia - Improved with Zocor    • IFG (impaired fasting glucose) 10/17/2018   • Impaired fasting glycaemia    • Iron deficiency anemia - Improved    • Lumbar radiculopathy    • Megaloblastic anemia due to vitamin B>12< deficiency    • Morbid obesity (CMS/HCC)    • Osteoarthritis of knee - Bilateral    • Osteoarthritis of multiple joints - Left knee    • Osteoporosis    • Pain in left hip    • Sleep apnea - On CPAP    • Spasm of back muscles    • Stage 3 chronic kidney disease (CMS/HCC) 10/23/2019   • Thyroid dysfunction    • Tubular adenoma of colon - removed during colonoscopy, 11/2018. 12/3/2018   • Vitamin D deficiency        SOCIAL HISTORY:    Social History     Tobacco Use   •  Smoking status: Never Smoker   • Smokeless tobacco: Never Used   Substance Use Topics   • Alcohol use: No   • Drug use: No       Surgical History :  Past Surgical History:   Procedure Laterality Date   • CARPAL TUNNEL RELEASE Right    • COLONOSCOPY N/A 11/30/2018    Procedure: COLONOSCOPY;  Surgeon: Jimmie Sutton MD;  Location: [facility] ENDOSCOPY;  Service: General   • ENDOSCOPY  09/17/2012    Normal esophagus.  Gastritis.  Normal duodenum   • ENDOSCOPY N/A 11/30/2018    Procedure: ESOPHAGOGASTRODUODENOSCOPY WITH ANESTHESIA;  Surgeon: Jimmie Sutton MD;  Location: [facility] ENDOSCOPY;  Service: General   • ENDOSCOPY AND COLONOSCOPY  09/17/2012    Internal & external hemorrhoids found.   • HYSTERECTOMY  1980    w bso   • JOINT REPLACEMENT      left knee   • LUMBAR LAMINECTOMY  2011    s/p lumbar laminectomy and fusion   • NECK SURGERY     • OOPHORECTOMY  1980   • SHOULDER SURGERY  12/07/2015    Arthroscopy of the left shoudler with rotator cuff repair, Vijay procedure, and subacromial decompression.   • SHOULDER SURGERY Right    • TOTAL KNEE ARTHROPLASTY Left        ALLERGIES:    Allergies   Allergen Reactions   • Lortab [Hydrocodone-Acetaminophen] Hallucinations   • Codeine Unknown (See Comments)     Heart race   • Antihistamines, Chlorpheniramine-Type Unknown (See Comments)     Heart races   • Latex Rash   • Other Unknown (See Comments)     Decongestants: Heart Race   • Tape Rash         FAMILY HISTORY:  Family History   Problem Relation Age of Onset   • Breast cancer Mother    • Ovarian cancer Mother    • Breast cancer Sister    • Breast cancer Other    • Heart disease Other    • Hypertension Other    • Lung disease Other    • Diabetes Other    • Cancer Other            REVIEW OF SYSTEMS:      CONSTITUTIONAL:   Denies any excessive fatigue. Denies any fever, chills or weight loss.      EYES: No visual disturbances. No discharge. No new lesions     ENMT:  No epistaxis, mouth sores or difficulty  swallowing.     RESPIRATORY:  No new shortness of breath. No new cough or hemoptysis.     CARDIOVASCULAR:  No chest pain or palpitations.     GASTROINTESTINAL: No  Abdominal pain, nausea or vomiting.  Complains of hemorrhoidal bleeding when she gets constipated.      GENITOURINARY: No Dysuria or Hematuria.     MUSCULOSKELETAL:  Complains of diffuse arthritic pain affecting multiple joints.     LYMPHATICS:  Denies any abnormal swollen glands anywhere in the body.     NEUROLOGICAL : No tingling or numbness. No headache or dizziness. No seizures or balance problems.     SKIN: Positive for Vitiligo     ENDOCRINE : No new heat or cold intolerance. No new polyuria . No polydipsia.          PHYSICAL EXAMINATION:      VITAL SIGNS:  /62 Comment: manual, left  Resp 18   Wt 113 kg (250 lb)   LMP  (LMP Unknown) Comment: 1980 w BSO  BMI 47.39 kg/m²       10/24/19  1527   Weight: 113 kg (250 lb)         ECOG performance status: 2     CONSTITUTIONAL:  Not in any distress.     EYES: Mild conjunctival Pallor. No Icterus. No Pterygium. Extraocular Movements intact.No ptosis.     ENMT:  Normocephalic, Atraumatic.No Facial Asymmetry noted.     NECK:  No adenopathy.Trachea midline. NO JVD.     RESPIRATORY:  Fair air entry bilateral. No rhonchi or wheezing.Fair respiratory effort.     CARDIOVASCULAR:  S1, S2. Regular rate and rhythm. No murmur or gallop appreciated.     ABDOMEN:  Soft, obese, nontender. Bowel sounds present in all four quadrants.  No Hepatosplenomegaly appreciated.     MUSCULOSKELETAL:  No edema.No Calf Tenderness.Decreased range of motion.     NEUROLOGIC:    No  Motor or sensory deficit appreciated. Cranial Nerves 2-12 grossly intact.     SKIN : Vitiligo present in neck. Skin is warm and moist to touch.     LYMPHATICS: No new enlarged lymph nodes in neck or supraclavicular area.     PSYCHIATRY: Alert, awake and oriented ×3.Normal affect.  Normal judgment.  Makes good eye contact.          DIAGNOSTIC DATA:     Glucose   Date Value Ref Range Status   10/22/2019 97 70 - 99 mg/dL Final     Sodium   Date Value Ref Range Status   10/22/2019 140 137 - 145 mmol/L Final     Potassium   Date Value Ref Range Status   10/22/2019 4.6 3.4 - 5.0 mmol/L Final     CO2   Date Value Ref Range Status   10/22/2019 29.0 22.0 - 30.0 mmol/L Final     Chloride   Date Value Ref Range Status   10/22/2019 103 101 - 112 mmol/L Final     Anion Gap   Date Value Ref Range Status   10/22/2019 8.0 5.0 - 15.0 mmol/L Final     Creatinine   Date Value Ref Range Status   10/22/2019 1.54 (H) 0.52 - 1.04 mg/dL Final     BUN   Date Value Ref Range Status   10/22/2019 32 (H) 7 - 23 mg/dL Final     BUN/Creatinine Ratio   Date Value Ref Range Status   10/22/2019 20.8 7.0 - 25.0 Final     Calcium   Date Value Ref Range Status   10/22/2019 9.8 8.4 - 10.2 mg/dL Final     eGFR Non  Amer   Date Value Ref Range Status   10/22/2019 33 (L) 39 - 90 mL/min/1.73 Final     Alkaline Phosphatase   Date Value Ref Range Status   10/22/2019 38 38 - 126 U/L Final     Total Protein   Date Value Ref Range Status   10/22/2019 7.6 6.3 - 8.6 g/dL Final     ALT (SGPT)   Date Value Ref Range Status   10/22/2019 17 <=35 U/L Final     AST (SGOT)   Date Value Ref Range Status   10/22/2019 22 14 - 36 U/L Final     Total Bilirubin   Date Value Ref Range Status   10/22/2019 0.5 0.2 - 1.3 mg/dL Final     Albumin   Date Value Ref Range Status   10/22/2019 4.40 3.50 - 5.00 g/dL Final     Globulin   Date Value Ref Range Status   10/22/2019 3.2 2.3 - 3.5 gm/dL Final     Lab Results   Component Value Date    WBC 6.55 10/22/2019    HGB 11.2 (L) 10/22/2019    HCT 33.6 (L) 10/22/2019    MCV 95.5 10/22/2019     10/22/2019     Lab Results   Component Value Date    NEUTROABS 4.39 10/22/2019    IRON 116 10/22/2019    TIBC 438 10/22/2019    LABIRON 26 10/22/2019    FERRITIN 31.40 10/22/2019    ZZRZYRYV05 1,270 (H) 10/22/2019    FOLATE >20.00 10/22/2019     No results found for: , LABCA2,  AFPTM, HCGQUANT, , CHROMGRNA, [id number], CEA, REFLABREPO        Pathology :  Pathology report from November 30, 2018 showed:  Component     Final Diagnosis   1.  GASTRIC ANTRUM, MUCOSAL BIOPSY:  MILD CHRONIC GASTRITIS.  NO EVIDENCE OF HELICOBACTER PYLORI.     2.  TUBULAR ADENOMA, ASCENDING COLON.            ASSESSMENT AND PLAN:       1.  Iron deficiency anemia:  -Patient was found to have occult blood positive in stool in October 2018.  Patient was also found to be anemic with a hemoglobin of 11.  -Patient was started on ferrous sulfate 1 tablet by mouth daily by mouth.  Patient is not able to tolerate iron secondary to nausea as well as severe constipation that's making her hemorrhoids bleed.  -Patient had evaluation done by EGD and colonoscopy by Dr. Sutton on November 30, 2018.  EGD showed gastritis, colonoscopy showed polyp without any evidence of bleeding or cancer.  - anemia workup done on January 10, 2019 shows ferritin is still low normal at 15.  Hemoglobin was 11.7  -Since patient is not able to tolerate iron by mouth, plan is to start her on intravenous Feraheme.  -Patient received 2 dose of intravenous Feraheme on February 8, 2019 and February 15, 2019.  -Anemia work-up done on October 22, 2019 shows hemoglobin is 11.2 with adequate amount of iron B12 and folate.  -Recommend continuing with B12 and folic acid 3 times a week  for now.  -Her anemia is likely related to chronic kidney disease versus age-related myelodysplasia.  -We will asked patient to return to clinic in 3 months with repeat CBC and anemia work-up to be done prior to that.  At any point with worsening anemia next step would be bone marrow biopsy.     2.  Hypothyroidism     3.  Hypertension     4.  Acute on chronic kidney disease:  -Patient's creatinine is worsened to 1.54.  Patient was notified about elevated creatinine and need for increasing hydration.     5.  Genetic counseling:  -Patient has at least 3 family member who is a  breast cancer.   -States her sister was never tested for any genetic testing.  -Patient is interested in genetic counseling   If insurance approves it.  We will follow-up on that tomorrow.     6.  Health Maintainence: Patient does not smoke.  Had a colonoscopy and EGD in November 2018.  Had a mammogram in December 2018.  Had a total abdominal hysterectomy at age 30, does not and Pap smear.    7.Advance Care Planning: For now patient remains full code and is able to make her decisions.  Patient has health care surrogate mentioned on chart.    8.BMI:Patient's Body mass index is 47.39 kg/m². BMI is higher than reference range.  Patient is aware of elevated BMI.    9.  Pain assessment:  -Patient denies any pain today.             Ranjeet De La Torre MD  10/24/2019  3:30 PM        EMR Dragon/Transcription disclaimer:   Much of this encounter note is an electronic transcription/translation of spoken language to printed text. The electronic translation of spoken language may permit erroneous, or at times, nonsensical words or phrases to be inadvertently transcribed; Although I have reviewed the note for such errors, some may still exist.

## 2019-11-22 ENCOUNTER — OFFICE VISIT (OUTPATIENT)
Dept: ORTHOPEDIC SURGERY | Facility: CLINIC | Age: 69
End: 2019-11-22

## 2019-11-22 VITALS — BODY MASS INDEX: 49.28 KG/M2 | HEIGHT: 61 IN | WEIGHT: 261 LBS

## 2019-11-22 DIAGNOSIS — I10 ESSENTIAL HYPERTENSION: ICD-10-CM

## 2019-11-22 DIAGNOSIS — G89.29 HIP PAIN, CHRONIC, LEFT: ICD-10-CM

## 2019-11-22 DIAGNOSIS — M70.62 TROCHANTERIC BURSITIS OF LEFT HIP: ICD-10-CM

## 2019-11-22 DIAGNOSIS — R22.42 MASS OF HIP REGION, LEFT: ICD-10-CM

## 2019-11-22 DIAGNOSIS — M25.552 HIP PAIN, CHRONIC, LEFT: ICD-10-CM

## 2019-11-22 DIAGNOSIS — E66.01 MORBID OBESITY WITH BMI OF 45.0-49.9, ADULT (HCC): ICD-10-CM

## 2019-11-22 PROCEDURE — 99213 OFFICE O/P EST LOW 20 MIN: CPT | Performed by: ORTHOPAEDIC SURGERY

## 2019-11-22 NOTE — PROGRESS NOTES
"Lois Parmar is a 69 y.o. female returns for     Chief Complaint   Patient presents with   • Left Hip - Follow-up, Pain       HISTORY OF PRESENT ILLNESS: f/u left hip pain.  Patient states that pain has gotten worse since seeing GLENIS Boss on 12/12/2018.   Pain for about 1 year, but she noticed a swelling in left buttock about 4 months ago.  The 'mass' has gotten larger.  Shooting pain in side of hip, front of thigh down to knee.  Pain when sitting, pain in buttock  No numbness or tingling but does have a burning.  No specific injury     CONCURRENT MEDICAL HISTORY:    The following portions of the patient's history were reviewed and updated as appropriate: allergies, current medications, past family history, past medical history, past social history, past surgical history and problem list.     ROS  No fevers or chills.  No chest pain or shortness of air.  No GI or  disturbances.    PHYSICAL EXAMINATION:       Ht 154.9 cm (61\")   Wt 118 kg (261 lb)   LMP  (LMP Unknown) Comment: 1980 w BSO  BMI 49.32 kg/m²     Physical Exam   Constitutional: She is oriented to person, place, and time. She appears well-developed and well-nourished.   Neurological: She is alert and oriented to person, place, and time.   Psychiatric: She has a normal mood and affect. Her behavior is normal. Judgment and thought content normal.       GAIT:     []  Normal  []  Antalgic    Assistive device: []  None  []  Walker     []  Crutches  [x]  Cane     []  Wheelchair  []  Stretcher    Right Hip Exam     Tenderness   The patient is experiencing no tenderness.     Range of Motion   The patient has normal right hip ROM.    Muscle Strength   The patient has normal right hip strength.    Tests   CHRISTI: negative  Fadir:  Negative FADIR test    Other   Erythema: absent  Sensation: normal  Pulse: present      Left Hip Exam     Tenderness   The patient is experiencing tenderness in the greater trochanter (very large left buttok.  no fluctuance noted.  " noticeably larger than right side).    Range of Motion   The patient has normal left hip ROM.    Muscle Strength   The patient has normal left hip strength.     Tests   CHRISTI: negative  Fadir:  Negative FADIR test    Other   Erythema: absent  Sensation: normal  Pulse: present    Comments:  Negative straight leg raise and negative femoral nerve stretch.                  Procedure: AP pelvis and bilateral hip 2 views     Reason for exam: Bilateral hip pain.     FINDINGS: Bony structures of the pelvis and bilateral hips are  normal. There is no evidence of fracture or dislocation. Soft  tissue structures unremarkable.     IMPRESSION:  Negative pelvis and bilateral hips.     Electronically signed by:  Mitch Jimenez MD  12/13/2018 9:21 AM [facility]  Workstation: [id number]      ASSESSMENT:    Diagnoses and all orders for this visit:    Trochanteric bursitis of left hip  -     MRI Pelvis Without Contrast; Future    Hip pain, chronic, left  -     MRI Pelvis Without Contrast; Future    Essential hypertension    Mass of hip region, left  -     MRI Pelvis Without Contrast; Future    Morbid obesity with BMI of 45.0-49.9, adult (CMS/[facility])          PLAN    She appears to have a swelling, mass, or process in the left buttock.  Not visible on xrays.  She is having some neurologic symptoms with this mass.  Mass has been enlarging over 4 months and pain present for about 1 year.  She needs further imaging to assess the mass and its effect on surrounding structures.  F./u after MRI.    Patient's Body mass index is 49.32 kg/m². BMI is above normal parameters. Recommendations include: exercise counseling and nutrition counseling.        Return for recheck for MRI results.    Curt Augustine MD

## 2019-11-27 DIAGNOSIS — G47.33 OBSTRUCTIVE SLEEP APNEA, ADULT: Primary | ICD-10-CM

## 2019-12-04 ENCOUNTER — HOSPITAL ENCOUNTER (OUTPATIENT)
Dept: MRI IMAGING | Facility: HOSPITAL | Age: 69
Discharge: HOME | End: 2019-12-04
Admitting: ORTHOPAEDIC SURGERY

## 2019-12-04 DIAGNOSIS — M25.552 HIP PAIN, CHRONIC, LEFT: ICD-10-CM

## 2019-12-04 DIAGNOSIS — R22.42 MASS OF HIP REGION, LEFT: ICD-10-CM

## 2019-12-04 DIAGNOSIS — G89.29 HIP PAIN, CHRONIC, LEFT: ICD-10-CM

## 2019-12-04 DIAGNOSIS — M70.62 TROCHANTERIC BURSITIS OF LEFT HIP: ICD-10-CM

## 2019-12-04 PROCEDURE — 72195 MRI PELVIS W/O DYE: CPT

## 2019-12-13 ENCOUNTER — OFFICE VISIT (OUTPATIENT)
Dept: ORTHOPEDIC SURGERY | Facility: CLINIC | Age: 69
End: 2019-12-13

## 2019-12-13 VITALS — BODY MASS INDEX: 49.28 KG/M2 | WEIGHT: 261 LBS | HEIGHT: 61 IN

## 2019-12-13 DIAGNOSIS — G89.29 HIP PAIN, CHRONIC, LEFT: ICD-10-CM

## 2019-12-13 DIAGNOSIS — M70.62 TROCHANTERIC BURSITIS OF LEFT HIP: Primary | ICD-10-CM

## 2019-12-13 DIAGNOSIS — E66.01 MORBID OBESITY WITH BMI OF 45.0-49.9, ADULT (HCC): ICD-10-CM

## 2019-12-13 DIAGNOSIS — M25.552 HIP PAIN, CHRONIC, LEFT: ICD-10-CM

## 2019-12-13 DIAGNOSIS — R22.42 MASS OF HIP REGION, LEFT: ICD-10-CM

## 2019-12-13 PROCEDURE — 99213 OFFICE O/P EST LOW 20 MIN: CPT | Performed by: ORTHOPAEDIC SURGERY

## 2019-12-13 NOTE — PROGRESS NOTES
"Lois Parmar is a 69 y.o. female returns for     Chief Complaint   Patient presents with   • Left Hip - Follow-up   • Results     MRI [facility] 12/4/19       HISTORY OF PRESENT ILLNESS:  Patient still feels a mass in her left buttock.  No new injury.  She states that she had an MRI.  No numbness or tingling but does have occasional burning.     CONCURRENT MEDICAL HISTORY:    The following portions of the patient's history were reviewed and updated as appropriate: allergies, current medications, past family history, past medical history, past social history, past surgical history and problem list.     ROS  No fevers or chills.  No chest pain or shortness of air.  No GI or  disturbances.    PHYSICAL EXAMINATION:       Ht 154.9 cm (61\")   Wt 118 kg (261 lb)   BMI 49.32 kg/m²     Physical Exam   Constitutional: She is oriented to person, place, and time. She appears well-developed and well-nourished.   Neurological: She is alert and oriented to person, place, and time.   Psychiatric: She has a normal mood and affect. Her behavior is normal. Judgment and thought content normal.       GAIT:     []  Normal  [x]  Antalgic    Assistive device: [x]  None  []  Walker     []  Crutches  []  Cane     []  Wheelchair  []  Stretcher    Right Hip Exam     Tenderness   The patient is experiencing no tenderness.     Range of Motion   The patient has normal right hip ROM.    Muscle Strength   The patient has normal right hip strength.    Tests   CHRISTI: negative  Fadir:  Negative FADIR test    Other   Erythema: absent  Sensation: normal  Pulse: present      Left Hip Exam     Tenderness   The patient is experiencing tenderness in the greater trochanter (very large left buttok.  no fluctuance noted.  noticeably larger than right side).    Range of Motion   The patient has normal left hip ROM.    Muscle Strength   The patient has normal left hip strength.     Tests   CHRISTI: negative  Fadir:  Negative FADIR test    Other   Erythema: " absent  Sensation: normal  Pulse: present    Comments:  Negative straight leg raise and negative femoral nerve stretch.              Mri Pelvis Without Contrast    Addendum Date: 12/13/2019 Addendum:    ADDENDUM ADDENDUM #1 Addendum, correction: Additional images are obtained with a wider field of view. There is a small fat-containing periumbilical hernia. Series 9 image 13. No discrete soft tissue mass about the left hip is identified. Electronically signed by:  Feliciano Catherine MD  12/13/2019 12:51 PM CST Workstation: [id number]     Result Date: 12/13/2019  Narrative: MRI pelvis without contrast. HISTORY: Left-sided hip, pelvic mass. Evaluate for trochanteric bursitis. TECHNIQUE: Multiplanar multisequence noncontrast images of the pelvis.  Prior exam: Bilateral hips and pelvis December 12, 2018. Susceptibility artifact from metal hardware posterior lumbar fusion L4, L5,S1. The right and left hips are normal and symmetrical. No bone edema. No fracture or avascular necrosis. No significant hip joint effusions. No fluid collections i.e. no evidence of trochanteric bursitis. The visualized portions of the bony pelvis are unremarkable. The uterus is surgically absent. There are no pelvic soft tissue masses. Normal bladder.     Impression: CONCLUSION: Susceptibility artifact lower lumbar spine from posterior surgical fusion L4, L5, S1. MRI pelvis is otherwise unremarkable. No MR evidence suggesting trochanteric bursitis. Electronically signed by:  Feliciano Catherine MD  12/4/2019 4:03 PM CST Workstation: [id number]            ASSESSMENT:    Diagnoses and all orders for this visit:    Trochanteric bursitis of left hip    Hip pain, chronic, left    Mass of hip region, left    Morbid obesity with BMI of 45.0-49.9, adult (CMS/HCC)          PLAN    The initial images from the MRI did not contain the entire soft tissue fields around her hip and buttock region.  Discussion was held with radiology department and they were going to rescan to  further the field-of-view.  Phone call was received later from the radiologist indicating that no discrete mass was identifiable on the imaging.  No other acute findings are noted.    Discussion was held with the patient by telephone at this point to discuss further treatment options.  No discrete masses identified.  We discussed activity as tolerated and continue to progress as pain allows.  Follow-up on an as-needed basis.    Patient's Body mass index is 49.32 kg/m². BMI is above normal parameters. Recommendations include: exercise counseling and nutrition counseling.      Return if symptoms worsen or fail to improve, for recheck.    Curt Augustine MD

## 2019-12-30 ENCOUNTER — OFFICE VISIT (OUTPATIENT)
Dept: SLEEP MEDICINE | Facility: HOSPITAL | Age: 69
End: 2019-12-30

## 2019-12-30 VITALS
HEIGHT: 61 IN | OXYGEN SATURATION: 95 % | SYSTOLIC BLOOD PRESSURE: 157 MMHG | BODY MASS INDEX: 48.52 KG/M2 | HEART RATE: 97 BPM | DIASTOLIC BLOOD PRESSURE: 90 MMHG | WEIGHT: 257 LBS

## 2019-12-30 DIAGNOSIS — G25.81 RESTLESS LEGS SYNDROME (RLS): ICD-10-CM

## 2019-12-30 DIAGNOSIS — G47.33 OBSTRUCTIVE SLEEP APNEA, ADULT: Primary | ICD-10-CM

## 2019-12-30 PROCEDURE — 99213 OFFICE O/P EST LOW 20 MIN: CPT | Performed by: NURSE PRACTITIONER

## 2019-12-30 NOTE — PROGRESS NOTES
Sleep Clinic Follow Up    Date: 2019  Primary Care Physician: Marshal Warner MD    Last office visit: 2019 (I reviewed this note)    CC: Follow up: LUDWIG started on CPAP      Interim History:  Since the last visit:    1) LUDWIG -  Lois Parmar has remained compliant with CPAP. She denies mask and machine issues, dry mouth, headaches, or pressures intolerance. She denies abnormal dreams, sleep paralysis, nasal congestion, URI sx. She was having issues getting supplies due to [employer] not accepting her insurance.    Sleep Testin. PSG ~16 years ago, AHI of ?   2. Currently on 17 cm H2O    PAP Data:    Data unavailable - patient's card is corrupt  Mask type: Nasal mask  DME: [facility]    Bed time: 2300  Sleep latency: 5 minutes  Number of times awakens during the night: 1-2  Wake time: 0700  Estimated total sleep time at night: 7-8 hours  Caffeine intake: 0.5 cups of coffee, 1 cups of tea, and 0-1 sodas per day  Alcohol intake: 0 drinks per week  Nap time: none   Sleepiness with Driving: rare     [place] - 12    2) Patient reports RLS symptoms 3/7 nights per week - kicking, discomfort.      PMHx, FH, SH reviewed and pertinent changes are: unchanged from last office visit on 2019      REVIEW OF SYSTEMS:   +SOB-at baseline, no worse than usual, hip pain    Negative for chest pain, fever, chills, cough, N/V/D, abdominal pain.    Smoking:none    Exam:  Vitals:    19 1102   BP: 157/90   Pulse: 97   SpO2: 95%           19  1102   Weight: 117 kg (257 lb)     Body mass index is 48.59 kg/m². Patient's Body mass index is 48.59 kg/m². BMI is above normal parameters. Recommendations include: referral to primary care.      Gen:                No distress, conversant, pleasant, appears stated age, alert, oriented  Eyes:               Anicteric sclera, moist conjunctiva, no lid lag                           PERRL, EOMI   Heent:             NC/AT                          Oropharynx clear                           Normal hearing  Lungs:             Normal effort, non-labored breathing                          Clear to auscultation bilaterally          CV:                  Normal S1/S2, no murmur                          No lower extremity edema  ABD:               Soft, rounded, non-distended                          Normal bowel sounds                    Psych:             Appropriate affect  Neuro:             CN 2-12 appear intact    Past Medical History:   Diagnosis Date   • Acquired hypothyroidism - On Synthroid    • Allergic rhinitis    • Anemia    • Asthma - mild intermittent    • Cardiac disease    • Carpal tunnel syndrome - Bilateral    • Colitis    • Degeneration of cervical intervertebral disc    • Diabetes (CMS/[facility])    • Disorder of lumbar spine    • Diverticulosis of large intestine 1/17/2019   • Essential hypertension    • Essential hypertension    • Fatigue    • History of myocardial infarct at age greater than 60 years 10/17/2018   • Hyperlipidemia - Improved with Zocor    • IFG (impaired fasting glucose) 10/17/2018   • Impaired fasting glycaemia    • Iron deficiency anemia - Improved    • Lumbar radiculopathy    • Megaloblastic anemia due to vitamin B>12< deficiency    • Morbid obesity (CMS/[facility])    • Osteoarthritis of knee - Bilateral    • Osteoarthritis of multiple joints - Left knee    • Osteoporosis    • Pain in left hip    • Sleep apnea - On CPAP    • Spasm of back muscles    • Stage 3 chronic kidney disease (CMS/HCC) 10/23/2019   • Thyroid dysfunction    • Tubular adenoma of colon - removed during colonoscopy, 11/2018. 12/3/2018   • Vitamin D deficiency        Current Outpatient Medications:   •  aspirin 81 MG chewable tablet, Chew 81 mg Daily., Disp: , Rfl:   •  Calcium Carb-Cholecalciferol (CALCIUM 600 + D) 600-200 MG-UNIT tablet, Take 2 tablets by mouth Daily., Disp: , Rfl:   •  Cholecalciferol (VITAMIN D) 1000 units tablet, Take 1 tablet by mouth Daily., Disp: , Rfl:   •  coenzyme Q10  100 MG capsule, Take 100 mg by mouth Daily., Disp: , Rfl:   •  CYMBALTA 60 MG capsule, TAKE 1 CAPSULE EVERY DAY, Disp: 90 capsule, Rfl: 3  •  docusate sodium (COLACE) 100 MG capsule, Take 100 mg by mouth., Disp: , Rfl:   •  folic acid (FOLVITE) 1 MG tablet, Take 1 tablet by mouth Daily., Disp: 90 tablet, Rfl: 1  •  glucose blood test strip, tests BID, Diagnosis: 250.00, 401.9, Disp: , Rfl:   •  labetalol (NORMODYNE) 200 MG tablet, Take 100 mg by mouth 2 (Two) Times a Day. 1/2 tab twice daily , Disp: , Rfl:   •  levothyroxine (SYNTHROID, LEVOTHROID) 75 MCG tablet, Take 1 tablet by mouth Daily., Disp: 90 tablet, Rfl: 3  •  loratadine (CLARITIN) 10 MG tablet, Take 10 mg by mouth daily., Disp: , Rfl:   •  metFORMIN (GLUCOPHAGE) 500 MG tablet, Take 1 tablet by mouth 2 (Two) Times a Day With Meals., Disp: 180 tablet, Rfl: 3  •  olmesartan-hydrochlorothiazide (BENICAR HCT) 40-25 MG per tablet, Take 1 tablet by mouth Daily., Disp: 90 tablet, Rfl: 0  •  Omega-3 Fatty Acids (FISH OIL) 1000 MG capsule capsule, Take 1,000 mg by mouth 2 (Two) Times a Day With Meals., Disp: , Rfl:   •  PREVACID 30 MG capsule, Take 1 capsule by mouth Every Morning., Disp: 30 capsule, Rfl: 0  •  raNITIdine (ZANTAC) 150 MG tablet, Take 150 mg by mouth Every Night., Disp: , Rfl:   •  simvastatin (ZOCOR) 40 MG tablet, TAKE 1 TABLET EVERY NIGHT, Disp: 90 tablet, Rfl: 3  •  spironolactone (ALDACTONE) 25 MG tablet, TAKE 1 TABLET EVERY DAY  FOR  FLUID, Disp: 90 tablet, Rfl: 1  •  vitamin B-12 (CYANOCOBALAMIN) 1000 MCG tablet, Take 1 tablet by mouth Daily., Disp: 90 tablet, Rfl: 1      Assessment and Plan:    1. Obstructive sleep apnea Established, stable (1)  1. Compliant with PAP therapy  2. Continue PAP as prescribed  3. Script for PAP supplies  4. Return to clinic in 1 year with compliance report unless change in symptoms in interim period  2. Morbid obesity - BMI >48  3. Restless leg syndrome/ /Savage-Ekbom disease (RLS/WED) Established, worsening  (2)  1. Will address after getting new mask/getting more restful sleep  2. Will work up as needed      8 of 15 minutes spent face-to-face counseling patient extensively regarding:   PAP therapy, PAP compliance and PAP maintenance      RTC in 12 months. Patient agrees to return sooner if changes in symptoms.        This document has been electronically signed by ODILIA Nolan on December 30, 2019 11:10 AM          CC: Marshal Warner MD          No ref. provider found

## 2020-01-21 ENCOUNTER — LAB (OUTPATIENT)
Dept: LAB | Facility: OTHER | Age: 70
End: 2020-01-21

## 2020-01-21 DIAGNOSIS — N17.9 ACUTE KIDNEY INJURY SUPERIMPOSED ON CKD (HCC): ICD-10-CM

## 2020-01-21 DIAGNOSIS — D50.9 IRON DEFICIENCY ANEMIA, UNSPECIFIED IRON DEFICIENCY ANEMIA TYPE: ICD-10-CM

## 2020-01-21 DIAGNOSIS — N18.9 ACUTE KIDNEY INJURY SUPERIMPOSED ON CKD (HCC): ICD-10-CM

## 2020-01-21 LAB
ALBUMIN SERPL-MCNC: 4.5 G/DL (ref 3.5–5)
ALBUMIN/GLOB SERPL: 1.3 G/DL (ref 1.1–1.8)
ALP SERPL-CCNC: 43 U/L (ref 38–126)
ALT SERPL W P-5'-P-CCNC: 18 U/L
ANION GAP SERPL CALCULATED.3IONS-SCNC: 10 MMOL/L (ref 5–15)
AST SERPL-CCNC: 20 U/L (ref 14–36)
BASOPHILS # BLD AUTO: 0.03 10*3/MM3 (ref 0–0.2)
BASOPHILS NFR BLD AUTO: 0.4 % (ref 0–1.5)
BILIRUB SERPL-MCNC: 0.3 MG/DL (ref 0.2–1.3)
BUN BLD-MCNC: 26 MG/DL (ref 7–23)
BUN/CREAT SERPL: 22 (ref 7–25)
CALCIUM SPEC-SCNC: 9.8 MG/DL (ref 8.4–10.2)
CHLORIDE SERPL-SCNC: 101 MMOL/L (ref 101–112)
CO2 SERPL-SCNC: 27 MMOL/L (ref 22–30)
CREAT BLD-MCNC: 1.18 MG/DL (ref 0.52–1.04)
DEPRECATED RDW RBC AUTO: 46.5 FL (ref 37–54)
EOSINOPHIL # BLD AUTO: 0.22 10*3/MM3 (ref 0–0.4)
EOSINOPHIL NFR BLD AUTO: 3.3 % (ref 0.3–6.2)
ERYTHROCYTE [DISTWIDTH] IN BLOOD BY AUTOMATED COUNT: 14 % (ref 12.3–15.4)
FERRITIN SERPL-MCNC: 28.31 NG/ML (ref 13–150)
GFR SERPL CREATININE-BSD FRML MDRD: 45 ML/MIN/1.73 (ref 39–90)
GLOBULIN UR ELPH-MCNC: 3.5 GM/DL (ref 2.3–3.5)
GLUCOSE BLD-MCNC: 103 MG/DL (ref 70–99)
HCT VFR BLD AUTO: 33.2 % (ref 34–46.6)
HGB BLD-MCNC: 10.9 G/DL (ref 12–15.9)
IRON 24H UR-MRATE: 90 MCG/DL (ref 37–145)
IRON SATN MFR SERPL: 20 % (ref 20–50)
LYMPHOCYTES # BLD AUTO: 1.22 10*3/MM3 (ref 0.7–3.1)
LYMPHOCYTES NFR BLD AUTO: 18.2 % (ref 19.6–45.3)
MCH RBC QN AUTO: 31.1 PG (ref 26.6–33)
MCHC RBC AUTO-ENTMCNC: 32.8 G/DL (ref 31.5–35.7)
MCV RBC AUTO: 94.9 FL (ref 79–97)
MONOCYTES # BLD AUTO: 0.57 10*3/MM3 (ref 0.1–0.9)
MONOCYTES NFR BLD AUTO: 8.5 % (ref 5–12)
NEUTROPHILS # BLD AUTO: 4.66 10*3/MM3 (ref 1.7–7)
NEUTROPHILS NFR BLD AUTO: 69.6 % (ref 42.7–76)
PLATELET # BLD AUTO: 284 10*3/MM3 (ref 140–450)
PMV BLD AUTO: 8.5 FL (ref 6–12)
POTASSIUM BLD-SCNC: 4.5 MMOL/L (ref 3.4–5)
PROT SERPL-MCNC: 8 G/DL (ref 6.3–8.6)
RBC # BLD AUTO: 3.5 10*6/MM3 (ref 3.77–5.28)
SODIUM BLD-SCNC: 138 MMOL/L (ref 137–145)
TIBC SERPL-MCNC: 454 MCG/DL (ref 298–536)
TRANSFERRIN SERPL-MCNC: 305 MG/DL (ref 200–360)
WBC NRBC COR # BLD: 6.7 10*3/MM3 (ref 3.4–10.8)

## 2020-01-21 PROCEDURE — 80053 COMPREHEN METABOLIC PANEL: CPT | Performed by: INTERNAL MEDICINE

## 2020-01-21 PROCEDURE — 83540 ASSAY OF IRON: CPT | Performed by: INTERNAL MEDICINE

## 2020-01-21 PROCEDURE — 36415 COLL VENOUS BLD VENIPUNCTURE: CPT | Performed by: INTERNAL MEDICINE

## 2020-01-21 PROCEDURE — 84466 ASSAY OF TRANSFERRIN: CPT | Performed by: INTERNAL MEDICINE

## 2020-01-21 PROCEDURE — 82746 ASSAY OF FOLIC ACID SERUM: CPT | Performed by: INTERNAL MEDICINE

## 2020-01-21 PROCEDURE — 82607 VITAMIN B-12: CPT | Performed by: INTERNAL MEDICINE

## 2020-01-21 PROCEDURE — 85025 COMPLETE CBC W/AUTO DIFF WBC: CPT | Performed by: INTERNAL MEDICINE

## 2020-01-21 PROCEDURE — 82728 ASSAY OF FERRITIN: CPT | Performed by: INTERNAL MEDICINE

## 2020-01-22 LAB
FOLATE SERPL-MCNC: >20 NG/ML (ref 4.78–24.2)
VIT B12 BLD-MCNC: 676 PG/ML (ref 211–946)

## 2020-01-23 ENCOUNTER — OFFICE VISIT (OUTPATIENT)
Dept: HEMATOLOGY/ONCOLOGY | Facility: CLINIC | Age: 70
End: 2020-01-23

## 2020-01-23 VITALS
HEART RATE: 93 BPM | RESPIRATION RATE: 20 BRPM | OXYGEN SATURATION: 95 % | BODY MASS INDEX: 47.95 KG/M2 | HEIGHT: 61 IN | WEIGHT: 254 LBS | SYSTOLIC BLOOD PRESSURE: 147 MMHG | DIASTOLIC BLOOD PRESSURE: 96 MMHG | TEMPERATURE: 99.2 F

## 2020-01-23 DIAGNOSIS — N18.30 STAGE 3 CHRONIC KIDNEY DISEASE (HCC): Chronic | ICD-10-CM

## 2020-01-23 DIAGNOSIS — Z80.3 FAMILY HISTORY OF BREAST CANCER IN FEMALE: ICD-10-CM

## 2020-01-23 DIAGNOSIS — D50.0 IRON DEFICIENCY ANEMIA DUE TO CHRONIC BLOOD LOSS: Primary | Chronic | ICD-10-CM

## 2020-01-23 DIAGNOSIS — T45.4X5A ADVERSE EFFECT OF IRON, INITIAL ENCOUNTER: ICD-10-CM

## 2020-01-23 PROCEDURE — G8730 PAIN DOC POS AND PLAN: HCPCS | Performed by: INTERNAL MEDICINE

## 2020-01-23 PROCEDURE — G9903 PT SCRN TBCO ID AS NON USER: HCPCS | Performed by: INTERNAL MEDICINE

## 2020-01-23 PROCEDURE — 1123F ACP DISCUSS/DSCN MKR DOCD: CPT | Performed by: INTERNAL MEDICINE

## 2020-01-23 PROCEDURE — 99214 OFFICE O/P EST MOD 30 MIN: CPT | Performed by: INTERNAL MEDICINE

## 2020-01-23 NOTE — PROGRESS NOTES
DATE OF VISIT: 1/23/2020        REASON FOR VISIT: Anemia with iron deficiency, vitamin B12 deficiency, elevated creatinine, family history of breast cancer      HISTORY OF PRESENT ILLNESS:    70-year-old female with medical program consisting of hypertension, dyslipidemia, diabetes, hypothyroidism on Synthroid, coronary artery disease has ongoing problem with iron deficiency since age 18.  Patient was initially seen in consultation on January 24, 2019.  In view of inability to take iron by mouth, patient received 2 dose of intravenous Feraheme on February 8 2019 and February 15, 2019.  Patient is here for 3-month follow-up appointment today.   Complains of worsening fatigue.  Complains of pain in left hip for which she has been evaluated by orthopedic team.  Denies any new lymph node enlargement.        PAST MEDICAL HISTORY:    Past Medical History:   Diagnosis Date   • Acquired hypothyroidism - On Synthroid    • Allergic rhinitis    • Anemia    • Asthma - mild intermittent    • Cardiac disease    • Carpal tunnel syndrome - Bilateral    • Colitis    • Degeneration of cervical intervertebral disc    • Diabetes (CMS/HCC)    • Disorder of lumbar spine    • Diverticulosis of large intestine 1/17/2019   • Essential hypertension    • Essential hypertension    • Fatigue    • History of myocardial infarct at age greater than 59 years 10/17/2018   • Hyperlipidemia - Improved with Zocor    • IFG (impaired fasting glucose) 10/17/2018   • Impaired fasting glycaemia    • Iron deficiency anemia - Improved    • Lumbar radiculopathy    • Megaloblastic anemia due to vitamin B>12< deficiency    • Morbid obesity (CMS/HCC)    • Osteoarthritis of knee - Bilateral    • Osteoarthritis of multiple joints - Left knee    • Osteoporosis    • Pain in left hip    • Sleep apnea - On CPAP    • Spasm of back muscles    • Stage 3 chronic kidney disease (CMS/HCC) 10/23/2019   • Thyroid dysfunction    • Tubular adenoma of colon - removed during  colonoscopy, 11/2018. 12/3/2018   • Vitamin D deficiency        SOCIAL HISTORY:    Social History     Tobacco Use   • Smoking status: Never Smoker   • Smokeless tobacco: Never Used   Substance Use Topics   • Alcohol use: No   • Drug use: No       Surgical History :  Past Surgical History:   Procedure Laterality Date   • CARPAL TUNNEL RELEASE Right    • COLONOSCOPY N/A 11/30/2018    Procedure: COLONOSCOPY;  Surgeon: Jimmie Sutton MD;  Location: [facility] ENDOSCOPY;  Service: General   • ENDOSCOPY  09/17/2012    Normal esophagus.  Gastritis.  Normal duodenum   • ENDOSCOPY N/A 11/30/2018    Procedure: ESOPHAGOGASTRODUODENOSCOPY WITH ANESTHESIA;  Surgeon: Jimmie Sutton MD;  Location: [facility] ENDOSCOPY;  Service: General   • ENDOSCOPY AND COLONOSCOPY  09/17/2012    Internal & external hemorrhoids found.   • HYSTERECTOMY  1980    w bso   • JOINT REPLACEMENT      left knee   • LUMBAR LAMINECTOMY  2011    s/p lumbar laminectomy and fusion   • NECK SURGERY     • OOPHORECTOMY  1980   • SHOULDER SURGERY  12/07/2015    Arthroscopy of the left shoudler with rotator cuff repair, Vijay procedure, and subacromial decompression.   • SHOULDER SURGERY Right    • TOTAL KNEE ARTHROPLASTY Left        ALLERGIES:    Allergies   Allergen Reactions   • Lortab [Hydrocodone-Acetaminophen] Hallucinations   • Codeine Unknown (See Comments)     Heart race   • Antihistamines, Chlorpheniramine-Type Unknown (See Comments)     Heart races   • Latex Rash   • Other Unknown (See Comments)     Decongestants: Heart Race   • Tape Rash         FAMILY HISTORY:  Family History   Problem Relation Age of Onset   • Breast cancer Mother    • Ovarian cancer Mother    • Breast cancer Sister    • Breast cancer Other    • Heart disease Other    • Hypertension Other    • Lung disease Other    • Diabetes Other    • Cancer Other            REVIEW OF SYSTEMS:      CONSTITUTIONAL:   Complains of worsening fatigue. Denies any fever, chills or weight loss.  "     EYES: No visual disturbances. No discharge. No new lesions     ENMT:  No epistaxis, mouth sores or difficulty swallowing.     RESPIRATORY:  No new shortness of breath. No new cough or hemoptysis.     CARDIOVASCULAR:  No chest pain or palpitations.     GASTROINTESTINAL: No  Abdominal pain, nausea or vomiting.  Complains of hemorrhoidal bleeding when she gets constipated.      GENITOURINARY: No Dysuria or Hematuria.     MUSCULOSKELETAL:  Complains of diffuse arthritic pain affecting multiple joints.  Complains of pain in left hip for which she was evaluated by orthopedic team.     LYMPHATICS:  Denies any abnormal swollen glands anywhere in the body.     NEUROLOGICAL : No tingling or numbness. No headache or dizziness. No seizures or balance problems.     SKIN: Positive for Vitiligo     ENDOCRINE : No new heat or cold intolerance. No new polyuria . No polydipsia.          PHYSICAL EXAMINATION:      VITAL SIGNS:  /96   Pulse 93   Temp 99.2 °F (37.3 °C) (Temporal)   Resp 20   Ht 154.9 cm (60.98\")   Wt 115 kg (254 lb) Comment: per pt  SpO2 95%   BMI 48.02 kg/m²       01/23/20  1458   Weight: 115 kg (254 lb) (per pt)         ECOG performance status: 2     CONSTITUTIONAL:  Not in any distress.     EYES: Mild conjunctival Pallor. No Icterus. No Pterygium. Extraocular Movements intact.No ptosis.     ENMT:  Normocephalic, Atraumatic.No Facial Asymmetry noted.     NECK:  No adenopathy.Trachea midline. NO JVD.     RESPIRATORY:  Fair air entry bilateral. No rhonchi or wheezing.Fair respiratory effort.     CARDIOVASCULAR:  S1, S2. Regular rate and rhythm. No murmur or gallop appreciated.     ABDOMEN:  Soft, obese, nontender. Bowel sounds present in all four quadrants.  No Hepatosplenomegaly appreciated.     MUSCULOSKELETAL:  No edema.No Calf Tenderness.Decreased range of motion.     NEUROLOGIC:    No  Motor or sensory deficit appreciated. Cranial Nerves 2-12 grossly intact.     SKIN : Vitiligo present in neck. " Skin is warm and moist to touch.     LYMPHATICS: No new enlarged lymph nodes in neck or supraclavicular area.     PSYCHIATRY: Alert, awake and oriented ×3.Normal affect.  Normal judgment.  Makes good eye contact.          DIAGNOSTIC DATA:    Glucose   Date Value Ref Range Status   01/21/2020 103 (H) 70 - 99 mg/dL Final     Sodium   Date Value Ref Range Status   01/21/2020 138 137 - 145 mmol/L Final     Potassium   Date Value Ref Range Status   01/21/2020 4.5 3.4 - 5.0 mmol/L Final     CO2   Date Value Ref Range Status   01/21/2020 27.0 22.0 - 30.0 mmol/L Final     Chloride   Date Value Ref Range Status   01/21/2020 101 101 - 112 mmol/L Final     Anion Gap   Date Value Ref Range Status   01/21/2020 10.0 5.0 - 15.0 mmol/L Final     Creatinine   Date Value Ref Range Status   01/21/2020 1.18 (H) 0.52 - 1.04 mg/dL Final     BUN   Date Value Ref Range Status   01/21/2020 26 (H) 7 - 23 mg/dL Final     BUN/Creatinine Ratio   Date Value Ref Range Status   01/21/2020 22.0 7.0 - 25.0 Final     Calcium   Date Value Ref Range Status   01/21/2020 9.8 8.4 - 10.2 mg/dL Final     eGFR Non  Amer   Date Value Ref Range Status   01/21/2020 45 39 - 90 mL/min/1.73 Final     Alkaline Phosphatase   Date Value Ref Range Status   01/21/2020 43 38 - 126 U/L Final     Total Protein   Date Value Ref Range Status   01/21/2020 8.0 6.3 - 8.6 g/dL Final     ALT (SGPT)   Date Value Ref Range Status   01/21/2020 18 <=35 U/L Final     AST (SGOT)   Date Value Ref Range Status   01/21/2020 20 14 - 36 U/L Final     Total Bilirubin   Date Value Ref Range Status   01/21/2020 0.3 0.2 - 1.3 mg/dL Final     Albumin   Date Value Ref Range Status   01/21/2020 4.50 3.50 - 5.00 g/dL Final     Globulin   Date Value Ref Range Status   01/21/2020 3.5 2.3 - 3.5 gm/dL Final     Lab Results   Component Value Date    WBC 6.70 01/21/2020    HGB 10.9 (L) 01/21/2020    HCT 33.2 (L) 01/21/2020    MCV 94.9 01/21/2020     01/21/2020     Lab Results   Component  Value Date    NEUTROABS 4.66 01/21/2020    IRON 90 01/21/2020    TIBC 454 01/21/2020    LABIRON 20 01/21/2020    FERRITIN 28.31 01/21/2020    [id number] 676 01/21/2020    FOLATE >20.00 01/21/2020     No results found for: , LABCA2, AFPTM, HCGQUANT, , CHROMGRNA, [id number], CEA, REFLABREPO        Pathology :  Pathology report from November 30, 2018 showed:  Component     Final Diagnosis   1.  GASTRIC ANTRUM, MUCOSAL BIOPSY:  MILD CHRONIC GASTRITIS.  NO EVIDENCE OF HELICOBACTER PYLORI.     2.  TUBULAR ADENOMA, ASCENDING COLON.            ASSESSMENT AND PLAN:       1.  Iron deficiency anemia:  -Patient was found to have occult blood positive in stool in October 2018.  Patient was also found to be anemic with a hemoglobin of 11.  -Patient was started on ferrous sulfate 1 tablet by mouth daily by mouth.  Patient is not able to tolerate iron secondary to nausea as well as severe constipation that's making her hemorrhoids bleed.  -Patient had evaluation done by EGD and colonoscopy by Dr. Sutton on November 30, 2018.  EGD showed gastritis, colonoscopy showed polyp without any evidence of bleeding or cancer.  - anemia workup done on January 10, 2019 shows ferritin is still low normal at 15.  Hemoglobin was 11.7  -Since patient is not able to tolerate iron by mouth, plan is to start her on intravenous Feraheme.  -Patient received 2 dose of intravenous Feraheme on February 8, 2019 and February 15, 2019.  -Anemia work-up done on January 21, 2020 shows hemoglobin is decreased to 10.9.  Iron studies are normal.  --Recommend continuing with B12 and folic acid 3 times a week  for now.  -Her anemia is likely related to chronic kidney disease versus age-related myelodysplasia.  Again option of bone marrow biopsy was discussed with patient, she wants to hold off at this point rather than doing on bone marrow biopsy.  -We will asked patient to return to clinic in 3 months with repeat CBC and anemia work-up to be done prior  to that.  At any point with worsening anemia next step would be bone marrow biopsy.     2.  Hypothyroidism     3.  Hypertension     4.  Acute on chronic kidney disease:  -Patient's creatinine is 1.17.  Patient was notified about elevated creatinine and need for increasing hydration.     5.  Genetic counseling:  -Patient has at least 3 family member who is a breast cancer.   -States her sister was never tested for any genetic testing.  -Patient is interested in genetic counseling.  Patient has an appointment with genetic counselor on February 7, 2020.  She was encouraged to keep that appointment.       6.  Health Maintainence: Patient does not smoke.  Had a colonoscopy and EGD in November 2018.  Had a mammogram in December 2018.  Had a total abdominal hysterectomy at age 30, does not and Pap smear.    7.Advance Care Planning: For now patient remains full code and is able to make her decisions.  Patient has health care surrogate mentioned on chart.    8.BMI:Patient's Body mass index is 48.02 kg/m². BMI is higher than reference range.  Patient is aware of elevated BMI.    9.  Lois Parmar reports a pain score of 8.  Given her pain assessment as noted, treatment options were discussed and the following options were decided upon as a follow-up plan to address the patient's pain: Continue with management as per medical team and orthopedic team.    10. PHQ-9 Total Score:               Ranjeet De La Torre MD  1/23/2020  3:18 PM        EMR Dragon/Transcription disclaimer:   Much of this encounter note is an electronic transcription/translation of spoken language to printed text. The electronic translation of spoken language may permit erroneous, or at times, nonsensical words or phrases to be inadvertently transcribed; Although I have reviewed the note for such errors, some may still exist.

## 2020-02-03 DIAGNOSIS — Z12.31 ENCOUNTER FOR SCREENING MAMMOGRAM FOR MALIGNANT NEOPLASM OF BREAST: Primary | ICD-10-CM

## 2020-02-07 ENCOUNTER — OFFICE VISIT (OUTPATIENT)
Dept: ONCOLOGY | Facility: CLINIC | Age: 70
End: 2020-02-07

## 2020-02-07 ENCOUNTER — DOCUMENTATION (OUTPATIENT)
Dept: GENETICS | Facility: HOSPITAL | Age: 70
End: 2020-02-07

## 2020-02-07 ENCOUNTER — DOCUMENTATION (OUTPATIENT)
Dept: ONCOLOGY | Facility: CLINIC | Age: 70
End: 2020-02-07

## 2020-02-07 ENCOUNTER — APPOINTMENT (OUTPATIENT)
Dept: ONCOLOGY | Facility: HOSPITAL | Age: 70
End: 2020-02-07

## 2020-02-07 VITALS
TEMPERATURE: 98 F | RESPIRATION RATE: 18 BRPM | SYSTOLIC BLOOD PRESSURE: 138 MMHG | DIASTOLIC BLOOD PRESSURE: 60 MMHG | HEART RATE: 81 BPM

## 2020-02-07 DIAGNOSIS — E55.9 VITAMIN D DEFICIENCY: Chronic | ICD-10-CM

## 2020-02-07 DIAGNOSIS — D50.0 IRON DEFICIENCY ANEMIA DUE TO CHRONIC BLOOD LOSS: Chronic | ICD-10-CM

## 2020-02-07 PROCEDURE — G0463 HOSPITAL OUTPT CLINIC VISIT: HCPCS | Performed by: NURSE PRACTITIONER

## 2020-02-07 PROCEDURE — 99212 OFFICE O/P EST SF 10 MIN: CPT | Performed by: NURSE PRACTITIONER

## 2020-02-07 NOTE — PROGRESS NOTES
Lois Parmar is a 70-year-old female who was seen for genetic counseling via telemedicine due to a family history of breast and ovarian cancer. She was 13 years old at menarche and had her first child at 23.  She had a hysterectomy with BSO at age 28. She reports that she was on hormone replacement therapy for approximately 25 years. She does not have a personal history of cancer.  Her current cancer screening includes annual clinical breast exam, annual mammogram, and colonoscopy every five years. She was interested in discussing her risk for a hereditary cancer syndrome. Ms. Parmar was interested in pursuing genetic testing, and therefore, the [facility] Common Hereditary Cancers panel was ordered which analyzes 47 genes associated with an increased cancer risk. Results from this testing are expected in approximately 2-3 weeks.    FAMILY HISTORY (see attached pedigree):    Mother: Breast cancer, 60; ovarian and uterine cancer, 79  Sister:  Breast cancer (bilateral), 50s; Lymphoma, 70s; Merkel cell carcinoma, 70s  Niece:  Breast cancer, 62  Mat. Aunt: Unspecified gynecologic cancer, 60s/70s    We do not have medical records confirming the diagnoses in Ms. Parmar’s family.    RISK ASSESSMENT: Ms. Parmar’s family history led to concern regarding a hereditary cancer syndrome. She clearly meets NCCN guidelines criteria for BRCA1/2 testing based on her family history of breast and ovarian cancer.  In cases where an affected relative is not available for testing or not willing to pursue testing, it is appropriate to offer testing to an unaffected individual. Medicare does not cover testing for individuals who have not themselves had a cancer diagnosis; however, [facility], a genetic testing laboratory, will provide testing to Medicare patients in these circumstances when affected relatives are not available for testing.  Ms. Parmar opted to pursue multigene panel testing via the [facility] Common Hereditary Cancers  panel.   If genetic testing is negative, Ms. Parmar’s management should be guided by family history. These risk assessments are based on the family history information provided at the time of the appointment and could change in the future should new information be obtained.    GENETIC COUNSELING (30 minutes):  We reviewed the family history information in detail. Cases of cancer follow three general patterns: sporadic, familial, and hereditary.  While most breast cancer is sporadic, some cases appear to occur in family clusters.  These cases are said to be familial and account for 10-20% of cancer cases.  Familial cases may be due to a combination of shared genes and environmental factors among family members.  In even fewer families, the cancer is said to be inherited, and the genes responsible for the cancer are known.      Family histories typical of hereditary cancer syndromes usually include multiple first- and second-degree relatives diagnosed with cancer types that define a syndrome. These cases tend to be diagnosed at younger-than-expected ages and can be bilateral or multifocal. The cancer in these families follows an autosomal dominant inheritance pattern, which indicates the likely presence of a mutation in a cancer susceptibility gene. Children and siblings of an individual believed to carry this mutation have a 50% chance of inheriting that mutation, thereby inheriting the increased risk to develop cancer. These mutations can be passed down from the maternal or the paternal lineage.    Based on Ms. Parmar’s family history, we discussed that hereditary breast cancer accounts for 5-10% of all cases of breast cancer.  A significant proportion of hereditary breast cancer can be attributed to mutations in the BRCA1 and BRCA2 genes.  Mutations in these genes confer an increased risk for breast cancer, ovarian cancer, male breast cancer, prostate cancer, melanoma, and pancreatic cancer.  Women with a BRCA1  or BRCA2 mutation have up to an 87% lifetime risk of breast cancer and up to a 44% risk of ovarian cancer.    Some of the genes that will be evaluated on this multigene panel have well defined cancer risks and established management guidelines.  Other genes that can be tested for have been more recently described, and there may be less data regarding the risks and therefore may not have established management guidelines.  We discussed these limitations at length. Based on Ms. Parmar’s family history of cancer and her desire to get more information regarding her personal risks she opted to pursue testing through a panel evaluating several other genes known to increase the risk for cancer.    GENETIC TESTING:  The risks, benefits and limitations of genetic testing and implications for clinical management following testing were reviewed. DNA test results can influence decisions regarding screening and prevention.  Genetic testing can have significant psychological implications for both individuals and families. Also discussed was the possibility of employment and insurance discrimination based on genetic test results and the federal and states laws that are in place to prevent this (KAYLEIGH).         We discussed panel testing, which would involve testing 47 genes associated with increased cancer risk. The benefits and limitations of genetic testing were discussed. The implications of a positive or negative test result were discussed. We also discussed the importance of testing on an affected relative. We discussed the possibility that, in some cases, genetic test results may be ambiguous due to the identification of a genetic variant. These variants may or may not be associated with an increased cancer risk. With multigene panel testing, it is not uncommon for a variant of uncertain significance (VUS) to be identified.  If a VUS is identified, testing family members is not recommended and screening recommendations are  made based on the family history. The laboratories that perform genetic testing work to reclassify the VUS and send out an amended report if and when a VUS is reclassified.  The majority of variant findings are ultimately reclassified to a negative result. Given her family history, a negative test result does not eliminate all cancer risk, although the risk would not be as high as it would with positive genetic testing.     PLAN:  Genetic testing via the [facility] Common Hereditary Cancers panel was ordered. A saliva kit will be mailed to Ms. Parmar’s home address from the testing lab, [facility]. Once she mails in her saliva sample to [facility], results would be expected in 2-3 weeks. We will contact Ms. Parmar with her results once they are received.      Katia Saucedo MS, [facility] [place], [facility]  Licensed Certified Genetic Counselor

## 2020-02-07 NOTE — PROGRESS NOTES
2/7/2020      Indication: patient was seen for genetic counseling via telemedicine by Katia Saucedo MS,[facility] [place], due to family history of breast and ovarian cancer. Genetic cousnelor reviewed family history in detail obtaining a three generation pedigree. See pedigree and summary of genetic counseling session in genetic counselors visit documentation.      Plan:  Genetic testing will be sent for testing utilizing the  [facility] Common Hereditary Cancers panel which analyzes 47 genes associated with an increased cancer risk. Results from this testing are expected in approximately 2-3 weeks.    I reviewed recommendations with patient and genetic counselor at conclusion of the visit. I answered all remaining questions and encouraged the patient to contact myself or genetic counselor if there are any further questions or concerns.      no

## 2020-03-03 ENCOUNTER — DOCUMENTATION (OUTPATIENT)
Dept: GENETICS | Facility: HOSPITAL | Age: 70
End: 2020-03-03

## 2020-03-03 NOTE — PROGRESS NOTES
Lois Parmar is a 70-year-old female who was seen for genetic counseling via telemedicine due to a family history of breast and ovarian cancer. She was 13 years old at menarche and had her first child at 23.  She had a hysterectomy with BSO at age 28. She reports that she was on hormone replacement therapy for approximately 25 years. She does not have a personal history of cancer.  Her current cancer screening includes annual clinical breast exam, annual mammogram, and colonoscopy every five years. She was interested in discussing her risk for a hereditary cancer syndrome. Ms. Parmar was interested in pursuing genetic testing, and therefore, the [facility] Common Hereditary Cancers panel was ordered which analyzes 47 genes associated with an increased cancer risk. Genetic testing was negative for known pathogenic mutations in BRCA1/2 and 45 additional genes on the [facility] Common Hereditary Cancers panel.   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  Variants are changes in DNA that may or may not affect the function of the gene.  The classification of a variant to either a benign gene change or pathogenic mutation depends on a number of factors.  The majority (estimated to be >90%) of VUS’s are eventually reclassified as benign gene changes. It is not recommended that unaffected relatives be tested for these variants at this time, and management should not be altered based on these variants.  Management should be guided by family history risk assessments.  These results were discussed with Ms. Parmar by telephone on 3/3/2020.    FAMILY HISTORY (see attached pedigree):    Mother: Breast cancer, 60; ovarian and uterine cancer, 79  Sister:  Breast cancer (bilateral), 50s; Lymphoma, 70s; Merkel cell carcinoma, 70s  Sister:  Brain tumors  Brother: Possible lung cancer  Niece:  Breast cancer, 62  Mat. Aunt: Unspecified gynecologic  cancer, 60s/70s    We do not have medical records confirming the diagnoses in Ms. Parmar’s family.    RISK ASSESSMENT: Ms. Parmar’s family history led to concern regarding a hereditary cancer syndrome. She clearly meets NCCN guidelines criteria for BRCA1/2 testing based on her family history of breast and ovarian cancer.  In cases where an affected relative is not available for testing or not willing to pursue testing, it is appropriate to offer testing to an unaffected individual. Medicare does not cover testing for individuals who have not themselves had a cancer diagnosis; however, [facility], a genetic testing laboratory, will provide testing to Medicare patients in these circumstances when affected relatives are not available for testing.  Ms. Parmar opted to pursue multigene panel testing via the [facility] Common Hereditary Cancers panel.  If genetic testing is negative, Ms. Pages management should be guided by family history.     Based on computer modeling, Ms. Pages lifetime risk for breast cancer was estimated to be up to   4.9% (Afsaneh-Celestine/LUCRECIA), in comparison to the average 71-year-old woman’s lifetime risk of 4.1%.  Per NCCN guidelines a risk greater than 20% is considered high risk and warrants increased screening; Ms. Parmar’s risk does not fall into this category.  This risk assessment is based on the information that was provided during the session and may change if new information is obtained.    GENETIC COUNSELING:  We reviewed the family history information in detail. Cases of cancer follow three general patterns: sporadic, familial, and hereditary.  While most breast cancer is sporadic, some cases appear to occur in family clusters.  These cases are said to be familial and account for 10-20% of cancer cases.  Familial cases may be due to a combination of shared genes and environmental factors among family members.  In even fewer families, the cancer is said to be inherited, and the  genes responsible for the cancer are known.      Family histories typical of hereditary cancer syndromes usually include multiple first- and second-degree relatives diagnosed with cancer types that define a syndrome. These cases tend to be diagnosed at younger-than-expected ages and can be bilateral or multifocal. The cancer in these families follows an autosomal dominant inheritance pattern, which indicates the likely presence of a mutation in a cancer susceptibility gene. Children and siblings of an individual believed to carry this mutation have a 50% chance of inheriting that mutation, thereby inheriting the increased risk to develop cancer. These mutations can be passed down from the maternal or the paternal lineage.    Based on Ms. Parmar’s family history, we discussed that hereditary breast cancer accounts for 5-10% of all cases of breast cancer.  A significant proportion of hereditary breast cancer can be attributed to mutations in the BRCA1 and BRCA2 genes.  Mutations in these genes confer an increased risk for breast cancer, ovarian cancer, male breast cancer, prostate cancer, melanoma, and pancreatic cancer.  Women with a BRCA1 or BRCA2 mutation have up to an 87% lifetime risk of breast cancer and up to a 44% risk of ovarian cancer.    Some of the genes that will be evaluated on this multigene panel have well defined cancer risks and established management guidelines.  Other genes that can be tested for have been more recently described, and there may be less data regarding the risks and therefore may not have established management guidelines.  We discussed these limitations at length. Based on Ms. Parmar’s family history of cancer and her desire to get more information regarding her personal risks she opted to pursue testing through a panel evaluating several other genes known to increase the risk for cancer.    GENETIC TESTING:  The risks, benefits and limitations of genetic testing and  implications for clinical management following testing were reviewed. DNA test results can influence decisions regarding screening and prevention.  Genetic testing can have significant psychological implications for both individuals and families. Also discussed was the possibility of employment and insurance discrimination based on genetic test results and the federal and states laws that are in place to prevent this (KAYLEIGH).         We discussed panel testing, which would involve testing 47 genes associated with increased cancer risk. The benefits and limitations of genetic testing were discussed. The implications of a positive or negative test result were discussed. We also discussed the importance of testing on an affected relative. We discussed the possibility that, in some cases, genetic test results may be ambiguous due to the identification of a genetic variant. These variants may or may not be associated with an increased cancer risk. With multigene panel testing, it is not uncommon for a variant of uncertain significance (VUS) to be identified.  If a VUS is identified, testing family members is not recommended and screening recommendations are made based on the family history. The laboratories that perform genetic testing work to reclassify the VUS and send out an amended report if and when a VUS is reclassified.  The majority of variant findings are ultimately reclassified to a negative result. Given her family history, a negative test result does not eliminate all cancer risk, although the risk would not be as high as it would with positive genetic testing.     TEST RESULTS:  Genetic testing was negative for known pathogenic mutations by sequencing and rearrangement testing for the 47 genes on the [facility] Common Hereditary Cancers panel. 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  If this variant is ever reclassified a new report will be issued by the laboratory and released directly to the ordering physician, and our office.  Unfortunately, we are unable to determine why Ms. Parmar’s relatives have developed cancer at this time.  It is possible that there is a mutation present in the family that Ms. Parmar did not happen to inherit. Therefore, it would be appropriate for an affected relative to have genetic testing.      CANCER PREVENTION:  Options available to individuals with an elevated lifetime risk for breast and/or ovarian cancer were briefly discussed, including increased surveillance, chemoprevention and prophylactic surgery (mastectomy and/or oophorectomy).  Based on computer modeling, Ms. Parmar’s lifetime risk for breast cancer would not be considered “high risk”.  She should continue to follow general population screening guidelines for her breast cancer risk, including annual clinical breast exam, annual mammography, and self-breast exam.  This assessment is based on the information provided at the time of consultation and could change should new information be obtained.     PLAN:  Genetic counseling remains available to Ms. Parmar and her family. She is encouraged to contact our office if she has any questions.       Katia Saucedo MS, [facility] [place], [facility]  Licensed Certified Genetic Counselor      Cc: Lois De La Torre MD

## 2020-04-24 RX ORDER — LANSOPRAZOLE 30 MG
30 CAPSULE,DELAYED RELEASE (ENTERIC COATED) ORAL EVERY MORNING
Qty: 30 CAPSULE | Refills: 0 | Status: SHIPPED | OUTPATIENT
Start: 2020-04-24 | End: 2020-05-29 | Stop reason: SDUPTHER

## 2020-04-24 RX ORDER — SPIRONOLACTONE 25 MG/1
25 TABLET ORAL DAILY
Qty: 90 TABLET | Refills: 1 | Status: SHIPPED | OUTPATIENT
Start: 2020-04-24 | End: 2020-10-16

## 2020-04-24 RX ORDER — SIMVASTATIN 40 MG
40 TABLET ORAL NIGHTLY
Qty: 90 TABLET | Refills: 3 | Status: SHIPPED | OUTPATIENT
Start: 2020-04-24 | End: 2021-10-18

## 2020-05-29 RX ORDER — LANSOPRAZOLE 30 MG
30 CAPSULE,DELAYED RELEASE (ENTERIC COATED) ORAL EVERY MORNING
Qty: 90 CAPSULE | Refills: 0 | Status: SHIPPED | OUTPATIENT
Start: 2020-05-29 | End: 2020-07-13

## 2020-05-29 RX ORDER — FAMOTIDINE 40 MG/1
40 TABLET, FILM COATED ORAL NIGHTLY
Qty: 90 TABLET | Refills: 0 | Status: SHIPPED | OUTPATIENT
Start: 2020-05-29 | End: 2020-07-13

## 2020-07-10 ENCOUNTER — LAB (OUTPATIENT)
Dept: LAB | Facility: OTHER | Age: 70
End: 2020-07-10

## 2020-07-10 DIAGNOSIS — E78.2 MIXED HYPERLIPIDEMIA: ICD-10-CM

## 2020-07-10 DIAGNOSIS — R73.01 IFG (IMPAIRED FASTING GLUCOSE): Chronic | ICD-10-CM

## 2020-07-10 DIAGNOSIS — E55.9 VITAMIN D DEFICIENCY: Chronic | ICD-10-CM

## 2020-07-10 DIAGNOSIS — E03.9 ACQUIRED HYPOTHYROIDISM: Chronic | ICD-10-CM

## 2020-07-10 DIAGNOSIS — I10 ESSENTIAL HYPERTENSION: Chronic | ICD-10-CM

## 2020-07-10 DIAGNOSIS — N18.30 STAGE 3 CHRONIC KIDNEY DISEASE (HCC): Chronic | ICD-10-CM

## 2020-07-10 LAB
ALBUMIN SERPL-MCNC: 4.5 G/DL (ref 3.5–5)
ALBUMIN/GLOB SERPL: 1.3 G/DL (ref 1.1–1.8)
ALP SERPL-CCNC: 49 U/L (ref 38–126)
ALT SERPL W P-5'-P-CCNC: 19 U/L
ANION GAP SERPL CALCULATED.3IONS-SCNC: 8 MMOL/L (ref 5–15)
AST SERPL-CCNC: 24 U/L (ref 14–36)
BASOPHILS # BLD AUTO: 0.03 10*3/MM3 (ref 0–0.2)
BASOPHILS NFR BLD AUTO: 0.4 % (ref 0–1.5)
BILIRUB SERPL-MCNC: 0.4 MG/DL (ref 0.2–1.3)
BUN SERPL-MCNC: 23 MG/DL (ref 7–23)
BUN/CREAT SERPL: 20 (ref 7–25)
CALCIUM SPEC-SCNC: 9.9 MG/DL (ref 8.4–10.2)
CHLORIDE SERPL-SCNC: 103 MMOL/L (ref 101–112)
CO2 SERPL-SCNC: 27 MMOL/L (ref 22–30)
CREAT SERPL-MCNC: 1.15 MG/DL (ref 0.52–1.04)
DEPRECATED RDW RBC AUTO: 45.8 FL (ref 37–54)
EOSINOPHIL # BLD AUTO: 0.27 10*3/MM3 (ref 0–0.4)
EOSINOPHIL NFR BLD AUTO: 4 % (ref 0.3–6.2)
ERYTHROCYTE [DISTWIDTH] IN BLOOD BY AUTOMATED COUNT: 13.9 % (ref 12.3–15.4)
GFR SERPL CREATININE-BSD FRML MDRD: 47 ML/MIN/1.73 (ref 39–90)
GLOBULIN UR ELPH-MCNC: 3.4 GM/DL (ref 2.3–3.5)
GLUCOSE SERPL-MCNC: 97 MG/DL (ref 70–99)
HCT VFR BLD AUTO: 34.7 % (ref 34–46.6)
HGB BLD-MCNC: 11.4 G/DL (ref 12–15.9)
LYMPHOCYTES # BLD AUTO: 1.36 10*3/MM3 (ref 0.7–3.1)
LYMPHOCYTES NFR BLD AUTO: 20.3 % (ref 19.6–45.3)
MCH RBC QN AUTO: 31.2 PG (ref 26.6–33)
MCHC RBC AUTO-ENTMCNC: 32.9 G/DL (ref 31.5–35.7)
MCV RBC AUTO: 95.1 FL (ref 79–97)
MONOCYTES # BLD AUTO: 0.5 10*3/MM3 (ref 0.1–0.9)
MONOCYTES NFR BLD AUTO: 7.5 % (ref 5–12)
NEUTROPHILS NFR BLD AUTO: 4.53 10*3/MM3 (ref 1.7–7)
NEUTROPHILS NFR BLD AUTO: 67.8 % (ref 42.7–76)
PLATELET # BLD AUTO: 274 10*3/MM3 (ref 140–450)
PMV BLD AUTO: 9.1 FL (ref 6–12)
POTASSIUM SERPL-SCNC: 4.6 MMOL/L (ref 3.4–5)
PROT SERPL-MCNC: 7.9 G/DL (ref 6.3–8.6)
RBC # BLD AUTO: 3.65 10*6/MM3 (ref 3.77–5.28)
SODIUM SERPL-SCNC: 138 MMOL/L (ref 137–145)
TRIGL SERPL-MCNC: 258 MG/DL
WBC # BLD AUTO: 6.69 10*3/MM3 (ref 3.4–10.8)

## 2020-07-10 PROCEDURE — 80053 COMPREHEN METABOLIC PANEL: CPT | Performed by: INTERNAL MEDICINE

## 2020-07-10 PROCEDURE — 83036 HEMOGLOBIN GLYCOSYLATED A1C: CPT | Performed by: INTERNAL MEDICINE

## 2020-07-10 PROCEDURE — 36415 COLL VENOUS BLD VENIPUNCTURE: CPT | Performed by: INTERNAL MEDICINE

## 2020-07-10 PROCEDURE — 84478 ASSAY OF TRIGLYCERIDES: CPT | Performed by: INTERNAL MEDICINE

## 2020-07-10 PROCEDURE — 84443 ASSAY THYROID STIM HORMONE: CPT | Performed by: INTERNAL MEDICINE

## 2020-07-10 PROCEDURE — 85025 COMPLETE CBC W/AUTO DIFF WBC: CPT | Performed by: INTERNAL MEDICINE

## 2020-07-10 PROCEDURE — 84439 ASSAY OF FREE THYROXINE: CPT | Performed by: INTERNAL MEDICINE

## 2020-07-10 PROCEDURE — 83721 ASSAY OF BLOOD LIPOPROTEIN: CPT | Performed by: INTERNAL MEDICINE

## 2020-07-10 PROCEDURE — 82306 VITAMIN D 25 HYDROXY: CPT | Performed by: INTERNAL MEDICINE

## 2020-07-10 RX ORDER — FOLIC ACID 1 MG/1
TABLET ORAL
Qty: 90 TABLET | Refills: 1 | Status: SHIPPED | OUTPATIENT
Start: 2020-07-10 | End: 2020-10-12

## 2020-07-11 LAB
25(OH)D3 SERPL-MCNC: 38.4 NG/ML (ref 30–100)
ARTICHOKE IGE QN: 130 MG/DL (ref 0–100)
HBA1C MFR BLD: 6.25 % (ref 4.8–5.6)
T4 FREE SERPL-MCNC: 1.21 NG/DL (ref 0.93–1.7)
TSH SERPL DL<=0.05 MIU/L-ACNC: 4.23 UIU/ML (ref 0.27–4.2)

## 2020-07-13 RX ORDER — LANSOPRAZOLE 30 MG
CAPSULE,DELAYED RELEASE (ENTERIC COATED) ORAL
Qty: 90 CAPSULE | Refills: 0 | Status: SHIPPED | OUTPATIENT
Start: 2020-07-13 | End: 2020-10-09

## 2020-07-13 RX ORDER — FAMOTIDINE 40 MG/1
40 TABLET, FILM COATED ORAL NIGHTLY
Qty: 90 TABLET | Refills: 0 | Status: SHIPPED | OUTPATIENT
Start: 2020-07-13 | End: 2021-04-29 | Stop reason: SDUPTHER

## 2020-07-17 ENCOUNTER — OFFICE VISIT (OUTPATIENT)
Dept: FAMILY MEDICINE CLINIC | Facility: CLINIC | Age: 70
End: 2020-07-17

## 2020-07-17 VITALS
BODY MASS INDEX: 47.95 KG/M2 | SYSTOLIC BLOOD PRESSURE: 137 MMHG | HEART RATE: 66 BPM | HEIGHT: 61 IN | WEIGHT: 254 LBS | DIASTOLIC BLOOD PRESSURE: 72 MMHG

## 2020-07-17 DIAGNOSIS — E66.01 MORBID OBESITY (HCC): Chronic | ICD-10-CM

## 2020-07-17 DIAGNOSIS — I10 ESSENTIAL HYPERTENSION: Chronic | ICD-10-CM

## 2020-07-17 DIAGNOSIS — R73.01 IFG (IMPAIRED FASTING GLUCOSE): Chronic | ICD-10-CM

## 2020-07-17 DIAGNOSIS — Z91.018 ALLERGY TO ANIMAL PROTEIN: ICD-10-CM

## 2020-07-17 DIAGNOSIS — E83.42 HYPOMAGNESEMIA: Chronic | ICD-10-CM

## 2020-07-17 DIAGNOSIS — Z91.018 ALLERGY TO ALPHA-GAL: Chronic | ICD-10-CM

## 2020-07-17 DIAGNOSIS — D53.1 MEGALOBLASTIC ANEMIA: Chronic | ICD-10-CM

## 2020-07-17 DIAGNOSIS — Z91.048 OTHER NONMEDICINAL SUBSTANCE ALLERGY STATUS: ICD-10-CM

## 2020-07-17 DIAGNOSIS — N18.30 STAGE 3 CHRONIC KIDNEY DISEASE (HCC): Chronic | ICD-10-CM

## 2020-07-17 DIAGNOSIS — E78.2 MIXED HYPERLIPIDEMIA: Primary | Chronic | ICD-10-CM

## 2020-07-17 DIAGNOSIS — D50.0 IRON DEFICIENCY ANEMIA DUE TO CHRONIC BLOOD LOSS: Chronic | ICD-10-CM

## 2020-07-17 DIAGNOSIS — I42.9 CARDIOMYOPATHY, UNSPECIFIED TYPE (HCC): Chronic | ICD-10-CM

## 2020-07-17 DIAGNOSIS — E03.9 ACQUIRED HYPOTHYROIDISM: Chronic | ICD-10-CM

## 2020-07-17 DIAGNOSIS — E55.9 VITAMIN D DEFICIENCY: Chronic | ICD-10-CM

## 2020-07-17 PROCEDURE — 99443 PR PHYS/QHP TELEPHONE EVALUATION 21-30 MIN: CPT | Performed by: INTERNAL MEDICINE

## 2020-07-17 RX ORDER — EPINEPHRINE 0.3 MG/.3ML
0.3 INJECTION SUBCUTANEOUS
COMMUNITY
Start: 2020-07-14 | End: 2022-06-10

## 2020-07-17 NOTE — PROGRESS NOTES
Subjective      You have chosen to receive care through a telephone visit. Do you consent to use a telephone visit for your medical care today? Yes    Total visit time: 22 minutes.        History of Present Illness     Lois Parmar is a 70 y.o. female who receives care via telephone visit for 6-month follow up on hypothyroidism, hyperlipidemia, hypertriglyceridemia, impaired fasting glucose, osteoarthritis of the knees and hips, morbid obesity, and iron deficiency anemia and vitamin B12 deficiency anemia, among other issues complicated by obesity.  She follows with Dr. De La Torre for anemia with iron deficiency and vitamin B12 deficiency.  He gave option of bone marrow biopsy, although, she wants to hold off at this point.  Patient had EGD and colonoscopy by Dr. Sutton on November 30, 2018.  EGD revealed gastritis.  Colonoscopy showed polyp without any evidence of bleeding or cancer.  He gave her some IV iron infusions, then placed her back on oral iron.  She is tolerating oral iron approximately five days weekly, although, she has to take breaks episodically due to constipation.  She continues on oral vitamin B12 supplement.    She was just diagnosed with alpha gal earlier this week by ODILIA Flores at the Dr. Boyle's office.  He follows her cardiomyopathy.  She was there to address dyspnea on exertion, although, she mentioned the pruritic skin rash and diarrhea and asked about alpha gal.  Her beef titer was positive and pork was slightly elevated.  Lamb titer was negative.  We do not have any dairy titers.  Chantel recommended p.r.n. EpiPen and Benadryl.  Chantel recommended avoiding all mammal meats and also avoiding all dairy.  We will try to get a copy of the labs.  EKG revealed normal sinus rhythm.  She also had an ECHO, although, she has not been notified with results.  I explained the dyspnea on exertion should be further evaluated if it persists as it may be indicative of cardiac etiology.      Renal  "function vastly improved with creatinine 1.15 improved from 1.54 nine months ago.  Diabetes remains excellently control despite decreasing her metformin dose for renal protection.      She has chronic lumbar back pain with history of lumbar laminectomy 2011.  She continues to struggle with chronic left hip pain aggravating with weightbearing.  Dr. Augustine evaluated with x-rays of the hip demonstrating no discrete mass or other acute findings on the imaging.      DEXA 04/2018 revealed bone density within normal limits.  We will plan to repeat in 2021 or 2022.     She reports stable weight.  Blood pressure and heart rate at goal.      The patient's relevant past medical, surgical, and social history was reviewed in Epic.   Lab results are reviewed with the patient today.  CBC reveals improving hemoglobin at 11.4.  Fasting glucose 97.  A1c 6.25. Normal thyroid function. LDL cholesterol 130 increased from 121 with daily simvastatin.  Triglycerides 258.   Vitamin D at goal.  Improved renal function as noted above.  Normal liver function.      Review of Systems   Constitutional: Negative for chills, fatigue and fever.   HENT: Negative for congestion, ear pain, postnasal drip, sinus pressure and sore throat.    Respiratory: Positive for shortness of breath. Negative for cough and wheezing.    Cardiovascular: Negative for chest pain, palpitations and leg swelling.   Gastrointestinal: Negative for abdominal pain, blood in stool, constipation, diarrhea, nausea and vomiting.   Endocrine: Negative for cold intolerance, heat intolerance, polydipsia and polyuria.   Genitourinary: Negative for dysuria, frequency, hematuria and urgency.   Musculoskeletal: Positive for back pain.   Skin: Negative for rash.   Neurological: Negative for syncope and weakness.        Objective     Visit Vitals  /72   Pulse 66   Ht 154.9 cm (61\")   Wt 115 kg (254 lb)   BMI 47.99 kg/m²     Physical Exam        Assessment/Plan      I recommended she " have influenza vaccine in September when available.     We will plan to repeat DEXA in 1-2 years since her DEXA 2018 showed normal bone density.  Continue oral vitamin D and calcium.      Renal function vastly improved.  Continue to avoid NSAIDs and other nephrotoxic agents.     Continue to follow with Dr. De La Torre to address the anemia.  Continue the oral iron.  Continue the Pepcid for GERD.    Continue to follow with Dr. Boyle, who follows her cardiomyopathy and other cardiovascular issues.  We continue to encourage very aggressive weight loss.    Continue  levothyroxine 75 mcg.      Recommended she go ahead and fill the prescription for EpiPen and keep Benadryl on hand.  Avoid beef and pork products.   Notify her cardiologist if the dyspnea on exertion persists.        Continue simvastatin and dietary efforts.     Continue other medications and vitamin and mineral supplements to treat additional medical problems which we addressed today.      Return in six months for routine follow up with fasting labs one week prior.            Scribed for Dr. Warner by Adore Marrero [facility].     Diagnoses and all orders for this visit:    Mixed hyperlipidemia  -     CBC Auto Differential; Future  -     Comprehensive Metabolic Panel; Future  -     Lipid Panel; Future    Essential hypertension  -     Comprehensive Metabolic Panel; Future    Cardiomyopathy, unspecified type (CMS/HCC)    Morbid obesity (CMS/HCC)    Vitamin D deficiency  -     Vitamin D 25 Hydroxy; Future    Acquired hypothyroidism  -     TSH; Future  -     T4, free; Future    IFG (impaired fasting glucose)  -     Hemoglobin A1c; Future    Stage 3 chronic kidney disease (CMS/HCC)    Iron deficiency anemia due to chronic blood loss  -     Ferritin; Future    Megaloblastic anemia due to vitamin B>12< deficiency    Allergy to alpha-gal  -     Alpha - Gal Panel; Future    Hypomagnesemia  -     Magnesium; Future    Allergy to animal protein  -     Alpha - Gal Panel;  Future    Other nonmedicinal substance allergy status   -     Alpha - Gal Panel; Future    Other orders  -     EPINEPHrine (EPIPEN) 0.3 MG/0.3ML solution auto-injector injection; Inject 0.3 mg into the appropriate muscle as directed by prescriber.  -     Ferrous Sulfate ER (Slow Fe) 142 (45 Fe) MG tablet controlled-release; Take 142 mg by mouth Daily.        Lab on 07/10/2020   Component Date Value Ref Range Status   • WBC 07/10/2020 6.69  3.40 - 10.80 10*3/mm3 Final   • RBC 07/10/2020 3.65* 3.77 - 5.28 10*6/mm3 Final   • Hemoglobin 07/10/2020 11.4* 12.0 - 15.9 g/dL Final   • Hematocrit 07/10/2020 34.7  34.0 - 46.6 % Final   • MCV 07/10/2020 95.1  79.0 - 97.0 fL Final   • MCH 07/10/2020 31.2  26.6 - 33.0 pg Final   • MCHC 07/10/2020 32.9  31.5 - 35.7 g/dL Final   • RDW 07/10/2020 13.9  12.3 - 15.4 % Final   • RDW-SD 07/10/2020 45.8  37.0 - 54.0 fl Final   • MPV 07/10/2020 9.1  6.0 - 12.0 fL Final   • Platelets 07/10/2020 274  140 - 450 10*3/mm3 Final   • Neutrophil % 07/10/2020 67.8  42.7 - 76.0 % Final   • Lymphocyte % 07/10/2020 20.3  19.6 - 45.3 % Final   • Monocyte % 07/10/2020 7.5  5.0 - 12.0 % Final   • Eosinophil % 07/10/2020 4.0  0.3 - 6.2 % Final   • Basophil % 07/10/2020 0.4  0.0 - 1.5 % Final   • Neutrophils, Absolute 07/10/2020 4.53  1.70 - 7.00 10*3/mm3 Final   • Lymphocytes, Absolute 07/10/2020 1.36  0.70 - 3.10 10*3/mm3 Final   • Monocytes, Absolute 07/10/2020 0.50  0.10 - 0.90 10*3/mm3 Final   • Eosinophils, Absolute 07/10/2020 0.27  0.00 - 0.40 10*3/mm3 Final   • Basophils, Absolute 07/10/2020 0.03  0.00 - 0.20 10*3/mm3 Final   • Glucose 07/10/2020 97  70 - 99 mg/dL Final   • BUN 07/10/2020 23  7 - 23 mg/dL Final   • Creatinine 07/10/2020 1.15* 0.52 - 1.04 mg/dL Final   • Sodium 07/10/2020 138  137 - 145 mmol/L Final   • Potassium 07/10/2020 4.6  3.4 - 5.0 mmol/L Final   • Chloride 07/10/2020 103  101 - 112 mmol/L Final   • CO2 07/10/2020 27.0  22.0 - 30.0 mmol/L Final   • Calcium 07/10/2020 9.9   8.4 - 10.2 mg/dL Final   • Total Protein 07/10/2020 7.9  6.3 - 8.6 g/dL Final   • Albumin 07/10/2020 4.50  3.50 - 5.00 g/dL Final   • ALT (SGPT) 07/10/2020 19  <=35 U/L Final   • AST (SGOT) 07/10/2020 24  14 - 36 U/L Final   • Alkaline Phosphatase 07/10/2020 49  38 - 126 U/L Final   • Total Bilirubin 07/10/2020 0.4  0.2 - 1.3 mg/dL Final   • eGFR Non African Amer 07/10/2020 47  39 - 90 mL/min/1.73 Final   • Globulin 07/10/2020 3.4  2.3 - 3.5 gm/dL Final   • A/G Ratio 07/10/2020 1.3  1.1 - 1.8 g/dL Final   • BUN/Creatinine Ratio 07/10/2020 20.0  7.0 - 25.0 Final   • Anion Gap 07/10/2020 8.0  5.0 - 15.0 mmol/L Final   • Hemoglobin A1C 07/10/2020 6.25* 4.80 - 5.60 % Final   • LDL Cholesterol  07/10/2020 130* 0 - 100 mg/dL Final   • TSH 07/10/2020 4.230* 0.270 - 4.200 uIU/mL Final   • Free T4 07/10/2020 1.21  0.93 - 1.70 ng/dL Final   • Triglycerides 07/10/2020 258* <=150 mg/dL Final   • 25 Hydroxy, Vitamin D 07/10/2020 38.4  30.0 - 100.0 ng/ml Final   ]

## 2020-07-21 ENCOUNTER — TELEPHONE (OUTPATIENT)
Dept: FAMILY MEDICINE CLINIC | Facility: CLINIC | Age: 70
End: 2020-07-21

## 2020-07-21 ENCOUNTER — LAB (OUTPATIENT)
Dept: LAB | Facility: OTHER | Age: 70
End: 2020-07-21

## 2020-07-21 DIAGNOSIS — D50.0 IRON DEFICIENCY ANEMIA DUE TO CHRONIC BLOOD LOSS: ICD-10-CM

## 2020-07-21 DIAGNOSIS — N18.30 STAGE 3 CHRONIC KIDNEY DISEASE (HCC): ICD-10-CM

## 2020-07-21 DIAGNOSIS — T45.4X5A ADVERSE EFFECT OF IRON, INITIAL ENCOUNTER: ICD-10-CM

## 2020-07-21 LAB
ALBUMIN SERPL-MCNC: 4.6 G/DL (ref 3.5–5)
ALBUMIN/GLOB SERPL: 1.2 G/DL (ref 1.1–1.8)
ALP SERPL-CCNC: 44 U/L (ref 38–126)
ALT SERPL W P-5'-P-CCNC: 23 U/L
ANION GAP SERPL CALCULATED.3IONS-SCNC: 9 MMOL/L (ref 5–15)
AST SERPL-CCNC: 27 U/L (ref 14–36)
BASOPHILS # BLD AUTO: 0.04 10*3/MM3 (ref 0–0.2)
BASOPHILS NFR BLD AUTO: 0.6 % (ref 0–1.5)
BILIRUB SERPL-MCNC: 0.5 MG/DL (ref 0.2–1.3)
BUN SERPL-MCNC: 27 MG/DL (ref 7–23)
BUN/CREAT SERPL: 22.9 (ref 7–25)
CALCIUM SPEC-SCNC: 10.3 MG/DL (ref 8.4–10.2)
CHLORIDE SERPL-SCNC: 101 MMOL/L (ref 101–112)
CO2 SERPL-SCNC: 26 MMOL/L (ref 22–30)
CREAT SERPL-MCNC: 1.18 MG/DL (ref 0.52–1.04)
DEPRECATED RDW RBC AUTO: 46 FL (ref 37–54)
EOSINOPHIL # BLD AUTO: 0.3 10*3/MM3 (ref 0–0.4)
EOSINOPHIL NFR BLD AUTO: 4.2 % (ref 0.3–6.2)
ERYTHROCYTE [DISTWIDTH] IN BLOOD BY AUTOMATED COUNT: 13.9 % (ref 12.3–15.4)
FERRITIN SERPL-MCNC: 48.79 NG/ML (ref 13–150)
FOLATE SERPL-MCNC: >20 NG/ML (ref 4.78–24.2)
GFR SERPL CREATININE-BSD FRML MDRD: 45 ML/MIN/1.73 (ref 39–90)
GLOBULIN UR ELPH-MCNC: 3.7 GM/DL (ref 2.3–3.5)
GLUCOSE SERPL-MCNC: 103 MG/DL (ref 70–99)
HCT VFR BLD AUTO: 35.2 % (ref 34–46.6)
HGB BLD-MCNC: 11.8 G/DL (ref 12–15.9)
IRON 24H UR-MRATE: 109 MCG/DL (ref 37–145)
IRON SATN MFR SERPL: 23 % (ref 20–50)
LYMPHOCYTES # BLD AUTO: 1.26 10*3/MM3 (ref 0.7–3.1)
LYMPHOCYTES NFR BLD AUTO: 17.6 % (ref 19.6–45.3)
MCH RBC QN AUTO: 31.8 PG (ref 26.6–33)
MCHC RBC AUTO-ENTMCNC: 33.5 G/DL (ref 31.5–35.7)
MCV RBC AUTO: 94.9 FL (ref 79–97)
MONOCYTES # BLD AUTO: 0.59 10*3/MM3 (ref 0.1–0.9)
MONOCYTES NFR BLD AUTO: 8.2 % (ref 5–12)
NEUTROPHILS NFR BLD AUTO: 4.97 10*3/MM3 (ref 1.7–7)
NEUTROPHILS NFR BLD AUTO: 69.4 % (ref 42.7–76)
PLATELET # BLD AUTO: 282 10*3/MM3 (ref 140–450)
PMV BLD AUTO: 9 FL (ref 6–12)
POTASSIUM SERPL-SCNC: 4.4 MMOL/L (ref 3.4–5)
PROT SERPL-MCNC: 8.3 G/DL (ref 6.3–8.6)
RBC # BLD AUTO: 3.71 10*6/MM3 (ref 3.77–5.28)
SODIUM SERPL-SCNC: 136 MMOL/L (ref 137–145)
TIBC SERPL-MCNC: 472 MCG/DL (ref 298–536)
TRANSFERRIN SERPL-MCNC: 317 MG/DL (ref 200–360)
VIT B12 BLD-MCNC: 871 PG/ML (ref 211–946)
WBC # BLD AUTO: 7.16 10*3/MM3 (ref 3.4–10.8)

## 2020-07-21 PROCEDURE — 82728 ASSAY OF FERRITIN: CPT | Performed by: INTERNAL MEDICINE

## 2020-07-21 PROCEDURE — 85025 COMPLETE CBC W/AUTO DIFF WBC: CPT | Performed by: INTERNAL MEDICINE

## 2020-07-21 PROCEDURE — 82746 ASSAY OF FOLIC ACID SERUM: CPT | Performed by: INTERNAL MEDICINE

## 2020-07-21 PROCEDURE — 80053 COMPREHEN METABOLIC PANEL: CPT | Performed by: INTERNAL MEDICINE

## 2020-07-21 PROCEDURE — 84466 ASSAY OF TRANSFERRIN: CPT | Performed by: INTERNAL MEDICINE

## 2020-07-21 PROCEDURE — 36415 COLL VENOUS BLD VENIPUNCTURE: CPT | Performed by: INTERNAL MEDICINE

## 2020-07-21 PROCEDURE — 82607 VITAMIN B-12: CPT | Performed by: INTERNAL MEDICINE

## 2020-07-21 PROCEDURE — 83540 ASSAY OF IRON: CPT | Performed by: INTERNAL MEDICINE

## 2020-07-21 NOTE — TELEPHONE ENCOUNTER
I spoke to patient regarding the Alpha Gal studies she had through cardiology. I relayed to her per Dr. Warner that she didn't have a dairy lab test and told her she didn't have to avoid dairy but she wants to avoid anyway. Instructed we would recheck panel in 6 months and offered referral to Dr. Harmon but she states will call back if she wants one. TP

## 2020-07-23 ENCOUNTER — OFFICE VISIT (OUTPATIENT)
Dept: ONCOLOGY | Facility: CLINIC | Age: 70
End: 2020-07-23

## 2020-07-23 VITALS
HEIGHT: 61 IN | WEIGHT: 254.9 LBS | HEART RATE: 65 BPM | DIASTOLIC BLOOD PRESSURE: 65 MMHG | RESPIRATION RATE: 20 BRPM | TEMPERATURE: 97.7 F | BODY MASS INDEX: 48.12 KG/M2 | SYSTOLIC BLOOD PRESSURE: 145 MMHG

## 2020-07-23 DIAGNOSIS — T45.4X5A ADVERSE EFFECT OF IRON, INITIAL ENCOUNTER: ICD-10-CM

## 2020-07-23 DIAGNOSIS — E83.52 HYPERCALCEMIA: ICD-10-CM

## 2020-07-23 DIAGNOSIS — D50.0 IRON DEFICIENCY ANEMIA DUE TO CHRONIC BLOOD LOSS: Primary | ICD-10-CM

## 2020-07-23 PROCEDURE — 1123F ACP DISCUSS/DSCN MKR DOCD: CPT | Performed by: INTERNAL MEDICINE

## 2020-07-23 PROCEDURE — G0463 HOSPITAL OUTPT CLINIC VISIT: HCPCS | Performed by: INTERNAL MEDICINE

## 2020-07-23 PROCEDURE — 99214 OFFICE O/P EST MOD 30 MIN: CPT | Performed by: INTERNAL MEDICINE

## 2020-07-23 PROCEDURE — G8731 PAIN NEG NO PLAN: HCPCS | Performed by: INTERNAL MEDICINE

## 2020-07-23 PROCEDURE — G9903 PT SCRN TBCO ID AS NON USER: HCPCS | Performed by: INTERNAL MEDICINE

## 2020-07-23 NOTE — PROGRESS NOTES
DATE OF VISIT: 7/23/2020      REASON FOR VISIT: Anemia with iron deficiency, vitamin B12 deficiency, family history of breast cancer      HISTORY OF PRESENT ILLNESS:    70-year-old female with medical problem consisting of hypertension, dyslipidemia, diabetes, hypothyroidism on Synthroid, coronary artery disease has been following up with hematology clinic since January 24, 2019 for evaluation of anemia.  Patient has required intravenous Feraheme in the past.  Patient is here for follow-up appointment today.  States she has been diagnosed with alpha gal recently.  Complains of fatigue.  Complains of arthritis pain.  Denies any new lymph node enlargement.        PAST MEDICAL HISTORY:    Past Medical History:   Diagnosis Date   • Acquired hypothyroidism - On Synthroid    • Allergic rhinitis    • Allergy to alpha-gal 7/17/2020   • Anemia    • Asthma - mild intermittent    • Cardiac disease    • Carpal tunnel syndrome - Bilateral    • Colitis    • Degeneration of cervical intervertebral disc    • Diabetes (CMS/HCC)    • Disorder of lumbar spine    • Diverticulosis of large intestine 1/17/2019   • Essential hypertension    • Essential hypertension    • Fatigue    • History of myocardial infarct at age greater than 60 years 10/17/2018   • Hyperlipidemia - Improved with Zocor    • IFG (impaired fasting glucose) 10/17/2018   • Impaired fasting glycaemia    • Iron deficiency anemia - Improved    • Lumbar radiculopathy    • Megaloblastic anemia due to vitamin B>12< deficiency    • Morbid obesity (CMS/HCC)    • Osteoarthritis of knee - Bilateral    • Osteoarthritis of multiple joints - Left knee    • Osteoporosis    • Pain in left hip    • Sleep apnea - On CPAP    • Spasm of back muscles    • Stage 3 chronic kidney disease (CMS/HCC) 10/23/2019   • Thyroid dysfunction    • Tubular adenoma of colon - removed during colonoscopy, 11/2018. 12/3/2018   • Vitamin D deficiency        SOCIAL HISTORY:    Social History     Tobacco Use    • Smoking status: Never Smoker   • Smokeless tobacco: Never Used   Substance Use Topics   • Alcohol use: No   • Drug use: No       Surgical History :  Past Surgical History:   Procedure Laterality Date   • CARPAL TUNNEL RELEASE Right    • COLONOSCOPY N/A 11/30/2018    Procedure: COLONOSCOPY;  Surgeon: Jimmie Sutton MD;  Location: [facility] ENDOSCOPY;  Service: General   • ENDOSCOPY  09/17/2012    Normal esophagus.  Gastritis.  Normal duodenum   • ENDOSCOPY N/A 11/30/2018    Procedure: ESOPHAGOGASTRODUODENOSCOPY WITH ANESTHESIA;  Surgeon: Jimmie Sutton MD;  Location: [facility] ENDOSCOPY;  Service: General   • ENDOSCOPY AND COLONOSCOPY  09/17/2012    Internal & external hemorrhoids found.   • HYSTERECTOMY  1980    w bso   • JOINT REPLACEMENT      left knee   • LUMBAR LAMINECTOMY  2011    s/p lumbar laminectomy and fusion   • NECK SURGERY     • OOPHORECTOMY  1980   • SHOULDER SURGERY  12/07/2015    Arthroscopy of the left shoudler with rotator cuff repair, Vijay procedure, and subacromial decompression.   • SHOULDER SURGERY Right    • TOTAL KNEE ARTHROPLASTY Left        ALLERGIES:    Allergies   Allergen Reactions   • Lortab [Hydrocodone-Acetaminophen] Hallucinations   • Codeine Palpitations     Heart race   • Antihistamines, Chlorpheniramine-Type Palpitations     Heart races   • Latex Rash   • Other Palpitations     Decongestants: Heart Race   • Tape Rash         FAMILY HISTORY:  Family History   Problem Relation Age of Onset   • Breast cancer Mother    • Ovarian cancer Mother    • Breast cancer Sister    • Breast cancer Other    • Heart disease Other    • Hypertension Other    • Lung disease Other    • Diabetes Other    • Cancer Other            REVIEW OF SYSTEMS:      CONSTITUTIONAL:  Complains of fatigue. Denies any fever, chills or weight loss.     EYES: No visual disturbances. No discharge. No new lesions    ENMT:  No epistaxis, mouth sores or difficulty swallowing.    RESPIRATORY:  No new shortness of  "breath. No new cough or hemoptysis.    CARDIOVASCULAR:  No chest pain or palpitations.    GASTROINTESTINAL:  No abdominal pain nausea, vomiting or blood in the stool.    GENITOURINARY: No Dysuria or Hematuria.    MUSCULOSKELETAL: Positive for chronic arthritis pain.    LYMPHATICS:  Denies any abnormal swollen glands anywhere in the body.    NEUROLOGICAL : No tingling or numbness. No headache or dizziness. No seizures or balance problems.    SKIN: Positive for vitiligo    ENDOCRINE : No new hot or cold intolerance. No new polyuria . No polydipsia.        PHYSICAL EXAMINATION:      VITAL SIGNS:  /65   Pulse 65   Temp 97.7 °F (36.5 °C) (Temporal)   Resp 20   Ht 154.9 cm (60.98\")   Wt 116 kg (254 lb 14.4 oz)   BMI 48.19 kg/m²       07/23/20  1117   Weight: 116 kg (254 lb 14.4 oz)         ECOG performance status: 2    CONSTITUTIONAL:  Not in any distress.    EYES: Mild conjunctival Pallor. No Icterus. No Pterygium. Extraocular Movements intact.No ptosis.    ENMT:  Normocephalic, Atraumatic.No Facial Asymmetry noted.    NECK:  No adenopathy.Trachea midline. NO JVD.    RESPIRATORY:  Fair air entry bilateral. No rhonchi or wheezing.Fair respiratory effort.    CARDIOVASCULAR:  S1, S2. Regular rate and rhythm. No murmur or gallop appreciated.    ABDOMEN:  Soft, obese, nontender. Bowel sounds present in all four quadrants.  No Hepatosplenomegaly appreciated.    MUSCULOSKELETAL:  No edema.No Calf Tenderness.Decreased range of motion.    NEUROLOGIC:    No  Motor or sensory deficit appreciated. Cranial Nerves 2-12 grossly intact.    SKIN : No new skin lesion identified. Skin is warm and moist to touch.    LYMPHATICS: No new enlarged lymph nodes in neck or supraclavicular area.    PSYCHIATRY: Alert, awake and oriented ×3.Normal affect.  Normal judgment.  Makes good eye contact.        DIAGNOSTIC DATA:    Glucose   Date Value Ref Range Status   07/21/2020 103 (H) 70 - 99 mg/dL Final     Sodium   Date Value Ref Range " Status   07/21/2020 136 (L) 137 - 145 mmol/L Final     Potassium   Date Value Ref Range Status   07/21/2020 4.4 3.4 - 5.0 mmol/L Final     CO2   Date Value Ref Range Status   07/21/2020 26.0 22.0 - 30.0 mmol/L Final     Chloride   Date Value Ref Range Status   07/21/2020 101 101 - 112 mmol/L Final     Anion Gap   Date Value Ref Range Status   07/21/2020 9.0 5.0 - 15.0 mmol/L Final     Creatinine   Date Value Ref Range Status   07/21/2020 1.18 (H) 0.52 - 1.04 mg/dL Final     BUN   Date Value Ref Range Status   07/21/2020 27 (H) 7 - 23 mg/dL Final     BUN/Creatinine Ratio   Date Value Ref Range Status   07/21/2020 22.9 7.0 - 25.0 Final     Calcium   Date Value Ref Range Status   07/21/2020 10.3 (H) 8.4 - 10.2 mg/dL Final     eGFR Non  Amer   Date Value Ref Range Status   07/21/2020 45 39 - 90 mL/min/1.73 Final     Alkaline Phosphatase   Date Value Ref Range Status   07/21/2020 44 38 - 126 U/L Final     Total Protein   Date Value Ref Range Status   07/21/2020 8.3 6.3 - 8.6 g/dL Final     ALT (SGPT)   Date Value Ref Range Status   07/21/2020 23 <=35 U/L Final     AST (SGOT)   Date Value Ref Range Status   07/21/2020 27 14 - 36 U/L Final     Total Bilirubin   Date Value Ref Range Status   07/21/2020 0.5 0.2 - 1.3 mg/dL Final     Albumin   Date Value Ref Range Status   07/21/2020 4.60 3.50 - 5.00 g/dL Final     Globulin   Date Value Ref Range Status   07/21/2020 3.7 (H) 2.3 - 3.5 gm/dL Final     Lab Results   Component Value Date    WBC 7.16 07/21/2020    HGB 11.8 (L) 07/21/2020    HCT 35.2 07/21/2020    MCV 94.9 07/21/2020     07/21/2020     Lab Results   Component Value Date    NEUTROABS 4.97 07/21/2020    IRON 109 07/21/2020    TIBC 472 07/21/2020    LABIRON 23 07/21/2020    FERRITIN 48.79 07/21/2020    [id number] 07/21/2020    FOLATE >20.00 07/21/2020     No results found for: , LABCA2, AFPTM, HCGQUANT, , CHROMGRNA, [id number], CEA, REFLABREPO            ASSESSMENT AND PLAN:      1.  Iron  deficiency anemia:  - Patient had a EGD and colonoscopy by Dr. Sutton on November 30, 2018 that did not show any evidence of active bleeding.  - Due to inability to tolerate iron by mouth patient has required intravenous Feraheme, last dose of Feraheme was on February 15, 2019.  - Patient remains on B12 and folic acid 3 times a week.  - Anemia work-up done on July 21, 2020 shows hemoglobin is 11.8.  - Iron studies shows adequate amount of iron.  No need to do any intravenous Feraheme at present.  -Recommend continue with B12 and folic acid 3 times a week.  -We will ask patient to return to clinic in 3 months with repeat CBC and anemia work-up to be done prior to that.    2.  Hypercalcemia:( problem is new to me)  -Patient is found to have hypercalcemia with calcium of 10.3.  -Patient states she has not been taking any calcium.  Hydrochlorothiazide can also contribute to hypercalcemia.  Encourage hydration for now.  Encourage avoiding any kind of supplement with calcium.  -Recommend rechecking blood work with primary medical provider in 2 to 3 weeks to follow-up on calcium level.    3.  Elevated creatinine:  - Creatinine is again elevated at 1.18, patient was counseled about increasing hydration.    4.  Genetic counseling:  - Patient had 3 family members with breast cancer.  - Patient had a genetic testing done with [employer] cancer panel which was negative including BRCA1 and BRCA2 in March 2020.     5.  Health maintenance: Patient does not smoke.    6. Advance Care Planning: For now patient remains full code and is able to make decisions.  Patient has health care surrogate mentioned on chart.    PHQ-9 Total Score: 0   -Patient is not homicidal or suicidal.  No acute intervention required.    Lois Agata reports a pain score of 0.  Given her pain assessment as noted, treatment options were discussed and the following options were decided upon as a follow-up plan to address the patient's pain: continuation of  current treatment plan for pain.         Ranjeet De La Torre MD  7/23/2020  11:46        Part of this note may be an electronic transcription/translation of spoken language to printed text using the Dragon Dictation System.

## 2020-08-05 ENCOUNTER — OFFICE VISIT (OUTPATIENT)
Dept: FAMILY MEDICINE CLINIC | Facility: CLINIC | Age: 70
End: 2020-08-05

## 2020-08-05 VITALS — BODY MASS INDEX: 47.95 KG/M2 | WEIGHT: 254 LBS | HEIGHT: 61 IN

## 2020-08-05 DIAGNOSIS — Z23 NEED FOR DIPHTHERIA-TETANUS-PERTUSSIS (TDAP) VACCINE: ICD-10-CM

## 2020-08-05 DIAGNOSIS — Z00.00 MEDICARE ANNUAL WELLNESS VISIT, SUBSEQUENT: Primary | ICD-10-CM

## 2020-08-05 DIAGNOSIS — Z23 NEED FOR SHINGLES VACCINE: ICD-10-CM

## 2020-08-05 DIAGNOSIS — Z11.59 ENCOUNTER FOR HEPATITIS C SCREENING TEST FOR LOW RISK PATIENT: ICD-10-CM

## 2020-08-05 PROCEDURE — G0439 PPPS, SUBSEQ VISIT: HCPCS | Performed by: INTERNAL MEDICINE

## 2020-08-05 PROCEDURE — 96160 PT-FOCUSED HLTH RISK ASSMT: CPT | Performed by: INTERNAL MEDICINE

## 2020-08-05 NOTE — PROGRESS NOTES
The ABCs of the Annual Wellness Visit  Subsequent Medicare Wellness Visit    Chief Complaint   Patient presents with   • Medicare Wellness-subsequent       Subjective   History of Present Illness:  Lois Parmar is a 70 y.o. female who presents for a Subsequent Medicare Wellness Visit. You have chosen to receive care through a telephone visit. Do you consent to use a telephone visit for your medical care today? Yes    HEALTH RISK ASSESSMENT    Recent Hospitalizations:  No hospitalization(s) within the last year.    Current Medical Providers:  Patient Care Team:  Marshal Warner MD as PCP - Elyse Wheatley APRN as Gynecologist (Nurse Practitioner)  Ranjeet De La Torre MD as Consulting Physician (Hematology and Oncology)    Smoking Status:  Social History     Tobacco Use   Smoking Status Never Smoker   Smokeless Tobacco Never Used       Alcohol Consumption:  Social History     Substance and Sexual Activity   Alcohol Use No       Depression Screen:   PHQ-2/PHQ-9 Depression Screening 8/5/2020   Little interest or pleasure in doing things 0   Feeling down, depressed, or hopeless 1   Trouble falling or staying asleep, or sleeping too much -   Feeling tired or having little energy -   Poor appetite or overeating -   Feeling bad about yourself - or that you are a failure or have let yourself or your family down -   Trouble concentrating on things, such as reading the newspaper or watching television -   Moving or speaking so slowly that other people could have noticed. Or the opposite - being so fidgety or restless that you have been moving around a lot more than usual -   Thoughts that you would be better off dead, or of hurting yourself in some way -   Total Score 1   If you checked off any problems, how difficult have these problems made it for you to do your work, take care of things at home, or get along with other people? -       Fall Risk Screen:  STEADI Fall Risk Assessment was completed, and  patient is at LOW risk for falls.Assessment completed on:8/5/2020    Health Habits and Functional and Cognitive Screening:  Functional & Cognitive Status 8/5/2020   Do you have difficulty preparing food and eating? No   Do you have difficulty bathing yourself, getting dressed or grooming yourself? No   Do you have difficulty using the toilet? No   Do you have difficulty moving around from place to place? No   Do you have trouble with steps or getting out of a bed or a chair? No   Current Diet Well Balanced Diet   Dental Exam Up to date   Eye Exam Up to date   Exercise (times per week) 4 times per week   Current Exercise Activities Include Housecleaning   Do you need help using the phone?  No   Are you deaf or do you have serious difficulty hearing?  Yes   Do you need help with transportation? No   Do you need help shopping? No   Do you need help preparing meals?  No   Do you need help with housework?  No   Do you need help with laundry? No   Do you need help taking your medications? No   Do you need help managing money? No   Do you ever drive or ride in a car without wearing a seat belt? No   Have you felt unusual stress, anger or loneliness in the last month? Yes   Who do you live with? Spouse   If you need help, do you have trouble finding someone available to you? No   Have you been bothered in the last four weeks by sexual problems? No   Do you have difficulty concentrating, remembering or making decisions? No         Does the patient have evidence of cognitive impairment? No    Asprin use counseling:Taking ASA appropriately as indicated    Age-appropriate Screening Schedule:  Refer to the list below for future screening recommendations based on patient's age, sex and/or medical conditions. Orders for these recommended tests are listed in the plan section. The patient has been provided with a written plan.    Health Maintenance   Topic Date Due   • TDAP/TD VACCINES (1 - Tdap) 01/16/1961   • ZOSTER VACCINE (2 of  3) 07/01/2011   • INFLUENZA VACCINE  08/01/2020   • DXA SCAN  05/16/2022 (Originally 4/18/2020)   • LIPID PANEL  07/10/2021   • COLONOSCOPY  11/30/2021   • MAMMOGRAM  02/04/2022          The following portions of the patient's history were reviewed and updated as appropriate:   She  has a past medical history of Acquired hypothyroidism - On Synthroid, Allergic rhinitis, Allergy to alpha-gal (7/17/2020), Anemia, Asthma - mild intermittent, Cardiac disease, Carpal tunnel syndrome - Bilateral, Colitis, Degeneration of cervical intervertebral disc, Diabetes (CMS/HCC), Disorder of lumbar spine, Diverticulosis of large intestine (1/17/2019), Essential hypertension, Essential hypertension, Fatigue, History of myocardial infarct at age greater than 60 years (10/17/2018), Hyperlipidemia - Improved with Zocor, IFG (impaired fasting glucose) (10/17/2018), Impaired fasting glycaemia, Iron deficiency anemia - Improved, Lumbar radiculopathy, Megaloblastic anemia due to vitamin B>12< deficiency, Morbid obesity (CMS/[facility]), Osteoarthritis of knee - Bilateral, Osteoarthritis of multiple joints - Left knee, Osteoporosis, Pain in left hip, Sleep apnea - On CPAP, Spasm of back muscles, Stage 3 chronic kidney disease (CMS/HCC) (10/23/2019), Thyroid dysfunction, Tubular adenoma of colon - removed during colonoscopy, 11/2018. (12/3/2018), and Vitamin D deficiency.  She does not have any pertinent problems on file.  She  has a past surgical history that includes endoscopy and colonoscopy (09/17/2012); Lumbar laminectomy (2011); Esophagogastroduodenoscopy (09/17/2012); Hysterectomy (1980); Shoulder surgery (12/07/2015); Shoulder surgery (Right); Carpal tunnel release (Right); Neck surgery; Joint replacement; Total knee arthroplasty (Left); Colonoscopy (N/A, 11/30/2018); Esophagogastroduodenoscopy (N/A, 11/30/2018); and Oophorectomy (1980).  Her family history includes Breast cancer in her mother, sister, and another family member; Cancer in  an other family member; Diabetes in an other family member; Heart disease in an other family member; Hypertension in an other family member; Lung disease in an other family member; Ovarian cancer in her mother.  She  reports that she has never smoked. She has never used smokeless tobacco. She reports that she does not drink alcohol or use drugs.  Current Outpatient Medications   Medication Sig Dispense Refill   • aspirin 81 MG chewable tablet Chew 81 mg Daily.     • Calcium Carb-Cholecalciferol (CALCIUM 600 + D) 600-200 MG-UNIT tablet Take 2 tablets by mouth Daily.     • Cholecalciferol (VITAMIN D) 1000 units tablet Take 1 tablet by mouth Daily.     • CYMBALTA 60 MG capsule Take 1 capsule by mouth Daily. 90 capsule 3   • docusate sodium (COLACE) 100 MG capsule Take 100 mg by mouth.     • EPINEPHrine (EPIPEN) 0.3 MG/0.3ML solution auto-injector injection Inject 0.3 mg into the appropriate muscle as directed by prescriber.     • famotidine (PEPCID) 40 MG tablet TAKE 1 TABLET BY MOUTH EVERY NIGHT. THIS REPLACES ZANTAC 90 tablet 0   • Ferrous Sulfate ER (Slow Fe) 142 (45 Fe) MG tablet controlled-release Take 142 mg by mouth Daily.     • folic acid (FOLVITE) 1 MG tablet TAKE 1 TABLET EVERY DAY 90 tablet 1   • glucose blood test strip tests BID, Diagnosis: 250.00, 401.9     • labetalol (NORMODYNE) 200 MG tablet Take 100 mg by mouth 2 (Two) Times a Day. 1/2 tab twice daily      • levothyroxine (SYNTHROID, LEVOTHROID) 75 MCG tablet Take 1 tablet by mouth Daily. 90 tablet 3   • loratadine (CLARITIN) 10 MG tablet Take 10 mg by mouth daily.     • metFORMIN (GLUCOPHAGE) 500 MG tablet Take 1 tablet by mouth 2 (Two) Times a Day With Meals. 180 tablet 3   • olmesartan-hydrochlorothiazide (BENICAR HCT) 40-25 MG per tablet Take 1 tablet by mouth Daily. 90 tablet 0   • Omega-3 Fatty Acids (FISH OIL) 1000 MG capsule capsule Take 1,000 mg by mouth 2 (Two) Times a Day With Meals.     • PREVACID 30 MG capsule TAKE 1 CAPSULE EVERY MORNING  90 capsule 0   • simvastatin (ZOCOR) 40 MG tablet Take 1 tablet by mouth Every Night. 90 tablet 3   • spironolactone (ALDACTONE) 25 MG tablet Take 1 tablet by mouth Daily. 90 tablet 1   • vitamin B-12 (CYANOCOBALAMIN) 1000 MCG tablet Take 1 tablet by mouth Daily. 90 tablet 1     No current facility-administered medications for this visit.      Current Outpatient Medications on File Prior to Visit   Medication Sig   • aspirin 81 MG chewable tablet Chew 81 mg Daily.   • Calcium Carb-Cholecalciferol (CALCIUM 600 + D) 600-200 MG-UNIT tablet Take 2 tablets by mouth Daily.   • Cholecalciferol (VITAMIN D) 1000 units tablet Take 1 tablet by mouth Daily.   • CYMBALTA 60 MG capsule Take 1 capsule by mouth Daily.   • docusate sodium (COLACE) 100 MG capsule Take 100 mg by mouth.   • EPINEPHrine (EPIPEN) 0.3 MG/0.3ML solution auto-injector injection Inject 0.3 mg into the appropriate muscle as directed by prescriber.   • famotidine (PEPCID) 40 MG tablet TAKE 1 TABLET BY MOUTH EVERY NIGHT. THIS REPLACES ZANTAC   • Ferrous Sulfate ER (Slow Fe) 142 (45 Fe) MG tablet controlled-release Take 142 mg by mouth Daily.   • folic acid (FOLVITE) 1 MG tablet TAKE 1 TABLET EVERY DAY   • glucose blood test strip tests BID, Diagnosis: 250.00, 401.9   • labetalol (NORMODYNE) 200 MG tablet Take 100 mg by mouth 2 (Two) Times a Day. 1/2 tab twice daily    • levothyroxine (SYNTHROID, LEVOTHROID) 75 MCG tablet Take 1 tablet by mouth Daily.   • loratadine (CLARITIN) 10 MG tablet Take 10 mg by mouth daily.   • metFORMIN (GLUCOPHAGE) 500 MG tablet Take 1 tablet by mouth 2 (Two) Times a Day With Meals.   • olmesartan-hydrochlorothiazide (BENICAR HCT) 40-25 MG per tablet Take 1 tablet by mouth Daily.   • Omega-3 Fatty Acids (FISH OIL) 1000 MG capsule capsule Take 1,000 mg by mouth 2 (Two) Times a Day With Meals.   • PREVACID 30 MG capsule TAKE 1 CAPSULE EVERY MORNING   • simvastatin (ZOCOR) 40 MG tablet Take 1 tablet by mouth Every Night.   •  spironolactone (ALDACTONE) 25 MG tablet Take 1 tablet by mouth Daily.   • vitamin B-12 (CYANOCOBALAMIN) 1000 MCG tablet Take 1 tablet by mouth Daily.     No current facility-administered medications on file prior to visit.      She is allergic to lortab [hydrocodone-acetaminophen]; codeine; antihistamines, chlorpheniramine-type; latex; other; and tape..    Outpatient Medications Prior to Visit   Medication Sig Dispense Refill   • aspirin 81 MG chewable tablet Chew 81 mg Daily.     • Calcium Carb-Cholecalciferol (CALCIUM 600 + D) 600-200 MG-UNIT tablet Take 2 tablets by mouth Daily.     • Cholecalciferol (VITAMIN D) 1000 units tablet Take 1 tablet by mouth Daily.     • CYMBALTA 60 MG capsule Take 1 capsule by mouth Daily. 90 capsule 3   • docusate sodium (COLACE) 100 MG capsule Take 100 mg by mouth.     • EPINEPHrine (EPIPEN) 0.3 MG/0.3ML solution auto-injector injection Inject 0.3 mg into the appropriate muscle as directed by prescriber.     • famotidine (PEPCID) 40 MG tablet TAKE 1 TABLET BY MOUTH EVERY NIGHT. THIS REPLACES ZANTAC 90 tablet 0   • Ferrous Sulfate ER (Slow Fe) 142 (45 Fe) MG tablet controlled-release Take 142 mg by mouth Daily.     • folic acid (FOLVITE) 1 MG tablet TAKE 1 TABLET EVERY DAY 90 tablet 1   • glucose blood test strip tests BID, Diagnosis: 250.00, 401.9     • labetalol (NORMODYNE) 200 MG tablet Take 100 mg by mouth 2 (Two) Times a Day. 1/2 tab twice daily      • levothyroxine (SYNTHROID, LEVOTHROID) 75 MCG tablet Take 1 tablet by mouth Daily. 90 tablet 3   • loratadine (CLARITIN) 10 MG tablet Take 10 mg by mouth daily.     • metFORMIN (GLUCOPHAGE) 500 MG tablet Take 1 tablet by mouth 2 (Two) Times a Day With Meals. 180 tablet 3   • olmesartan-hydrochlorothiazide (BENICAR HCT) 40-25 MG per tablet Take 1 tablet by mouth Daily. 90 tablet 0   • Omega-3 Fatty Acids (FISH OIL) 1000 MG capsule capsule Take 1,000 mg by mouth 2 (Two) Times a Day With Meals.     • PREVACID 30 MG capsule TAKE 1  CAPSULE EVERY MORNING 90 capsule 0   • simvastatin (ZOCOR) 40 MG tablet Take 1 tablet by mouth Every Night. 90 tablet 3   • spironolactone (ALDACTONE) 25 MG tablet Take 1 tablet by mouth Daily. 90 tablet 1   • vitamin B-12 (CYANOCOBALAMIN) 1000 MCG tablet Take 1 tablet by mouth Daily. 90 tablet 1     No facility-administered medications prior to visit.        Patient Active Problem List   Diagnosis   • Vitamin D deficiency   • Spasm of back muscles   • Sleep apnea - On CPAP   • Pain in left hip   • Osteoarthritis   • Osteoarthritis of knee - Bilateral   • Obesity   • Morbid obesity (CMS/HCC)   • Megaloblastic anemia due to vitamin B>12< deficiency   • Lumbar radiculopathy   • Iron deficiency anemia - Improved   • Hyperlipidemia - Improved with Zocor   • Fatigue   • Essential hypertension   • Degeneration of cervical intervertebral disc   • Disorder of lumbar spine   • Carpal tunnel syndrome - Bilateral   • Asthma - mild intermittent   • Allergic rhinitis   • Acquired hypothyroidism   • Hypomagnesemia   • Trochanteric bursitis of left hip   • Hip pain, acute, right   • Hip pain, chronic, left   • Chronic pain of both knees   • Acute pain of both shoulders   • Presence of total knee joint prosthesis, left   • IFG (impaired fasting glucose)   • History of myocardial infarct at age greater than 60 years   • Heme positive stool   • Anemia   • Tubular adenoma of colon - removed during colonoscopy, 11/2018.   • Hx of adenomatous colonic polyps   • Presence of total left knee joint prosthesis   • Chronic bilateral low back pain with bilateral sciatica   • Degenerative disc disease, lumbar   • Diverticulosis of large intestine   • Adverse effect of iron   • Iron deficiency anemia due to chronic blood loss   • Iron adverse reaction   • Cardiomyopathy (CMS/HCC) -  [place]   • Fibromyalgia   • Lumbar spinal stenosis   • Acute kidney injury superimposed on CKD (CMS/HCC)   • Stage 3 chronic kidney disease (CMS/HCC)   • Family  "history of breast cancer in female   • Allergy to alpha-gal       Advanced Care Planning:  ACP discussion was held with the patient during this visit. Patient does not have an advance directive, information provided.    Review of Systems    Compared to one year ago, the patient feels her physical health is better.  Compared to one year ago, the patient feels her mental health is better.    Reviewed chart for potential of high risk medication in the elderly: yes  Reviewed chart for potential of harmful drug interactions in the elderly:yes    Objective         Vitals:    08/05/20 1312   Weight: 115 kg (254 lb)   Height: 154.9 cm (61\")   PainSc:   2   PainLoc: Generalized       Body mass index is 47.99 kg/m².  Discussed the patient's BMI with her. The BMI is above average; BMI management plan is completed.    Physical Exam    Lab Results   Component Value Date    TRIG 258 (H) 07/10/2020     (H) 07/10/2020    HGBA1C 6.25 (H) 07/10/2020        Assessment/Plan   Medicare Risks and Personalized Health Plan  CMS Preventative Services Quick Reference  Advance Directive Discussion  Immunizations Discussed/Encouraged (specific immunizations; adacel Tdap and Shingrix )  Obesity/Overweight   Osteoprorosis Risk    The above risks/problems have been discussed with the patient.  We will send prescription for Shingrix vaccine to [facility] pharmacy.  She will come back for tetanus booster in the future.  Pertinent information has been shared with the patient in the After Visit Summary.  Follow up plans and orders are seen below in the Assessment/Plan Section.    Diagnoses and all orders for this visit:    1. Medicare annual wellness visit, subsequent (Primary)    2. Encounter for hepatitis C screening test for low risk patient  -     Hepatitis C Antibody; Future    3. Need for shingles vaccine    4. Need for diphtheria-tetanus-pertussis (Tdap) vaccine      Follow Up:  Return in about 1 year (around 8/5/2021) for Medicare " Wellness.     An After Visit Summary and PPPS were given to the patient.

## 2020-08-05 NOTE — PATIENT INSTRUCTIONS
Medicare Wellness  Personal Prevention Plan of Service     Date of Office Visit:  2020  Encounter Provider:  Marshal Warner MD  Place of Service:  [facility]  Patient Name: Lois Parmar  :  1950    As part of the Medicare Wellness portion of your visit today, we are providing you with this personalized preventive plan of services (PPPS). This plan is based upon recommendations of the [place] Preventive Services Task Force (USPSTF) and the Advisory Committee on Immunization Practices (ACIP).    This lists the preventive care services that should be considered, and provides dates of when you are due. Items listed as completed are up-to-date and do not require any further intervention.    Health Maintenance   Topic Date Due   • TDAP/TD VACCINES (1 - Tdap) 1960   • ZOSTER VACCINE (2 of 3) 2011   • HEPATITIS C SCREENING  2016   • DXA SCAN  2020   • MEDICARE ANNUAL WELLNESS  2020   • INFLUENZA VACCINE  2020   • LIPID PANEL  07/10/2021   • COLONOSCOPY  2021   • MAMMOGRAM  2022   • Pneumococcal Vaccine Once at 65 Years Old  Completed       No orders of the defined types were placed in this encounter.      No follow-ups on file.          Exercising to Lose Weight  Exercise is structured, repetitive physical activity to improve fitness and health. Getting regular exercise is important for everyone. It is especially important if you are overweight. Being overweight increases your risk of heart disease, stroke, diabetes, high blood pressure, and several types of cancer. Reducing your calorie intake and exercising can help you lose weight.  Exercise is usually categorized as moderate or vigorous intensity. To lose weight, most people need to do a certain amount of moderate-intensity or vigorous-intensity exercise each week.  Moderate-intensity exercise    Moderate-intensity exercise is any activity that gets you  moving enough to burn at least three times more energy (calories) than if you were sitting.  Examples of moderate exercise include:  · Walking a mile in 15 minutes.  · Doing light yard work.  · Biking at an easy pace.  Most people should get at least 150 minutes (2 hours and 30 minutes) a week of moderate-intensity exercise to maintain their body weight.  Vigorous-intensity exercise  Vigorous-intensity exercise is any activity that gets you moving enough to burn at least six times more calories than if you were sitting. When you exercise at this intensity, you should be working hard enough that you are not able to carry on a conversation.  Examples of vigorous exercise include:  · Running.  · Playing a team sport, such as football, basketball, and soccer.  · Jumping rope.  Most people should get at least 75 minutes (1 hour and 15 minutes) a week of vigorous-intensity exercise to maintain their body weight.  How can exercise affect me?  When you exercise enough to burn more calories than you eat, you lose weight. Exercise also reduces body fat and builds muscle. The more muscle you have, the more calories you burn. Exercise also:  · Improves mood.  · Reduces stress and tension.  · Improves your overall fitness, flexibility, and endurance.  · Increases bone strength.  The amount of exercise you need to lose weight depends on:  · Your age.  · The type of exercise.  · Any health conditions you have.  · Your overall physical ability.  Talk to your health care provider about how much exercise you need and what types of activities are safe for you.  What actions can I take to lose weight?  Nutrition    · Make changes to your diet as told by your health care provider or diet and nutrition specialist (dietitian). This may include:  ? Eating fewer calories.  ? Eating more protein.  ? Eating less unhealthy fats.  ? Eating a diet that includes fresh fruits and vegetables, whole grains, low-fat dairy products, and lean  protein.  ? Avoiding foods with added fat, salt, and sugar.  · Drink plenty of water while you exercise to prevent dehydration or heat stroke.  Activity  · Choose an activity that you enjoy and set realistic goals. Your health care provider can help you make an exercise plan that works for you.  · Exercise at a moderate or vigorous intensity most days of the week.  ? The intensity of exercise may vary from person to person. You can tell how intense a workout is for you by paying attention to your breathing and heartbeat. Most people will notice their breathing and heartbeat get faster with more intense exercise.  · Do resistance training twice each week, such as:  ? Push-ups.  ? Sit-ups.  ? Lifting weights.  ? Using resistance bands.  · Getting short amounts of exercise can be just as helpful as long structured periods of exercise. If you have trouble finding time to exercise, try to include exercise in your daily routine.  ? Get up, stretch, and walk around every 30 minutes throughout the day.  ? Go for a walk during your lunch break.  ? Park your car farther away from your destination.  ? If you take public transportation, get off one stop early and walk the rest of the way.  ? Make phone calls while standing up and walking around.  ? Take the stairs instead of elevators or escalators.  · Wear comfortable clothes and shoes with good support.  · Do not exercise so much that you hurt yourself, feel dizzy, or get very short of breath.  Where to find more information  · [place] Department of Health and Human Services: www.hhs.gov  · Centers for Disease Control and Prevention (CDC): www.cdc.gov  Contact a health care provider:  · Before starting a new exercise program.  · If you have questions or concerns about your weight.  · If you have a medical problem that keeps you from exercising.  Get help right away if you have any of the following while exercising:  · Injury.  · Dizziness.  · Difficulty breathing or shortness of  breath that does not go away when you stop exercising.  · Chest pain.  · Rapid heartbeat.  Summary  · Being overweight increases your risk of heart disease, stroke, diabetes, high blood pressure, and several types of cancer.  · Losing weight happens when you burn more calories than you eat.  · Reducing the amount of calories you eat in addition to getting regular moderate or vigorous exercise each week helps you lose weight.  This information is not intended to replace advice given to you by your health care provider. Make sure you discuss any questions you have with your health care provider.  Document Released: 01/20/2012 Document Revised: 12/31/2018 Document Reviewed: 12/31/2018  [employer] Patient Education © 2020 [employer]      Calorie Counting for Weight Loss  Calories are units of energy. Your body needs a certain amount of calories from food to keep you going throughout the day. When you eat more calories than your body needs, your body stores the extra calories as fat. When you eat fewer calories than your body needs, your body burns fat to get the energy it needs.  Calorie counting means keeping track of how many calories you eat and drink each day. Calorie counting can be helpful if you need to lose weight. If you make sure to eat fewer calories than your body needs, you should lose weight. Ask your health care provider what a healthy weight is for you.  For calorie counting to work, you will need to eat the right number of calories in a day in order to lose a healthy amount of weight per week. A dietitian can help you determine how many calories you need in a day and will give you suggestions on how to reach your calorie goal.  · A healthy amount of weight to lose per week is usually 1-2 lb (0.5-0.9 kg). This usually means that your daily calorie intake should be reduced by 500-750 calories.  · Eating 1,200 - 1,500 calories per day can help most women lose weight.  · Eating 1,500 - 1,800 calories per  day can help most men lose weight.  What is my plan?  My goal is to have __________ calories per day.  If I have this many calories per day, I should lose around __________ pounds per week.  What do I need to know about calorie counting?  In order to meet your daily calorie goal, you will need to:  · Find out how many calories are in each food you would like to eat. Try to do this before you eat.  · Decide how much of the food you plan to eat.  · Write down what you ate and how many calories it had. Doing this is called keeping a food log.  To successfully lose weight, it is important to balance calorie counting with a healthy lifestyle that includes regular activity. Aim for 150 minutes of moderate exercise (such as walking) or 75 minutes of vigorous exercise (such as running) each week.  Where do I find calorie information?    The number of calories in a food can be found on a Nutrition Facts label. If a food does not have a Nutrition Facts label, try to look up the calories online or ask your dietitian for help.  Remember that calories are listed per serving. If you choose to have more than one serving of a food, you will have to multiply the calories per serving by the amount of servings you plan to eat. For example, the label on a package of bread might say that a serving size is 1 slice and that there are 90 calories in a serving. If you eat 1 slice, you will have eaten 90 calories. If you eat 2 slices, you will have eaten 180 calories.  How do I keep a food log?  Immediately after each meal, record the following information in your food log:  · What you ate. Don't forget to include toppings, sauces, and other extras on the food.  · How much you ate. This can be measured in cups, ounces, or number of items.  · How many calories each food and drink had.  · The total number of calories in the meal.  Keep your food log near you, such as in a small notebook in your pocket, or use a mobile shad or website. Some  "programs will calculate calories for you and show you how many calories you have left for the day to meet your goal.  What are some calorie counting tips?    · Use your calories on foods and drinks that will fill you up and not leave you hungry:  ? Some examples of foods that fill you up are nuts and nut butters, vegetables, lean proteins, and high-fiber foods like whole grains. High-fiber foods are foods with more than 5 g fiber per serving.  ? Drinks such as sodas, specialty coffee drinks, alcohol, and juices have a lot of calories, yet do not fill you up.  · Eat nutritious foods and avoid empty calories. Empty calories are calories you get from foods or beverages that do not have many vitamins or protein, such as candy, sweets, and soda. It is better to have a nutritious high-calorie food (such as an avocado) than a food with few nutrients (such as a bag of chips).  · Know how many calories are in the foods you eat most often. This will help you calculate calorie counts faster.  · Pay attention to calories in drinks. Low-calorie drinks include water and unsweetened drinks.  · Pay attention to nutrition labels for \"low fat\" or \"fat free\" foods. These foods sometimes have the same amount of calories or more calories than the full fat versions. They also often have added sugar, starch, or salt, to make up for flavor that was removed with the fat.  · Find a way of tracking calories that works for you. Get creative. Try different apps or programs if writing down calories does not work for you.  What are some portion control tips?  · Know how many calories are in a serving. This will help you know how many servings of a certain food you can have.  · Use a measuring cup to measure serving sizes. You could also try weighing out portions on a kitchen scale. With time, you will be able to estimate serving sizes for some foods.  · Take some time to put servings of different foods on your favorite plates, bowls, and cups so " you know what a serving looks like.  · Try not to eat straight from a bag or box. Doing this can lead to overeating. Put the amount you would like to eat in a cup or on a plate to make sure you are eating the right portion.  · Use smaller plates, glasses, and bowls to prevent overeating.  · Try not to multitask (for example, watch TV or use your computer) while eating. If it is time to eat, sit down at a table and enjoy your food. This will help you to know when you are full. It will also help you to be aware of what you are eating and how much you are eating.  What are tips for following this plan?  Reading food labels  · Check the calorie count compared to the serving size. The serving size may be smaller than what you are used to eating.  · Check the source of the calories. Make sure the food you are eating is high in vitamins and protein and low in saturated and trans fats.  Shopping  · Read nutrition labels while you shop. This will help you make healthy decisions before you decide to purchase your food.  · Make a grocery list and stick to it.  Cooking  · Try to cook your favorite foods in a healthier way. For example, try baking instead of frying.  · Use low-fat dairy products.  Meal planning  · Use more fruits and vegetables. Half of your plate should be fruits and vegetables.  · Include lean proteins like poultry and fish.  How do I count calories when eating out?  · Ask for smaller portion sizes.  · Consider sharing an entree and sides instead of getting your own entree.  · If you get your own entree, eat only half. Ask for a box at the beginning of your meal and put the rest of your entree in it so you are not tempted to eat it.  · If calories are listed on the menu, choose the lower calorie options.  · Choose dishes that include vegetables, fruits, whole grains, low-fat dairy products, and lean protein.  · Choose items that are boiled, broiled, grilled, or steamed. Stay away from items that are buttered,  "battered, fried, or served with cream sauce. Items labeled \"crispy\" are usually fried, unless stated otherwise.  · Choose water, low-fat milk, unsweetened iced tea, or other drinks without added sugar. If you want an alcoholic beverage, choose a lower calorie option such as a glass of wine or light beer.  · Ask for dressings, sauces, and syrups on the side. These are usually high in calories, so you should limit the amount you eat.  · If you want a salad, choose a garden salad and ask for grilled meats. Avoid extra toppings like sullivan, cheese, or fried items. Ask for the dressing on the side, or ask for olive oil and vinegar or lemon to use as dressing.  · Estimate how many servings of a food you are given. For example, a serving of cooked rice is ½ cup or about the size of half a baseball. Knowing serving sizes will help you be aware of how much food you are eating at restaurants. The list below tells you how big or small some common portion sizes are based on everyday objects:  ? 1 oz--4 stacked dice.  ? 3 oz--1 deck of cards.  ? 1 tsp--1 die.  ? 1 Tbsp--½ a ping-pong ball.  ? 2 Tbsp--1 ping-pong ball.  ? ½ cup--½ baseball.  ? 1 cup--1 baseball.  Summary  · Calorie counting means keeping track of how many calories you eat and drink each day. If you eat fewer calories than your body needs, you should lose weight.  · A healthy amount of weight to lose per week is usually 1-2 lb (0.5-0.9 kg). This usually means reducing your daily calorie intake by 500-750 calories.  · The number of calories in a food can be found on a Nutrition Facts label. If a food does not have a Nutrition Facts label, try to look up the calories online or ask your dietitian for help.  · Use your calories on foods and drinks that will fill you up, and not on foods and drinks that will leave you hungry.  · Use smaller plates, glasses, and bowls to prevent overeating.  This information is not intended to replace advice given to you by your health " care provider. Make sure you discuss any questions you have with your health care provider.  Document Released: 12/18/2006 Document Revised: 09/06/2019 Document Reviewed: 11/17/2017  [employer] Patient Education © 2020 [employer]

## 2020-08-07 ENCOUNTER — TELEPHONE (OUTPATIENT)
Dept: FAMILY MEDICINE CLINIC | Facility: CLINIC | Age: 70
End: 2020-08-07

## 2020-08-07 DIAGNOSIS — E83.52 HYPERCALCEMIA: Primary | ICD-10-CM

## 2020-08-07 NOTE — TELEPHONE ENCOUNTER
08/07/2020 Patient states she had stopped Calcium a year ago. I instructed her to come and get lab work up for calcium. TP          ----- Message from Marshal Paez MD sent at 8/7/2020 12:15 PM CDT -----  Regarding: RE: MEDICATION  Have her reduce her calcium supplement to once daily.  In 2 weeks, come in for nonfasting CMP and PTH due to mild hypercalcemia.  EB  ----- Message -----  From: Ruel Mack RN  Sent: 8/7/2020  11:14 AM CDT  To: Marshal Paez MD  Subject: FW: MEDICATION                                   I checked with Dr. Hewitt note and it does state to recheck in 2-3 weeks through Dr. Paez. On 07/21 her calcium was 10. Is there any additional test you want to do with calcium? TP  ----- Message -----  From: Renetta Otero RegSched Rep  Sent: 8/7/2020  10:10 AM CDT  To: Ruel Mack RN  Subject: MEDICATION                                       DR HEWITT WANTS DR PAEZ TO RECHECK HER CALCIUM NEEDS LAB PUT IN

## 2020-08-17 ENCOUNTER — LAB (OUTPATIENT)
Dept: LAB | Facility: OTHER | Age: 70
End: 2020-08-17

## 2020-08-17 ENCOUNTER — CLINICAL SUPPORT (OUTPATIENT)
Dept: FAMILY MEDICINE CLINIC | Facility: CLINIC | Age: 70
End: 2020-08-17

## 2020-08-17 DIAGNOSIS — Z23 NEED FOR TDAP VACCINATION: Primary | ICD-10-CM

## 2020-08-17 DIAGNOSIS — E83.52 HYPERCALCEMIA: ICD-10-CM

## 2020-08-17 LAB
ALBUMIN SERPL-MCNC: 4.4 G/DL (ref 3.5–5)
ALBUMIN/GLOB SERPL: 1.3 G/DL (ref 1.1–1.8)
ALP SERPL-CCNC: 46 U/L (ref 38–126)
ALT SERPL W P-5'-P-CCNC: 18 U/L
ANION GAP SERPL CALCULATED.3IONS-SCNC: 7 MMOL/L (ref 5–15)
AST SERPL-CCNC: 22 U/L (ref 14–36)
BILIRUB SERPL-MCNC: 0.3 MG/DL (ref 0.2–1.3)
BUN SERPL-MCNC: 17 MG/DL (ref 7–23)
BUN/CREAT SERPL: 16.8 (ref 7–25)
CALCIUM SPEC-SCNC: 9.8 MG/DL (ref 8.6–10.5)
CALCIUM SPEC-SCNC: 9.9 MG/DL (ref 8.4–10.2)
CHLORIDE SERPL-SCNC: 103 MMOL/L (ref 101–112)
CO2 SERPL-SCNC: 29 MMOL/L (ref 22–30)
CREAT SERPL-MCNC: 1.01 MG/DL (ref 0.52–1.04)
GFR SERPL CREATININE-BSD FRML MDRD: 54 ML/MIN/1.73 (ref 39–90)
GLOBULIN UR ELPH-MCNC: 3.3 GM/DL (ref 2.3–3.5)
GLUCOSE SERPL-MCNC: 103 MG/DL (ref 70–99)
POTASSIUM SERPL-SCNC: 4.7 MMOL/L (ref 3.4–5)
PROT SERPL-MCNC: 7.7 G/DL (ref 6.3–8.6)
PTH-INTACT SERPL-MCNC: 26.8 PG/ML (ref 15–65)
SODIUM SERPL-SCNC: 139 MMOL/L (ref 137–145)

## 2020-08-17 PROCEDURE — 80053 COMPREHEN METABOLIC PANEL: CPT | Performed by: INTERNAL MEDICINE

## 2020-08-17 PROCEDURE — 83970 ASSAY OF PARATHORMONE: CPT | Performed by: INTERNAL MEDICINE

## 2020-08-17 PROCEDURE — 90471 IMMUNIZATION ADMIN: CPT | Performed by: INTERNAL MEDICINE

## 2020-08-17 PROCEDURE — 36415 COLL VENOUS BLD VENIPUNCTURE: CPT | Performed by: INTERNAL MEDICINE

## 2020-08-17 PROCEDURE — 90715 TDAP VACCINE 7 YRS/> IM: CPT | Performed by: INTERNAL MEDICINE

## 2020-09-24 RX ORDER — SPIRONOLACTONE 25 MG/1
TABLET ORAL
Qty: 90 TABLET | Refills: 1 | OUTPATIENT
Start: 2020-09-24

## 2020-10-11 RX ORDER — LANSOPRAZOLE 30 MG
30 CAPSULE,DELAYED RELEASE (ENTERIC COATED) ORAL EVERY MORNING
Qty: 90 CAPSULE | Refills: 1 | Status: SHIPPED | OUTPATIENT
Start: 2020-10-11 | End: 2021-04-07

## 2020-10-11 RX ORDER — LEVOTHYROXINE SODIUM 0.07 MG/1
75 TABLET ORAL DAILY
Qty: 90 TABLET | Refills: 1 | Status: SHIPPED | OUTPATIENT
Start: 2020-10-11 | End: 2021-01-18 | Stop reason: SDUPTHER

## 2020-10-12 RX ORDER — FOLIC ACID 1 MG/1
TABLET ORAL
Qty: 90 TABLET | Refills: 1 | Status: SHIPPED | OUTPATIENT
Start: 2020-10-12 | End: 2021-10-14

## 2020-10-13 ENCOUNTER — OFFICE VISIT (OUTPATIENT)
Dept: FAMILY MEDICINE CLINIC | Facility: CLINIC | Age: 70
End: 2020-10-13

## 2020-10-13 VITALS — BODY MASS INDEX: 46.82 KG/M2 | WEIGHT: 248 LBS | HEIGHT: 61 IN

## 2020-10-13 DIAGNOSIS — I42.9 CARDIOMYOPATHY, UNSPECIFIED TYPE (HCC): Chronic | ICD-10-CM

## 2020-10-13 DIAGNOSIS — R06.09 DOE (DYSPNEA ON EXERTION): ICD-10-CM

## 2020-10-13 DIAGNOSIS — R53.83 FATIGUE, UNSPECIFIED TYPE: Primary | ICD-10-CM

## 2020-10-13 DIAGNOSIS — N18.30 STAGE 3 CHRONIC KIDNEY DISEASE, UNSPECIFIED WHETHER STAGE 3A OR 3B CKD (HCC): Chronic | ICD-10-CM

## 2020-10-13 DIAGNOSIS — D53.1 MEGALOBLASTIC ANEMIA: Chronic | ICD-10-CM

## 2020-10-13 DIAGNOSIS — E66.01 MORBID OBESITY (HCC): Chronic | ICD-10-CM

## 2020-10-13 DIAGNOSIS — D50.0 IRON DEFICIENCY ANEMIA DUE TO CHRONIC BLOOD LOSS: Chronic | ICD-10-CM

## 2020-10-13 DIAGNOSIS — Z91.018 ALLERGY TO ALPHA-GAL: Chronic | ICD-10-CM

## 2020-10-13 PROCEDURE — 99442 PR PHYS/QHP TELEPHONE EVALUATION 11-20 MIN: CPT | Performed by: INTERNAL MEDICINE

## 2020-10-13 RX ORDER — LABETALOL 100 MG/1
TABLET, FILM COATED ORAL
COMMUNITY
Start: 2020-10-09 | End: 2021-04-09 | Stop reason: DRUGHIGH

## 2020-10-13 RX ORDER — EZETIMIBE 10 MG/1
10 TABLET ORAL DAILY
COMMUNITY
Start: 2020-08-12 | End: 2021-08-13

## 2020-10-13 RX ORDER — OLMESARTAN MEDOXOMIL 40 MG/1
40 TABLET ORAL DAILY
COMMUNITY
Start: 2020-09-03 | End: 2022-03-30

## 2020-10-13 RX ORDER — INDAPAMIDE 2.5 MG/1
2.5 TABLET, FILM COATED ORAL DAILY
COMMUNITY
Start: 2020-09-03 | End: 2023-03-13

## 2020-10-13 NOTE — PROGRESS NOTES
Subjective     You have chosen to receive care through a telephone visit. Do you consent to use a telephone visit for your medical care today? Yes   Total visit time: 16 minutes.      History of Present Illness     Lois Parmar is a 70 y.o. female who receives care today via telephone reporting worsening fatigue and dyspnea with exertion and feels this is similar to symptoms experienced in the past with worsening anemia. Patient continues to follow with Dr. De La Torre for iron deficient anemia and vitamin B12 deficiency and continues on oral iron and B-12 three days weekly. Patient had EGD and colonoscopy by Dr. Sutton on November 30, 2018.  EGD revealed gastritis.  Colonoscopy showed polyp without any evidence of bleeding or cancer.   Patient has required intravenous Feraheme in the past.  Hemoglobin was 11.8 in July.  She is not scheduled to see Dr. De La Torre until November.     She was just diagnosed with alpha gal recently by ODILIA Flores at the Dr. Boyle's office.  He follows her cardiomyopathy and worked up dyspnea on exertion.  He switched her from Benicar HCT to plain Benicar and added Indapamide.  She denies significant swelling other than with extended periods of weightbearing activity.         Review of Systems   Constitutional: Positive for fatigue. Negative for chills and fever.   HENT: Negative for congestion, ear pain, postnasal drip, sinus pressure and sore throat.    Respiratory: Positive for shortness of breath. Negative for cough and wheezing.    Cardiovascular: Negative for chest pain, palpitations and leg swelling.   Gastrointestinal: Negative for abdominal pain, blood in stool, constipation, diarrhea, nausea and vomiting.   Endocrine: Negative for cold intolerance, heat intolerance, polydipsia and polyuria.   Genitourinary: Negative for dysuria, frequency, hematuria and urgency.   Skin: Negative for rash.   Neurological: Negative for syncope and weakness.        Objective     Visit Vitals  Ht  "154.9 cm (61\")   Wt 112 kg (248 lb)   Breastfeeding No   BMI 46.86 kg/m²     Physical Exam    Future Appointments   Date Time Provider Department Center   11/19/2020 11:45 AM Ranjeet De La Torre MD [facility]   12/28/2020  1:30 PM eBrry Gardner II, MD [facility] SM [facility]AD None   1/25/2021  2:30 PM Marshal Warner MD [facility] POW None       Assessment/Plan      For the fatigue and dyspnea on exertion in this patient with iron deficiency and B-12 anemia on oral iron and oral B-12, we will check anemia workup including labs for CBC, CMP, iron/ferritin, B-12, and folate. Continue the oral iron and B-12.  We will notify with results.  Keep scheduled appointment with Dr. De La Torre next month and appointments with Dr. Boyle as scheduled.           She is going to have her second Shingrix today at her pharmacy.  I recommended she wait a couple of weeks before having influenza vaccine.       Continue to avoid mammal meats due to alpha gal.     Return in January for routine follow up with fasting labs one week prior.     Scribed for Dr. Warner by Adore Marrero [facility].       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  -     indapamide (LOZOL) 2.5 MG tablet; Take 2.5 mg by mouth Daily.  -     labetalol (NORMODYNE) 100 MG tablet; Take 1 tablet by mouth 2 (two) times a day.  -     olmesartan (BENICAR) 40 MG tablet; Take 40 mg by mouth Daily.        No visits with results within 3 Week(s) from this visit.   Latest known visit with results is:   Lab on 08/17/2020   Component Date Value Ref Range Status   • Glucose 08/17/2020 103* 70 - 99 mg/dL Final   • BUN 08/17/2020 17  7 - 23 mg/dL Final   • Creatinine 08/17/2020 1.01  0.52 - 1.04 mg/dL Final   • Sodium 08/17/2020 139  137 - 145 mmol/L Final   • Potassium 08/17/2020 4.7  3.4 - 5.0 mmol/L Final   • Chloride 08/17/2020 103  101 - 112 mmol/L Final   • CO2 08/17/2020 29.0  22.0 - 30.0 mmol/L Final   • Calcium 08/17/2020 9.9  8.4 - 10.2 mg/dL Final   • Total Protein 08/17/2020 7.7  6.3 - 8.6 g/dL Final   • Albumin 08/17/2020 4.40  3.50 - 5.00 g/dL Final   • ALT (SGPT) 08/17/2020 18  <=35 U/L Final   • AST (SGOT) 08/17/2020 22  14 - 36 U/L Final   • Alkaline Phosphatase 08/17/2020 46  38 - 126 U/L Final   • Total Bilirubin 08/17/2020 0.3  0.2 - 1.3 mg/dL Final   • eGFR Non African Amer 08/17/2020 54  39 - 90 mL/min/1.73 Final   • Globulin 08/17/2020 3.3  2.3 - 3.5 gm/dL Final   • A/G Ratio 08/17/2020 1.3  1.1 - 1.8 g/dL Final   • BUN/Creatinine Ratio 08/17/2020 16.8  7.0 - 25.0 Final   • Anion Gap 08/17/2020 7.0  5.0 - 15.0 mmol/L Final   • PTH, Intact 08/17/2020 26.8  15.0 - 65.0 pg/mL Final   • Calcium 08/17/2020 9.8  8.6 - 10.5 mg/dL Final   ]

## 2020-10-14 ENCOUNTER — LAB (OUTPATIENT)
Dept: LAB | Facility: OTHER | Age: 70
End: 2020-10-14

## 2020-10-14 DIAGNOSIS — N18.30 STAGE 3 CHRONIC KIDNEY DISEASE, UNSPECIFIED WHETHER STAGE 3A OR 3B CKD (HCC): Chronic | ICD-10-CM

## 2020-10-14 DIAGNOSIS — D53.1 MEGALOBLASTIC ANEMIA: Chronic | ICD-10-CM

## 2020-10-14 DIAGNOSIS — D50.0 IRON DEFICIENCY ANEMIA DUE TO CHRONIC BLOOD LOSS: Chronic | ICD-10-CM

## 2020-10-14 DIAGNOSIS — R53.83 FATIGUE, UNSPECIFIED TYPE: ICD-10-CM

## 2020-10-14 LAB
ALBUMIN SERPL-MCNC: 4.6 G/DL (ref 3.5–5)
ALBUMIN/GLOB SERPL: 1.3 G/DL (ref 1.1–1.8)
ALP SERPL-CCNC: 47 U/L (ref 38–126)
ALT SERPL W P-5'-P-CCNC: 20 U/L
ANION GAP SERPL CALCULATED.3IONS-SCNC: 9 MMOL/L (ref 5–15)
AST SERPL-CCNC: 30 U/L (ref 14–36)
BASOPHILS # BLD AUTO: 0.03 10*3/MM3 (ref 0–0.2)
BASOPHILS NFR BLD AUTO: 0.4 % (ref 0–1.5)
BILIRUB SERPL-MCNC: 0.6 MG/DL (ref 0.2–1.3)
BUN SERPL-MCNC: 23 MG/DL (ref 7–23)
BUN/CREAT SERPL: 20.9 (ref 7–25)
CALCIUM SPEC-SCNC: 10 MG/DL (ref 8.4–10.2)
CHLORIDE SERPL-SCNC: 101 MMOL/L (ref 101–112)
CO2 SERPL-SCNC: 28 MMOL/L (ref 22–30)
CREAT SERPL-MCNC: 1.1 MG/DL (ref 0.52–1.04)
DEPRECATED RDW RBC AUTO: 45.3 FL (ref 37–54)
EOSINOPHIL # BLD AUTO: 0.28 10*3/MM3 (ref 0–0.4)
EOSINOPHIL NFR BLD AUTO: 3.4 % (ref 0.3–6.2)
ERYTHROCYTE [DISTWIDTH] IN BLOOD BY AUTOMATED COUNT: 13.6 % (ref 12.3–15.4)
GFR SERPL CREATININE-BSD FRML MDRD: 49 ML/MIN/1.73 (ref 39–90)
GLOBULIN UR ELPH-MCNC: 3.5 GM/DL (ref 2.3–3.5)
GLUCOSE SERPL-MCNC: 95 MG/DL (ref 70–99)
HCT VFR BLD AUTO: 34.4 % (ref 34–46.6)
HGB BLD-MCNC: 11.3 G/DL (ref 12–15.9)
LYMPHOCYTES # BLD AUTO: 1.11 10*3/MM3 (ref 0.7–3.1)
LYMPHOCYTES NFR BLD AUTO: 13.6 % (ref 19.6–45.3)
MCH RBC QN AUTO: 31.3 PG (ref 26.6–33)
MCHC RBC AUTO-ENTMCNC: 32.8 G/DL (ref 31.5–35.7)
MCV RBC AUTO: 95.3 FL (ref 79–97)
MONOCYTES # BLD AUTO: 0.56 10*3/MM3 (ref 0.1–0.9)
MONOCYTES NFR BLD AUTO: 6.8 % (ref 5–12)
NEUTROPHILS NFR BLD AUTO: 6.2 10*3/MM3 (ref 1.7–7)
NEUTROPHILS NFR BLD AUTO: 75.8 % (ref 42.7–76)
PLATELET # BLD AUTO: 268 10*3/MM3 (ref 140–450)
PMV BLD AUTO: 8.6 FL (ref 6–12)
POTASSIUM SERPL-SCNC: 4.5 MMOL/L (ref 3.4–5)
PROT SERPL-MCNC: 8.1 G/DL (ref 6.3–8.6)
RBC # BLD AUTO: 3.61 10*6/MM3 (ref 3.77–5.28)
SODIUM SERPL-SCNC: 138 MMOL/L (ref 137–145)
WBC # BLD AUTO: 8.18 10*3/MM3 (ref 3.4–10.8)

## 2020-10-14 PROCEDURE — 84466 ASSAY OF TRANSFERRIN: CPT | Performed by: INTERNAL MEDICINE

## 2020-10-14 PROCEDURE — 80053 COMPREHEN METABOLIC PANEL: CPT | Performed by: INTERNAL MEDICINE

## 2020-10-14 PROCEDURE — 83540 ASSAY OF IRON: CPT | Performed by: INTERNAL MEDICINE

## 2020-10-14 PROCEDURE — 82728 ASSAY OF FERRITIN: CPT | Performed by: INTERNAL MEDICINE

## 2020-10-14 PROCEDURE — 85025 COMPLETE CBC W/AUTO DIFF WBC: CPT | Performed by: INTERNAL MEDICINE

## 2020-10-14 PROCEDURE — 82607 VITAMIN B-12: CPT | Performed by: INTERNAL MEDICINE

## 2020-10-14 PROCEDURE — 36415 COLL VENOUS BLD VENIPUNCTURE: CPT | Performed by: INTERNAL MEDICINE

## 2020-10-14 PROCEDURE — 82746 ASSAY OF FOLIC ACID SERUM: CPT | Performed by: INTERNAL MEDICINE

## 2020-10-15 LAB
FERRITIN SERPL-MCNC: 30.3 NG/ML (ref 13–150)
FOLATE SERPL-MCNC: >20 NG/ML (ref 4.78–24.2)
IRON 24H UR-MRATE: 123 MCG/DL (ref 37–145)
IRON SATN MFR SERPL: 25 % (ref 20–50)
TIBC SERPL-MCNC: 484 MCG/DL (ref 298–536)
TRANSFERRIN SERPL-MCNC: 325 MG/DL (ref 200–360)
VIT B12 BLD-MCNC: 1048 PG/ML (ref 211–946)

## 2020-10-16 RX ORDER — LANOLIN ALCOHOL/MO/W.PET/CERES
1000 CREAM (GRAM) TOPICAL 3 TIMES WEEKLY
Qty: 48 TABLET | Refills: 1 | Status: SHIPPED | OUTPATIENT
Start: 2020-10-16 | End: 2021-05-13

## 2020-10-16 RX ORDER — SPIRONOLACTONE 25 MG/1
TABLET ORAL
Qty: 90 TABLET | Refills: 1 | Status: SHIPPED | OUTPATIENT
Start: 2020-10-16 | End: 2021-06-29

## 2020-11-17 ENCOUNTER — LAB (OUTPATIENT)
Dept: LAB | Facility: OTHER | Age: 70
End: 2020-11-17

## 2020-11-17 DIAGNOSIS — D50.0 IRON DEFICIENCY ANEMIA DUE TO CHRONIC BLOOD LOSS: ICD-10-CM

## 2020-11-17 DIAGNOSIS — T45.4X5A ADVERSE EFFECT OF IRON, INITIAL ENCOUNTER: ICD-10-CM

## 2020-11-17 DIAGNOSIS — E83.52 HYPERCALCEMIA: ICD-10-CM

## 2020-11-17 LAB
ALBUMIN SERPL-MCNC: 4.4 G/DL (ref 3.5–5)
ALBUMIN/GLOB SERPL: 1.3 G/DL (ref 1.1–1.8)
ALP SERPL-CCNC: 42 U/L (ref 38–126)
ALT SERPL W P-5'-P-CCNC: 17 U/L
ANION GAP SERPL CALCULATED.3IONS-SCNC: 8 MMOL/L (ref 5–15)
AST SERPL-CCNC: 25 U/L (ref 14–36)
BASOPHILS # BLD AUTO: 0.04 10*3/MM3 (ref 0–0.2)
BASOPHILS NFR BLD AUTO: 0.5 % (ref 0–1.5)
BILIRUB SERPL-MCNC: 0.4 MG/DL (ref 0.2–1.3)
BUN SERPL-MCNC: 21 MG/DL (ref 7–23)
BUN/CREAT SERPL: 20.4 (ref 7–25)
CALCIUM SPEC-SCNC: 10.2 MG/DL (ref 8.4–10.2)
CHLORIDE SERPL-SCNC: 101 MMOL/L (ref 101–112)
CO2 SERPL-SCNC: 30 MMOL/L (ref 22–30)
CREAT SERPL-MCNC: 1.03 MG/DL (ref 0.52–1.04)
DEPRECATED RDW RBC AUTO: 47.4 FL (ref 37–54)
EOSINOPHIL # BLD AUTO: 0.32 10*3/MM3 (ref 0–0.4)
EOSINOPHIL NFR BLD AUTO: 4.1 % (ref 0.3–6.2)
ERYTHROCYTE [DISTWIDTH] IN BLOOD BY AUTOMATED COUNT: 14.1 % (ref 12.3–15.4)
FERRITIN SERPL-MCNC: 33.17 NG/ML (ref 13–150)
GFR SERPL CREATININE-BSD FRML MDRD: 53 ML/MIN/1.73 (ref 39–90)
GLOBULIN UR ELPH-MCNC: 3.4 GM/DL (ref 2.3–3.5)
GLUCOSE SERPL-MCNC: 97 MG/DL (ref 70–99)
HCT VFR BLD AUTO: 34.3 % (ref 34–46.6)
HGB BLD-MCNC: 11 G/DL (ref 12–15.9)
IRON 24H UR-MRATE: 106 MCG/DL (ref 37–145)
IRON SATN MFR SERPL: 23 % (ref 20–50)
LYMPHOCYTES # BLD AUTO: 1.7 10*3/MM3 (ref 0.7–3.1)
LYMPHOCYTES NFR BLD AUTO: 21.8 % (ref 19.6–45.3)
MCH RBC QN AUTO: 31 PG (ref 26.6–33)
MCHC RBC AUTO-ENTMCNC: 32.1 G/DL (ref 31.5–35.7)
MCV RBC AUTO: 96.6 FL (ref 79–97)
MONOCYTES # BLD AUTO: 0.74 10*3/MM3 (ref 0.1–0.9)
MONOCYTES NFR BLD AUTO: 9.5 % (ref 5–12)
NEUTROPHILS NFR BLD AUTO: 5 10*3/MM3 (ref 1.7–7)
NEUTROPHILS NFR BLD AUTO: 64.1 % (ref 42.7–76)
PLATELET # BLD AUTO: 268 10*3/MM3 (ref 140–450)
PMV BLD AUTO: 9.1 FL (ref 6–12)
POTASSIUM SERPL-SCNC: 4.4 MMOL/L (ref 3.4–5)
PROT SERPL-MCNC: 7.8 G/DL (ref 6.3–8.6)
RBC # BLD AUTO: 3.55 10*6/MM3 (ref 3.77–5.28)
SODIUM SERPL-SCNC: 139 MMOL/L (ref 137–145)
TIBC SERPL-MCNC: 471 MCG/DL (ref 298–536)
TRANSFERRIN SERPL-MCNC: 316 MG/DL (ref 200–360)
WBC # BLD AUTO: 7.8 10*3/MM3 (ref 3.4–10.8)

## 2020-11-17 PROCEDURE — 82728 ASSAY OF FERRITIN: CPT | Performed by: INTERNAL MEDICINE

## 2020-11-17 PROCEDURE — 85025 COMPLETE CBC W/AUTO DIFF WBC: CPT | Performed by: INTERNAL MEDICINE

## 2020-11-17 PROCEDURE — 80053 COMPREHEN METABOLIC PANEL: CPT | Performed by: INTERNAL MEDICINE

## 2020-11-17 PROCEDURE — 36415 COLL VENOUS BLD VENIPUNCTURE: CPT | Performed by: INTERNAL MEDICINE

## 2020-11-17 PROCEDURE — 84466 ASSAY OF TRANSFERRIN: CPT | Performed by: INTERNAL MEDICINE

## 2020-11-17 PROCEDURE — 82607 VITAMIN B-12: CPT | Performed by: INTERNAL MEDICINE

## 2020-11-17 PROCEDURE — 82746 ASSAY OF FOLIC ACID SERUM: CPT | Performed by: INTERNAL MEDICINE

## 2020-11-17 PROCEDURE — 83540 ASSAY OF IRON: CPT | Performed by: INTERNAL MEDICINE

## 2020-11-18 ENCOUNTER — OFFICE VISIT (OUTPATIENT)
Dept: FAMILY MEDICINE CLINIC | Facility: CLINIC | Age: 70
End: 2020-11-18

## 2020-11-18 VITALS
WEIGHT: 250 LBS | SYSTOLIC BLOOD PRESSURE: 147 MMHG | HEIGHT: 61 IN | DIASTOLIC BLOOD PRESSURE: 79 MMHG | BODY MASS INDEX: 47.2 KG/M2 | HEART RATE: 70 BPM

## 2020-11-18 DIAGNOSIS — Z91.018 ALLERGY TO ALPHA-GAL: Chronic | ICD-10-CM

## 2020-11-18 DIAGNOSIS — E66.01 MORBID OBESITY (HCC): Chronic | ICD-10-CM

## 2020-11-18 DIAGNOSIS — G47.33 OBSTRUCTIVE SLEEP APNEA SYNDROME: Chronic | ICD-10-CM

## 2020-11-18 DIAGNOSIS — R06.09 DOE (DYSPNEA ON EXERTION): Primary | ICD-10-CM

## 2020-11-18 DIAGNOSIS — E55.9 VITAMIN D DEFICIENCY: Chronic | ICD-10-CM

## 2020-11-18 LAB
FOLATE SERPL-MCNC: >20 NG/ML (ref 4.78–24.2)
VIT B12 BLD-MCNC: 1006 PG/ML (ref 211–946)

## 2020-11-18 PROCEDURE — 99443 PR PHYS/QHP TELEPHONE EVALUATION 21-30 MIN: CPT | Performed by: INTERNAL MEDICINE

## 2020-11-18 NOTE — PROGRESS NOTES
Subjective      You have chosen to receive care through a telephone visit. Do you consent to use a telephone visit for your medical care today? Yes    Total visit time: 22 minutes.     History of Present Illness     Lois Parmar is a 70 y.o. morbidly obes female who receives care via telephone visit today to address multiple issues including follow up on poor exercise tolerance, fatigue and worsening dyspnea on exertion, which she feels started after diagnosis of alpha gal.  We are repeating the alpha gal panel with her labs January 2021.  Last month, we checked anemia workup including labs for CBC, CMP, iron/ferritin, B-12, and folate and patient wants to discuss those results today.  Iron, folate and B-12 levels normal range and hemoglobin remains relatively stable at 11.  Patient continues to follow with Dr. De La Torre for iron deficiency anemia and vitamin B12 deficiency and continues on oral iron and B-12 three days weekly.  She is scheduled to follow up with Moon Delarosa, oncology/hematology, tomorrow for telemedicine visit.     Dr. Boyle follows her cardiomyopathy and worked up dyspnea on exertion.  He switched her from Benicar HCT to plain Benicar and added Indapamide.  She has noticed improvement in lower extremity edema since starting the Indapamide, but it has not improved the dyspnea.  Since there was no cardiac etiology found for the worsening dyspnea on exertion and anemia is not playing a role, I explained the symptoms are more than likely related to her morbid obesity.  However, we will rule out pulmonology etiology.  She is in agreement for referral to pulmonology.  She uses a cane outdoors and walker at times in the house due to her poor performance level and to help decrease fall risk, although, overall is very sedentary.     She reports she was instructed to stop her vitamin D supplement, although, she is not sure if this was addressed by Dr. De La Torre or Dr. Boyle.  I explained she may have  "misunderstood.  We will check a vitamin D level with routine labs in January.  I suggested she talk to Dr. De La Torre about this tomorrow with the visit       Review of Systems   Constitutional: Negative for chills, fatigue and fever.   HENT: Negative for congestion, ear pain, postnasal drip, sinus pressure and sore throat.    Respiratory: Positive for shortness of breath. Negative for cough and wheezing.    Cardiovascular: Negative for chest pain, palpitations and leg swelling.   Gastrointestinal: Negative for abdominal pain, blood in stool, constipation, diarrhea, nausea and vomiting.   Endocrine: Negative for cold intolerance, heat intolerance, polydipsia and polyuria.   Genitourinary: Negative for dysuria, frequency, hematuria and urgency.   Skin: Negative for rash.   Neurological: Negative for syncope and weakness.        Objective     Visit Vitals  /79   Pulse 70   Ht 154.9 cm (61\")   Wt 113 kg (250 lb)   BMI 47.24 kg/m²       Physical Exam         Assessment/Plan      No cardiology etiology for the dyspnea on exertion was found on cardiac evaluation by Dr. Boyle.  He switched her from Benicar HCT to plain Benicar and added Indapamide loop diuretic.  She noticed improvement in lower extremity edema, but no improvement in the worsening dyspnea with the Indapamide.  Interestingly, Dr. Boyle's note states that she reported improvement in those symptoms as well as no further angina symptoms.  We will refer to Dr. Walker, pulmonologist at [facility] for evaluation of the worsening dyspnea on exertion.  I have told her that it is most likely that her symptoms are multifactorial in etiology and that physical deconditioning and her morbid obesity is playing a major role and I have recommended she ambulate trips up and down the hallway in her house with her walker multiple times daily to gradually improve conditioning and also continue to encourage her to pursue aggressive weight loss efforts.  She is morbidly obese " with BMI of 47.3.  Continue the CPAP for her obstructive sleep apnea.    I doubt that Dr. De La Torre told her to discontinue the vitamin D, as her vitamin D level was at goal with our lab work July 2020.  She has been off of the vitamin D now for several months in the midst of a coronavirus pandemic.  We will recheck a vitamin D level with labs in January.       It is interesting that she feels that her dyspnea on exertion developed very near the time she was diagnosed with alpha gal allergy.  Continue to be compliant with the diet, avoiding mammal meats.  I suspect this is coincidental, not cause-and-effect.    Return in two months for routine follow up with fasting labs one week prior or sooner if needed.      Sc[facility] for Dr. Warner [facility].     Diagnoses and all orders for this visit:    PALOMINO (dyspnea on exertion)  -     Ambulatory Referral to Pulmonology    Morbid obesity (CMS/HCC)  -     Ambulatory Referral to Pulmonology    Obstructive sleep apnea syndrome  -     Ambulatory Referral to Pulmonology    Vitamin D deficiency    Allergy to alpha-gal        Lab on 11/17/2020   Component Date Value Ref Range Status   • Glucose 11/17/2020 97  70 - 99 mg/dL Final   • BUN 11/17/2020 21  7 - 23 mg/dL Final   • Creatinine 11/17/2020 1.03  0.52 - 1.04 mg/dL Final   • Sodium 11/17/2020 139  137 - 145 mmol/L Final   • Potassium 11/17/2020 4.4  3.4 - 5.0 mmol/L Final   • Chloride 11/17/2020 101  101 - 112 mmol/L Final   • CO2 11/17/2020 30.0  22.0 - 30.0 mmol/L Final   • Calcium 11/17/2020 10.2  8.4 - 10.2 mg/dL Final   • Total Protein 11/17/2020 7.8  6.3 - 8.6 g/dL Final   • Albumin 11/17/2020 4.40  3.50 - 5.00 g/dL Final   • ALT (SGPT) 11/17/2020 17  <=35 U/L Final   • AST (SGOT) 11/17/2020 25  14 - 36 U/L Final   • Alkaline Phosphatase 11/17/2020 42  38 - 126 U/L Final   • Total Bilirubin 11/17/2020 0.4  0.2 - 1.3 mg/dL Final   • eGFR Non African Amer 11/17/2020 53  39 - 90 mL/min/1.73 Final   • Globulin  11/17/2020 3.4  2.3 - 3.5 gm/dL Final   • A/G Ratio 11/17/2020 1.3  1.1 - 1.8 g/dL Final   • BUN/Creatinine Ratio 11/17/2020 20.4  7.0 - 25.0 Final   • Anion Gap 11/17/2020 8.0  5.0 - 15.0 mmol/L Final   • Iron 11/17/2020 106  37 - 145 mcg/dL Final   • Iron Saturation 11/17/2020 23  20 - 50 % Final   • Transferrin 11/17/2020 316  200 - 360 mg/dL Final   • TIBC 11/17/2020 471  298 - 536 mcg/dL Final   • Ferritin 11/17/2020 33.17  13.00 - 150.00 ng/mL Final   • Folate 11/17/2020 >20.00  4.78 - 24.20 ng/mL Final   • Vitamin B-12 11/17/2020 1,006* 211 - 946 pg/mL Final   • WBC 11/17/2020 7.80  3.40 - 10.80 10*3/mm3 Final   • RBC 11/17/2020 3.55* 3.77 - 5.28 10*6/mm3 Final   • Hemoglobin 11/17/2020 11.0* 12.0 - 15.9 g/dL Final   • Hematocrit 11/17/2020 34.3  34.0 - 46.6 % Final   • MCV 11/17/2020 96.6  79.0 - 97.0 fL Final   • MCH 11/17/2020 31.0  26.6 - 33.0 pg Final   • MCHC 11/17/2020 32.1  31.5 - 35.7 g/dL Final   • RDW 11/17/2020 14.1  12.3 - 15.4 % Final   • RDW-SD 11/17/2020 47.4  37.0 - 54.0 fl Final   • MPV 11/17/2020 9.1  6.0 - 12.0 fL Final   • Platelets 11/17/2020 268  140 - 450 10*3/mm3 Final   • Neutrophil % 11/17/2020 64.1  42.7 - 76.0 % Final   • Lymphocyte % 11/17/2020 21.8  19.6 - 45.3 % Final   • Monocyte % 11/17/2020 9.5  5.0 - 12.0 % Final   • Eosinophil % 11/17/2020 4.1  0.3 - 6.2 % Final   • Basophil % 11/17/2020 0.5  0.0 - 1.5 % Final   • Neutrophils, Absolute 11/17/2020 5.00  1.70 - 7.00 10*3/mm3 Final   • Lymphocytes, Absolute 11/17/2020 1.70  0.70 - 3.10 10*3/mm3 Final   • Monocytes, Absolute 11/17/2020 0.74  0.10 - 0.90 10*3/mm3 Final   • Eosinophils, Absolute 11/17/2020 0.32  0.00 - 0.40 10*3/mm3 Final   • Basophils, Absolute 11/17/2020 0.04  0.00 - 0.20 10*3/mm3 Final   ]

## 2020-11-18 NOTE — PATIENT INSTRUCTIONS

## 2020-11-19 ENCOUNTER — OFFICE VISIT (OUTPATIENT)
Dept: ONCOLOGY | Facility: CLINIC | Age: 70
End: 2020-11-19

## 2020-11-19 DIAGNOSIS — D50.8 OTHER IRON DEFICIENCY ANEMIA: Primary | Chronic | ICD-10-CM

## 2020-11-19 PROCEDURE — 99441 PR PHYS/QHP TELEPHONE EVALUATION 5-10 MIN: CPT | Performed by: NURSE PRACTITIONER

## 2020-11-19 NOTE — PROGRESS NOTES
You have chosen to receive care through a telephone visit. Do you consent to use a telephone visit for your medical care today? Yes    This visit has been rescheduled as a phone visit to comply with patient safety concerns in accordance with CDC recommendations. Total time of discussion was 10 minutes.      HPI:  70-year-old female with medical problem consisting of hypertension, dyslipidemia, diabetes, hypothyroidism on Synthroid, coronary artery disease has been following up with hematology clinic since January 24, 2019 for evaluation of anemia.  Patient has required intravenous Feraheme in the past.  Patient is here for follow-up appointment today.  States she has been diagnosed with alpha gal recently.  Complains of fatigue.  Complains of arthritis pain.  Denies any new lymph node enlargement.  Taking B-12 and folic TIW; states she takes iron tablet about 1- 2 per week. She states she cant take more than that due to GI disturbacnes       ROS:     CONSTITUTIONAL:  Complains of fatigue. Denies any fever, chills or weight loss.      EYES: No visual disturbances. No discharge. No new lesions     ENMT:  No epistaxis, mouth sores or difficulty swallowing.     RESPIRATORY:  No new shortness of breath. No new cough or hemoptysis.     CARDIOVASCULAR:  No chest pain or palpitations.     GASTROINTESTINAL:  No abdominal pain nausea, vomiting or blood in the stool.     GENITOURINARY: No Dysuria or Hematuria.     MUSCULOSKELETAL: Positive for chronic arthritis pain.     LYMPHATICS:  Denies any abnormal swollen glands anywhere in the body.     NEUROLOGICAL : No tingling or numbness. No headache or dizziness. No seizures or balance problems.     SKIN: Positive for vitiligo     ENDOCRINE : No new hot or cold intolerance. No new polyuria . No polydipsia.        Component      Latest Ref Rng & Units 11/17/2020          11:34 AM   WBC      3.40 - 10.80 10*3/mm3 7.80   RBC      3.77 - 5.28 10*6/mm3 3.55 (L)   Hemoglobin      12.0 -  15.9 g/dL 11.0 (L)   Hematocrit      34.0 - 46.6 % 34.3   MCV      79.0 - 97.0 fL 96.6   MCH      26.6 - 33.0 pg 31.0   MCHC      31.5 - 35.7 g/dL 32.1   RDW      12.3 - 15.4 % 14.1   RDW-SD      37.0 - 54.0 fl 47.4   MPV      6.0 - 12.0 fL 9.1   Platelets      140 - 450 10*3/mm3 268   Neutrophil Rel %      42.7 - 76.0 % 64.1   Lymphocyte Rel %      19.6 - 45.3 % 21.8   Monocyte Rel %      5.0 - 12.0 % 9.5   Eosinophil Rel %      0.3 - 6.2 % 4.1   Basophil Rel %      0.0 - 1.5 % 0.5   Neutrophils Absolute      1.70 - 7.00 10*3/mm3 5.00   Lymphocytes Absolute      0.70 - 3.10 10*3/mm3 1.70   Monocytes Absolute      0.10 - 0.90 10*3/mm3 0.74   Eosinophils Absolute      0.00 - 0.40 10*3/mm3 0.32     Component      Latest Ref Rng & Units 11/17/2020          11:34 AM   Iron      37 - 145 mcg/dL 106   Iron Saturation      20 - 50 % 23   Transferrin      200 - 360 mg/dL 316   TIBC      298 - 536 mcg/dL 471     Component      Latest Ref Rng & Units 11/17/2020          11:34 AM   Ferritin      13.00 - 150.00 ng/mL 33.17         ASSESSMENT AND PLAN:    1. Iron deficiency anemia:  - Patient had a EGD and colonoscopy by Dr. Sutton on November 30, 2018 that did not show any evidence of active bleeding.  - Due to inability to tolerate iron by mouth patient has required intravenous Feraheme, last dose of Feraheme was on February 15, 2019.  - Patient remains on B12 and folic acid 3 times a week.  - Anemia work-up done 11/17/2020 shows  hemoglobin is 11.0.  - Iron studies shows adequate amount of iron.  No need to do any intravenous Feraheme at present.  -Recommend continue with B12 and folic acid 3 times a week.  -We will ask patient to return to clinic in 3 months with repeat CBC and anemia work-up to be done prior to that.     2. .  Elevated creatinine:  - Creatinine improved to 1.03     3.  Genetic counseling:  - Patient had 3 family members with breast cancer.  - Patient had a genetic testing done with [facility] cancer panel  which was negative including BRCA1 and BRCA2 in March 2020.               4.  Health maintenance: Patient does not smoke.

## 2021-01-14 ENCOUNTER — LAB (OUTPATIENT)
Dept: LAB | Facility: OTHER | Age: 71
End: 2021-01-14

## 2021-01-14 DIAGNOSIS — D50.0 IRON DEFICIENCY ANEMIA DUE TO CHRONIC BLOOD LOSS: Chronic | ICD-10-CM

## 2021-01-14 DIAGNOSIS — E83.42 HYPOMAGNESEMIA: Chronic | ICD-10-CM

## 2021-01-14 DIAGNOSIS — Z91.048 OTHER NONMEDICINAL SUBSTANCE ALLERGY STATUS: ICD-10-CM

## 2021-01-14 DIAGNOSIS — E03.9 ACQUIRED HYPOTHYROIDISM: Chronic | ICD-10-CM

## 2021-01-14 DIAGNOSIS — R73.01 IFG (IMPAIRED FASTING GLUCOSE): Chronic | ICD-10-CM

## 2021-01-14 DIAGNOSIS — I10 ESSENTIAL HYPERTENSION: Chronic | ICD-10-CM

## 2021-01-14 DIAGNOSIS — Z11.59 ENCOUNTER FOR HEPATITIS C SCREENING TEST FOR LOW RISK PATIENT: ICD-10-CM

## 2021-01-14 DIAGNOSIS — E55.9 VITAMIN D DEFICIENCY: Chronic | ICD-10-CM

## 2021-01-14 DIAGNOSIS — E78.2 MIXED HYPERLIPIDEMIA: Chronic | ICD-10-CM

## 2021-01-14 DIAGNOSIS — Z91.018 ALLERGY TO ALPHA-GAL: Chronic | ICD-10-CM

## 2021-01-14 DIAGNOSIS — Z91.018 ALLERGY TO ANIMAL PROTEIN: ICD-10-CM

## 2021-01-14 LAB
25(OH)D3 SERPL-MCNC: 45.1 NG/ML (ref 30–100)
ALBUMIN SERPL-MCNC: 4.3 G/DL (ref 3.5–5)
ALBUMIN/GLOB SERPL: 1.4 G/DL (ref 1.1–1.8)
ALP SERPL-CCNC: 39 U/L (ref 38–126)
ALT SERPL W P-5'-P-CCNC: 15 U/L
ANION GAP SERPL CALCULATED.3IONS-SCNC: 10 MMOL/L (ref 5–15)
AST SERPL-CCNC: 21 U/L (ref 14–36)
BASOPHILS # BLD AUTO: 0.04 10*3/MM3 (ref 0–0.2)
BASOPHILS NFR BLD AUTO: 0.5 % (ref 0–1.5)
BILIRUB SERPL-MCNC: 0.4 MG/DL (ref 0.2–1.3)
BUN SERPL-MCNC: 27 MG/DL (ref 7–23)
BUN/CREAT SERPL: 22.7 (ref 7–25)
CALCIUM SPEC-SCNC: 9.7 MG/DL (ref 8.4–10.2)
CHLORIDE SERPL-SCNC: 103 MMOL/L (ref 101–112)
CHOLEST SERPL-MCNC: 149 MG/DL (ref 150–200)
CO2 SERPL-SCNC: 27 MMOL/L (ref 22–30)
CREAT SERPL-MCNC: 1.19 MG/DL (ref 0.52–1.04)
DEPRECATED RDW RBC AUTO: 46.8 FL (ref 37–54)
EOSINOPHIL # BLD AUTO: 0.27 10*3/MM3 (ref 0–0.4)
EOSINOPHIL NFR BLD AUTO: 3.3 % (ref 0.3–6.2)
ERYTHROCYTE [DISTWIDTH] IN BLOOD BY AUTOMATED COUNT: 14 % (ref 12.3–15.4)
FERRITIN SERPL-MCNC: 20 NG/ML (ref 13–150)
GFR SERPL CREATININE-BSD FRML MDRD: 45 ML/MIN/1.73 (ref 39–90)
GLOBULIN UR ELPH-MCNC: 3.1 GM/DL (ref 2.3–3.5)
GLUCOSE SERPL-MCNC: 101 MG/DL (ref 70–99)
HBA1C MFR BLD: 6.11 % (ref 4.8–5.6)
HCT VFR BLD AUTO: 30.5 % (ref 34–46.6)
HCV AB SER DONR QL: NORMAL
HDLC SERPL-MCNC: 36 MG/DL (ref 40–59)
HGB BLD-MCNC: 10 G/DL (ref 12–15.9)
LDLC SERPL CALC-MCNC: 85 MG/DL
LDLC/HDLC SERPL: 2.26 {RATIO} (ref 0–3.22)
LYMPHOCYTES # BLD AUTO: 1.35 10*3/MM3 (ref 0.7–3.1)
LYMPHOCYTES NFR BLD AUTO: 16.3 % (ref 19.6–45.3)
MAGNESIUM SERPL-MCNC: 1.8 MG/DL (ref 1.6–2.3)
MCH RBC QN AUTO: 31.6 PG (ref 26.6–33)
MCHC RBC AUTO-ENTMCNC: 32.8 G/DL (ref 31.5–35.7)
MCV RBC AUTO: 96.5 FL (ref 79–97)
MONOCYTES # BLD AUTO: 0.64 10*3/MM3 (ref 0.1–0.9)
MONOCYTES NFR BLD AUTO: 7.7 % (ref 5–12)
NEUTROPHILS NFR BLD AUTO: 5.96 10*3/MM3 (ref 1.7–7)
NEUTROPHILS NFR BLD AUTO: 72.2 % (ref 42.7–76)
PLATELET # BLD AUTO: 245 10*3/MM3 (ref 140–450)
PMV BLD AUTO: 8.7 FL (ref 6–12)
POTASSIUM SERPL-SCNC: 4.2 MMOL/L (ref 3.4–5)
PROT SERPL-MCNC: 7.4 G/DL (ref 6.3–8.6)
RBC # BLD AUTO: 3.16 10*6/MM3 (ref 3.77–5.28)
SODIUM SERPL-SCNC: 140 MMOL/L (ref 137–145)
T4 FREE SERPL-MCNC: 1.08 NG/DL (ref 0.93–1.7)
TRIGL SERPL-MCNC: 159 MG/DL
TSH SERPL DL<=0.05 MIU/L-ACNC: 5 UIU/ML (ref 0.27–4.2)
VLDLC SERPL-MCNC: 28 MG/DL (ref 5–40)
WBC # BLD AUTO: 8.26 10*3/MM3 (ref 3.4–10.8)

## 2021-01-14 PROCEDURE — 36415 COLL VENOUS BLD VENIPUNCTURE: CPT | Performed by: INTERNAL MEDICINE

## 2021-01-14 PROCEDURE — 84443 ASSAY THYROID STIM HORMONE: CPT | Performed by: INTERNAL MEDICINE

## 2021-01-14 PROCEDURE — 84439 ASSAY OF FREE THYROXINE: CPT | Performed by: INTERNAL MEDICINE

## 2021-01-14 PROCEDURE — 80061 LIPID PANEL: CPT | Performed by: INTERNAL MEDICINE

## 2021-01-14 PROCEDURE — 83735 ASSAY OF MAGNESIUM: CPT | Performed by: INTERNAL MEDICINE

## 2021-01-14 PROCEDURE — 86003 ALLG SPEC IGE CRUDE XTRC EA: CPT | Performed by: INTERNAL MEDICINE

## 2021-01-14 PROCEDURE — 86008 ALLG SPEC IGE RECOMB EA: CPT | Performed by: INTERNAL MEDICINE

## 2021-01-14 PROCEDURE — 86803 HEPATITIS C AB TEST: CPT | Performed by: INTERNAL MEDICINE

## 2021-01-14 PROCEDURE — 82306 VITAMIN D 25 HYDROXY: CPT | Performed by: INTERNAL MEDICINE

## 2021-01-14 PROCEDURE — 80053 COMPREHEN METABOLIC PANEL: CPT | Performed by: INTERNAL MEDICINE

## 2021-01-14 PROCEDURE — 85025 COMPLETE CBC W/AUTO DIFF WBC: CPT | Performed by: INTERNAL MEDICINE

## 2021-01-14 PROCEDURE — 82728 ASSAY OF FERRITIN: CPT | Performed by: INTERNAL MEDICINE

## 2021-01-14 PROCEDURE — 83036 HEMOGLOBIN GLYCOSYLATED A1C: CPT | Performed by: INTERNAL MEDICINE

## 2021-01-18 ENCOUNTER — OFFICE VISIT (OUTPATIENT)
Dept: FAMILY MEDICINE CLINIC | Facility: CLINIC | Age: 71
End: 2021-01-18

## 2021-01-18 VITALS
WEIGHT: 254 LBS | BODY MASS INDEX: 47.95 KG/M2 | HEART RATE: 46 BPM | DIASTOLIC BLOOD PRESSURE: 62 MMHG | HEIGHT: 61 IN | OXYGEN SATURATION: 92 % | SYSTOLIC BLOOD PRESSURE: 153 MMHG

## 2021-01-18 DIAGNOSIS — R73.01 IFG (IMPAIRED FASTING GLUCOSE): Chronic | ICD-10-CM

## 2021-01-18 DIAGNOSIS — E78.1 HYPERTRIGLYCERIDEMIA: Chronic | ICD-10-CM

## 2021-01-18 DIAGNOSIS — I10 ESSENTIAL HYPERTENSION: Chronic | ICD-10-CM

## 2021-01-18 DIAGNOSIS — E55.9 VITAMIN D DEFICIENCY: Chronic | ICD-10-CM

## 2021-01-18 DIAGNOSIS — I42.9 CARDIOMYOPATHY, UNSPECIFIED TYPE (HCC): Chronic | ICD-10-CM

## 2021-01-18 DIAGNOSIS — J45.20 MILD INTERMITTENT ASTHMA WITHOUT COMPLICATION: Chronic | ICD-10-CM

## 2021-01-18 DIAGNOSIS — N18.31 STAGE 3A CHRONIC KIDNEY DISEASE (HCC): Chronic | ICD-10-CM

## 2021-01-18 DIAGNOSIS — Z91.018 ALLERGY TO ALPHA-GAL: Chronic | ICD-10-CM

## 2021-01-18 DIAGNOSIS — E66.01 MORBID OBESITY (HCC): Chronic | ICD-10-CM

## 2021-01-18 DIAGNOSIS — G47.33 OBSTRUCTIVE SLEEP APNEA SYNDROME: Chronic | ICD-10-CM

## 2021-01-18 DIAGNOSIS — R06.09 DOE (DYSPNEA ON EXERTION): ICD-10-CM

## 2021-01-18 DIAGNOSIS — J30.2 SEASONAL ALLERGIC RHINITIS, UNSPECIFIED TRIGGER: ICD-10-CM

## 2021-01-18 DIAGNOSIS — D50.0 IRON DEFICIENCY ANEMIA DUE TO CHRONIC BLOOD LOSS: Chronic | ICD-10-CM

## 2021-01-18 DIAGNOSIS — R00.1 SINUS BRADYCARDIA: ICD-10-CM

## 2021-01-18 DIAGNOSIS — E03.9 ACQUIRED HYPOTHYROIDISM: Chronic | ICD-10-CM

## 2021-01-18 DIAGNOSIS — E78.2 MIXED HYPERLIPIDEMIA: Primary | Chronic | ICD-10-CM

## 2021-01-18 DIAGNOSIS — D53.1 MEGALOBLASTIC ANEMIA: Chronic | ICD-10-CM

## 2021-01-18 PROBLEM — E83.42 HYPOMAGNESEMIA: Chronic | Status: RESOLVED | Noted: 2017-10-04 | Resolved: 2021-01-18

## 2021-01-18 PROCEDURE — 99443 PR PHYS/QHP TELEPHONE EVALUATION 21-30 MIN: CPT | Performed by: INTERNAL MEDICINE

## 2021-01-18 RX ORDER — LEVOTHYROXINE SODIUM 88 UG/1
88 TABLET ORAL DAILY
Qty: 90 TABLET | Refills: 3 | Status: SHIPPED | OUTPATIENT
Start: 2021-01-18 | End: 2022-01-17

## 2021-01-18 NOTE — PROGRESS NOTES
You have chosen to receive care through a telephone visit. Do you consent to use a telephone visit for your medical care today? Yes    Total visit time: 33 minutes.      Chief Complaint    Follow-up (6 month)    Subjective            History of Present Illness     Lois Parmar receives care via telephone visit through [facility] [place] for 6-month follow up on chronic medical issues including hypothyroidism, hyperlipidemia, hypertriglyceridemia, impaired fasting glucose, osteoarthritis of the knees and hips, morbid obesity, iron deficiency anemia and vitamin B12 deficiency anemia, among other issues complicated by obesity. She was diagnosed with alpha gal earlier this year and continues to avoid mammal meats.         Despite taking oral iron and B-12, her anemia persists and has worsened with hemoglobin 10.  She reports she continues to experience dyspnea on exertion and follows with Dr. De La Torre for anemia with iron deficiency and vitamin B12 deficiency.  Dr. Garnt gave patient option of bone marrow biopsy, although, she wanted to hold off.  She has difficulty tolerating oral iron due to constipation.   Patient had EGD and colonoscopy by Dr. Sutton on November 30, 2018.  EGD revealed gastritis.  Colonoscopy showed polyp without any evidence of bleeding or cancer. She does report continued hemorrhoidal bleeding.      Dr. Boyle follows her cardiomyopathy and dyspnea on exertion, which is multifactorial with her morbid obesity and anemia.  She has an appointment with Dr. Walker, pulmonology tomorrow.  Her heart rate is bradycardic today at 46 with high dose labetalol.  She reports HR has been running in the fifties the majority of time.      DEXA 04/2018 revealed bone density within normal limits.  We will plan to repeat in 2021 or 2022.  Vitamin D at goal.    Weight is stable at 254 pounds with BMI 48.  BP above goal at 153/62.  Labs continue to reveal impaired fasting glucose,  "which has not progressed significantly with metformin.     Dr. Boyle had performed some labs which revealed evidence of alpha gal syndrome.  She is avoiding mammal meats.  I recommend referral to an allergist for further evaluation.    The patient's relevant past medical, surgical, and social history was reviewed in Epic.   Lab results are reviewed with the patient today. CBC reveals worsening anemia as noted above. Renal function slightly declined with creatinine 1.19 increased from 1.03.  Thyroid is not quite at goal with Synthroid 75 mcg daily.        Objective   Vital Signs:     /62   Pulse (!) 46   Ht 154.9 cm (61\")   Wt 115 kg (254 lb)   SpO2 92%   BMI 47.99 kg/m²       Physical Exam   Result Review :     CMP    CMP 10/14/20 11/17/20 1/14/21   BUN 23 21 27 (A)   Creatinine 1.10 (A) 1.03 1.19 (A)   eGFR Non African Am 49 53 45   Sodium 138 139 140   Potassium 4.5 4.4 4.2   Chloride 101 101 103   Calcium 10.0 10.2 9.7   Albumin 4.60 4.40 4.30   Total Bilirubin 0.6 0.4 0.4   Alkaline Phosphatase 47 42 39   AST (SGOT) 30 25 21   ALT (SGPT) 20 17 15   (A) Abnormal value            CBC w/diff    CBC w/Diff 10/14/20 11/17/20 1/14/21   WBC 8.18 7.80 8.26   RBC 3.61 (A) 3.55 (A) 3.16 (A)   Hemoglobin 11.3 (A) 11.0 (A) 10.0 (A)   Hematocrit 34.4 34.3 30.5 (A)   MCV 95.3 96.6 96.5   MCH 31.3 31.0 31.6   MCHC 32.8 32.1 32.8   RDW 13.6 14.1 14.0   Platelets 268 268 245   Neutrophil Rel % 75.8 64.1 72.2   Lymphocyte Rel % 13.6 (A) 21.8 16.3 (A)   Monocyte Rel % 6.8 9.5 7.7   Eosinophil Rel % 3.4 4.1 3.3   Basophil Rel % 0.4 0.5 0.5   (A) Abnormal value            Lipid Panel    Lipid Panel 7/10/20 7/10/20 1/14/21    1035 1035    Triglycerides 258 (A)  159 (A)   HDL Cholesterol   36 (A)   VLDL Cholesterol   28   LDL Cholesterol   130 (A) 85   LDL/HDL Ratio   2.26   (A) Abnormal value            TSH    TSH 7/10/20 1/14/21   TSH 4.230 (A) 5.000 (A)   (A) Abnormal value            A1C Last 3 Results    HGBA1C Last 3 " Results 7/10/20 1/14/21   Hemoglobin A1C 6.25 (A) 6.11 (A)   (A) Abnormal value                      Assessment and Plan    Problem List Items Addressed This Visit     None        Continue to follow with Dr. Boyle, who follows her cardiomyopathy and other cardiovascular issues. Continue simvastatin and Zetia as well as dietary efforts.  We continue to encourage very aggressive weight loss. Continue current BP medications.   Monitor BP and heart rate and notify Dr. Boyle if heart rate is consistently in the 40s or low 50s on the high dose labetalol.      It certainly appears that her dyspnea on exertion is multifactorial, with significant components being due to her morbid obesity with deconditioning, worsened anemia, bradycardia, and possibly some degree of chronic lung disease or pulmonary hypertension.  Cardiology and now pulmonology are working with her.  She will continue hematology follow-up with Dr. De La Torre.  It looks like she needs another iron infusion.    Refer to Dr. Harmon to evaluate the alpha gal syndrome and mild intermittent asthma and seasonal allergies.    Increase Synthroid to 88 mcg daily.  We will continue to monitor.      Keep scheduled appointment with Dr. De La Torre next month.  Continue oral iron and B-12.   Encouraged her to consider his recommendation for bone marrow biopsy and suggested she may try to scheduled sooner due to the worsening anemia.  Continue the Pepcid and Prevacid for GERD/gastritis.      Continue metformin.  Pursue aggressive weight loss to help delay progression of impaired fasting glucose.     I recommended she monitor O2 sat and heart rate 2-3 times today and keep the numbers to report to Dr. Walker tomorrow.      Continue to avoid mammal meats due to alpha gal.  She has an EpiPen to use as needed.     Continue other medications and vitamin and mineral supplements to treat additional medical problems which we addressed today.      Return in six months for routine follow up  with fasting labs one week prior.            Scribed for Dr. Warner by Adore Marrero [facility].         Follow Up   Return in about 6 months (around 7/18/2021) for Follow up in six months with labs one week prior..  Patient was given instructions and counseling regarding her condition or for health maintenance advice. Please see specific information pulled into the AVS if appropriate.

## 2021-01-20 DIAGNOSIS — J30.2 SEASONAL ALLERGIC RHINITIS, UNSPECIFIED TRIGGER: ICD-10-CM

## 2021-01-20 DIAGNOSIS — Z91.018 ALLERGY TO ALPHA-GAL: Primary | ICD-10-CM

## 2021-01-20 DIAGNOSIS — J45.20 MILD INTERMITTENT ASTHMA WITHOUT COMPLICATION: ICD-10-CM

## 2021-01-21 LAB
ALPHA-GAL IGE QN: 1.85 KU/L
BEEF IGE QN: 0.65 KU/L
DEPRECATED BEEF IGE RAST QL: 1
DEPRECATED LAMB IGE RAST QL: 0
DEPRECATED PORK IGE RAST QL: ABNORMAL
LAMB IGE QN: <0.1 KU/L
PORK IGE QN: 0.24 KU/L

## 2021-02-18 ENCOUNTER — APPOINTMENT (OUTPATIENT)
Dept: ONCOLOGY | Facility: CLINIC | Age: 71
End: 2021-02-18

## 2021-03-02 ENCOUNTER — LAB (OUTPATIENT)
Dept: LAB | Facility: OTHER | Age: 71
End: 2021-03-02

## 2021-03-02 DIAGNOSIS — D50.8 OTHER IRON DEFICIENCY ANEMIA: ICD-10-CM

## 2021-03-02 LAB
ALBUMIN SERPL-MCNC: 4.2 G/DL (ref 3.5–5)
ALBUMIN/GLOB SERPL: 1.4 G/DL (ref 1.1–1.8)
ALP SERPL-CCNC: 35 U/L (ref 38–126)
ALT SERPL W P-5'-P-CCNC: 18 U/L
ANION GAP SERPL CALCULATED.3IONS-SCNC: 8 MMOL/L (ref 5–15)
AST SERPL-CCNC: 21 U/L (ref 14–36)
BASOPHILS # BLD AUTO: 0.03 10*3/MM3 (ref 0–0.2)
BASOPHILS NFR BLD AUTO: 0.5 % (ref 0–1.5)
BILIRUB SERPL-MCNC: 0.4 MG/DL (ref 0.2–1.3)
BUN SERPL-MCNC: 27 MG/DL (ref 7–23)
BUN/CREAT SERPL: 24.1 (ref 7–25)
CALCIUM SPEC-SCNC: 9.6 MG/DL (ref 8.4–10.2)
CHLORIDE SERPL-SCNC: 105 MMOL/L (ref 101–112)
CO2 SERPL-SCNC: 28 MMOL/L (ref 22–30)
CREAT SERPL-MCNC: 1.12 MG/DL (ref 0.52–1.04)
DEPRECATED RDW RBC AUTO: 46.8 FL (ref 37–54)
EOSINOPHIL # BLD AUTO: 0.22 10*3/MM3 (ref 0–0.4)
EOSINOPHIL NFR BLD AUTO: 3.7 % (ref 0.3–6.2)
ERYTHROCYTE [DISTWIDTH] IN BLOOD BY AUTOMATED COUNT: 13.7 % (ref 12.3–15.4)
FERRITIN SERPL-MCNC: 14.34 NG/ML (ref 13–150)
GFR SERPL CREATININE-BSD FRML MDRD: 48 ML/MIN/1.73 (ref 39–90)
GLOBULIN UR ELPH-MCNC: 3.1 GM/DL (ref 2.3–3.5)
GLUCOSE SERPL-MCNC: 101 MG/DL (ref 70–99)
HCT VFR BLD AUTO: 29.1 % (ref 34–46.6)
HGB BLD-MCNC: 9.2 G/DL (ref 12–15.9)
IRON 24H UR-MRATE: 56 MCG/DL (ref 37–145)
IRON SATN MFR SERPL: 11 % (ref 20–50)
LYMPHOCYTES # BLD AUTO: 1.19 10*3/MM3 (ref 0.7–3.1)
LYMPHOCYTES NFR BLD AUTO: 20.2 % (ref 19.6–45.3)
MCH RBC QN AUTO: 30.9 PG (ref 26.6–33)
MCHC RBC AUTO-ENTMCNC: 31.6 G/DL (ref 31.5–35.7)
MCV RBC AUTO: 97.7 FL (ref 79–97)
MONOCYTES # BLD AUTO: 0.62 10*3/MM3 (ref 0.1–0.9)
MONOCYTES NFR BLD AUTO: 10.5 % (ref 5–12)
NEUTROPHILS NFR BLD AUTO: 3.82 10*3/MM3 (ref 1.7–7)
NEUTROPHILS NFR BLD AUTO: 65.1 % (ref 42.7–76)
PLATELET # BLD AUTO: 226 10*3/MM3 (ref 140–450)
PMV BLD AUTO: 9.9 FL (ref 6–12)
POTASSIUM SERPL-SCNC: 4.7 MMOL/L (ref 3.4–5)
PROT SERPL-MCNC: 7.3 G/DL (ref 6.3–8.6)
RBC # BLD AUTO: 2.98 10*6/MM3 (ref 3.77–5.28)
SODIUM SERPL-SCNC: 141 MMOL/L (ref 137–145)
TIBC SERPL-MCNC: 487 MCG/DL (ref 298–536)
TRANSFERRIN SERPL-MCNC: 327 MG/DL (ref 200–360)
WBC # BLD AUTO: 5.88 10*3/MM3 (ref 3.4–10.8)

## 2021-03-02 PROCEDURE — 80053 COMPREHEN METABOLIC PANEL: CPT | Performed by: NURSE PRACTITIONER

## 2021-03-02 PROCEDURE — 83540 ASSAY OF IRON: CPT | Performed by: NURSE PRACTITIONER

## 2021-03-02 PROCEDURE — 36415 COLL VENOUS BLD VENIPUNCTURE: CPT | Performed by: NURSE PRACTITIONER

## 2021-03-02 PROCEDURE — 82746 ASSAY OF FOLIC ACID SERUM: CPT | Performed by: NURSE PRACTITIONER

## 2021-03-02 PROCEDURE — 82728 ASSAY OF FERRITIN: CPT | Performed by: NURSE PRACTITIONER

## 2021-03-02 PROCEDURE — 84466 ASSAY OF TRANSFERRIN: CPT | Performed by: NURSE PRACTITIONER

## 2021-03-02 PROCEDURE — 85025 COMPLETE CBC W/AUTO DIFF WBC: CPT | Performed by: NURSE PRACTITIONER

## 2021-03-02 PROCEDURE — 82607 VITAMIN B-12: CPT | Performed by: NURSE PRACTITIONER

## 2021-03-03 LAB
FOLATE SERPL-MCNC: >20 NG/ML (ref 4.78–24.2)
VIT B12 BLD-MCNC: 1042 PG/ML (ref 211–946)

## 2021-03-04 ENCOUNTER — OFFICE VISIT (OUTPATIENT)
Dept: ONCOLOGY | Facility: CLINIC | Age: 71
End: 2021-03-04

## 2021-03-04 DIAGNOSIS — Z80.3 FAMILY HISTORY OF BREAST CANCER IN FEMALE: Chronic | ICD-10-CM

## 2021-03-04 DIAGNOSIS — T45.4X5A ADVERSE EFFECT OF IRON, INITIAL ENCOUNTER: ICD-10-CM

## 2021-03-04 DIAGNOSIS — D50.8 OTHER IRON DEFICIENCY ANEMIA: Primary | Chronic | ICD-10-CM

## 2021-03-04 PROCEDURE — G9903 PT SCRN TBCO ID AS NON USER: HCPCS | Performed by: INTERNAL MEDICINE

## 2021-03-04 PROCEDURE — 1126F AMNT PAIN NOTED NONE PRSNT: CPT | Performed by: INTERNAL MEDICINE

## 2021-03-04 PROCEDURE — 1123F ACP DISCUSS/DSCN MKR DOCD: CPT | Performed by: INTERNAL MEDICINE

## 2021-03-04 PROCEDURE — 99442 PR PHYS/QHP TELEPHONE EVALUATION 11-20 MIN: CPT | Performed by: INTERNAL MEDICINE

## 2021-03-04 RX ORDER — FAMOTIDINE 10 MG/ML
20 INJECTION, SOLUTION INTRAVENOUS ONCE
Status: CANCELLED | OUTPATIENT
Start: 2021-03-11

## 2021-03-04 RX ORDER — DIPHENHYDRAMINE HCL 25 MG
25 CAPSULE ORAL ONCE
Status: CANCELLED | OUTPATIENT
Start: 2021-03-11

## 2021-03-04 RX ORDER — CETIRIZINE HYDROCHLORIDE 10 MG/1
10 TABLET ORAL ONCE
Status: CANCELLED | OUTPATIENT
Start: 2021-03-11

## 2021-03-04 RX ORDER — SODIUM CHLORIDE 9 MG/ML
250 INJECTION, SOLUTION INTRAVENOUS ONCE
Status: CANCELLED | OUTPATIENT
Start: 2021-03-11

## 2021-03-04 NOTE — PROGRESS NOTES
DATE OF telephone VISIT: 3/4/2021      REASON FOR telephone VISIT: Anemia with iron deficiency, vitamin B12 deficiency, family history of breast cancer      You have chosen to receive care through a telephone visit. Do you consent to use a telephone visit for your medical care today? Yes       HISTORY OF PRESENT ILLNESS:    71-year-old female with medical problem consisting of hypertension, dyslipidemia, diabetes mellitus, hypothyroidism on Synthroid, coronary artery disease has been following up with hematology clinic since January 24, 2019 for evaluation of anemia.  Patient is here for follow-up appointment today to discuss recently done blood work and further recommendation.  Complains of fatigue.  Complains of worsening shortness of breath with exertion.  Complains of arthritis pain.  Denies any bleeding.  Denies any new lymph node enlargement.        PAST MEDICAL HISTORY:    Past Medical History:   Diagnosis Date   • Acquired hypothyroidism - On Synthroid    • Allergic rhinitis    • Allergy to alpha-gal 7/17/2020   • Anemia    • Asthma - mild intermittent    • Cardiac disease    • Carpal tunnel syndrome - Bilateral    • Colitis    • Degeneration of cervical intervertebral disc    • Diabetes (CMS/HCC)    • Disorder of lumbar spine    • Diverticulosis of large intestine 1/17/2019   • Essential hypertension    • Essential hypertension    • Fatigue    • History of myocardial infarct at age greater than 60 years 10/17/2018   • Hyperlipidemia - Improved with Zocor    • Hypertriglyceridemia 1/18/2021   • IFG (impaired fasting glucose) 10/17/2018   • Impaired fasting glycaemia    • Iron deficiency anemia - Improved    • Lumbar radiculopathy    • Megaloblastic anemia due to vitamin B>12< deficiency    • Morbid obesity (CMS/HCC)    • Osteoarthritis of knee - Bilateral    • Osteoarthritis of multiple joints - Left knee    • Osteoporosis    • Pain in left hip    • Sleep apnea - On CPAP    • Spasm of back muscles    • Stage  3 chronic kidney disease (CMS/Former[facility]) 10/23/2019   • Stage 3a chronic kidney disease (CMS/Formerly [facility]) 10/23/2019   • Thyroid dysfunction    • Tubular adenoma of colon - removed during colonoscopy, 11/2018. 12/3/2018   • Vitamin D deficiency        SOCIAL HISTORY:    Social History     Tobacco Use   • Smoking status: Never Smoker   • Smokeless tobacco: Never Used   Substance Use Topics   • Alcohol use: No   • Drug use: No       Surgical History :  Past Surgical History:   Procedure Laterality Date   • CARPAL TUNNEL RELEASE Right    • COLONOSCOPY N/A 11/30/2018    Procedure: COLONOSCOPY;  Surgeon: Jimmie Sutton MD;  Location: [facility] ENDOSCOPY;  Service: General   • ENDOSCOPY  09/17/2012    Normal esophagus.  Gastritis.  Normal duodenum   • ENDOSCOPY N/A 11/30/2018    Procedure: ESOPHAGOGASTRODUODENOSCOPY WITH ANESTHESIA;  Surgeon: Jimmie Sutton MD;  Location: [facility] ENDOSCOPY;  Service: General   • ENDOSCOPY AND COLONOSCOPY  09/17/2012    Internal & external hemorrhoids found.   • HYSTERECTOMY  1980    w bso   • JOINT REPLACEMENT      left knee   • LUMBAR LAMINECTOMY  2011    s/p lumbar laminectomy and fusion   • NECK SURGERY     • OOPHORECTOMY  1980   • SHOULDER SURGERY  12/07/2015    Arthroscopy of the left shoudler with rotator cuff repair, Vijay procedure, and subacromial decompression.   • SHOULDER SURGERY Right    • TOTAL KNEE ARTHROPLASTY Left        ALLERGIES:    Allergies   Allergen Reactions   • Beef Allergy Anaphylaxis   • Dairy Aid  [Lactase] Anaphylaxis   • Galactose Anaphylaxis   • Lortab [Hydrocodone-Acetaminophen] Hallucinations   • Milk  [Lac Bovis] Anaphylaxis     D/t alpha gal   • Pork-Derived Products Anaphylaxis   • Codeine Palpitations     Heart race   • Antihistamines, Chlorpheniramine-Type Palpitations     Heart races   • Latex Rash   • Other Palpitations     Decongestants: Heart Race   • Tape Rash         FAMILY HISTORY:  Family History   Problem Relation Age of Onset   • Breast cancer  Mother    • Ovarian cancer Mother    • Breast cancer Sister    • Breast cancer Other    • Heart disease Other    • Hypertension Other    • Lung disease Other    • Diabetes Other    • Cancer Other            REVIEW OF SYSTEMS:      CONSTITUTIONAL:  Complains of fatigue. Denies any fever, chills or weight loss.     EYES: No visual disturbances. No discharge. No new lesions    ENMT:  No epistaxis, mouth sores or difficulty swallowing.    RESPIRATORY: Complains of shortness of breath with exertion. No new cough or hemoptysis.    CARDIOVASCULAR:  No chest pain or palpitations.    GASTROINTESTINAL:  No abdominal pain nausea, vomiting or blood in the stool.    GENITOURINARY: No Dysuria or Hematuria.    MUSCULOSKELETAL: Positive for chronic arthritis pain    LYMPHATICS:  Denies any abnormal swollen glands anywhere in the body.    NEUROLOGICAL : No tingling or numbness. No headache or dizziness. No seizures or balance problems.    SKIN: Positive for vitiligo    ENDOCRINE : No new hot or cold intolerance. No new polyuria . No polydipsia.        PHYSICAL EXAMINATION:      Unable to obtain secondary to being telephone visit      DIAGNOSTIC DATA:    Glucose   Date Value Ref Range Status   03/02/2021 101 (H) 70 - 99 mg/dL Final     Sodium   Date Value Ref Range Status   03/02/2021 141 137 - 145 mmol/L Final     Potassium   Date Value Ref Range Status   03/02/2021 4.7 3.4 - 5.0 mmol/L Final     CO2   Date Value Ref Range Status   03/02/2021 28.0 22.0 - 30.0 mmol/L Final     Chloride   Date Value Ref Range Status   03/02/2021 105 101 - 112 mmol/L Final     Anion Gap   Date Value Ref Range Status   03/02/2021 8.0 5.0 - 15.0 mmol/L Final     Creatinine   Date Value Ref Range Status   03/02/2021 1.12 (H) 0.52 - 1.04 mg/dL Final     BUN   Date Value Ref Range Status   03/02/2021 27 (H) 7 - 23 mg/dL Final     BUN/Creatinine Ratio   Date Value Ref Range Status   03/02/2021 24.1 7.0 - 25.0 Final     Calcium   Date Value Ref Range Status    03/02/2021 9.6 8.4 - 10.2 mg/dL Final     eGFR Non  Amer   Date Value Ref Range Status   03/02/2021 48 39 - 90 mL/min/1.73 Final     Alkaline Phosphatase   Date Value Ref Range Status   03/02/2021 35 (L) 38 - 126 U/L Final     Total Protein   Date Value Ref Range Status   03/02/2021 7.3 6.3 - 8.6 g/dL Final     ALT (SGPT)   Date Value Ref Range Status   03/02/2021 18 <=35 U/L Final     AST (SGOT)   Date Value Ref Range Status   03/02/2021 21 14 - 36 U/L Final     Total Bilirubin   Date Value Ref Range Status   03/02/2021 0.4 0.2 - 1.3 mg/dL Final     Albumin   Date Value Ref Range Status   03/02/2021 4.20 3.50 - 5.00 g/dL Final     Globulin   Date Value Ref Range Status   03/02/2021 3.1 2.3 - 3.5 gm/dL Final     Lab Results   Component Value Date    WBC 5.88 03/02/2021    HGB 9.2 (L) 03/02/2021    HCT 29.1 (L) 03/02/2021    MCV 97.7 (H) 03/02/2021     03/02/2021     Lab Results   Component Value Date    NEUTROABS 3.82 03/02/2021    IRON 56 03/02/2021    TIBC 487 03/02/2021    LABIRON 11 (L) 03/02/2021    FERRITIN 14.34 03/02/2021    [id number] 1,042 (H) 03/02/2021    FOLATE >20.00 03/02/2021     No results found for: , LABCA2, AFPTM, HCGQUANT, , CHROMGRNA, [id number], CEA, REFLABREPO        PATHOLOGY:  * Cannot find OR log *         RADIOLOGY DATA :  No radiology results for the last 7 days      ASSESSMENT AND PLAN:      1.  Iron deficiency anemia:  -Had EGD and colonoscopy done by Dr. Sutton on November 30, 2018 that did not show any evidence of active bleeding  -Due to inability to tolerate iron by mouth patient required intravenous Feraheme, last dose was on February 15, 2019  -Patient remains on B12 and folic acid 3 times a week  -Anemia work-up done on March 2, 2021 shows hemoglobin is decreased to 9.2.  Iron studies are consistent with iron deficiency with ferritin of 14 and iron saturation of 11%.  Due to inability to tolerate iron by mouth we will start patient on intravenous  Feraheme starting next week.  Side effect of Feraheme including allergic reaction were discussed with patient  -We will refer patient to gastroenterology team for further evaluation of iron deficiency .  Since patient may need capsule endoscopy that has not been done by Dr. Sutton, will refer her to gastroenterology for that purpose.  Referral has been placed today    2.  Elevated creatinine:  -Creatinine is again elevated at 1.12.  Patient was counseled about increasing hydration    3.  Genetic counseling:  -Patient had a 3 family member with breast cancer:  -Genetic testing done with [facility] hereditary cancer panel testing including BRCA1 and BRCA2 in March 2020 was negative    4.  Health maintenance: Patient does not smoke    5 Advance Care Planning: For now patient remains full code and is able to make decisions.  Patient has health care surrogate mentioned on chart.         PHQ-9 Total Score:       Lois Parmar reports a pain score of 0.  Given her pain assessment as noted, treatment options were discussed and the following options were decided upon as a follow-up plan to address the patient's pain: continuation of current treatment plan for pain.         This visit has been rescheduled as a phone visit to comply with patient safety concerns in accordance with CDC recommendations. Total time of discussion was 11 minutes.       Ranjeet De La Torre MD  3/4/2021  14:51 CST        Part of this note may be an electronic transcription/translation of spoken language to printed text using the Dragon Dictation System.”

## 2021-03-08 ENCOUNTER — OFFICE VISIT (OUTPATIENT)
Dept: GASTROENTEROLOGY | Facility: CLINIC | Age: 71
End: 2021-03-08

## 2021-03-08 VITALS
DIASTOLIC BLOOD PRESSURE: 77 MMHG | HEART RATE: 45 BPM | BODY MASS INDEX: 48.64 KG/M2 | SYSTOLIC BLOOD PRESSURE: 159 MMHG | WEIGHT: 257.6 LBS | HEIGHT: 61 IN

## 2021-03-08 DIAGNOSIS — D50.0 IRON DEFICIENCY ANEMIA SECONDARY TO BLOOD LOSS (CHRONIC): Primary | ICD-10-CM

## 2021-03-08 DIAGNOSIS — D50.9 IRON DEFICIENCY ANEMIA, UNSPECIFIED IRON DEFICIENCY ANEMIA TYPE: ICD-10-CM

## 2021-03-08 PROCEDURE — 99213 OFFICE O/P EST LOW 20 MIN: CPT | Performed by: NURSE PRACTITIONER

## 2021-03-08 RX ORDER — SIMETHICONE 20 MG/.3ML
333 EMULSION ORAL ONCE
Status: CANCELLED | OUTPATIENT
Start: 2021-04-13 | End: 2021-03-08

## 2021-03-08 NOTE — PATIENT INSTRUCTIONS
Capsule Endoscopy  Capsule endoscopy is a procedure that is used to examine the inside of the small intestine. It is done to look for problems or abnormalities in the intestine. These problems may include bleeding, inflammation, or growths in the intestine. You will swallow a small capsule that has a tiny camera and transmitter in it. This capsule naturally travels through your digestive system. The camera takes photos of the small intestine along the way. The photos are transmitted to a recording device so that your health care provider can later view the photos and look for problems.  Tell a health care provider about:  · Any allergies you have.  · All medicines you are taking, including vitamins, herbs, eye drops, creams, and over-the-counter medicines.  · Any blood disorders you have.  · Any surgeries or treatments you have had.  · Any medical conditions you have.  · Any history of intestinal blockage, Crohn disease, or ulcerative colitis.  · Any history of swallowing disorders.  · Smoking history.  · Whether you have a pacemaker or implantable heart defibrillator.  · Whether you are pregnant or may be pregnant.  What are the risks?  Generally, this is a safe procedure. However, problems may occur, including:  · The capsule becoming trapped in the digestive tract, resulting in intestinal blockage.  · Fever.  · Nausea and vomiting.  · Problems swallowing.  · Cramps and pain in the abdomen.  · Failure to find a problem.  What happens before the procedure?  Eating and drinking restrictions  Follow instructions from your health care provider about eating and drinking. You may be asked to:  · Switch to a clear liquid diet 24 hours before the procedure. Examples of clear liquids include water, broth, and gelatin. Drinking plenty of fluids during this time can help to prevent complications.  · Stop eating and drinking completely 10 hours before the procedure.  General instructions  · Ask your health care provider  about:  ? Changing or stopping your regular medicines. This is especially important if you are taking diabetes medicines or blood thinners.  ? Taking medicines such as aspirin and ibuprofen. These medicines can thin your blood. Do not take these medicines before your procedure if your health care provider instructs you not to.  · Do not use any products that contain nicotine or tobacco, such as cigarettes and e-cigarettes. If you need help quitting, ask your health care provider.  · You may be asked to follow a bowel preparation cleansing routine (bowel prep) to empty out your intestines. If the intestines are not adequately cleansed, the procedure may fail to get useful photos and may need to be repeated.  · You may have imaging tests, such as X-rays, CT scans, or an MRI.  What happens during the procedure?         · Your health care provider will attach sensors to your abdomen.  · Then, you will be asked to put on a belt that contains a recording device.  · Your health care provider will turn on the camera inside the capsule.  · You will swallow the capsule with water, just like taking a pill.  · After you have swallowed the capsule, you may leave and return to your regular daily activities. Over the next several hours, the capsule will naturally travel through your digestive system and take photos of the small intestine.  · The photos will be transmitted to the recording device on your belt.  · After the specified number of hours (usually 8 or 12), you will take off the sensors and belt and return them to your health care provider as directed.  · Your health care provider will retrieve the photos that were taken and examine them on a computer.  The procedure may vary among health care providers and hospitals.  What happens after the procedure?  After you swallow the capsule, you will be able to leave and carry on with your regular daily activities. Follow these instructions during the testing  period:  Activity  · You can return to your normal activities right after swallowing the capsule.  · Avoid strenuous activity, such as running or heavy lifting, for 8 hours or until the capsule has passed out of your body during a bowel movement.  Eating and drinking  · 2 hours after swallowing the capsule -- you may drink clear liquids.  · 4 hours after swallowing the capsule -- you may eat lightly. You can return to your regular diet after the testing period.  General instructions  · You should not feel any pain or discomfort while the capsule is traveling through your digestive system. Contact your health care provider if:  ? You develop nausea or vomiting.  ? You feel pain or bloating in your abdomen.  · Only remove the sensors and recording device as told by your health care provider.  · Return the recording device to your health care provider as directed.  · The capsule will be naturally eliminated from the body during a bowel movement. Look for the capsule in your bowel movements over the next few days. Most people pass the capsule in about 8 hours, but this can vary.  · If you do not pass the capsule within 2 weeks, contact a health care provider.  · The capsule can be safely flushed down the toilet.  · Avoid MRI equipment until the capsule has been eliminated from your body.  · It is up to you to get the results of your procedure. Ask your health care provider, or the department performing the procedure, when your results will be ready.  Summary  · Capsule endoscopy is a procedure that is used to examine the inside of the small intestine.  · You will swallow a small capsule that has a tiny camera and transmitter in it. This capsule naturally travels through your digestive system, and the camera takes photos of the small intestine along the way.  · After you swallow the capsule, you will be able to leave and carry on with your regular daily activities.  · Contact your health care provider if you develop  nausea or vomiting or if you feel pain or bloating in your abdomen.  This information is not intended to replace advice given to you by your health care provider. Make sure you discuss any questions you have with your health care provider.  Document Revised: 03/14/2019 Document Reviewed: 11/06/2017  [employer] Patient Education © 2020 [employer]

## 2021-03-08 NOTE — PROGRESS NOTES
Chief Complaint   Patient presents with   • Anemia       Subjective    Lois Parmar is a 71 y.o. female. she is being seen for consultation today at the request of Dr. De La Torre   71-year-old female presents to discuss recurrent anemia.  Reports she has been feeling more tired and fatigued recently blood work noted drop in hemoglobin to 9.2 she is set up to get iron infusions when she takes oral iron she becomes very constipated and reports she does see bright red blood in the stool but otherwise has had no other visible signs of blood loss.  She underwent EGD and colonoscopy 11/2018 per Dr. Sutton no signs of bleeding were noted then gastritis was seen in the stomach small polyp was removed from colon with repeat recommended in 5 years for surveillance.  States she was recently diagnosed with alpha gal.    Anemia  Presents for initial visit. Symptoms include abdominal pain, malaise/fatigue, pallor and pica. There has been no anorexia, bruising/bleeding easily, confusion, fever, leg swelling, light-headedness, palpitations, paresthesias or weight loss. Signs of blood loss that are present include hematochezia (occ when strains with constipation ). Signs of blood loss that are not present include melena. Past treatments include changes in diet, parenteral iron supplements and oral vitamin B12. Procedure history includes colonoscopy and EGD. procedures 2018 .     Plan; schedule patient for capsule endoscopy to rule out blood loss from small bowel due to iron deficiency anemia due to chronic blood loss.  Recommend she continue with iron infusion per hematology.  Follow-up after test return office sooner if needed       The following portions of the patient's history were reviewed and updated as appropriate:   Past Medical History:   Diagnosis Date   • Acquired hypothyroidism - On Synthroid    • Allergic rhinitis    • Allergy to alpha-gal 7/17/2020   • Anemia    • Asthma - mild intermittent    • Cardiac disease    •  Carpal tunnel syndrome - Bilateral    • Colitis    • Degeneration of cervical intervertebral disc    • Diabetes (CMS/HCC)    • Disorder of lumbar spine    • Diverticulosis of large intestine 1/17/2019   • Essential hypertension    • Essential hypertension    • Fatigue    • History of myocardial infarct at age greater than 60 years 10/17/2018   • Hyperlipidemia - Improved with Zocor    • Hypertriglyceridemia 1/18/2021   • IFG (impaired fasting glucose) 10/17/2018   • Impaired fasting glycaemia    • Iron deficiency anemia - Improved    • Lumbar radiculopathy    • Megaloblastic anemia due to vitamin B>12< deficiency    • Morbid obesity (CMS/HCC)    • Osteoarthritis of knee - Bilateral    • Osteoarthritis of multiple joints - Left knee    • Osteoporosis    • Pain in left hip    • Sleep apnea - On CPAP    • Spasm of back muscles    • Stage 3 chronic kidney disease (CMS/HCC) 10/23/2019   • Stage 3a chronic kidney disease (CMS/HCC) 10/23/2019   • Thyroid dysfunction    • Tubular adenoma of colon - removed during colonoscopy, 11/2018. 12/3/2018   • Vitamin D deficiency      Past Surgical History:   Procedure Laterality Date   • CARPAL TUNNEL RELEASE Right    • COLONOSCOPY N/A 11/30/2018    Procedure: COLONOSCOPY;  Surgeon: Jimmie Sutton MD;  Location: [facility] ENDOSCOPY;  Service: General   • ENDOSCOPY  09/17/2012    Normal esophagus.  Gastritis.  Normal duodenum   • ENDOSCOPY N/A 11/30/2018    Procedure: ESOPHAGOGASTRODUODENOSCOPY WITH ANESTHESIA;  Surgeon: Jimmie Sutton MD;  Location: [facility] ENDOSCOPY;  Service: General   • ENDOSCOPY AND COLONOSCOPY  09/17/2012    Internal & external hemorrhoids found.   • HYSTERECTOMY  1980    w bso   • JOINT REPLACEMENT      left knee   • LUMBAR LAMINECTOMY  2011    s/p lumbar laminectomy and fusion   • NECK SURGERY     • OOPHORECTOMY  1980   • SHOULDER SURGERY  12/07/2015    Arthroscopy of the left shoudler with rotator cuff repair, Vijay procedure, and subacromial  decompression.   • SHOULDER SURGERY Right    • TOTAL KNEE ARTHROPLASTY Left      Family History   Problem Relation Age of Onset   • Breast cancer Mother    • Ovarian cancer Mother    • Breast cancer Sister    • Breast cancer Other    • Heart disease Other    • Hypertension Other    • Lung disease Other    • Diabetes Other    • Cancer Other      OB History        2    Para   2    Term   2            AB        Living           SAB        TAB        Ectopic        Molar        Multiple        Live Births                  Prior to Admission medications    Medication Sig Start Date End Date Taking? Authorizing Provider   aspirin 81 MG chewable tablet Chew 81 mg Daily.    Artis Smith MD   CYMBALTA 60 MG capsule Take 1 capsule by mouth Daily. 20   Marshal Warner MD   docusate sodium (COLACE) 100 MG capsule Take 100 mg by mouth 2 (Two) Times a Day As Needed.    Artis Smith MD   EPINEPHrine (EPIPEN) 0.3 MG/0.3ML solution auto-injector injection Inject 0.3 mg into the appropriate muscle as directed by prescriber. 20   Artis Smith MD   ezetimibe (ZETIA) 10 MG tablet Take 10 mg by mouth Daily. 20  Artis Smith MD   famotidine (PEPCID) 40 MG tablet TAKE 1 TABLET BY MOUTH EVERY NIGHT. THIS REPLACES ZANTAC 20   Marshal Warner MD   Ferrous Sulfate ER (Slow Fe) 142 (45 Fe) MG tablet controlled-release Take 142 mg by mouth 3 (Three) Times a Week.    Artis Smith MD   folic acid (FOLVITE) 1 MG tablet TAKE 1 TABLET EVERY DAY  Patient taking differently: Take 1 mg by mouth 3 (Three) Times a Week. 10/12/20   Ranjeet De La Torre MD   glucose blood test strip tests BID, Diagnosis: 250.00, 401.9    Artis Smith MD   indapamide (LOZOL) 2.5 MG tablet Take 2.5 mg by mouth Daily. 9/3/20 9/4/21  Artis Smith MD   labetalol (NORMODYNE) 100 MG tablet 1 1/2 tab twice daily 10/9/20   Artis Smith MD   levothyroxine (SYNTHROID, LEVOTHROID)  88 MCG tablet Take 1 tablet by mouth Daily. 1/18/21   Marshal Warner MD   loratadine (CLARITIN) 10 MG tablet Take 10 mg by mouth daily.    ProviderArtis MD   metFORMIN (GLUCOPHAGE) 500 MG tablet Take 1 tablet by mouth 2 (Two) Times a Day With Meals. 10/23/19   Marshal Warner MD   olmesartan (BENICAR) 40 MG tablet Take 40 mg by mouth Daily. 9/3/20 9/4/21  Artis Smith MD   Prevacid 30 MG capsule Take 1 capsule by mouth Every Morning. 10/11/20   Marshal Warner MD   simvastatin (ZOCOR) 40 MG tablet Take 1 tablet by mouth Every Night. 4/24/20   Marshal Warner MD   spironolactone (ALDACTONE) 25 MG tablet TAKE 1 TABLET EVERY DAY  FOR  FLUID 10/16/20   Marshal Warner MD   vitamin B-12 (CYANOCOBALAMIN) 1000 MCG tablet Take 1 tablet by mouth 3 (Three) Times a Week. One tablet three times per week 10/16/20   Moon Delarosa APRN     Allergies   Allergen Reactions   • Beef Allergy Anaphylaxis   • Dairy Aid  [Lactase] Anaphylaxis   • Galactose Anaphylaxis   • Lortab [Hydrocodone-Acetaminophen] Hallucinations   • Milk  [Lac Bovis] Anaphylaxis     D/t alpha gal   • Pork-Derived Products Anaphylaxis   • Codeine Palpitations     Heart race   • Antihistamines, Chlorpheniramine-Type Palpitations     Heart races   • Latex Rash   • Other Palpitations     Decongestants: Heart Race   • Tape Rash     Social History     Socioeconomic History   • Marital status:      Spouse name: Not on file   • Number of children: Not on file   • Years of education: Not on file   • Highest education level: Not on file   Tobacco Use   • Smoking status: Never Smoker   • Smokeless tobacco: Never Used   Vaping Use   • Vaping Use: Never used   Substance and Sexual Activity   • Alcohol use: No   • Drug use: No   • Sexual activity: Defer       Review of Systems  Review of Systems   Constitutional: Positive for fatigue and malaise/fatigue. Negative for activity change, appetite change, chills, diaphoresis, fever, unexpected weight change  "and weight loss.   HENT: Negative for sore throat and trouble swallowing.    Respiratory: Positive for shortness of breath (with minimal activity ).    Cardiovascular: Negative for palpitations.   Gastrointestinal: Positive for abdominal pain, constipation and hematochezia (occ when strains with constipation ). Negative for abdominal distention, anal bleeding, anorexia, blood in stool, diarrhea, melena, nausea, rectal pain and vomiting.   Musculoskeletal: Negative for arthralgias.   Skin: Positive for pallor.   Neurological: Negative for light-headedness and paresthesias.   Hematological: Does not bruise/bleed easily.   Psychiatric/Behavioral: Negative for confusion.        /77 (BP Location: Right arm)   Pulse (!) 45   Ht 154.9 cm (61\")   Wt 117 kg (257 lb 9.6 oz)   BMI 48.67 kg/m²     Objective    Physical Exam  Constitutional:       General: She is not in acute distress.     Appearance: Normal appearance. She is well-developed.   HENT:      Head: Normocephalic and atraumatic.   Neck:      Thyroid: No thyromegaly.   Pulmonary:      Effort: Pulmonary effort is normal.   Abdominal:      General: Bowel sounds are normal. There is no distension.      Palpations: Abdomen is soft. Abdomen is not rigid. There is no shifting dullness or fluid wave.      Tenderness: There is no abdominal tenderness. There is no guarding.      Hernia: No hernia is present.   Musculoskeletal:      Cervical back: Normal range of motion and neck supple.   Skin:     General: Skin is warm and dry.      Coloration: Skin is not pale.      Findings: No rash.   Neurological:      Mental Status: She is alert and oriented to person, place, and time.   Psychiatric:         Speech: Speech normal.         Behavior: Behavior is cooperative.       Lab on 03/02/2021   Component Date Value Ref Range Status   • Ferritin 03/02/2021 14.34  13.00 - 150.00 ng/mL Final   • Iron 03/02/2021 56  37 - 145 mcg/dL Final   • Iron Saturation 03/02/2021 11* 20 - " 50 % Final   • Transferrin 03/02/2021 327  200 - 360 mg/dL Final   • TIBC 03/02/2021 487  298 - 536 mcg/dL Final   • Folate 03/02/2021 >20.00  4.78 - 24.20 ng/mL Final   • Vitamin B-12 03/02/2021 1,042* 211 - 946 pg/mL Final   • Glucose 03/02/2021 101* 70 - 99 mg/dL Final   • BUN 03/02/2021 27* 7 - 23 mg/dL Final   • Creatinine 03/02/2021 1.12* 0.52 - 1.04 mg/dL Final   • Sodium 03/02/2021 141  137 - 145 mmol/L Final   • Potassium 03/02/2021 4.7  3.4 - 5.0 mmol/L Final   • Chloride 03/02/2021 105  101 - 112 mmol/L Final   • CO2 03/02/2021 28.0  22.0 - 30.0 mmol/L Final   • Calcium 03/02/2021 9.6  8.4 - 10.2 mg/dL Final   • Total Protein 03/02/2021 7.3  6.3 - 8.6 g/dL Final   • Albumin 03/02/2021 4.20  3.50 - 5.00 g/dL Final   • ALT (SGPT) 03/02/2021 18  <=35 U/L Final   • AST (SGOT) 03/02/2021 21  14 - 36 U/L Final   • Alkaline Phosphatase 03/02/2021 35* 38 - 126 U/L Final   • Total Bilirubin 03/02/2021 0.4  0.2 - 1.3 mg/dL Final   • eGFR Non African Amer 03/02/2021 48  39 - 90 mL/min/1.73 Final   • Globulin 03/02/2021 3.1  2.3 - 3.5 gm/dL Final   • A/G Ratio 03/02/2021 1.4  1.1 - 1.8 g/dL Final   • BUN/Creatinine Ratio 03/02/2021 24.1  7.0 - 25.0 Final   • Anion Gap 03/02/2021 8.0  5.0 - 15.0 mmol/L Final   • WBC 03/02/2021 5.88  3.40 - 10.80 10*3/mm3 Final   • RBC 03/02/2021 2.98* 3.77 - 5.28 10*6/mm3 Final   • Hemoglobin 03/02/2021 9.2* 12.0 - 15.9 g/dL Final   • Hematocrit 03/02/2021 29.1* 34.0 - 46.6 % Final   • MCV 03/02/2021 97.7* 79.0 - 97.0 fL Final   • MCH 03/02/2021 30.9  26.6 - 33.0 pg Final   • MCHC 03/02/2021 31.6  31.5 - 35.7 g/dL Final   • RDW 03/02/2021 13.7  12.3 - 15.4 % Final   • RDW-SD 03/02/2021 46.8  37.0 - 54.0 fl Final   • MPV 03/02/2021 9.9  6.0 - 12.0 fL Final   • Platelets 03/02/2021 226  140 - 450 10*3/mm3 Final   • Neutrophil % 03/02/2021 65.1  42.7 - 76.0 % Final   • Lymphocyte % 03/02/2021 20.2  19.6 - 45.3 % Final   • Monocyte % 03/02/2021 10.5  5.0 - 12.0 % Final   • Eosinophil %  03/02/2021 3.7  0.3 - 6.2 % Final   • Basophil % 03/02/2021 0.5  0.0 - 1.5 % Final   • Neutrophils, Absolute 03/02/2021 3.82  1.70 - 7.00 10*3/mm3 Final   • Lymphocytes, Absolute 03/02/2021 1.19  0.70 - 3.10 10*3/mm3 Final   • Monocytes, Absolute 03/02/2021 0.62  0.10 - 0.90 10*3/mm3 Final   • Eosinophils, Absolute 03/02/2021 0.22  0.00 - 0.40 10*3/mm3 Final   • Basophils, Absolute 03/02/2021 0.03  0.00 - 0.20 10*3/mm3 Final     Assessment/Plan      1. Iron deficiency anemia secondary to blood loss (chronic)         .       Orders placed during this encounter include:  Orders Placed This Encounter   Procedures   • Endoscopy, GI With Capsule     Standing Status:   Future     Standing Expiration Date:   3/8/2022       * Surgery not found *    Review and/or summary of lab tests, radiology, procedures, medications. Review and summary of old records and obtaining of history. The risks and benefits of my recommendations, as well as other treatment options were discussed with the patient today. Questions were answered.    No orders of the defined types were placed in this encounter.      Follow-up: Return in about 4 weeks (around 4/5/2021) for Recheck, After test.          This document has been electronically signed by ODILIA Benson on March 8, 2021 13:31 CST           I spent 18 minutes caring for Lois on this date of service. This time includes time spent by me in the following activities:preparing for the visit, reviewing tests, obtaining and/or reviewing a separately obtained history, performing a medically appropriate examination and/or evaluation , counseling and educating the patient/family/caregiver, ordering medications, tests, or procedures, referring and communicating with other health care professionals , documenting information in the medical record and care coordination    Results for orders placed or performed in visit on 03/02/21   Vitamin B12 & Folate    Specimen: Blood   Result Value Ref Range     Folate >20.00 4.78 - 24.20 ng/mL    Vitamin B-12 1,042 (H) 211 - 946 pg/mL   CBC Auto Differential    Specimen: Blood   Result Value Ref Range    WBC 5.88 3.40 - 10.80 10*3/mm3    RBC 2.98 (L) 3.77 - 5.28 10*6/mm3    Hemoglobin 9.2 (L) 12.0 - 15.9 g/dL    Hematocrit 29.1 (L) 34.0 - 46.6 %    MCV 97.7 (H) 79.0 - 97.0 fL    MCH 30.9 26.6 - 33.0 pg    MCHC 31.6 31.5 - 35.7 g/dL    RDW 13.7 12.3 - 15.4 %    RDW-SD 46.8 37.0 - 54.0 fl    MPV 9.9 6.0 - 12.0 fL    Platelets 226 140 - 450 10*3/mm3    Neutrophil % 65.1 42.7 - 76.0 %    Lymphocyte % 20.2 19.6 - 45.3 %    Monocyte % 10.5 5.0 - 12.0 %    Eosinophil % 3.7 0.3 - 6.2 %    Basophil % 0.5 0.0 - 1.5 %    Neutrophils, Absolute 3.82 1.70 - 7.00 10*3/mm3    Lymphocytes, Absolute 1.19 0.70 - 3.10 10*3/mm3    Monocytes, Absolute 0.62 0.10 - 0.90 10*3/mm3    Eosinophils, Absolute 0.22 0.00 - 0.40 10*3/mm3    Basophils, Absolute 0.03 0.00 - 0.20 10*3/mm3   Iron Profile    Specimen: Blood   Result Value Ref Range    Iron 56 37 - 145 mcg/dL    Iron Saturation 11 (L) 20 - 50 %    Transferrin 327 200 - 360 mg/dL    TIBC 487 298 - 536 mcg/dL   Ferritin    Specimen: Blood   Result Value Ref Range    Ferritin 14.34 13.00 - 150.00 ng/mL   Comprehensive Metabolic Panel    Specimen: Blood   Result Value Ref Range    Glucose 101 (H) 70 - 99 mg/dL    BUN 27 (H) 7 - 23 mg/dL    Creatinine 1.12 (H) 0.52 - 1.04 mg/dL    Sodium 141 137 - 145 mmol/L    Potassium 4.7 3.4 - 5.0 mmol/L    Chloride 105 101 - 112 mmol/L    CO2 28.0 22.0 - 30.0 mmol/L    Calcium 9.6 8.4 - 10.2 mg/dL    Total Protein 7.3 6.3 - 8.6 g/dL    Albumin 4.20 3.50 - 5.00 g/dL    ALT (SGPT) 18 <=35 U/L    AST (SGOT) 21 14 - 36 U/L    Alkaline Phosphatase 35 (L) 38 - 126 U/L    Total Bilirubin 0.4 0.2 - 1.3 mg/dL    eGFR Non African Amer 48 39 - 90 mL/min/1.73    Globulin 3.1 2.3 - 3.5 gm/dL    A/G Ratio 1.4 1.1 - 1.8 g/dL    BUN/Creatinine Ratio 24.1 7.0 - 25.0    Anion Gap 8.0 5.0 - 15.0 mmol/L   Results for orders placed  or performed in visit on 01/14/21   Alpha - Gal Panel    Specimen: Blood   Result Value Ref Range    Beef 0.65 (H) <0.35 kU/L    Class Description 1     Hayes <0.10 <0.35 kU/L    Class Interpretation 0     Pork 0.24 <0.35 kU/L    Class Interpretation 0/1     Alpha Gal IgE 1.85 (H) <0.10 kU/L   CBC Auto Differential    Specimen: Blood   Result Value Ref Range    WBC 8.26 3.40 - 10.80 10*3/mm3    RBC 3.16 (L) 3.77 - 5.28 10*6/mm3    Hemoglobin 10.0 (L) 12.0 - 15.9 g/dL    Hematocrit 30.5 (L) 34.0 - 46.6 %    MCV 96.5 79.0 - 97.0 fL    MCH 31.6 26.6 - 33.0 pg    MCHC 32.8 31.5 - 35.7 g/dL    RDW 14.0 12.3 - 15.4 %    RDW-SD 46.8 37.0 - 54.0 fl    MPV 8.7 6.0 - 12.0 fL    Platelets 245 140 - 450 10*3/mm3    Neutrophil % 72.2 42.7 - 76.0 %    Lymphocyte % 16.3 (L) 19.6 - 45.3 %    Monocyte % 7.7 5.0 - 12.0 %    Eosinophil % 3.3 0.3 - 6.2 %    Basophil % 0.5 0.0 - 1.5 %    Neutrophils, Absolute 5.96 1.70 - 7.00 10*3/mm3    Lymphocytes, Absolute 1.35 0.70 - 3.10 10*3/mm3    Monocytes, Absolute 0.64 0.10 - 0.90 10*3/mm3    Eosinophils, Absolute 0.27 0.00 - 0.40 10*3/mm3    Basophils, Absolute 0.04 0.00 - 0.20 10*3/mm3   Hepatitis C Antibody    Specimen: Blood   Result Value Ref Range    Hepatitis C Ab Non-Reactive Non-Reactive   Vitamin D 25 Hydroxy    Specimen: Blood   Result Value Ref Range    25 Hydroxy, Vitamin D 45.1 30.0 - 100.0 ng/ml   TSH    Specimen: Blood   Result Value Ref Range    TSH 5.000 (H) 0.270 - 4.200 uIU/mL   T4, free    Specimen: Blood   Result Value Ref Range    Free T4 1.08 0.93 - 1.70 ng/dL   Magnesium    Specimen: Blood   Result Value Ref Range    Magnesium 1.8 1.6 - 2.3 mg/dL   Hemoglobin A1c    Specimen: Blood   Result Value Ref Range    Hemoglobin A1C 6.11 (H) 4.80 - 5.60 %   Ferritin    Specimen: Blood   Result Value Ref Range    Ferritin 20.00 13.00 - 150.00 ng/mL   Lipid Panel    Specimen: Blood   Result Value Ref Range    Total Cholesterol 149 (L) 150 - 200 mg/dL    Triglycerides 159 (H) <=150  mg/dL    HDL Cholesterol 36 (L) 40 - 59 mg/dL    LDL Cholesterol  85 <=100 mg/dL    VLDL Cholesterol 28 5 - 40 mg/dL    LDL/HDL Ratio 2.26 0.00 - 3.22   Comprehensive Metabolic Panel    Specimen: Blood   Result Value Ref Range    Glucose 101 (H) 70 - 99 mg/dL    BUN 27 (H) 7 - 23 mg/dL    Creatinine 1.19 (H) 0.52 - 1.04 mg/dL    Sodium 140 137 - 145 mmol/L    Potassium 4.2 3.4 - 5.0 mmol/L    Chloride 103 101 - 112 mmol/L    CO2 27.0 22.0 - 30.0 mmol/L    Calcium 9.7 8.4 - 10.2 mg/dL    Total Protein 7.4 6.3 - 8.6 g/dL    Albumin 4.30 3.50 - 5.00 g/dL    ALT (SGPT) 15 <=35 U/L    AST (SGOT) 21 14 - 36 U/L    Alkaline Phosphatase 39 38 - 126 U/L    Total Bilirubin 0.4 0.2 - 1.3 mg/dL    eGFR Non African Amer 45 39 - 90 mL/min/1.73    Globulin 3.1 2.3 - 3.5 gm/dL    A/G Ratio 1.4 1.1 - 1.8 g/dL    BUN/Creatinine Ratio 22.7 7.0 - 25.0    Anion Gap 10.0 5.0 - 15.0 mmol/L     *Note: Due to a large number of results and/or encounters for the requested time period, some results have not been displayed. A complete set of results can be found in Results Review.

## 2021-03-11 ENCOUNTER — INFUSION (OUTPATIENT)
Dept: ONCOLOGY | Facility: HOSPITAL | Age: 71
End: 2021-03-11

## 2021-03-11 VITALS
DIASTOLIC BLOOD PRESSURE: 42 MMHG | SYSTOLIC BLOOD PRESSURE: 116 MMHG | TEMPERATURE: 98.1 F | RESPIRATION RATE: 18 BRPM | HEART RATE: 44 BPM

## 2021-03-11 DIAGNOSIS — D50.0 IRON DEFICIENCY ANEMIA DUE TO CHRONIC BLOOD LOSS: Primary | ICD-10-CM

## 2021-03-11 DIAGNOSIS — T45.4X5A ADVERSE EFFECT OF IRON, INITIAL ENCOUNTER: ICD-10-CM

## 2021-03-11 PROCEDURE — 96374 THER/PROPH/DIAG INJ IV PUSH: CPT | Performed by: INTERNAL MEDICINE

## 2021-03-11 PROCEDURE — 63710000001 DIPHENHYDRAMINE PER 50 MG: Performed by: INTERNAL MEDICINE

## 2021-03-11 PROCEDURE — 96375 TX/PRO/DX INJ NEW DRUG ADDON: CPT | Performed by: INTERNAL MEDICINE

## 2021-03-11 PROCEDURE — 25010000002 FERUMOXYTOL 510 MG/17ML SOLUTION 510 MG VIAL: Performed by: INTERNAL MEDICINE

## 2021-03-11 RX ORDER — FAMOTIDINE 10 MG/ML
20 INJECTION, SOLUTION INTRAVENOUS ONCE
Status: CANCELLED | OUTPATIENT
Start: 2021-03-18 | End: 2021-03-18

## 2021-03-11 RX ORDER — CETIRIZINE HYDROCHLORIDE 10 MG/1
10 TABLET ORAL ONCE
Status: COMPLETED | OUTPATIENT
Start: 2021-03-11 | End: 2021-03-11

## 2021-03-11 RX ORDER — SODIUM CHLORIDE 9 MG/ML
250 INJECTION, SOLUTION INTRAVENOUS ONCE
Status: COMPLETED | OUTPATIENT
Start: 2021-03-11 | End: 2021-03-11

## 2021-03-11 RX ORDER — SODIUM CHLORIDE 9 MG/ML
250 INJECTION, SOLUTION INTRAVENOUS ONCE
Status: CANCELLED | OUTPATIENT
Start: 2021-03-18

## 2021-03-11 RX ORDER — CETIRIZINE HYDROCHLORIDE 10 MG/1
10 TABLET ORAL ONCE
Status: CANCELLED | OUTPATIENT
Start: 2021-03-18 | End: 2021-03-18

## 2021-03-11 RX ORDER — DIPHENHYDRAMINE HCL 25 MG
25 CAPSULE ORAL ONCE
Status: CANCELLED | OUTPATIENT
Start: 2021-03-18 | End: 2021-03-18

## 2021-03-11 RX ORDER — DIPHENHYDRAMINE HCL 25 MG
25 CAPSULE ORAL ONCE
Status: COMPLETED | OUTPATIENT
Start: 2021-03-11 | End: 2021-03-11

## 2021-03-11 RX ORDER — FAMOTIDINE 10 MG/ML
20 INJECTION, SOLUTION INTRAVENOUS ONCE
Status: COMPLETED | OUTPATIENT
Start: 2021-03-11 | End: 2021-03-11

## 2021-03-11 RX ADMIN — FAMOTIDINE 20 MG: 10 INJECTION INTRAVENOUS at 14:33

## 2021-03-11 RX ADMIN — FERUMOXYTOL 510 MG: 510 INJECTION INTRAVENOUS at 15:01

## 2021-03-11 RX ADMIN — CETIRIZINE HYDROCHLORIDE 10 MG: 10 TABLET, FILM COATED ORAL at 14:30

## 2021-03-11 RX ADMIN — DIPHENHYDRAMINE HYDROCHLORIDE 25 MG: 25 CAPSULE ORAL at 14:30

## 2021-03-11 RX ADMIN — SODIUM CHLORIDE 250 ML: 9 INJECTION, SOLUTION INTRAVENOUS at 14:33

## 2021-03-13 ENCOUNTER — LAB (OUTPATIENT)
Dept: LAB | Facility: HOSPITAL | Age: 71
End: 2021-03-13

## 2021-03-13 DIAGNOSIS — Z01.818 PREOP TESTING: Primary | ICD-10-CM

## 2021-03-13 PROCEDURE — C9803 HOPD COVID-19 SPEC COLLECT: HCPCS

## 2021-03-13 PROCEDURE — U0004 COV-19 TEST NON-CDC HGH THRU: HCPCS

## 2021-03-14 LAB — SARS-COV-2 ORF1AB RESP QL NAA+PROBE: NOT DETECTED

## 2021-03-15 ENCOUNTER — BULK ORDERING (OUTPATIENT)
Dept: CASE MANAGEMENT | Facility: OTHER | Age: 71
End: 2021-03-15

## 2021-03-15 DIAGNOSIS — Z23 IMMUNIZATION DUE: ICD-10-CM

## 2021-03-16 ENCOUNTER — HOSPITAL ENCOUNTER (OUTPATIENT)
Dept: GASTROENTEROLOGY | Facility: HOSPITAL | Age: 71
Discharge: HOME | End: 2021-03-16
Admitting: NURSE PRACTITIONER

## 2021-03-16 DIAGNOSIS — D50.0 IRON DEFICIENCY ANEMIA SECONDARY TO BLOOD LOSS (CHRONIC): ICD-10-CM

## 2021-03-16 DIAGNOSIS — D50.9 IRON DEFICIENCY ANEMIA, UNSPECIFIED IRON DEFICIENCY ANEMIA TYPE: ICD-10-CM

## 2021-03-16 PROCEDURE — 91110 GI TRC IMG INTRAL ESOPH-ILE: CPT

## 2021-03-16 NOTE — NURSING NOTE
Room arrival time__________0740_____________________    Does patient have a pacemaker or implantable device (if yes, this is contraindicated) :   no    B/P: 157/54    Heart Rate: 38    O2 Sat %: 95%    Temp: 98.7      Pain: 0             If yes, location and VAS _____________________________    Allergies: see list  NPO status:  [place]  Pillcam Lot# :  [id number]  Pillcam expiration date:  02/28/2022    Patient swallowed capsule @ ___0755__________      Patient arrived to Endoscopy department ambulatory, alert and oriented.  Procedure explained to patient, patient verbalized understanding.  Consent obtained.  Patient swallowed capsule without difficulty.  Dietary instructions given and patient instructed on time to return to the Endoscopy department.    Discharged from endo time__0804____________________

## 2021-03-17 DIAGNOSIS — Z12.31 ENCOUNTER FOR SCREENING MAMMOGRAM FOR MALIGNANT NEOPLASM OF BREAST: Primary | ICD-10-CM

## 2021-03-18 ENCOUNTER — APPOINTMENT (OUTPATIENT)
Dept: ONCOLOGY | Facility: HOSPITAL | Age: 71
End: 2021-03-18

## 2021-03-19 ENCOUNTER — INFUSION (OUTPATIENT)
Dept: ONCOLOGY | Facility: HOSPITAL | Age: 71
End: 2021-03-19

## 2021-03-19 VITALS
TEMPERATURE: 98 F | HEART RATE: 75 BPM | SYSTOLIC BLOOD PRESSURE: 122 MMHG | DIASTOLIC BLOOD PRESSURE: 54 MMHG | RESPIRATION RATE: 18 BRPM

## 2021-03-19 DIAGNOSIS — D50.0 IRON DEFICIENCY ANEMIA DUE TO CHRONIC BLOOD LOSS: Primary | ICD-10-CM

## 2021-03-19 DIAGNOSIS — T45.4X5A ADVERSE EFFECT OF IRON, INITIAL ENCOUNTER: ICD-10-CM

## 2021-03-19 PROCEDURE — 63710000001 DIPHENHYDRAMINE PER 50 MG: Performed by: INTERNAL MEDICINE

## 2021-03-19 PROCEDURE — 96374 THER/PROPH/DIAG INJ IV PUSH: CPT | Performed by: INTERNAL MEDICINE

## 2021-03-19 PROCEDURE — 25010000002 FERUMOXYTOL 510 MG/17ML SOLUTION 510 MG VIAL: Performed by: INTERNAL MEDICINE

## 2021-03-19 PROCEDURE — 96375 TX/PRO/DX INJ NEW DRUG ADDON: CPT | Performed by: INTERNAL MEDICINE

## 2021-03-19 RX ORDER — SODIUM CHLORIDE 9 MG/ML
250 INJECTION, SOLUTION INTRAVENOUS ONCE
Status: COMPLETED | OUTPATIENT
Start: 2021-03-19 | End: 2021-03-19

## 2021-03-19 RX ORDER — DIPHENHYDRAMINE HCL 25 MG
25 CAPSULE ORAL ONCE
Status: COMPLETED | OUTPATIENT
Start: 2021-03-19 | End: 2021-03-19

## 2021-03-19 RX ORDER — FAMOTIDINE 10 MG/ML
20 INJECTION, SOLUTION INTRAVENOUS ONCE
Status: COMPLETED | OUTPATIENT
Start: 2021-03-19 | End: 2021-03-19

## 2021-03-19 RX ORDER — CETIRIZINE HYDROCHLORIDE 10 MG/1
10 TABLET ORAL ONCE
Status: CANCELLED | OUTPATIENT
Start: 2021-03-25 | End: 2021-03-25

## 2021-03-19 RX ORDER — CETIRIZINE HYDROCHLORIDE 10 MG/1
10 TABLET ORAL ONCE
Status: COMPLETED | OUTPATIENT
Start: 2021-03-19 | End: 2021-03-19

## 2021-03-19 RX ORDER — FAMOTIDINE 10 MG/ML
20 INJECTION, SOLUTION INTRAVENOUS ONCE
Status: CANCELLED | OUTPATIENT
Start: 2021-03-25 | End: 2021-03-25

## 2021-03-19 RX ORDER — SODIUM CHLORIDE 9 MG/ML
250 INJECTION, SOLUTION INTRAVENOUS ONCE
Status: CANCELLED | OUTPATIENT
Start: 2021-03-25

## 2021-03-19 RX ORDER — DIPHENHYDRAMINE HCL 25 MG
25 CAPSULE ORAL ONCE
Status: CANCELLED | OUTPATIENT
Start: 2021-03-25 | End: 2021-03-25

## 2021-03-19 RX ADMIN — FAMOTIDINE 20 MG: 10 INJECTION INTRAVENOUS at 09:41

## 2021-03-19 RX ADMIN — CETIRIZINE HYDROCHLORIDE 10 MG: 10 TABLET, FILM COATED ORAL at 09:30

## 2021-03-19 RX ADMIN — FERUMOXYTOL 510 MG: 510 INJECTION INTRAVENOUS at 10:00

## 2021-03-19 RX ADMIN — SODIUM CHLORIDE 250 ML: 9 INJECTION, SOLUTION INTRAVENOUS at 09:40

## 2021-03-19 RX ADMIN — DIPHENHYDRAMINE HYDROCHLORIDE 25 MG: 25 CAPSULE ORAL at 09:30

## 2021-04-07 RX ORDER — LANSOPRAZOLE 30 MG
CAPSULE,DELAYED RELEASE (ENTERIC COATED) ORAL
Qty: 90 CAPSULE | Refills: 3 | Status: SHIPPED | OUTPATIENT
Start: 2021-04-07 | End: 2022-03-28 | Stop reason: SDUPTHER

## 2021-04-09 ENCOUNTER — OFFICE VISIT (OUTPATIENT)
Dept: FAMILY MEDICINE CLINIC | Facility: CLINIC | Age: 71
End: 2021-04-09

## 2021-04-09 VITALS — BODY MASS INDEX: 48.52 KG/M2 | WEIGHT: 257 LBS | HEIGHT: 61 IN

## 2021-04-09 DIAGNOSIS — E66.01 MORBID OBESITY (HCC): Chronic | ICD-10-CM

## 2021-04-09 DIAGNOSIS — J45.40 MODERATE PERSISTENT ASTHMA WITHOUT COMPLICATION: Primary | Chronic | ICD-10-CM

## 2021-04-09 DIAGNOSIS — Z91.018 ALLERGY TO ALPHA-GAL: ICD-10-CM

## 2021-04-09 DIAGNOSIS — J30.1 SEASONAL ALLERGIC RHINITIS DUE TO POLLEN: Chronic | ICD-10-CM

## 2021-04-09 PROBLEM — I11.0: Status: ACTIVE | Noted: 2021-04-09

## 2021-04-09 PROBLEM — E28.39 PRIMARY OVARIAN FAILURE: Status: ACTIVE | Noted: 2018-08-30
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
## 2021-04-09 PROBLEM — E11.9 DIABETES MELLITUS: Status: ACTIVE | Noted: 2021-04-09

## 2021-04-09 PROBLEM — K21.9 GERD (GASTROESOPHAGEAL REFLUX DISEASE): Status: ACTIVE | Noted: 2021-04-09

## 2021-04-09 PROBLEM — R00.2 PALPITATIONS: Status: ACTIVE | Noted: 2020-06-29

## 2021-04-09 PROBLEM — T78.40XA ALLERGY, UNSPECIFIED, INITIAL ENCOUNTER: Status: ACTIVE | Noted: 2020-07-14

## 2021-04-09 PROBLEM — R19.7 DIARRHEA: Status: ACTIVE | Noted: 2020-06-29

## 2021-04-09 PROCEDURE — 99442 PR PHYS/QHP TELEPHONE EVALUATION 11-20 MIN: CPT | Performed by: INTERNAL MEDICINE

## 2021-04-09 RX ORDER — ALBUTEROL SULFATE 90 UG/1
2 AEROSOL, METERED RESPIRATORY (INHALATION) EVERY 4 HOURS PRN
Qty: 18 G | Refills: 11 | Status: SHIPPED | OUTPATIENT
Start: 2021-04-09

## 2021-04-09 RX ORDER — LABETALOL 300 MG/1
1 TABLET, FILM COATED ORAL 2 TIMES DAILY
COMMUNITY
Start: 2021-04-07 | End: 2021-05-13

## 2021-04-09 RX ORDER — ASCORBIC ACID 250 MG
250 TABLET ORAL DAILY
COMMUNITY
End: 2021-09-15 | Stop reason: SDUPTHER

## 2021-04-09 NOTE — PROGRESS NOTES
"You have chosen to receive care through a telephone visit. Do you consent to use a telephone visit for your medical care today? Yes    Total visit time: 26 minutes.      Chief Complaint  pulmonary studies (she had seen Dr. Ramos and he sent her for lung studies. She states she had that yesterday and the [employer] told her she had a positive asthma test and [place] [facility] [place] but he is out till next week and that office recommended her to call PCP for a rescue inhaler. She does have SOB with exertion . She will see allergist next week also )    Subjective          History of Present Illness     Lois Candelario receives care today to follow up recent pulmonary testing ordered by Dr. Ramos to evaluate chronic dyspnea with exertion.  She continues to have a paroxysmal cough, worse at night, sometimes waking her from sleep.  Pulmonary function test results are not available.  Patient was given a nebulizer treatment yesterday with her testing and reports this gave several hours of relief.  Chest x-ray 04/10/2021 revealed evidence of old granulomatous disease.  The respiratory therapist recommended she call Dr. Ramos's office yesterday.  However, he is out of the office until next week and his office staff recommended she call her primary care provider for a prescription for albuterol rescue inhaler. She is scheduled to see Luz Butts allergist, next week, for which I recommended she discuss the recent asthma diagnosis as well as her alpha gal syndrome and seasonal allergies.          Objective   Vital Signs:   Ht 154.9 cm (61\")   Wt 117 kg (257 lb)   BMI 48.56 kg/m²     Physical Exam   Result Review :       Data reviewed: Radiologic studies Chest x-ray 04/09/2021           Future Appointments   Date Time Provider Department Center   4/13/2021 11:00 AM [facility] 1 [facility] [place]   5/13/2021 11:00 AM Ranjeet De La Torre MD [facility]C [facility]   7/28/2021  9:30 AM Marshal Warner MD " [facility]     Assessment and Plan    Diagnoses and all orders for this visit:    1. Moderate persistent asthma without complication (Primary)    2. Seasonal allergic rhinitis due to pollen    3. Allergy to alpha-gal    4. Morbid obesity (CMS/HCC)    Other orders  -     albuterol sulfate  (90 Base) MCG/ACT inhaler; Inhale 2 puffs Every 4 (Four) Hours As Needed for Wheezing.  Dispense: 18 g; Refill: 11           I sent a prescription for albuterol rescue inhaler to use 2 puffs every 4h as needed wheezing or paroxysmal cough and recommended she premedicate with the rescue inhaler 2 puffs 10-15 minutes prior to bedtime to help prevent the paroxysmal cough waking her during the night.  She may need to repeat two puffs during the night when awakened with the cough.  Discussed the results of this with Dr. Butts.  Keep appointment with allergist, Luz Butts, next week.  I recommended she discuss the recent asthma diagnosis.  I continue to recommend patient pursue weight loss.  I have told her that it is my pain in that asthma is only one of the components that may be producing poor exercise tolerance and dyspnea on exertion, with morbid obesity and physical deconditioning and arthritic weightbearing joint pain also playing roles.    Return in July for routine follow up with fasting labs one week prior or sooner if needed.      Scribed for Dr. Warner by Adore Marrero [facility].             Follow Up   Return for Next scheduled follow up, Next scheduled follow up - labs 1 week prior.  Patient was given instructions and counseling regarding her condition or for health maintenance advice. Please see specific information pulled into the AVS if appropriate.

## 2021-04-09 NOTE — PATIENT INSTRUCTIONS

## 2021-04-29 RX ORDER — FAMOTIDINE 40 MG/1
40 TABLET, FILM COATED ORAL NIGHTLY
Qty: 90 TABLET | Refills: 3 | Status: SHIPPED | OUTPATIENT
Start: 2021-04-29 | End: 2022-05-03

## 2021-05-11 ENCOUNTER — LAB (OUTPATIENT)
Dept: LAB | Facility: OTHER | Age: 71
End: 2021-05-11

## 2021-05-11 DIAGNOSIS — T45.4X5A ADVERSE EFFECT OF IRON, INITIAL ENCOUNTER: ICD-10-CM

## 2021-05-11 DIAGNOSIS — D50.8 OTHER IRON DEFICIENCY ANEMIA: ICD-10-CM

## 2021-05-11 DIAGNOSIS — Z80.3 FAMILY HISTORY OF BREAST CANCER IN FEMALE: ICD-10-CM

## 2021-05-11 PROCEDURE — 84466 ASSAY OF TRANSFERRIN: CPT | Performed by: INTERNAL MEDICINE

## 2021-05-11 PROCEDURE — 80053 COMPREHEN METABOLIC PANEL: CPT | Performed by: INTERNAL MEDICINE

## 2021-05-11 PROCEDURE — 85025 COMPLETE CBC W/AUTO DIFF WBC: CPT | Performed by: INTERNAL MEDICINE

## 2021-05-11 PROCEDURE — 83540 ASSAY OF IRON: CPT | Performed by: INTERNAL MEDICINE

## 2021-05-11 PROCEDURE — 82607 VITAMIN B-12: CPT | Performed by: INTERNAL MEDICINE

## 2021-05-11 PROCEDURE — 82728 ASSAY OF FERRITIN: CPT | Performed by: INTERNAL MEDICINE

## 2021-05-11 PROCEDURE — 82746 ASSAY OF FOLIC ACID SERUM: CPT | Performed by: INTERNAL MEDICINE

## 2021-05-12 NOTE — PROGRESS NOTES
DATE OF telephone VISIT: 5/13/2021      REASON FOR telephone VISIT: Anemia with iron deficiency, vitamin B12 deficiency, family history of breast cancer      You have chosen to receive care through a telephone visit. Do you consent to use a telephone visit for your medical care today? Yes       HISTORY OF PRESENT ILLNESS:    71-year-old female with medical problem consisting of hypertension, dyslipidemia, diabetes mellitus, hypothyroidism, coronary artery disease has been following up with hematology clinic since January 24, 2019 for anemia.  Patient is here for follow-up appointment today to discuss recently done blood work and further recommendation.  States her fatigue is improved after intravenous iron replacement.  Denies any bleeding.  Complains of arthritis pain.  Denies any new lymph node enlargement.            PAST MEDICAL HISTORY:    Past Medical History:   Diagnosis Date   • Acquired hypothyroidism - On Synthroid    • Allergic rhinitis    • Allergy to alpha-gal 7/17/2020   • Anemia    • Asthma - mild intermittent    • Cardiac disease    • Carpal tunnel syndrome - Bilateral    • Colitis    • Degeneration of cervical intervertebral disc    • Diabetes (CMS/HCC)    • Disorder of lumbar spine    • Diverticulosis of large intestine 1/17/2019   • Essential hypertension    • Essential hypertension    • Fatigue    • History of myocardial infarct at age greater than 60 years 10/17/2018   • Hyperlipidemia - Improved with Zocor    • Hypertriglyceridemia 1/18/2021   • IFG (impaired fasting glucose) 10/17/2018   • Impaired fasting glycaemia    • Iron deficiency anemia - Improved    • Lumbar radiculopathy    • Megaloblastic anemia due to vitamin B>12< deficiency    • Moderate persistent asthma without complication 4/9/2021   • Morbid obesity (CMS/HCC)    • Osteoarthritis of knee - Bilateral    • Osteoarthritis of multiple joints - Left knee    • Osteoporosis    • Pain in left hip    • Sleep apnea - On CPAP    • Spasm  of back muscles    • Stage 3 chronic kidney disease (CMS/HCC) 10/23/2019   • Stage 3a chronic kidney disease (CMS/HCC) 10/23/2019   • Thyroid dysfunction    • Tubular adenoma of colon - removed during colonoscopy, 11/2018. 12/3/2018   • Vitamin D deficiency        SOCIAL HISTORY:    Social History     Tobacco Use   • Smoking status: Never Smoker   • Smokeless tobacco: Never Used   Vaping Use   • Vaping Use: Never used   Substance Use Topics   • Alcohol use: No   • Drug use: No       Surgical History :  Past Surgical History:   Procedure Laterality Date   • CARPAL TUNNEL RELEASE Right    • COLONOSCOPY N/A 11/30/2018    Procedure: COLONOSCOPY;  Surgeon: Jimmie Sutton MD;  Location: [facility] ENDOSCOPY;  Service: General   • ENDOSCOPY  09/17/2012    Normal esophagus.  Gastritis.  Normal duodenum   • ENDOSCOPY N/A 11/30/2018    Procedure: ESOPHAGOGASTRODUODENOSCOPY WITH ANESTHESIA;  Surgeon: Jimmie Sutton MD;  Location: [facility] ENDOSCOPY;  Service: General   • ENDOSCOPY AND COLONOSCOPY  09/17/2012    Internal & external hemorrhoids found.   • HYSTERECTOMY  1980    w bso   • JOINT REPLACEMENT      left knee   • LUMBAR LAMINECTOMY  2011    s/p lumbar laminectomy and fusion   • NECK SURGERY     • OOPHORECTOMY  1980   • SHOULDER SURGERY  12/07/2015    Arthroscopy of the left shoudler with rotator cuff repair, Vijay procedure, and subacromial decompression.   • SHOULDER SURGERY Right    • TOTAL KNEE ARTHROPLASTY Left        ALLERGIES:    Allergies   Allergen Reactions   • Beef Allergy Anaphylaxis   • Dairy Aid  [Lactase] Anaphylaxis   • Galactose Anaphylaxis   • Lortab [Hydrocodone-Acetaminophen] Hallucinations   • Milk  [Lac Bovis] Anaphylaxis     D/t alpha gal   • Pork-Derived Products Anaphylaxis   • Codeine Palpitations     Heart race   • Antihistamines, Chlorpheniramine-Type Palpitations     Heart races   • Latex Rash   • Other Palpitations     Decongestants: Heart Race   • Tape Rash         FAMILY  HISTORY:  Family History   Problem Relation Age of Onset   • Breast cancer Mother    • Ovarian cancer Mother    • Breast cancer Sister    • Breast cancer Other    • Heart disease Other    • Hypertension Other    • Lung disease Other    • Diabetes Other    • Cancer Other            REVIEW OF SYSTEMS:      CONSTITUTIONAL:  Complains of fatigue. Denies any fever, chills or weight loss.     EYES: No visual disturbances. No discharge. No new lesions    ENMT:  No epistaxis, mouth sores or difficulty swallowing.    RESPIRATORY: Complains of chronic shortness of breath with exertion. No new cough or hemoptysis.    CARDIOVASCULAR:  No chest pain or palpitations.    GASTROINTESTINAL:  No abdominal pain nausea, vomiting or blood in the stool.    GENITOURINARY: No Dysuria or Hematuria.    MUSCULOSKELETAL: Positive for arthritis pain    LYMPHATICS:  Denies any abnormal swollen glands anywhere in the body.    NEUROLOGICAL : No tingling or numbness. No headache or dizziness. No seizures or balance problems.    SKIN: Positive for vitiligo.    ENDOCRINE : No new hot or cold intolerance. No new polyuria . No polydipsia.        PHYSICAL EXAMINATION:      Unable to obtain secondary to being telephone visit      DIAGNOSTIC DATA:    Glucose   Date Value Ref Range Status   05/11/2021 89 70 - 99 mg/dL Final     Sodium   Date Value Ref Range Status   05/11/2021 139 137 - 145 mmol/L Final     Potassium   Date Value Ref Range Status   05/11/2021 4.5 3.4 - 5.0 mmol/L Final     CO2   Date Value Ref Range Status   05/11/2021 28.0 22.0 - 30.0 mmol/L Final     Chloride   Date Value Ref Range Status   05/11/2021 101 101 - 112 mmol/L Final     Anion Gap   Date Value Ref Range Status   05/11/2021 10.0 5.0 - 15.0 mmol/L Final     Creatinine   Date Value Ref Range Status   05/11/2021 1.00 0.52 - 1.04 mg/dL Final     BUN   Date Value Ref Range Status   05/11/2021 31 (H) 7 - 23 mg/dL Final     BUN/Creatinine Ratio   Date Value Ref Range Status    05/11/2021 31.0 (H) 7.0 - 25.0 Final     Calcium   Date Value Ref Range Status   05/11/2021 10.0 8.4 - 10.2 mg/dL Final     eGFR Non  Amer   Date Value Ref Range Status   05/11/2021 55 39 - 90 mL/min/1.73 Final     Alkaline Phosphatase   Date Value Ref Range Status   05/11/2021 51 38 - 126 U/L Final     Total Protein   Date Value Ref Range Status   05/11/2021 7.9 6.3 - 8.6 g/dL Final     ALT (SGPT)   Date Value Ref Range Status   05/11/2021 18 <=35 U/L Final     AST (SGOT)   Date Value Ref Range Status   05/11/2021 25 14 - 36 U/L Final     Total Bilirubin   Date Value Ref Range Status   05/11/2021 0.3 0.2 - 1.3 mg/dL Final     Albumin   Date Value Ref Range Status   05/11/2021 4.40 3.50 - 5.00 g/dL Final     Globulin   Date Value Ref Range Status   05/11/2021 3.5 2.3 - 3.5 gm/dL Final     Lab Results   Component Value Date    WBC 8.85 05/11/2021    HGB 12.9 05/11/2021    HCT 37.9 05/11/2021    MCV 93.3 05/11/2021     05/11/2021     Lab Results   Component Value Date    NEUTROABS 6.55 05/11/2021    IRON 111 05/11/2021    TIBC 375 05/11/2021    LABIRON 30 05/11/2021    FERRITIN 173.30 (H) 05/11/2021    [id number] 743 05/11/2021    FOLATE >20.00 05/11/2021     No results found for: , LABCA2, AFPTM, HCGQUANT, , CHROMGRNA, [id number], CEA, REFLABREPO        PATHOLOGY:  * Cannot find OR log *         RADIOLOGY DATA :  No radiology results for the last 7 days      ASSESSMENT AND PLAN:      1.  Iron deficiency anemia:  -Had EGD and colonoscopy in November 2018 that was negative for any bleeding.  -Due to recurrent iron deficiency had a capsule endoscopy done in March 2021 which did not show any bleeding.  -Due to inability to tolerate iron by mouth patient received 2 dose of Feraheme on March 11, 2021 and  March 19, 2021  -Remains on B12 and folic acid 3 times a week  -Anemia work-up done on May 11, 2021 showed hemoglobin is 12.9.  Iron studies are adequate.  No need to start any intravenous iron  replacement  -We will ask patient to return to clinic in 3 months with a repeat CBC, iron studies, ferritin, B12 and folate to be done prior to that    2.  Elevated creatinine  -Creatinine is 1.0.  Patient was counseled about hydration    3.  Family history of breast cancer  -Patient has 3 family members with breast cancer  -Genetic counseling done for [employer] hereditary cancer panel including BRCA1 and BRCA2 in March 2020 was negative    4.  Health maintenance: Patient does not smoke    5. Advance Care Planning: For now patient remains full code and is able to make decisions.  Patient has health care surrogate mentioned on chart.      PHQ-9 Total Score:       Lois Parmar reports a pain score of 10.  Given her pain assessment as noted, treatment options were discussed and the following options were decided upon as a follow-up plan to address the patient's pain: continuation of current treatment plan for pain.         This visit has been rescheduled as a phone visit to comply with patient safety concerns in accordance with CDC recommendations. Total time of discussion was 11 minutes.       Ranjeet De La Torre MD  5/13/2021  11:08 CDT        Part of this note may be an electronic transcription/translation of spoken language to printed text using the Dragon Dictation System.”

## 2021-05-13 ENCOUNTER — OFFICE VISIT (OUTPATIENT)
Dept: ONCOLOGY | Facility: CLINIC | Age: 71
End: 2021-05-13

## 2021-05-13 DIAGNOSIS — T45.4X5A ADVERSE EFFECT OF IRON, INITIAL ENCOUNTER: Chronic | ICD-10-CM

## 2021-05-13 DIAGNOSIS — Z80.3 FAMILY HISTORY OF BREAST CANCER IN FEMALE: ICD-10-CM

## 2021-05-13 DIAGNOSIS — D50.0 IRON DEFICIENCY ANEMIA DUE TO CHRONIC BLOOD LOSS: Primary | Chronic | ICD-10-CM

## 2021-05-13 PROCEDURE — 1123F ACP DISCUSS/DSCN MKR DOCD: CPT | Performed by: INTERNAL MEDICINE

## 2021-05-13 PROCEDURE — G9903 PT SCRN TBCO ID AS NON USER: HCPCS | Performed by: INTERNAL MEDICINE

## 2021-05-13 PROCEDURE — 99442 PR PHYS/QHP TELEPHONE EVALUATION 11-20 MIN: CPT | Performed by: INTERNAL MEDICINE

## 2021-05-13 PROCEDURE — 1125F AMNT PAIN NOTED PAIN PRSNT: CPT | Performed by: INTERNAL MEDICINE

## 2021-05-13 RX ORDER — AMLODIPINE BESYLATE 5 MG/1
5 TABLET ORAL 2 TIMES DAILY
COMMUNITY
Start: 2021-04-13 | End: 2022-03-30

## 2021-05-13 RX ORDER — AMLODIPINE BESYLATE 5 MG/1
5 TABLET ORAL 2 TIMES DAILY
COMMUNITY
Start: 2021-04-13 | End: 2021-05-13

## 2021-05-13 RX ORDER — DIPHENHYDRAMINE HCL 25 MG
50 CAPSULE ORAL
COMMUNITY

## 2021-05-13 RX ORDER — ALBUTEROL SULFATE 90 UG/1
2 AEROSOL, METERED RESPIRATORY (INHALATION)
COMMUNITY
Start: 2021-04-09 | End: 2021-05-13

## 2021-06-25 DIAGNOSIS — M25.562 ACUTE PAIN OF LEFT KNEE: Primary | ICD-10-CM

## 2021-06-28 ENCOUNTER — OFFICE VISIT (OUTPATIENT)
Dept: ORTHOPEDIC SURGERY | Facility: CLINIC | Age: 71
End: 2021-06-28

## 2021-06-28 VITALS — HEIGHT: 61 IN | WEIGHT: 257 LBS | BODY MASS INDEX: 48.52 KG/M2

## 2021-06-28 DIAGNOSIS — S83.412A SPRAIN OF MEDIAL COLLATERAL LIGAMENT OF LEFT KNEE, INITIAL ENCOUNTER: ICD-10-CM

## 2021-06-28 DIAGNOSIS — M25.562 ACUTE PAIN OF LEFT KNEE: Primary | ICD-10-CM

## 2021-06-28 PROCEDURE — 99213 OFFICE O/P EST LOW 20 MIN: CPT | Performed by: NURSE PRACTITIONER

## 2021-06-28 NOTE — PROGRESS NOTES
Lois Parmar is a 71 y.o. female   Primary provider:  Marshal Warner MD       Chief Complaint   Patient presents with   • Left Knee - Initial Evaluation       HISTORY OF PRESENT ILLNESS:  Patient is a 71-year-old female who presents today with complaints of left knee pain.  Patient is status post left TKA, replacement was around 6 years ago.  She reports pain started 10 days ago without known injury or precipitating factor.  Patient describes the pain is moderate and intermittent.  Pain was a stabbing pain and associated with swelling.  Standing and walking aggravates pain.  She has tried OTC medications, rest, massage which have all helped.  She reports her current pain is a 0 out of 10 while sitting.  Pain increases to a 2 out of 10 with certain movements.  She reports pain has improved on its own with conservative measures, however she was still interested in keeping appointment today as she had some concern there may be an issue with her prosthetic.  She denies fever, nausea, vomiting.  She has no complaints of burning, tingling, numbness.  She denies clunking and popping of the left knee.  She is using assistive device.  She is sent for x-rays today.  She indicates pain is over medial knee.        Knee Pain   Incident onset: 10days ago. There was no injury mechanism. The pain is present in the left knee. The quality of the pain is described as stabbing. The pain is moderate. The pain has been intermittent since onset. Associated symptoms comments: swelling. She reports no foreign bodies present. Exacerbated by: walking/standing. She has tried NSAIDs and rest for the symptoms.        CONCURRENT MEDICAL HISTORY:    Past Medical History:   Diagnosis Date   • Acquired hypothyroidism - On Synthroid    • Allergic rhinitis    • Allergy to alpha-gal 7/17/2020   • Anemia    • Asthma - mild intermittent    • Cardiac disease    • Carpal tunnel syndrome - Bilateral    • Colitis    • Degeneration of cervical  intervertebral disc    • Diabetes (CMS/[facility])    • Disorder of lumbar spine    • Diverticulosis of large intestine 1/17/2019   • Essential hypertension    • Essential hypertension    • Fatigue    • History of myocardial infarct at age greater than 60 years 10/17/2018   • Hyperlipidemia - Improved with Zocor    • Hypertriglyceridemia 1/18/2021   • IFG (impaired fasting glucose) 10/17/2018   • Impaired fasting glycaemia    • Iron deficiency anemia - Improved    • Lumbar radiculopathy    • Megaloblastic anemia due to vitamin B>12< deficiency    • Moderate persistent asthma without complication 4/9/2021   • Morbid obesity (CMS/[facility])    • Osteoarthritis of knee - Bilateral    • Osteoarthritis of multiple joints - Left knee    • Osteoporosis    • Pain in left hip    • Sleep apnea - On CPAP    • Spasm of back muscles    • Stage 3 chronic kidney disease (CMS/[facility]) 10/23/2019   • Stage 3a chronic kidney disease (CMS/[facility]) 10/23/2019   • Thyroid dysfunction    • Tubular adenoma of colon - removed during colonoscopy, 11/2018. 12/3/2018   • Vitamin D deficiency        Allergies   Allergen Reactions   • Beef Allergy Anaphylaxis   • Dairy Aid  [Lactase] Anaphylaxis   • Galactose Anaphylaxis   • Lortab [Hydrocodone-Acetaminophen] Hallucinations   • Milk  [Lac Bovis] Anaphylaxis     D/t alpha gal   • Pork-Derived Products Anaphylaxis   • Codeine Palpitations     Heart race   • Antihistamines, Chlorpheniramine-Type Palpitations     Heart races   • Latex Rash   • Other Palpitations     Decongestants: Heart Race   • Tape Rash         Current Outpatient Medications:   •  albuterol sulfate  (90 Base) MCG/ACT inhaler, Inhale 2 puffs Every 4 (Four) Hours As Needed for Wheezing., Disp: 18 g, Rfl: 11  •  amLODIPine (NORVASC) 5 MG tablet, Take 5 mg by mouth 2 (two) times a day., Disp: , Rfl:   •  ascorbic acid (VITAMIN C) 250 MG tablet, Take 250 mg by mouth Daily., Disp: , Rfl:   •  aspirin 81 MG chewable tablet, Chew 81 mg Daily., Disp: ,  Rfl:   •  Breo Ellipta 100-25 MCG/INH inhaler, INHALE 1 PUFF BY MOUTH DAILY **RINSE MOUTH AFTER EACH USE**, Disp: , Rfl:   •  Cholecalciferol 125 MCG (5000 UT) tablet, Take 5,000 Units by mouth Daily., Disp: , Rfl:   •  cyanocobalamin (VITAMIN B-12) 1000 MCG tablet, Take 1,000 mcg by mouth Daily., Disp: , Rfl:   •  Cymbalta 60 MG capsule, TAKE 1 CAPSULE EVERY DAY, Disp: 90 capsule, Rfl: 3  •  diphenhydrAMINE (BENADRYL) 25 mg capsule, Take 50 mg by mouth., Disp: , Rfl:   •  docusate sodium (COLACE) 100 MG capsule, Take 100 mg by mouth 2 (Two) Times a Day As Needed., Disp: , Rfl:   •  EPINEPHrine (EPIPEN) 0.3 MG/0.3ML solution auto-injector injection, Inject 0.3 mg into the appropriate muscle as directed by prescriber., Disp: , Rfl:   •  ezetimibe (ZETIA) 10 MG tablet, Take 10 mg by mouth Daily., Disp: , Rfl:   •  famotidine (PEPCID) 40 MG tablet, Take 1 tablet by mouth Every Night. This replaces Zantac, Disp: 90 tablet, Rfl: 3  •  folic acid (FOLVITE) 1 MG tablet, TAKE 1 TABLET EVERY DAY (Patient taking differently: Take 1 mg by mouth 3 (Three) Times a Week.), Disp: 90 tablet, Rfl: 1  •  glucose blood test strip, tests BID, Diagnosis: 250.00, 401.9, Disp: , Rfl:   •  indapamide (LOZOL) 2.5 MG tablet, Take 2.5 mg by mouth Daily., Disp: , Rfl:   •  levothyroxine (SYNTHROID, LEVOTHROID) 88 MCG tablet, Take 1 tablet by mouth Daily., Disp: 90 tablet, Rfl: 3  •  loratadine (CLARITIN) 10 MG tablet, Take 10 mg by mouth daily., Disp: , Rfl:   •  metFORMIN (GLUCOPHAGE) 500 MG tablet, Take 1 tablet by mouth 2 (Two) Times a Day With Meals., Disp: 180 tablet, Rfl: 3  •  olmesartan (BENICAR) 40 MG tablet, Take 40 mg by mouth Daily., Disp: , Rfl:   •  Prevacid 30 MG capsule, TAKE 1 CAPSULE EVERY MORNING, Disp: 90 capsule, Rfl: 3  •  simvastatin (ZOCOR) 40 MG tablet, Take 1 tablet by mouth Every Night., Disp: 90 tablet, Rfl: 3  •  spironolactone (ALDACTONE) 25 MG tablet, TAKE 1 TABLET EVERY DAY  FOR  FLUID, Disp: 90 tablet, Rfl:  "1    Past Surgical History:   Procedure Laterality Date   • CARPAL TUNNEL RELEASE Right    • COLONOSCOPY N/A 11/30/2018    Procedure: COLONOSCOPY;  Surgeon: Jimmie Sutton MD;  Location: [facility] ENDOSCOPY;  Service: General   • ENDOSCOPY  09/17/2012    Normal esophagus.  Gastritis.  Normal duodenum   • ENDOSCOPY N/A 11/30/2018    Procedure: ESOPHAGOGASTRODUODENOSCOPY WITH ANESTHESIA;  Surgeon: Jimmie Sutton MD;  Location: [facility] ENDOSCOPY;  Service: General   • ENDOSCOPY AND COLONOSCOPY  09/17/2012    Internal & external hemorrhoids found.   • HYSTERECTOMY  1980    w bso   • JOINT REPLACEMENT      left knee   • LUMBAR LAMINECTOMY  2011    s/p lumbar laminectomy and fusion   • NECK SURGERY     • OOPHORECTOMY  1980   • SHOULDER SURGERY  12/07/2015    Arthroscopy of the left shoudler with rotator cuff repair, Vijay procedure, and subacromial decompression.   • SHOULDER SURGERY Right    • TOTAL KNEE ARTHROPLASTY Left        Family History   Problem Relation Age of Onset   • Breast cancer Mother    • Ovarian cancer Mother    • Breast cancer Sister    • Breast cancer Other    • Heart disease Other    • Hypertension Other    • Lung disease Other    • Diabetes Other    • Cancer Other        Social History     Socioeconomic History   • Marital status:      Spouse name: Not on file   • Number of children: Not on file   • Years of education: Not on file   • Highest education level: Not on file   Tobacco Use   • Smoking status: Never Smoker   • Smokeless tobacco: Never Used   Vaping Use   • Vaping Use: Never used   Substance and Sexual Activity   • Alcohol use: No   • Drug use: No   • Sexual activity: Defer        Review of Systems   Musculoskeletal:        Left knee pain       PHYSICAL EXAMINATION:       Ht 154.9 cm (61\")   Wt 117 kg (257 lb)   BMI 48.56 kg/m²     Physical Exam  Vitals and nursing note reviewed.   Constitutional:       General: She is not in acute distress.     Appearance: She is " well-developed. She is not toxic-appearing.   HENT:      Head: Normocephalic.   Pulmonary:      Effort: Pulmonary effort is normal. No respiratory distress.   Musculoskeletal:      Left knee: No effusion.   Skin:     General: Skin is warm and dry.   Neurological:      Mental Status: She is alert and oriented to person, place, and time.   Psychiatric:         Behavior: Behavior normal.         Thought Content: Thought content normal.         Judgment: Judgment normal.         GAIT:     []  Normal  []  Antalgic    Assistive device: []  None  []  Walker     []  Crutches  []  Cane     []  Wheelchair  []  Stretcher    Left Knee Exam     Tenderness   The patient is experiencing tenderness in the MCL.    Range of Motion   Extension: -5   Flexion: 130     Tests   Varus: negative Valgus: negative    Other   Erythema: absent  Pulse: present  Swelling: mild  Effusion: no effusion present    Comments:  Mild swelling but no other evidence of infection  Mild tenderness over MCL  Distal pulses intact                XR Knee 1 or 2 View Left    Result Date: 6/28/2021  Study: XR knee 1 or 2 view, left Comparison: 8/16/2018 Narrative: Alignment and position of left knee is acceptable.  Left knee is status post TKA; no evidence of hardware failure or loosening.  No acute bony abnormality identified.  Louise HARTLEY 6/28/2021.    XR Knee Bilateral AP Standing    Result Date: 6/28/2021  Study: XR knee bilateral AP standing Comparison: 8/16/2018 Narrative: Right knee is with end-stage osteoarthritic findings in the medial compartment.  Medial compartment of right knee has complete collapse and bone-on-bone findings.  Right knee is with mild varus positioning.  Left knee is acceptable in alignment and positioning.  Left knee is status post TKA; no evidence of hardware failure or loosening.  No appreciated interval change of prosthesis of left knee from comparison imaging.  No evidence of fracture or dislocation in either knee.  Louise  Aamir APRN 6/28/2021.               ASSESSMENT:    Diagnoses and all orders for this visit:    Acute pain of left knee    Sprain of medial collateral ligament of left knee, initial encounter          PLAN    Given complaints and exam suspect mild sprain of MCL.  X-rays reviewed no acute bony abnormality identified, no evidence of hardware failure.  Recommended patient continue with conservative measures of RICE therapy, OTC medications, modified weightbearing, activity modification.  Patient offered Medrol Dosepak to help decrease inflammation, patient declines stating she feels knee is improving on its own and she does not currently need further intervention.  Patient states if she does not continue to improve she will call back for Medrol Dosepak prescription and return appointment.  Signs and symptoms to report and when to seek care explained to patient.  Patient to return as needed.    Return if symptoms worsen or fail to improve.    Louise Rutledge, APRN

## 2021-06-29 RX ORDER — SPIRONOLACTONE 25 MG/1
TABLET ORAL
Qty: 90 TABLET | Refills: 0 | Status: SHIPPED | OUTPATIENT
Start: 2021-06-29 | End: 2021-10-18

## 2021-07-21 ENCOUNTER — LAB (OUTPATIENT)
Dept: LAB | Facility: OTHER | Age: 71
End: 2021-07-21

## 2021-07-21 DIAGNOSIS — R73.01 IFG (IMPAIRED FASTING GLUCOSE): Chronic | ICD-10-CM

## 2021-07-21 DIAGNOSIS — E78.1 HYPERTRIGLYCERIDEMIA: Chronic | ICD-10-CM

## 2021-07-21 DIAGNOSIS — D50.0 IRON DEFICIENCY ANEMIA DUE TO CHRONIC BLOOD LOSS: Chronic | ICD-10-CM

## 2021-07-21 DIAGNOSIS — E03.9 ACQUIRED HYPOTHYROIDISM: Chronic | ICD-10-CM

## 2021-07-21 DIAGNOSIS — I42.9 CARDIOMYOPATHY, UNSPECIFIED TYPE (HCC): Chronic | ICD-10-CM

## 2021-07-21 DIAGNOSIS — E78.2 MIXED HYPERLIPIDEMIA: Chronic | ICD-10-CM

## 2021-07-21 DIAGNOSIS — E66.01 MORBID OBESITY (HCC): Chronic | ICD-10-CM

## 2021-07-21 DIAGNOSIS — I10 ESSENTIAL HYPERTENSION: Chronic | ICD-10-CM

## 2021-07-21 DIAGNOSIS — D53.1 MEGALOBLASTIC ANEMIA: Chronic | ICD-10-CM

## 2021-07-21 DIAGNOSIS — N18.31 STAGE 3A CHRONIC KIDNEY DISEASE (HCC): Chronic | ICD-10-CM

## 2021-07-21 DIAGNOSIS — E55.9 VITAMIN D DEFICIENCY: Chronic | ICD-10-CM

## 2021-07-21 LAB
25(OH)D3 SERPL-MCNC: 34.9 NG/ML (ref 30–100)
ALBUMIN SERPL-MCNC: 4.7 G/DL (ref 3.5–5)
ALBUMIN/GLOB SERPL: 1.5 G/DL (ref 1.1–1.8)
ALP SERPL-CCNC: 46 U/L (ref 38–126)
ALT SERPL W P-5'-P-CCNC: 17 U/L
ANION GAP SERPL CALCULATED.3IONS-SCNC: 9 MMOL/L (ref 5–15)
ARTICHOKE IGE QN: 124 MG/DL (ref 0–100)
AST SERPL-CCNC: 23 U/L (ref 14–36)
BASOPHILS # BLD AUTO: 0.04 10*3/MM3 (ref 0–0.2)
BASOPHILS NFR BLD AUTO: 0.5 % (ref 0–1.5)
BILIRUB SERPL-MCNC: 0.4 MG/DL (ref 0.2–1.3)
BUN SERPL-MCNC: 27 MG/DL (ref 7–23)
BUN/CREAT SERPL: 24.8 (ref 7–25)
CALCIUM SPEC-SCNC: 9.8 MG/DL (ref 8.4–10.2)
CHLORIDE SERPL-SCNC: 104 MMOL/L (ref 101–112)
CO2 SERPL-SCNC: 26 MMOL/L (ref 22–30)
CREAT SERPL-MCNC: 1.09 MG/DL (ref 0.52–1.04)
DEPRECATED RDW RBC AUTO: 47.7 FL (ref 37–54)
EOSINOPHIL # BLD AUTO: 0.33 10*3/MM3 (ref 0–0.4)
EOSINOPHIL NFR BLD AUTO: 4.4 % (ref 0.3–6.2)
ERYTHROCYTE [DISTWIDTH] IN BLOOD BY AUTOMATED COUNT: 14.1 % (ref 12.3–15.4)
FERRITIN SERPL-MCNC: 130 NG/ML (ref 13–150)
GFR SERPL CREATININE-BSD FRML MDRD: 49 ML/MIN/1.73 (ref 39–90)
GLOBULIN UR ELPH-MCNC: 3.2 GM/DL (ref 2.3–3.5)
GLUCOSE SERPL-MCNC: 104 MG/DL (ref 70–99)
HBA1C MFR BLD: 6.12 % (ref 4.8–5.6)
HCT VFR BLD AUTO: 34.3 % (ref 34–46.6)
HGB BLD-MCNC: 11.7 G/DL (ref 12–15.9)
LYMPHOCYTES # BLD AUTO: 1.51 10*3/MM3 (ref 0.7–3.1)
LYMPHOCYTES NFR BLD AUTO: 20.2 % (ref 19.6–45.3)
MCH RBC QN AUTO: 33.1 PG (ref 26.6–33)
MCHC RBC AUTO-ENTMCNC: 34.1 G/DL (ref 31.5–35.7)
MCV RBC AUTO: 97.2 FL (ref 79–97)
MONOCYTES # BLD AUTO: 0.59 10*3/MM3 (ref 0.1–0.9)
MONOCYTES NFR BLD AUTO: 7.9 % (ref 5–12)
NEUTROPHILS NFR BLD AUTO: 5 10*3/MM3 (ref 1.7–7)
NEUTROPHILS NFR BLD AUTO: 67 % (ref 42.7–76)
PLATELET # BLD AUTO: 280 10*3/MM3 (ref 140–450)
PMV BLD AUTO: 8.8 FL (ref 6–12)
POTASSIUM SERPL-SCNC: 4.8 MMOL/L (ref 3.4–5)
PROT SERPL-MCNC: 7.9 G/DL (ref 6.3–8.6)
RBC # BLD AUTO: 3.53 10*6/MM3 (ref 3.77–5.28)
SODIUM SERPL-SCNC: 139 MMOL/L (ref 137–145)
T4 FREE SERPL-MCNC: 1.13 NG/DL (ref 0.93–1.7)
TRIGL SERPL-MCNC: 301 MG/DL
TSH SERPL DL<=0.05 MIU/L-ACNC: 2.83 UIU/ML (ref 0.27–4.2)
VIT B12 BLD-MCNC: 496 PG/ML (ref 211–946)
WBC # BLD AUTO: 7.47 10*3/MM3 (ref 3.4–10.8)

## 2021-07-21 PROCEDURE — 84478 ASSAY OF TRIGLYCERIDES: CPT | Performed by: INTERNAL MEDICINE

## 2021-07-21 PROCEDURE — 84443 ASSAY THYROID STIM HORMONE: CPT | Performed by: INTERNAL MEDICINE

## 2021-07-21 PROCEDURE — 82728 ASSAY OF FERRITIN: CPT | Performed by: INTERNAL MEDICINE

## 2021-07-21 PROCEDURE — 36415 COLL VENOUS BLD VENIPUNCTURE: CPT | Performed by: INTERNAL MEDICINE

## 2021-07-21 PROCEDURE — 85025 COMPLETE CBC W/AUTO DIFF WBC: CPT | Performed by: INTERNAL MEDICINE

## 2021-07-21 PROCEDURE — 82607 VITAMIN B-12: CPT | Performed by: INTERNAL MEDICINE

## 2021-07-21 PROCEDURE — 82306 VITAMIN D 25 HYDROXY: CPT | Performed by: INTERNAL MEDICINE

## 2021-07-21 PROCEDURE — 83721 ASSAY OF BLOOD LIPOPROTEIN: CPT | Performed by: INTERNAL MEDICINE

## 2021-07-21 PROCEDURE — 83036 HEMOGLOBIN GLYCOSYLATED A1C: CPT | Performed by: INTERNAL MEDICINE

## 2021-07-21 PROCEDURE — 84439 ASSAY OF FREE THYROXINE: CPT | Performed by: INTERNAL MEDICINE

## 2021-07-21 PROCEDURE — 80053 COMPREHEN METABOLIC PANEL: CPT | Performed by: INTERNAL MEDICINE

## 2021-07-28 ENCOUNTER — OFFICE VISIT (OUTPATIENT)
Dept: FAMILY MEDICINE CLINIC | Facility: CLINIC | Age: 71
End: 2021-07-28

## 2021-07-28 VITALS
DIASTOLIC BLOOD PRESSURE: 62 MMHG | SYSTOLIC BLOOD PRESSURE: 141 MMHG | BODY MASS INDEX: 47.77 KG/M2 | WEIGHT: 253 LBS | OXYGEN SATURATION: 95 % | HEART RATE: 63 BPM | HEIGHT: 61 IN

## 2021-07-28 DIAGNOSIS — Z91.018 ALLERGY TO ALPHA-GAL: Chronic | ICD-10-CM

## 2021-07-28 DIAGNOSIS — E66.01 MORBID OBESITY (HCC): Chronic | ICD-10-CM

## 2021-07-28 DIAGNOSIS — E11.69 HYPERLIPIDEMIA ASSOCIATED WITH TYPE 2 DIABETES MELLITUS (HCC): Chronic | ICD-10-CM

## 2021-07-28 DIAGNOSIS — N18.31 TYPE 2 DIABETES MELLITUS WITH STAGE 3A CHRONIC KIDNEY DISEASE, WITHOUT LONG-TERM CURRENT USE OF INSULIN (HCC): Primary | Chronic | ICD-10-CM

## 2021-07-28 DIAGNOSIS — E78.5 HYPERLIPIDEMIA ASSOCIATED WITH TYPE 2 DIABETES MELLITUS (HCC): Chronic | ICD-10-CM

## 2021-07-28 DIAGNOSIS — N18.31 STAGE 3A CHRONIC KIDNEY DISEASE (HCC): Chronic | ICD-10-CM

## 2021-07-28 DIAGNOSIS — D53.1 MEGALOBLASTIC ANEMIA: ICD-10-CM

## 2021-07-28 DIAGNOSIS — I10 ESSENTIAL HYPERTENSION: Chronic | ICD-10-CM

## 2021-07-28 DIAGNOSIS — E78.1 HYPERTRIGLYCERIDEMIA: Chronic | ICD-10-CM

## 2021-07-28 DIAGNOSIS — E03.9 ACQUIRED HYPOTHYROIDISM: Chronic | ICD-10-CM

## 2021-07-28 DIAGNOSIS — E55.9 VITAMIN D DEFICIENCY: Chronic | ICD-10-CM

## 2021-07-28 DIAGNOSIS — E11.22 TYPE 2 DIABETES MELLITUS WITH STAGE 3A CHRONIC KIDNEY DISEASE, WITHOUT LONG-TERM CURRENT USE OF INSULIN (HCC): Primary | Chronic | ICD-10-CM

## 2021-07-28 PROBLEM — E11.9 TYPE 2 DIABETES MELLITUS WITHOUT COMPLICATION, WITHOUT LONG-TERM CURRENT USE OF INSULIN: Chronic | Status: ACTIVE | Noted: 2021-04-09

## 2021-07-28 PROBLEM — R73.01 IFG (IMPAIRED FASTING GLUCOSE): Chronic | Status: RESOLVED | Noted: 2018-10-17 | Resolved: 2021-07-28

## 2021-07-28 PROBLEM — R06.02 SHORTNESS OF BREATH: Status: ACTIVE | Noted: 2020-06-29

## 2021-07-28 PROBLEM — R00.1 BRADYCARDIA: Status: ACTIVE | Noted: 2021-05-12

## 2021-07-28 PROCEDURE — 99443 PR PHYS/QHP TELEPHONE EVALUATION 21-30 MIN: CPT | Performed by: INTERNAL MEDICINE

## 2021-07-28 NOTE — PATIENT INSTRUCTIONS
Exercising to Lose Weight  Exercise is structured, repetitive physical activity to improve fitness and health. Getting regular exercise is important for everyone. It is especially important if you are overweight. Being overweight increases your risk of heart disease, stroke, diabetes, high blood pressure, and several types of cancer. Reducing your calorie intake and exercising can help you lose weight.  Exercise is usually categorized as moderate or vigorous intensity. To lose weight, most people need to do a certain amount of moderate-intensity or vigorous-intensity exercise each week.  Moderate-intensity exercise    Moderate-intensity exercise is any activity that gets you moving enough to burn at least three times more energy (calories) than if you were sitting.  Examples of moderate exercise include:  · Walking a mile in 15 minutes.  · Doing light yard work.  · Biking at an easy pace.  Most people should get at least 150 minutes (2 hours and 30 minutes) a week of moderate-intensity exercise to maintain their body weight.  Vigorous-intensity exercise  Vigorous-intensity exercise is any activity that gets you moving enough to burn at least six times more calories than if you were sitting. When you exercise at this intensity, you should be working hard enough that you are not able to carry on a conversation.  Examples of vigorous exercise include:  · Running.  · Playing a team sport, such as football, basketball, and soccer.  · Jumping rope.  Most people should get at least 75 minutes (1 hour and 15 minutes) a week of vigorous-intensity exercise to maintain their body weight.  How can exercise affect me?  When you exercise enough to burn more calories than you eat, you lose weight. Exercise also reduces body fat and builds muscle. The more muscle you have, the more calories you burn. Exercise also:  · Improves mood.  · Reduces stress and tension.  · Improves your overall fitness, flexibility, and  endurance.  · Increases bone strength.  The amount of exercise you need to lose weight depends on:  · Your age.  · The type of exercise.  · Any health conditions you have.  · Your overall physical ability.  Talk to your health care provider about how much exercise you need and what types of activities are safe for you.  What actions can I take to lose weight?  Nutrition    · Make changes to your diet as told by your health care provider or diet and nutrition specialist (dietitian). This may include:  ? Eating fewer calories.  ? Eating more protein.  ? Eating less unhealthy fats.  ? Eating a diet that includes fresh fruits and vegetables, whole grains, low-fat dairy products, and lean protein.  ? Avoiding foods with added fat, salt, and sugar.  · Drink plenty of water while you exercise to prevent dehydration or heat stroke.  Activity  · Choose an activity that you enjoy and set realistic goals. Your health care provider can help you make an exercise plan that works for you.  · Exercise at a moderate or vigorous intensity most days of the week.  ? The intensity of exercise may vary from person to person. You can tell how intense a workout is for you by paying attention to your breathing and heartbeat. Most people will notice their breathing and heartbeat get faster with more intense exercise.  · Do resistance training twice each week, such as:  ? Push-ups.  ? Sit-ups.  ? Lifting weights.  ? Using resistance bands.  · Getting short amounts of exercise can be just as helpful as long structured periods of exercise. If you have trouble finding time to exercise, try to include exercise in your daily routine.  ? Get up, stretch, and walk around every 30 minutes throughout the day.  ? Go for a walk during your lunch break.  ? Park your car farther away from your destination.  ? If you take public transportation, get off one stop early and walk the rest of the way.  ? Make phone calls while standing up and walking  around.  ? Take the stairs instead of elevators or escalators.  · Wear comfortable clothes and shoes with good support.  · Do not exercise so much that you hurt yourself, feel dizzy, or get very short of breath.  Where to find more information  · [place] Department of Health and Human Services: www.hhs.gov  · Centers for Disease Control and Prevention (CDC): www.cdc.gov  Contact a health care provider:  · Before starting a new exercise program.  · If you have questions or concerns about your weight.  · If you have a medical problem that keeps you from exercising.  Get help right away if you have any of the following while exercising:  · Injury.  · Dizziness.  · Difficulty breathing or shortness of breath that does not go away when you stop exercising.  · Chest pain.  · Rapid heartbeat.  Summary  · Being overweight increases your risk of heart disease, stroke, diabetes, high blood pressure, and several types of cancer.  · Losing weight happens when you burn more calories than you eat.  · Reducing the amount of calories you eat in addition to getting regular moderate or vigorous exercise each week helps you lose weight.  This information is not intended to replace advice given to you by your health care provider. Make sure you discuss any questions you have with your health care provider.  Document Revised: 04/15/2021 Document Reviewed: 04/15/2021  [employer] Patient Education © 2021 [employer]      Calorie Counting for Weight Loss  Calories are units of energy. Your body needs a certain number of calories from food to keep going throughout the day. When you eat or drink more calories than your body needs, your body stores the extra calories mostly as fat. When you eat or drink fewer calories than your body needs, your body burns fat to get the energy it needs.  Calorie counting means keeping track of how many calories you eat and drink each day. Calorie counting can be helpful if you need to lose weight. If you eat  fewer calories than your body needs, you should lose weight. Ask your health care provider what a healthy weight is for you.  For calorie counting to work, you will need to eat the right number of calories each day to lose a healthy amount of weight per week. A dietitian can help you figure out how many calories you need in a day and will suggest ways to reach your calorie goal.  · A healthy amount of weight to lose each week is usually 1-2 lb (0.5-0.9 kg). This usually means that your daily calorie intake should be reduced by 500-750 calories.  · Eating 1,200-1,500 calories a day can help most women lose weight.  · Eating 1,500-1,800 calories a day can help most men lose weight.  What do I need to know about calorie counting?  Work with your health care provider or dietitian to determine how many calories you should get each day. To meet your daily calorie goal, you will need to:  · Find out how many calories are in each food that you would like to eat. Try to do this before you eat.  · Decide how much of the food you plan to eat.  · Keep a food log. Do this by writing down what you ate and how many calories it had.  To successfully lose weight, it is important to balance calorie counting with a healthy lifestyle that includes regular activity.  Where do I find calorie information?    The number of calories in a food can be found on a Nutrition Facts label. If a food does not have a Nutrition Facts label, try to look up the calories online or ask your dietitian for help.  Remember that calories are listed per serving. If you choose to have more than one serving of a food, you will have to multiply the calories per serving by the number of servings you plan to eat. For example, the label on a package of bread might say that a serving size is 1 slice and that there are 90 calories in a serving. If you eat 1 slice, you will have eaten 90 calories. If you eat 2 slices, you will have eaten 180 calories.  How do I keep a  food log?  After each time that you eat, record the following in your food log as soon as possible:  · What you ate. Be sure to include toppings, sauces, and other extras on the food.  · How much you ate. This can be measured in cups, ounces, or number of items.  · How many calories were in each food and drink.  · The total number of calories in the food you ate.  Keep your food log near you, such as in a pocket-sized notebook or on an shad or website on your mobile phone. Some programs will calculate calories for you and show you how many calories you have left to meet your daily goal.  What are some portion-control tips?  · Know how many calories are in a serving. This will help you know how many servings you can have of a certain food.  · Use a measuring cup to measure serving sizes. You could also try weighing out portions on a kitchen scale. With time, you will be able to estimate serving sizes for some foods.  · Take time to put servings of different foods on your favorite plates or in your favorite bowls and cups so you know what a serving looks like.  · Try not to eat straight from a food's packaging, such as from a bag or box. Eating straight from the package makes it hard to see how much you are eating and can lead to overeating. Put the amount you would like to eat in a cup or on a plate to make sure you are eating the right portion.  · Use smaller plates, glasses, and bowls for smaller portions and to prevent overeating.  · Try not to multitask. For example, avoid watching TV or using your computer while eating. If it is time to eat, sit down at a table and enjoy your food. This will help you recognize when you are full. It will also help you be more mindful of what and how much you are eating.  What are tips for following this plan?  Reading food labels  · Check the calorie count compared with the serving size. The serving size may be smaller than what you are used to eating.  · Check the source of the  calories. Try to choose foods that are high in protein, fiber, and vitamins, and low in saturated fat, trans fat, and sodium.  Shopping  · Read nutrition labels while you shop. This will help you make healthy decisions about which foods to buy.  · Pay attention to nutrition labels for low-fat or fat-free foods. These foods sometimes have the same number of calories or more calories than the full-fat versions. They also often have added sugar, starch, or salt to make up for flavor that was removed with the fat.  · Make a grocery list of lower-calorie foods and stick to it.  Cooking  · Try to cook your favorite foods in a healthier way. For example, try baking instead of frying.  · Use low-fat dairy products.  Meal planning  · Use more fruits and vegetables. One-half of your plate should be fruits and vegetables.  · Include lean proteins, such as chicken, turkey, and fish.  Lifestyle  Each week, aim to do one of the following:  · 150 minutes of moderate exercise, such as walking.  · 75 minutes of vigorous exercise, such as running.  General information  · Know how many calories are in the foods you eat most often. This will help you calculate calorie counts faster.  · Find a way of tracking calories that works for you. Get creative. Try different apps or programs if writing down calories does not work for you.  What foods should I eat?    · Eat nutritious foods. It is better to have a nutritious, high-calorie food, such as an avocado, than a food with few nutrients, such as a bag of potato chips.  · Use your calories on foods and drinks that will fill you up and will not leave you hungry soon after eating.  ? Examples of foods that fill you up are nuts and nut butters, vegetables, lean proteins, and high-fiber foods such as whole grains. High-fiber foods are foods with more than 5 g of fiber per serving.  · Pay attention to calories in drinks. Low-calorie drinks include water and unsweetened drinks.  The items listed  above may not be a complete list of foods and beverages you can eat. Contact a dietitian for more information.  What foods should I limit?  Limit foods or drinks that are not good sources of vitamins, minerals, or protein or that are high in unhealthy fats. These include:  · Candy.  · Other sweets.  · Sodas, specialty coffee drinks, alcohol, and juice.  The items listed above may not be a complete list of foods and beverages you should avoid. Contact a dietitian for more information.  How do I count calories when eating out?  · Pay attention to portions. Often, portions are much larger when eating out. Try these tips to keep portions smaller:  ? Consider sharing a meal instead of getting your own.  ? If you get your own meal, eat only half of it. Before you start eating, ask for a container and put half of your meal into it.  ? When available, consider ordering smaller portions from the menu instead of full portions.  · Pay attention to your food and drink choices. Knowing the way food is cooked and what is included with the meal can help you eat fewer calories.  ? If calories are listed on the menu, choose the lower-calorie options.  ? Choose dishes that include vegetables, fruits, whole grains, low-fat dairy products, and lean proteins.  ? Choose items that are boiled, broiled, grilled, or steamed. Avoid items that are buttered, battered, fried, or served with cream sauce. Items labeled as crispy are usually fried, unless stated otherwise.  ? Choose water, low-fat milk, unsweetened iced tea, or other drinks without added sugar. If you want an alcoholic beverage, choose a lower-calorie option, such as a glass of wine or light beer.  ? Ask for dressings, sauces, and syrups on the side. These are usually high in calories, so you should limit the amount you eat.  ? If you want a salad, choose a garden salad and ask for grilled meats. Avoid extra toppings such as sullivan, cheese, or fried items. Ask for the dressing on  the side, or ask for olive oil and vinegar or lemon to use as dressing.  · Estimate how many servings of a food you are given. Knowing serving sizes will help you be aware of how much food you are eating at restaurants.  Where to find more information  · Centers for Disease Control and Prevention: www.cdc.gov  · [place] Department of Agriculture: myplate.gov  Summary  · Calorie counting means keeping track of how many calories you eat and drink each day. If you eat fewer calories than your body needs, you should lose weight.  · A healthy amount of weight to lose per week is usually 1-2 lb (0.5-0.9 kg). This usually means reducing your daily calorie intake by 500-750 calories.  · The number of calories in a food can be found on a Nutrition Facts label. If a food does not have a Nutrition Facts label, try to look up the calories online or ask your dietitian for help.  · Use smaller plates, glasses, and bowls for smaller portions and to prevent overeating.  · Use your calories on foods and drinks that will fill you up and not leave you hungry shortly after a meal.  This information is not intended to replace advice given to you by your health care provider. Make sure you discuss any questions you have with your health care provider.  Document Revised: 01/28/2021 Document Reviewed: 01/28/2021  [employer] Patient Education © 2021 [employer]

## 2021-07-28 NOTE — PROGRESS NOTES
You have chosen to receive care through a telephone visit. Do you consent to use a telephone visit for your medical care today? Yes    Total visit time: 30 minutes.     Chief Complaint  Follow-up (6 month. States she has been having rapid heart rate and is a patient and she states sees him in about a week )    Subjective          History of Present Illness     Lois Parmar receives care via telephone visit for 6-month follow up on chronic medical issues including hypothyroidism, hyperlipidemia, hypertriglyceridemia, impaired fasting glucose, osteoarthritis of the knees and hips, morbid obesity, iron deficiency anemia and vitamin B12 deficiency anemia, among other issues complicated by obesity. Dr. De La Torre is following patient's anemia with iron deficiency and vitamin B12 deficiency.  Dr. Augustine is managing her chronic knee pain.     Dr. Harmon follows her alpha gal syndrome.  She has resumed eating pork without any issues. However, with eating more sausage and pork, her LDL cholesterol hasincreased from 85 to 124 and triglycerides have nearly doubled at 301.  She states it will not be a problem to cut out the pork, as she abstained from eating pork for over a year due to alpha gal.        Dr. Boyle follows her cardiomyopathy and dyspnea on exertion, which is multifactorial with her morbid obesity and anemia.  She reports HR has been in the 60s improving from 30s to 40s with medication changes made by Dr. Boyle, cardiologist.  She was a little concerned, but I have given reassurance HR in the 60s is fine.  She is scheduled to follow up with Dr. Boyle in about three weeks.      Diabetes at goal with metformin 500 mg twice daily in combination with walking.  Patient reports stable weight from six months ago.  These are reported weights due to telemedicine visits.      I increased Synthroid to 88 mcg daily with visit six months ago.  Thyroid at goal with the current dose.       The patient's relevant past medical,  "surgical, and social history was reviewed in Epic.   Lab results are reviewed with the patient today.  CBC reveals hemoglobin is down at 11.7, otherwise unremarkable.   Fasting glucose 104.  A1c 6.1. B-12 has trended down with oral B-12 three days weekly.     Objective   Vital Signs:   /62 Comment: 94 BS  Pulse 63   Ht 154.9 cm (61\")   Wt 115 kg (253 lb)   SpO2 95%   BMI 47.80 kg/m²       Physical Exam   Result Review :     CMP    CMP 3/2/21 5/11/21 7/21/21   Glucose 101 (A) 89 104 (A)   BUN 27 (A) 31 (A) 27 (A)   Creatinine 1.12 (A) 1.00 1.09 (A)   eGFR Non African Am 48 55 49   Sodium 141 139 139   Potassium 4.7 4.5 4.8   Chloride 105 101 104   Calcium 9.6 10.0 9.8   Albumin 4.20 4.40 4.70   Total Bilirubin 0.4 0.3 0.4   Alkaline Phosphatase 35 (A) 51 46   AST (SGOT) 21 25 23   ALT (SGPT) 18 18 17   (A) Abnormal value            CBC w/diff    CBC w/Diff 3/2/21 5/11/21 7/21/21   WBC 5.88 8.85 7.47   RBC 2.98 (A) 4.06 3.53 (A)   Hemoglobin 9.2 (A) 12.9 11.7 (A)   Hematocrit 29.1 (A) 37.9 34.3   MCV 97.7 (A) 93.3 97.2 (A)   MCH 30.9 31.8 33.1 (A)   MCHC 31.6 34.0 34.1   RDW 13.7 14.7 14.1   Platelets 226 277 280   Neutrophil Rel % 65.1 74.0 67.0   Lymphocyte Rel % 20.2 15.5 (A) 20.2   Monocyte Rel % 10.5 7.2 7.9   Eosinophil Rel % 3.7 2.8 4.4   Basophil Rel % 0.5 0.5 0.5   (A) Abnormal value            Lipid Panel    Lipid Panel 1/14/21 7/21/21 7/21/21     [id number] 0836   Total Cholesterol 149 (A)     Triglycerides 159 (A) 301 (A)    HDL Cholesterol 36 (A)     VLDL Cholesterol 28     LDL Cholesterol  85  124 (A)   LDL/HDL Ratio 2.26     (A) Abnormal value            TSH    TSH 1/14/21 7/21/21   TSH 5.000 (A) 2.830   (A) Abnormal value            A1C Last 3 Results    HGBA1C Last 3 Results 1/14/21 7/21/21   Hemoglobin A1C 6.11 (A) 6.12 (A)   (A) Abnormal value                      Assessment and Plan    Diagnoses and all orders for this visit:    1. Type 2 diabetes mellitus with stage 3a chronic kidney disease, " without long-term current use of insulin (CMS/[facility]) (Primary)  -     Comprehensive Metabolic Panel; Future  -     Hemoglobin A1c; Future  -     Microalbumin / Creatinine Urine Ratio - Urine, Clean Catch; Future  -     Vitamin B12; Future    2. Hyperlipidemia associated with type 2 diabetes mellitus (CMS/[facility])  -     CBC Auto Differential; Future  -     Comprehensive Metabolic Panel; Future  -     Lipid Panel; Future    3. Hypertriglyceridemia    4. Essential hypertension  -     Comprehensive Metabolic Panel; Future    5. Allergy to alpha-gal    6. Vitamin D deficiency    7. Acquired hypothyroidism  -     TSH; Future  -     T4, free; Future    8. Stage 3a chronic kidney disease (CMS/[facility])    9. Morbid obesity (CMS/[facility])    10. Megaloblastic anemia  -     CBC Auto Differential; Future  -     Vitamin B12; Future         I spent 30 minutes caring for Lois on this date of service. This time includes time spent by me in the following activities:preparing for the visit, reviewing tests, obtaining and/or reviewing a separately obtained history, performing a medically appropriate examination and/or evaluation , counseling and educating the patient/family/caregiver, ordering medications, tests, or procedures, documenting information in the medical record and independently interpreting results and communicating that information with the patient/family/caregiver     Continue to follow with Dr. Boyle, who follows her cardiomyopathy and other cardiovascular issues. Continue simvastatin and Zetia as well as dietary efforts.  We continue to encourage very aggressive weight loss. I asked her to try to cut out the pork products, which are elevating cholesterol and triglycerides.      Continue current BP medications.   Monitor BP and heart rate and notify me or Dr. Boyle if not consistently at goal.       Increase the oral B-12 to four days weekly.     Continue Prevacid and Pepcid for GERD.       Continue current dose of Synthroid.   Hypothyroidism at goal.        Keep scheduled appointments with Dr. De La Torre next month.  Continue oral iron and B-12.    Continue metformin.  Pursue aggressive weight loss to help delay progression of impaired fasting glucose.     Continue other medications and vitamin and mineral supplements to treat additional medical problems which we addressed today.      Return in six months for routine follow up with fasting labs one week prior.           Follow Up   Return in about 6 months (around 1/28/2022) for Follow up in six months with labs one week prior., Next scheduled follow up - labs 1 week prior.  Patient was given instructions and counseling regarding her condition or for health maintenance advice. Please see specific information pulled into the AVS if appropriate.

## 2021-08-10 ENCOUNTER — TELEPHONE (OUTPATIENT)
Dept: ONCOLOGY | Facility: HOSPITAL | Age: 71
End: 2021-08-10

## 2021-08-16 ENCOUNTER — LAB (OUTPATIENT)
Dept: LAB | Facility: OTHER | Age: 71
End: 2021-08-16

## 2021-08-16 DIAGNOSIS — T45.4X5A ADVERSE EFFECT OF IRON, INITIAL ENCOUNTER: ICD-10-CM

## 2021-08-16 DIAGNOSIS — Z80.3 FAMILY HISTORY OF BREAST CANCER IN FEMALE: ICD-10-CM

## 2021-08-16 DIAGNOSIS — D50.0 IRON DEFICIENCY ANEMIA DUE TO CHRONIC BLOOD LOSS: ICD-10-CM

## 2021-08-16 LAB
ALBUMIN SERPL-MCNC: 4.8 G/DL (ref 3.5–5)
ALBUMIN/GLOB SERPL: 1.3 G/DL (ref 1.1–1.8)
ALP SERPL-CCNC: 51 U/L (ref 38–126)
ALT SERPL W P-5'-P-CCNC: 18 U/L
ANION GAP SERPL CALCULATED.3IONS-SCNC: 7 MMOL/L (ref 5–15)
AST SERPL-CCNC: 24 U/L (ref 14–36)
BASOPHILS # BLD AUTO: 0.05 10*3/MM3 (ref 0–0.2)
BASOPHILS NFR BLD AUTO: 0.6 % (ref 0–1.5)
BILIRUB SERPL-MCNC: 0.3 MG/DL (ref 0.2–1.3)
BUN SERPL-MCNC: 27 MG/DL (ref 7–23)
BUN/CREAT SERPL: 25.7 (ref 7–25)
CALCIUM SPEC-SCNC: 10.2 MG/DL (ref 8.4–10.2)
CHLORIDE SERPL-SCNC: 106 MMOL/L (ref 101–112)
CO2 SERPL-SCNC: 26 MMOL/L (ref 22–30)
CREAT SERPL-MCNC: 1.05 MG/DL (ref 0.52–1.04)
DEPRECATED RDW RBC AUTO: 46.7 FL (ref 37–54)
EOSINOPHIL # BLD AUTO: 0.28 10*3/MM3 (ref 0–0.4)
EOSINOPHIL NFR BLD AUTO: 3.3 % (ref 0.3–6.2)
ERYTHROCYTE [DISTWIDTH] IN BLOOD BY AUTOMATED COUNT: 13.5 % (ref 12.3–15.4)
FERRITIN SERPL-MCNC: 120.1 NG/ML (ref 13–150)
GFR SERPL CREATININE-BSD FRML MDRD: 52 ML/MIN/1.73 (ref 39–90)
GLOBULIN UR ELPH-MCNC: 3.6 GM/DL (ref 2.3–3.5)
GLUCOSE SERPL-MCNC: 110 MG/DL (ref 70–99)
HCT VFR BLD AUTO: 35.5 % (ref 34–46.6)
HGB BLD-MCNC: 12.2 G/DL (ref 12–15.9)
IRON 24H UR-MRATE: 93 MCG/DL (ref 37–145)
IRON SATN MFR SERPL: 22 % (ref 20–50)
LYMPHOCYTES # BLD AUTO: 1.62 10*3/MM3 (ref 0.7–3.1)
LYMPHOCYTES NFR BLD AUTO: 18.9 % (ref 19.6–45.3)
MCH RBC QN AUTO: 33.7 PG (ref 26.6–33)
MCHC RBC AUTO-ENTMCNC: 34.4 G/DL (ref 31.5–35.7)
MCV RBC AUTO: 98.1 FL (ref 79–97)
MONOCYTES # BLD AUTO: 0.63 10*3/MM3 (ref 0.1–0.9)
MONOCYTES NFR BLD AUTO: 7.3 % (ref 5–12)
NEUTROPHILS NFR BLD AUTO: 6 10*3/MM3 (ref 1.7–7)
NEUTROPHILS NFR BLD AUTO: 69.9 % (ref 42.7–76)
PLATELET # BLD AUTO: 282 10*3/MM3 (ref 140–450)
PMV BLD AUTO: 8.8 FL (ref 6–12)
POTASSIUM SERPL-SCNC: 4.6 MMOL/L (ref 3.4–5)
PROT SERPL-MCNC: 8.4 G/DL (ref 6.3–8.6)
RBC # BLD AUTO: 3.62 10*6/MM3 (ref 3.77–5.28)
SODIUM SERPL-SCNC: 139 MMOL/L (ref 137–145)
TIBC SERPL-MCNC: 429 MCG/DL (ref 298–536)
TRANSFERRIN SERPL-MCNC: 288 MG/DL (ref 200–360)
WBC # BLD AUTO: 8.58 10*3/MM3 (ref 3.4–10.8)

## 2021-08-16 PROCEDURE — 36415 COLL VENOUS BLD VENIPUNCTURE: CPT | Performed by: INTERNAL MEDICINE

## 2021-08-16 PROCEDURE — 84466 ASSAY OF TRANSFERRIN: CPT | Performed by: INTERNAL MEDICINE

## 2021-08-16 PROCEDURE — 80053 COMPREHEN METABOLIC PANEL: CPT | Performed by: INTERNAL MEDICINE

## 2021-08-16 PROCEDURE — 82728 ASSAY OF FERRITIN: CPT | Performed by: INTERNAL MEDICINE

## 2021-08-16 PROCEDURE — 82607 VITAMIN B-12: CPT | Performed by: INTERNAL MEDICINE

## 2021-08-16 PROCEDURE — 82746 ASSAY OF FOLIC ACID SERUM: CPT | Performed by: INTERNAL MEDICINE

## 2021-08-16 PROCEDURE — 85025 COMPLETE CBC W/AUTO DIFF WBC: CPT | Performed by: INTERNAL MEDICINE

## 2021-08-16 PROCEDURE — 83540 ASSAY OF IRON: CPT | Performed by: INTERNAL MEDICINE

## 2021-08-17 LAB
FOLATE SERPL-MCNC: >20 NG/ML (ref 4.78–24.2)
VIT B12 BLD-MCNC: 439 PG/ML (ref 211–946)

## 2021-08-18 DIAGNOSIS — M25.561 ACUTE PAIN OF RIGHT KNEE: Primary | ICD-10-CM

## 2021-08-19 ENCOUNTER — OFFICE VISIT (OUTPATIENT)
Dept: ORTHOPEDIC SURGERY | Facility: CLINIC | Age: 71
End: 2021-08-19

## 2021-08-19 ENCOUNTER — OFFICE VISIT (OUTPATIENT)
Dept: ONCOLOGY | Facility: CLINIC | Age: 71
End: 2021-08-19

## 2021-08-19 VITALS
HEART RATE: 68 BPM | RESPIRATION RATE: 18 BRPM | BODY MASS INDEX: 48.84 KG/M2 | WEIGHT: 258.5 LBS | OXYGEN SATURATION: 94 % | TEMPERATURE: 98 F | DIASTOLIC BLOOD PRESSURE: 76 MMHG | SYSTOLIC BLOOD PRESSURE: 135 MMHG

## 2021-08-19 VITALS — BODY MASS INDEX: 48.71 KG/M2 | HEIGHT: 61 IN | WEIGHT: 258 LBS

## 2021-08-19 DIAGNOSIS — G89.29 CHRONIC PAIN OF RIGHT KNEE: ICD-10-CM

## 2021-08-19 DIAGNOSIS — Z96.652 S/P TKR (TOTAL KNEE REPLACEMENT), LEFT: ICD-10-CM

## 2021-08-19 DIAGNOSIS — T45.4X5A ADVERSE EFFECT OF IRON, INITIAL ENCOUNTER: Chronic | ICD-10-CM

## 2021-08-19 DIAGNOSIS — E66.01 MORBID OBESITY WITH BMI OF 45.0-49.9, ADULT (HCC): ICD-10-CM

## 2021-08-19 DIAGNOSIS — I10 ESSENTIAL HYPERTENSION: ICD-10-CM

## 2021-08-19 DIAGNOSIS — D50.0 IRON DEFICIENCY ANEMIA DUE TO CHRONIC BLOOD LOSS: Primary | Chronic | ICD-10-CM

## 2021-08-19 DIAGNOSIS — M25.561 CHRONIC PAIN OF RIGHT KNEE: ICD-10-CM

## 2021-08-19 DIAGNOSIS — M17.11 PRIMARY OSTEOARTHRITIS OF RIGHT KNEE: Primary | ICD-10-CM

## 2021-08-19 PROCEDURE — 1126F AMNT PAIN NOTED NONE PRSNT: CPT | Performed by: INTERNAL MEDICINE

## 2021-08-19 PROCEDURE — 99213 OFFICE O/P EST LOW 20 MIN: CPT | Performed by: ORTHOPAEDIC SURGERY

## 2021-08-19 PROCEDURE — G0463 HOSPITAL OUTPT CLINIC VISIT: HCPCS | Performed by: INTERNAL MEDICINE

## 2021-08-19 PROCEDURE — 1123F ACP DISCUSS/DSCN MKR DOCD: CPT | Performed by: INTERNAL MEDICINE

## 2021-08-19 PROCEDURE — 20610 DRAIN/INJ JOINT/BURSA W/O US: CPT | Performed by: ORTHOPAEDIC SURGERY

## 2021-08-19 PROCEDURE — 99214 OFFICE O/P EST MOD 30 MIN: CPT | Performed by: INTERNAL MEDICINE

## 2021-08-19 PROCEDURE — G9903 PT SCRN TBCO ID AS NON USER: HCPCS | Performed by: INTERNAL MEDICINE

## 2021-08-19 RX ADMIN — LIDOCAINE HYDROCHLORIDE 2 ML: 10 INJECTION, SOLUTION INFILTRATION; PERINEURAL at 14:23

## 2021-08-19 RX ADMIN — TRIAMCINOLONE ACETONIDE 40 MG: 40 INJECTION, SUSPENSION INTRA-ARTICULAR; INTRAMUSCULAR at 14:23

## 2021-08-19 NOTE — PROGRESS NOTES
Lois Parmar is a 71 y.o. female   Primary provider:  Marshal Warner MD       Chief Complaint   Patient presents with   • Right Knee - Knee Pain       HISTORY OF PRESENT ILLNESS:    Right knee pain started about 2 years ago, xrays done today.   She has pain on the inside part of her right knee.  Mostly she has a constant dull ache but she has occasional sharp stabbing pains as well.  No specific injury.  No fevers or chills.  She has pain with activities of daily living.  She reports no problems with her left knee.      Knee Pain   The incident occurred more than 1 week ago. There was no injury mechanism. The pain is present in the right knee. The quality of the pain is described as aching, stabbing and burning. The pain is severe. The pain has been constant since onset. Associated symptoms comments: SWELLING. . She reports no foreign bodies present. The symptoms are aggravated by weight bearing (STANDING, DRIVING, WALKING. ). She has tried NSAIDs, rest, heat and ice for the symptoms.        CONCURRENT MEDICAL HISTORY:    Past Medical History:   Diagnosis Date   • Acquired hypothyroidism - On Synthroid    • Allergic rhinitis    • Allergy to alpha-gal 7/17/2020   • Anemia    • Asthma - mild intermittent    • Cardiac disease    • Carpal tunnel syndrome - Bilateral    • Colitis    • Degeneration of cervical intervertebral disc    • Diabetes (CMS/HCC)    • Disorder of lumbar spine    • Diverticulosis of large intestine 1/17/2019   • Essential hypertension    • Essential hypertension    • Fatigue    • History of myocardial infarct at age greater than 60 years 10/17/2018   • Hyperlipidemia - Improved with Zocor    • Hyperlipidemia associated with type 2 diabetes mellitus (CMS/HCC) 7/28/2021   • Hypertriglyceridemia 1/18/2021   • IFG (impaired fasting glucose) 10/17/2018   • Impaired fasting glycaemia    • Iron deficiency anemia - Improved    • Lumbar radiculopathy    • Megaloblastic anemia due to vitamin B>12<  deficiency    • Moderate persistent asthma without complication 4/9/2021   • Morbid obesity (CMS/[facility])    • Osteoarthritis of knee - Bilateral    • Osteoarthritis of multiple joints - Left knee    • Osteoporosis    • Pain in left hip    • Sleep apnea - On CPAP    • Spasm of back muscles    • Stage 3 chronic kidney disease (CMS/[facility]) 10/23/2019   • Stage 3a chronic kidney disease (CMS/HCC) 10/23/2019   • Thyroid dysfunction    • Tubular adenoma of colon - removed during colonoscopy, 11/2018. 12/3/2018   • Type 2 diabetes mellitus with stage 3a chronic kidney disease, without long-term current use of insulin (CMS/[facility]) 4/9/2021   • Type 2 diabetes mellitus without complication, without long-term current use of insulin (CMS/HCC) 4/9/2021   • Vitamin D deficiency        Allergies   Allergen Reactions   • Beef Allergy Anaphylaxis   • Dairy Aid  [Lactase] Anaphylaxis   • Galactose Anaphylaxis   • Lortab [Hydrocodone-Acetaminophen] Hallucinations   • Milk  [Lac Bovis] Anaphylaxis     D/t alpha gal   • Pork-Derived Products Anaphylaxis   • Codeine Palpitations     Heart race   • Antihistamines, Chlorpheniramine-Type Palpitations     Heart races   • Latex Rash   • Other Palpitations     Decongestants: Heart Race   • Tape Rash         Current Outpatient Medications:   •  albuterol sulfate  (90 Base) MCG/ACT inhaler, Inhale 2 puffs Every 4 (Four) Hours As Needed for Wheezing., Disp: 18 g, Rfl: 11  •  amLODIPine (NORVASC) 5 MG tablet, Take 5 mg by mouth 2 (two) times a day., Disp: , Rfl:   •  ascorbic acid (VITAMIN C) 1000 MG tablet, Take 1,000 mg by mouth Daily., Disp: , Rfl:   •  ascorbic acid (VITAMIN C) 250 MG tablet, Take 250 mg by mouth Daily., Disp: , Rfl:   •  aspirin 81 MG chewable tablet, Chew 81 mg Daily., Disp: , Rfl:   •  Breo Ellipta 100-25 MCG/INH inhaler, INHALE 1 PUFF BY MOUTH DAILY **RINSE MOUTH AFTER EACH USE**, Disp: , Rfl:   •  Cholecalciferol 125 MCG (5000 UT) tablet, Take 5,000 Units by mouth Daily.,  Disp: , Rfl:   •  cyanocobalamin (VITAMIN B-12) 1000 MCG tablet, Take 1 tablet by mouth Daily., Disp: 90 tablet, Rfl: 1  •  Cymbalta 60 MG capsule, TAKE 1 CAPSULE EVERY DAY, Disp: 90 capsule, Rfl: 3  •  diphenhydrAMINE (BENADRYL) 25 mg capsule, Take 50 mg by mouth., Disp: , Rfl:   •  docusate sodium (COLACE) 100 MG capsule, Take 100 mg by mouth 2 (Two) Times a Day As Needed., Disp: , Rfl:   •  EPINEPHrine (EPIPEN) 0.3 MG/0.3ML solution auto-injector injection, Inject 0.3 mg into the appropriate muscle as directed by prescriber., Disp: , Rfl:   •  famotidine (PEPCID) 40 MG tablet, Take 1 tablet by mouth Every Night. This replaces Zantac, Disp: 90 tablet, Rfl: 3  •  folic acid (FOLVITE) 1 MG tablet, TAKE 1 TABLET EVERY DAY (Patient taking differently: Take 1 mg by mouth 3 (Three) Times a Week.), Disp: 90 tablet, Rfl: 1  •  glucose blood test strip, tests BID, Diagnosis: 250.00, 401.9, Disp: , Rfl:   •  indapamide (LOZOL) 2.5 MG tablet, Take 2.5 mg by mouth Daily., Disp: , Rfl:   •  levothyroxine (SYNTHROID, LEVOTHROID) 88 MCG tablet, Take 1 tablet by mouth Daily., Disp: 90 tablet, Rfl: 3  •  loratadine (CLARITIN) 10 MG tablet, Take 10 mg by mouth daily., Disp: , Rfl:   •  metFORMIN (GLUCOPHAGE) 500 MG tablet, Take 1 tablet by mouth 2 (Two) Times a Day With Meals., Disp: 180 tablet, Rfl: 3  •  olmesartan (BENICAR) 40 MG tablet, Take 40 mg by mouth Daily., Disp: , Rfl:   •  Prevacid 30 MG capsule, TAKE 1 CAPSULE EVERY MORNING, Disp: 90 capsule, Rfl: 3  •  simvastatin (ZOCOR) 40 MG tablet, Take 1 tablet by mouth Every Night., Disp: 90 tablet, Rfl: 3  •  spironolactone (ALDACTONE) 25 MG tablet, TAKE 1 TABLET EVERY DAY  FOR  FLUID, Disp: 90 tablet, Rfl: 0    Past Surgical History:   Procedure Laterality Date   • CARPAL TUNNEL RELEASE Right    • COLONOSCOPY N/A 11/30/2018    Procedure: COLONOSCOPY;  Surgeon: Jimmie Sutton MD;  Location: [facility] ENDOSCOPY;  Service: General   • ENDOSCOPY  09/17/2012    Normal esophagus.   "Gastritis.  Normal duodenum   • ENDOSCOPY N/A 11/30/2018    Procedure: ESOPHAGOGASTRODUODENOSCOPY WITH ANESTHESIA;  Surgeon: Jimmie Sutton MD;  Location: [facility] ENDOSCOPY;  Service: General   • ENDOSCOPY AND COLONOSCOPY  09/17/2012    Internal & external hemorrhoids found.   • HYSTERECTOMY  1980    w bso   • JOINT REPLACEMENT      left knee   • LUMBAR LAMINECTOMY  2011    s/p lumbar laminectomy and fusion   • NECK SURGERY     • OOPHORECTOMY  1980   • SHOULDER SURGERY  12/07/2015    Arthroscopy of the left shoudler with rotator cuff repair, Vijay procedure, and subacromial decompression.   • SHOULDER SURGERY Right    • TOTAL KNEE ARTHROPLASTY Left        Family History   Problem Relation Age of Onset   • Breast cancer Mother    • Ovarian cancer Mother    • Breast cancer Sister    • Breast cancer Other    • Heart disease Other    • Hypertension Other    • Lung disease Other    • Diabetes Other    • Cancer Other        Social History     Socioeconomic History   • Marital status:      Spouse name: Not on file   • Number of children: Not on file   • Years of education: Not on file   • Highest education level: Not on file   Tobacco Use   • Smoking status: Never Smoker   • Smokeless tobacco: Never Used   Vaping Use   • Vaping Use: Never used   Substance and Sexual Activity   • Alcohol use: No   • Drug use: No   • Sexual activity: Defer        Review of Systems   Eyes: Positive for visual disturbance.   Respiratory: Negative.    Musculoskeletal: Positive for joint swelling.        Joint pain   All other systems reviewed and are negative.      PHYSICAL EXAMINATION:       Ht 154.9 cm (61\")   Wt 117 kg (258 lb)   BMI 48.75 kg/m²     Physical Exam  Constitutional:       General: She is not in acute distress.     Appearance: Normal appearance.   Pulmonary:      Effort: Pulmonary effort is normal. No respiratory distress.   Neurological:      Mental Status: She is alert and oriented to person, place, and time. "         GAIT:     []  Normal  []  Antalgic    Assistive device: []  None  []  Walker     []  Crutches  [x]  Cane     []  Wheelchair  []  Stretcher    Right Knee Exam     Muscle Strength   The patient has normal right knee strength.    Tenderness   Right knee tenderness location: diffuse.    Range of Motion   Extension: -5   Flexion: 110     Tests   Varus: negative Valgus: negative  Drawer:  Anterior - negative    Posterior - negative    Other   Sensation: normal  Pulse: present  Swelling: mild    Comments:  Crepitation on motion.  Mild to moderate pain through arc of motion                  XR Knee 1 or 2 View Right    Result Date: 8/19/2021  Narrative: Ordering Provider:  Curt Augustine MD Ordering Diagnosis/Indication:  Acute pain of right knee Procedure:  XR KNEE 1 OR 2 VW RIGHT Exam Date:  8/19/21 COMPARISON:  Todays X-rays were compared to previous images dated June 28, 2021.     Impression:  AP bilateral standing of the knees with lateral of the right knee show acceptable position and alignment of a left total knee arthroplasty.  No sign of implant loosening or failure is noted.  The right knee shows moderate to severe osteoarthritic changes with varus positioning and bone-on-bone findings in the medial compartment.  Marginal osteophytes are present on both sides of the knee.  Severe arthritic changes noted in the patellofemoral compartment as well.  No acute findings.  Mild interval changes noted in comparison to prior x-ray. Curt Augustine MD 8/19/21     XR Knee Bilateral AP Standing    Result Date: 8/19/2021  Narrative: Ordering Provider:  Curt Augustine MD Ordering Diagnosis/Indication:  Acute pain of right knee Procedure:  XR KNEE BILATERAL AP STANDING Exam Date:  8/19/21 COMPARISON:  Todays X-rays were compared to previous images dated June 28, 2021.     Impression:  AP bilateral standing of the knees with lateral of the right knee show acceptable position and alignment of a  left total knee arthroplasty.  No sign of implant loosening or failure is noted.  The right knee shows moderate to severe osteoarthritic changes with varus positioning and bone-on-bone findings in the medial compartment.  Marginal osteophytes are present on both sides of the knee.  Severe arthritic changes noted in the patellofemoral compartment as well.  No acute findings.  Mild interval changes noted in comparison to prior x-ray. Curt Augustine MD 8/19/21           ASSESSMENT:    Diagnoses and all orders for this visit:    Primary osteoarthritis of right knee  -     Large Joint Arthrocentesis: R knee    Chronic pain of right knee  -     Large Joint Arthrocentesis: R knee    Morbid obesity with BMI of 45.0-49.9, adult (CMS/[facility])    Essential hypertension    S/P TKR (total knee replacement), left          PLAN    She has severe osteoarthritic change in the right knee.  She has bone-on-bone in the medial compartment of her knee.  We began the discussion with the osteoarthritis treatment algorithm.  We discussed general strength and conditioning exercises as well as use of a cane.  We discussed proceeding with a steroid injection today and possibility of viscosupplementation in the future.  We discussed the eventuality of total knee arthroplasty in the right knee.    She will follow up as needed depending on her response to the steroid injection.  Use of a cane as needed for support and mobility.    Large Joint Arthrocentesis: R knee  Date/Time: 8/19/2021 2:23 PM  Consent given by: patient  Site marked: site marked  Timeout: Immediately prior to procedure a time out was called to verify the correct patient, procedure, equipment, support staff and site/side marked as required   Supporting Documentation  Indications: pain   Procedure Details  Location: knee - R knee  Preparation: Patient was prepped and draped in the usual sterile fashion  Needle size: 22 G  Medications administered: 40 mg triamcinolone acetonide  40 MG/ML; 2 mL lidocaine 1 %  Patient tolerance: patient tolerated the procedure well with no immediate complications            Return if symptoms worsen or fail to improve, for recheck.    Curt Augustine MD

## 2021-08-19 NOTE — PROGRESS NOTES
DATE OF VISIT: 8/19/2021      REASON FOR VISIT: Anemia with iron deficiency, vitamin B12 deficiency, family history of breast cancer      HISTORY OF PRESENT ILLNESS:   71-year-old female with medical problem consisting of hypertension, dyslipidemia, diabetes mellitus, hypothyroidism, coronary artery disease has been following up with hematology clinic since January 24, 2019.  Due to iron adverse effect and malabsorption patient received intravenous iron in the past.  Patient is here for follow-up appointment today to discuss recently done blood work.  Denies any recent worsening of fatigue.  Complains of arthritis pain.  Denies any bleeding.  Denies any new lymph node enlargement.  Next      Past Medical History, Past Surgical History, Social History, Family History have been reviewed and are without significant changes except as mentioned.    Review of Systems   Musculoskeletal: Positive for arthralgias.   Skin:        Positive for vitiligo      A comprehensive 14 point review of systems was performed and was negative except as mentioned.    Medications:  The current medication list was reviewed in the EMR    ALLERGIES:    Allergies   Allergen Reactions   • Beef Allergy Anaphylaxis   • Dairy Aid  [Lactase] Anaphylaxis   • Galactose Anaphylaxis   • Lortab [Hydrocodone-Acetaminophen] Hallucinations   • Milk  [Lac Bovis] Anaphylaxis     D/t alpha gal   • Pork-Derived Products Anaphylaxis   • Codeine Palpitations     Heart race   • Antihistamines, Chlorpheniramine-Type Palpitations     Heart races   • Latex Rash   • Other Palpitations     Decongestants: Heart Race   • Tape Rash       Objective      Vitals:    08/19/21 1118   BP: 135/76   Pulse: 68   Resp: 18   Temp: 98 °F (36.7 °C)   SpO2: 94%   Weight: 117 kg (258 lb 8 oz)   PainSc: 0-No pain     Current Status 3/19/2021   ECOG score 0       Physical Exam  Pulmonary:      Breath sounds: Normal breath sounds.   Neurological:      Mental Status: She is alert and  oriented to person, place, and time.           RECENT LABS:  Glucose   Date Value Ref Range Status   08/16/2021 110 (H) 70 - 99 mg/dL Final     Sodium   Date Value Ref Range Status   08/16/2021 139 137 - 145 mmol/L Final     Potassium   Date Value Ref Range Status   08/16/2021 4.6 3.4 - 5.0 mmol/L Final     CO2   Date Value Ref Range Status   08/16/2021 26.0 22.0 - 30.0 mmol/L Final     Chloride   Date Value Ref Range Status   08/16/2021 106 101 - 112 mmol/L Final     Anion Gap   Date Value Ref Range Status   08/16/2021 7.0 5.0 - 15.0 mmol/L Final     Creatinine   Date Value Ref Range Status   08/16/2021 1.05 (H) 0.52 - 1.04 mg/dL Final     BUN   Date Value Ref Range Status   08/16/2021 27 (H) 7 - 23 mg/dL Final     BUN/Creatinine Ratio   Date Value Ref Range Status   08/16/2021 25.7 (H) 7.0 - 25.0 Final     Calcium   Date Value Ref Range Status   08/16/2021 10.2 8.4 - 10.2 mg/dL Final     eGFR Non  Amer   Date Value Ref Range Status   08/16/2021 52 39 - 90 mL/min/1.73 Final     Alkaline Phosphatase   Date Value Ref Range Status   08/16/2021 51 38 - 126 U/L Final     Total Protein   Date Value Ref Range Status   08/16/2021 8.4 6.3 - 8.6 g/dL Final     ALT (SGPT)   Date Value Ref Range Status   08/16/2021 18 <=35 U/L Final     AST (SGOT)   Date Value Ref Range Status   08/16/2021 24 14 - 36 U/L Final     Total Bilirubin   Date Value Ref Range Status   08/16/2021 0.3 0.2 - 1.3 mg/dL Final     Albumin   Date Value Ref Range Status   08/16/2021 4.80 3.50 - 5.00 g/dL Final     Globulin   Date Value Ref Range Status   08/16/2021 3.6 (H) 2.3 - 3.5 gm/dL Final     Lab Results   Component Value Date    WBC 8.58 08/16/2021    HGB 12.2 08/16/2021    HCT 35.5 08/16/2021    MCV 98.1 (H) 08/16/2021     08/16/2021     Lab Results   Component Value Date    NEUTROABS 6.00 08/16/2021    IRON 93 08/16/2021    IRON 111 05/11/2021    IRON 56 03/02/2021    TIBC 429 08/16/2021    TIBC 375 05/11/2021    TIBC 487 03/02/2021     LABIRON 22 08/16/2021    LABIRON 30 05/11/2021    LABIRON 11 (L) 03/02/2021    FERRITIN 120.10 08/16/2021    FERRITIN 130.00 07/21/2021    FERRITIN 173.30 (H) 05/11/2021    [id number] 439 08/16/2021    [id number] 496 07/21/2021    [id number] 743 05/11/2021    FOLATE >20.00 08/16/2021    FOLATE >20.00 05/11/2021    FOLATE >20.00 03/02/2021     No results found for: , LABCA2, AFPTM, HCGQUANT, , CHROMGRNA, [id number], CEA, REFLABREPO      PATHOLOGY:  * Cannot find OR log *         RADIOLOGY DATA :  No radiology results for the last 7 days        Assessment/Plan     1.  Iron deficiency anemia:  -Had EGD and colonoscopy in November 2018 that was negative for bleeding  -Had a capsule endoscopy in March 2021 not show any bleeding  -Due to inability to tolerate iron by mouth patient received 2 dose of Feraheme in March 2021  -Currently on B12 and folic acid 3 times a week  -Anemia work-up done on August 16, 2021 shows hemoglobin is 12.2.  Iron studies are adequate.  No need to start any iron replacement at present  -Recommend increasing B12 to 1 tablet p.o. daily.  Recommend continue with folic acid 3 times a week.  -We will ask patient to return to clinic in 3 months with repeat CBC, iron studies, ferritin, B12 and folate to be done prior to that    2.  Elevated creatinine:  -Creatinine is 1.05.  Recommend continue with hydration    3.  Family history of breast cancer  -Patient with 3 family members with breast cancer  -Genetic counseling was done and had a [employer] hereditary cancer panel which included BRCA1 and BRCA2 done in March 2020 which was negative    4.  Health maintenance: Patient does not smoke    5. Advance Care Planning: For now patient remains full code and is able to make decisions.  Patient has health care surrogate mentioned on chart.    6.  Prescriptions: Prescription for B12 p.o. daily has been sent to her pharmacy today             PHQ-9 Total Score: 0   -Patient is not homicidal or suicidal.   No acute intervention required.    Lois Parmar reports a pain score of 0.  Given her pain assessment as noted, treatment options were discussed and the following options were decided upon as a follow-up plan to address the patient's pain: continuation of current treatment plan for pain.         Ranjeet De La Torre MD  8/19/2021  13:09 CDT        Part of this note may be an electronic transcription/translation of spoken language to printed text using the Dragon Dictation System.          CC:

## 2021-08-21 RX ORDER — TRIAMCINOLONE ACETONIDE 40 MG/ML
40 INJECTION, SUSPENSION INTRA-ARTICULAR; INTRAMUSCULAR
Status: COMPLETED | OUTPATIENT
Start: 2021-08-19 | End: 2021-08-19

## 2021-08-21 RX ORDER — LIDOCAINE HYDROCHLORIDE 10 MG/ML
2 INJECTION, SOLUTION INFILTRATION; PERINEURAL
Status: COMPLETED | OUTPATIENT
Start: 2021-08-19 | End: 2021-08-19

## 2021-09-15 ENCOUNTER — OFFICE VISIT (OUTPATIENT)
Dept: FAMILY MEDICINE CLINIC | Facility: CLINIC | Age: 71
End: 2021-09-15

## 2021-09-15 VITALS — BODY MASS INDEX: 48.71 KG/M2 | HEIGHT: 61 IN | WEIGHT: 258 LBS

## 2021-09-15 DIAGNOSIS — E66.01 MORBID OBESITY (HCC): Chronic | ICD-10-CM

## 2021-09-15 DIAGNOSIS — Z00.00 MEDICARE ANNUAL WELLNESS VISIT, SUBSEQUENT: Primary | ICD-10-CM

## 2021-09-15 PROCEDURE — 1126F AMNT PAIN NOTED NONE PRSNT: CPT | Performed by: INTERNAL MEDICINE

## 2021-09-15 PROCEDURE — G0439 PPPS, SUBSEQ VISIT: HCPCS | Performed by: INTERNAL MEDICINE

## 2021-09-15 PROCEDURE — 96160 PT-FOCUSED HLTH RISK ASSMT: CPT | Performed by: INTERNAL MEDICINE

## 2021-09-15 PROCEDURE — 1159F MED LIST DOCD IN RCRD: CPT | Performed by: INTERNAL MEDICINE

## 2021-09-15 NOTE — PATIENT INSTRUCTIONS
Medicare Wellness  Personal Prevention Plan of Service     Date of Office Visit:  09/15/2021  Encounter Provider:  Marshal Warner MD  Place of Service:  [facility] - [place]  Patient Name: Lois Parmar  :  1950    As part of the Medicare Wellness portion of your visit today, we are providing you with this personalized preventive plan of services (PPPS). This plan is based upon recommendations of the [place] Preventive Services Task Force (USPSTF) and the Advisory Committee on Immunization Practices (ACIP).    This lists the preventive care services that should be considered, and provides dates of when you are due. Items listed as completed are up-to-date and do not require any further intervention.    Health Maintenance   Topic Date Due   • URINE MICROALBUMIN  Never done   • DIABETIC FOOT EXAM  Never done   • ANNUAL WELLNESS VISIT  2021   • DIABETIC EYE EXAM  08/10/2021   • DXA SCAN  2022 (Originally 2020)   • INFLUENZA VACCINE  10/01/2021   • COLORECTAL CANCER SCREENING  2021   • HEMOGLOBIN A1C  2022   • LIPID PANEL  2022   • MAMMOGRAM  2023   • TDAP/TD VACCINES (3 - Td or Tdap) 2030   • HEPATITIS C SCREENING  Completed   • COVID-19 Vaccine  Completed   • Pneumococcal Vaccine 65+  Completed   • ZOSTER VACCINE  Completed       No orders of the defined types were placed in this encounter.      Return in about 1 year (around 9/15/2022).          Exercising to Lose Weight  Exercise is structured, repetitive physical activity to improve fitness and health. Getting regular exercise is important for everyone. It is especially important if you are overweight. Being overweight increases your risk of heart disease, stroke, diabetes, high blood pressure, and several types of cancer. Reducing your calorie intake and exercising can help you lose weight.  Exercise is usually categorized as moderate or vigorous intensity.  To lose weight, most people need to do a certain amount of moderate-intensity or vigorous-intensity exercise each week.  Moderate-intensity exercise    Moderate-intensity exercise is any activity that gets you moving enough to burn at least three times more energy (calories) than if you were sitting.  Examples of moderate exercise include:  · Walking a mile in 15 minutes.  · Doing light yard work.  · Biking at an easy pace.  Most people should get at least 150 minutes (2 hours and 30 minutes) a week of moderate-intensity exercise to maintain their body weight.  Vigorous-intensity exercise  Vigorous-intensity exercise is any activity that gets you moving enough to burn at least six times more calories than if you were sitting. When you exercise at this intensity, you should be working hard enough that you are not able to carry on a conversation.  Examples of vigorous exercise include:  · Running.  · Playing a team sport, such as football, basketball, and soccer.  · Jumping rope.  Most people should get at least 75 minutes (1 hour and 15 minutes) a week of vigorous-intensity exercise to maintain their body weight.  How can exercise affect me?  When you exercise enough to burn more calories than you eat, you lose weight. Exercise also reduces body fat and builds muscle. The more muscle you have, the more calories you burn. Exercise also:  · Improves mood.  · Reduces stress and tension.  · Improves your overall fitness, flexibility, and endurance.  · Increases bone strength.  The amount of exercise you need to lose weight depends on:  · Your age.  · The type of exercise.  · Any health conditions you have.  · Your overall physical ability.  Talk to your health care provider about how much exercise you need and what types of activities are safe for you.  What actions can I take to lose weight?  Nutrition    · Make changes to your diet as told by your health care provider or diet and nutrition specialist (dietitian). This  may include:  ? Eating fewer calories.  ? Eating more protein.  ? Eating less unhealthy fats.  ? Eating a diet that includes fresh fruits and vegetables, whole grains, low-fat dairy products, and lean protein.  ? Avoiding foods with added fat, salt, and sugar.  · Drink plenty of water while you exercise to prevent dehydration or heat stroke.    Activity  · Choose an activity that you enjoy and set realistic goals. Your health care provider can help you make an exercise plan that works for you.  · Exercise at a moderate or vigorous intensity most days of the week.  ? The intensity of exercise may vary from person to person. You can tell how intense a workout is for you by paying attention to your breathing and heartbeat. Most people will notice their breathing and heartbeat get faster with more intense exercise.  · Do resistance training twice each week, such as:  ? Push-ups.  ? Sit-ups.  ? Lifting weights.  ? Using resistance bands.  · Getting short amounts of exercise can be just as helpful as long structured periods of exercise. If you have trouble finding time to exercise, try to include exercise in your daily routine.  ? Get up, stretch, and walk around every 30 minutes throughout the day.  ? Go for a walk during your lunch break.  ? Park your car farther away from your destination.  ? If you take public transportation, get off one stop early and walk the rest of the way.  ? Make phone calls while standing up and walking around.  ? Take the stairs instead of elevators or escalators.  · Wear comfortable clothes and shoes with good support.  · Do not exercise so much that you hurt yourself, feel dizzy, or get very short of breath.  Where to find more information  · [place] Department of Health and Human Services: www.hhs.gov  · Centers for Disease Control and Prevention (CDC): www.cdc.gov  Contact a health care provider:  · Before starting a new exercise program.  · If you have questions or concerns about your  weight.  · If you have a medical problem that keeps you from exercising.  Get help right away if you have any of the following while exercising:  · Injury.  · Dizziness.  · Difficulty breathing or shortness of breath that does not go away when you stop exercising.  · Chest pain.  · Rapid heartbeat.  Summary  · Being overweight increases your risk of heart disease, stroke, diabetes, high blood pressure, and several types of cancer.  · Losing weight happens when you burn more calories than you eat.  · Reducing the amount of calories you eat in addition to getting regular moderate or vigorous exercise each week helps you lose weight.  This information is not intended to replace advice given to you by your health care provider. Make sure you discuss any questions you have with your health care provider.  Document Revised: 04/15/2021 Document Reviewed: 04/15/2021  [employer] Patient Education © 2021 [employer]      Calorie Counting for Weight Loss  Calories are units of energy. Your body needs a certain number of calories from food to keep going throughout the day. When you eat or drink more calories than your body needs, your body stores the extra calories mostly as fat. When you eat or drink fewer calories than your body needs, your body burns fat to get the energy it needs.  Calorie counting means keeping track of how many calories you eat and drink each day. Calorie counting can be helpful if you need to lose weight. If you eat fewer calories than your body needs, you should lose weight. Ask your health care provider what a healthy weight is for you.  For calorie counting to work, you will need to eat the right number of calories each day to lose a healthy amount of weight per week. A dietitian can help you figure out how many calories you need in a day and will suggest ways to reach your calorie goal.  · A healthy amount of weight to lose each week is usually 1-2 lb (0.5-0.9 kg). This usually means that your daily  calorie intake should be reduced by 500-750 calories.  · Eating 1,200-1,500 calories a day can help most women lose weight.  · Eating 1,500-1,800 calories a day can help most men lose weight.  What do I need to know about calorie counting?  Work with your health care provider or dietitian to determine how many calories you should get each day. To meet your daily calorie goal, you will need to:  · Find out how many calories are in each food that you would like to eat. Try to do this before you eat.  · Decide how much of the food you plan to eat.  · Keep a food log. Do this by writing down what you ate and how many calories it had.  To successfully lose weight, it is important to balance calorie counting with a healthy lifestyle that includes regular activity.  Where do I find calorie information?    The number of calories in a food can be found on a Nutrition Facts label. If a food does not have a Nutrition Facts label, try to look up the calories online or ask your dietitian for help.  Remember that calories are listed per serving. If you choose to have more than one serving of a food, you will have to multiply the calories per serving by the number of servings you plan to eat. For example, the label on a package of bread might say that a serving size is 1 slice and that there are 90 calories in a serving. If you eat 1 slice, you will have eaten 90 calories. If you eat 2 slices, you will have eaten 180 calories.  How do I keep a food log?  After each time that you eat, record the following in your food log as soon as possible:  · What you ate. Be sure to include toppings, sauces, and other extras on the food.  · How much you ate. This can be measured in cups, ounces, or number of items.  · How many calories were in each food and drink.  · The total number of calories in the food you ate.  Keep your food log near you, such as in a pocket-sized notebook or on an shad or website on your mobile phone. Some programs will  calculate calories for you and show you how many calories you have left to meet your daily goal.  What are some portion-control tips?  · Know how many calories are in a serving. This will help you know how many servings you can have of a certain food.  · Use a measuring cup to measure serving sizes. You could also try weighing out portions on a kitchen scale. With time, you will be able to estimate serving sizes for some foods.  · Take time to put servings of different foods on your favorite plates or in your favorite bowls and cups so you know what a serving looks like.  · Try not to eat straight from a food's packaging, such as from a bag or box. Eating straight from the package makes it hard to see how much you are eating and can lead to overeating. Put the amount you would like to eat in a cup or on a plate to make sure you are eating the right portion.  · Use smaller plates, glasses, and bowls for smaller portions and to prevent overeating.  · Try not to multitask. For example, avoid watching TV or using your computer while eating. If it is time to eat, sit down at a table and enjoy your food. This will help you recognize when you are full. It will also help you be more mindful of what and how much you are eating.  What are tips for following this plan?  Reading food labels  · Check the calorie count compared with the serving size. The serving size may be smaller than what you are used to eating.  · Check the source of the calories. Try to choose foods that are high in protein, fiber, and vitamins, and low in saturated fat, trans fat, and sodium.  Shopping  · Read nutrition labels while you shop. This will help you make healthy decisions about which foods to buy.  · Pay attention to nutrition labels for low-fat or fat-free foods. These foods sometimes have the same number of calories or more calories than the full-fat versions. They also often have added sugar, starch, or salt to make up for flavor that was  removed with the fat.  · Make a grocery list of lower-calorie foods and stick to it.  Cooking  · Try to cook your favorite foods in a healthier way. For example, try baking instead of frying.  · Use low-fat dairy products.  Meal planning  · Use more fruits and vegetables. One-half of your plate should be fruits and vegetables.  · Include lean proteins, such as chicken, turkey, and fish.  Lifestyle  Each week, aim to do one of the following:  · 150 minutes of moderate exercise, such as walking.  · 75 minutes of vigorous exercise, such as running.  General information  · Know how many calories are in the foods you eat most often. This will help you calculate calorie counts faster.  · Find a way of tracking calories that works for you. Get creative. Try different apps or programs if writing down calories does not work for you.  What foods should I eat?    · Eat nutritious foods. It is better to have a nutritious, high-calorie food, such as an avocado, than a food with few nutrients, such as a bag of potato chips.  · Use your calories on foods and drinks that will fill you up and will not leave you hungry soon after eating.  ? Examples of foods that fill you up are nuts and nut butters, vegetables, lean proteins, and high-fiber foods such as whole grains. High-fiber foods are foods with more than 5 g of fiber per serving.  · Pay attention to calories in drinks. Low-calorie drinks include water and unsweetened drinks.  The items listed above may not be a complete list of foods and beverages you can eat. Contact a dietitian for more information.  What foods should I limit?  Limit foods or drinks that are not good sources of vitamins, minerals, or protein or that are high in unhealthy fats. These include:  · Candy.  · Other sweets.  · Sodas, specialty coffee drinks, alcohol, and juice.  The items listed above may not be a complete list of foods and beverages you should avoid. Contact a dietitian for more information.  How  do I count calories when eating out?  · Pay attention to portions. Often, portions are much larger when eating out. Try these tips to keep portions smaller:  ? Consider sharing a meal instead of getting your own.  ? If you get your own meal, eat only half of it. Before you start eating, ask for a container and put half of your meal into it.  ? When available, consider ordering smaller portions from the menu instead of full portions.  · Pay attention to your food and drink choices. Knowing the way food is cooked and what is included with the meal can help you eat fewer calories.  ? If calories are listed on the menu, choose the lower-calorie options.  ? Choose dishes that include vegetables, fruits, whole grains, low-fat dairy products, and lean proteins.  ? Choose items that are boiled, broiled, grilled, or steamed. Avoid items that are buttered, battered, fried, or served with cream sauce. Items labeled as crispy are usually fried, unless stated otherwise.  ? Choose water, low-fat milk, unsweetened iced tea, or other drinks without added sugar. If you want an alcoholic beverage, choose a lower-calorie option, such as a glass of wine or light beer.  ? Ask for dressings, sauces, and syrups on the side. These are usually high in calories, so you should limit the amount you eat.  ? If you want a salad, choose a garden salad and ask for grilled meats. Avoid extra toppings such as sullivan, cheese, or fried items. Ask for the dressing on the side, or ask for olive oil and vinegar or lemon to use as dressing.  · Estimate how many servings of a food you are given. Knowing serving sizes will help you be aware of how much food you are eating at restaurants.  Where to find more information  · Centers for Disease Control and Prevention: www.cdc.gov  · [place] Department of Agriculture: myplate.gov  Summary  · Calorie counting means keeping track of how many calories you eat and drink each day. If you eat fewer calories than your  body needs, you should lose weight.  · A healthy amount of weight to lose per week is usually 1-2 lb (0.5-0.9 kg). This usually means reducing your daily calorie intake by 500-750 calories.  · The number of calories in a food can be found on a Nutrition Facts label. If a food does not have a Nutrition Facts label, try to look up the calories online or ask your dietitian for help.  · Use smaller plates, glasses, and bowls for smaller portions and to prevent overeating.  · Use your calories on foods and drinks that will fill you up and not leave you hungry shortly after a meal.  This information is not intended to replace advice given to you by your health care provider. Make sure you discuss any questions you have with your health care provider.  Document Revised: 01/28/2021 Document Reviewed: 01/28/2021  [employer] Patient Education © 2021 [employer]

## 2021-09-15 NOTE — PROGRESS NOTES
You have chosen to receive care through a telephone visit. Do you consent to use a telephone visit for your medical care today? Yes    The ABCs of the Annual Wellness Visit  Subsequent Medicare Wellness Visit    Chief Complaint   Patient presents with   • Medicare Wellness-subsequent      Subjective    History of Present Illness:  Lois Parmar is a 71 y.o. female who presents for a Subsequent Medicare Wellness Visit.    The following portions of the patient's history were reviewed and   updated as appropriate:   She  has a past medical history of Acquired hypothyroidism - On Synthroid, Allergic rhinitis, Allergy to alpha-gal (7/17/2020), Anemia, Asthma - mild intermittent, Cardiac disease, Carpal tunnel syndrome - Bilateral, Colitis, Degeneration of cervical intervertebral disc, Diabetes (CMS/[facility]), Disorder of lumbar spine, Diverticulosis of large intestine (1/17/2019), Essential hypertension, Essential hypertension, Fatigue, History of myocardial infarct at age greater than 60 years (10/17/2018), Hyperlipidemia - Improved with Zocor, Hyperlipidemia associated with type 2 diabetes mellitus (CMS/[facility]) (7/28/2021), Hypertriglyceridemia (1/18/2021), IFG (impaired fasting glucose) (10/17/2018), Impaired fasting glycaemia, Iron deficiency anemia - Improved, Lumbar radiculopathy, Megaloblastic anemia due to vitamin B>12< deficiency, Moderate persistent asthma without complication (4/9/2021), Morbid obesity (CMS/[facility]), Osteoarthritis of knee - Bilateral, Osteoarthritis of multiple joints - Left knee, Osteoporosis, Pain in left hip, Sleep apnea - On CPAP, Spasm of back muscles, Stage 3 chronic kidney disease (CMS/[facility]) (10/23/2019), Stage 3a chronic kidney disease (CMS/[facility]) (10/23/2019), Thyroid dysfunction, Tubular adenoma of colon - removed during colonoscopy, 11/2018. (12/3/2018), Type 2 diabetes mellitus with stage 3a chronic kidney disease, without long-term current use of insulin (CMS/[facility]) (4/9/2021), Type 2 diabetes  mellitus without complication, without long-term current use of insulin (CMS/[facility]) (4/9/2021), and Vitamin D deficiency.  She does not have any pertinent problems on file.  She  has a past surgical history that includes endoscopy and colonoscopy (09/17/2012); Lumbar laminectomy (2011); Esophagogastroduodenoscopy (09/17/2012); Hysterectomy (1980); Shoulder surgery (12/07/2015); Shoulder surgery (Right); Carpal tunnel release (Right); Neck surgery; Joint replacement; Total knee arthroplasty (Left); Colonoscopy (N/A, 11/30/2018); Esophagogastroduodenoscopy (N/A, 11/30/2018); and Oophorectomy (1980).  Her family history includes Breast cancer in her mother, sister, and another family member; Cancer in an other family member; Diabetes in an other family member; Heart disease in an other family member; Hypertension in an other family member; Lung disease in an other family member; Ovarian cancer in her mother.  She  reports that she has never smoked. She has never used smokeless tobacco. She reports that she does not drink alcohol and does not use drugs.  Current Outpatient Medications   Medication Sig Dispense Refill   • albuterol sulfate  (90 Base) MCG/ACT inhaler Inhale 2 puffs Every 4 (Four) Hours As Needed for Wheezing. 18 g 11   • amLODIPine (NORVASC) 5 MG tablet Take 5 mg by mouth 2 (two) times a day.     • ascorbic acid (VITAMIN C) 1000 MG tablet Take 1,000 mg by mouth Daily.     • aspirin 81 MG chewable tablet Chew 81 mg Daily.     • Breo Ellipta 100-25 MCG/INH inhaler INHALE 1 PUFF BY MOUTH DAILY **RINSE MOUTH AFTER EACH USE**     • Cholecalciferol 125 MCG (5000 UT) tablet Take 5,000 Units by mouth Daily.     • cyanocobalamin (VITAMIN B-12) 1000 MCG tablet Take 1 tablet by mouth Daily. 90 tablet 1   • Cymbalta 60 MG capsule TAKE 1 CAPSULE EVERY DAY 90 capsule 3   • diphenhydrAMINE (BENADRYL) 25 mg capsule Take 50 mg by mouth.     • docusate sodium (COLACE) 100 MG capsule Take 100 mg by mouth 2 (Two) Times  a Day As Needed.     • EPINEPHrine (EPIPEN) 0.3 MG/0.3ML solution auto-injector injection Inject 0.3 mg into the appropriate muscle as directed by prescriber.     • famotidine (PEPCID) 40 MG tablet Take 1 tablet by mouth Every Night. This replaces Zantac 90 tablet 3   • folic acid (FOLVITE) 1 MG tablet TAKE 1 TABLET EVERY DAY (Patient taking differently: Take 1 mg by mouth 3 (Three) Times a Week.) 90 tablet 1   • glucose blood test strip tests BID, Diagnosis: 250.00, 401.9     • indapamide (LOZOL) 2.5 MG tablet Take 2.5 mg by mouth Daily.     • levothyroxine (SYNTHROID, LEVOTHROID) 88 MCG tablet Take 1 tablet by mouth Daily. 90 tablet 3   • loratadine (CLARITIN) 10 MG tablet Take 10 mg by mouth daily.     • metFORMIN (GLUCOPHAGE) 500 MG tablet Take 1 tablet by mouth 2 (Two) Times a Day With Meals. 180 tablet 3   • olmesartan (BENICAR) 40 MG tablet Take 40 mg by mouth Daily.     • Prevacid 30 MG capsule TAKE 1 CAPSULE EVERY MORNING 90 capsule 3   • simvastatin (ZOCOR) 40 MG tablet Take 1 tablet by mouth Every Night. 90 tablet 3   • spironolactone (ALDACTONE) 25 MG tablet TAKE 1 TABLET EVERY DAY  FOR  FLUID 90 tablet 0     No current facility-administered medications for this visit.     Current Outpatient Medications on File Prior to Visit   Medication Sig   • albuterol sulfate  (90 Base) MCG/ACT inhaler Inhale 2 puffs Every 4 (Four) Hours As Needed for Wheezing.   • amLODIPine (NORVASC) 5 MG tablet Take 5 mg by mouth 2 (two) times a day.   • ascorbic acid (VITAMIN C) 1000 MG tablet Take 1,000 mg by mouth Daily.   • aspirin 81 MG chewable tablet Chew 81 mg Daily.   • Breo Ellipta 100-25 MCG/INH inhaler INHALE 1 PUFF BY MOUTH DAILY **RINSE MOUTH AFTER EACH USE**   • Cholecalciferol 125 MCG (5000 UT) tablet Take 5,000 Units by mouth Daily.   • cyanocobalamin (VITAMIN B-12) 1000 MCG tablet Take 1 tablet by mouth Daily.   • Cymbalta 60 MG capsule TAKE 1 CAPSULE EVERY DAY   • diphenhydrAMINE (BENADRYL) 25 mg capsule  Take 50 mg by mouth.   • docusate sodium (COLACE) 100 MG capsule Take 100 mg by mouth 2 (Two) Times a Day As Needed.   • EPINEPHrine (EPIPEN) 0.3 MG/0.3ML solution auto-injector injection Inject 0.3 mg into the appropriate muscle as directed by prescriber.   • famotidine (PEPCID) 40 MG tablet Take 1 tablet by mouth Every Night. This replaces Zantac   • folic acid (FOLVITE) 1 MG tablet TAKE 1 TABLET EVERY DAY (Patient taking differently: Take 1 mg by mouth 3 (Three) Times a Week.)   • glucose blood test strip tests BID, Diagnosis: 250.00, 401.9   • indapamide (LOZOL) 2.5 MG tablet Take 2.5 mg by mouth Daily.   • levothyroxine (SYNTHROID, LEVOTHROID) 88 MCG tablet Take 1 tablet by mouth Daily.   • loratadine (CLARITIN) 10 MG tablet Take 10 mg by mouth daily.   • metFORMIN (GLUCOPHAGE) 500 MG tablet Take 1 tablet by mouth 2 (Two) Times a Day With Meals.   • olmesartan (BENICAR) 40 MG tablet Take 40 mg by mouth Daily.   • Prevacid 30 MG capsule TAKE 1 CAPSULE EVERY MORNING   • simvastatin (ZOCOR) 40 MG tablet Take 1 tablet by mouth Every Night.   • spironolactone (ALDACTONE) 25 MG tablet TAKE 1 TABLET EVERY DAY  FOR  FLUID   • [DISCONTINUED] ascorbic acid (VITAMIN C) 250 MG tablet Take 250 mg by mouth Daily.     No current facility-administered medications on file prior to visit.     She is allergic to beef allergy; dairy aid  [lactase]; galactose; lortab [hydrocodone-acetaminophen]; milk  [lac bovis]; pork-derived products; codeine; antihistamines, chlorpheniramine-type; latex; other; and tape..    Compared to one year ago, the patient feels her physical   health is better.    Compared to one year ago, the patient feels her mental   health is better.    Recent Hospitalizations:  She was not admitted to the hospital during the last year.       Current Medical Providers:  Patient Care Team:  Marshal Warner MD as PCP - General CrandallNoé, ODILIA Jimenez as Gynecologist (Nurse Practitioner)  Ranjeet De La Torre MD as  Consulting Physician (Hematology and Oncology)  Moon Delarosa APRN as Nurse Practitioner (Oncology)    Outpatient Medications Prior to Visit   Medication Sig Dispense Refill   • albuterol sulfate  (90 Base) MCG/ACT inhaler Inhale 2 puffs Every 4 (Four) Hours As Needed for Wheezing. 18 g 11   • amLODIPine (NORVASC) 5 MG tablet Take 5 mg by mouth 2 (two) times a day.     • ascorbic acid (VITAMIN C) 1000 MG tablet Take 1,000 mg by mouth Daily.     • aspirin 81 MG chewable tablet Chew 81 mg Daily.     • Breo Ellipta 100-25 MCG/INH inhaler INHALE 1 PUFF BY MOUTH DAILY **RINSE MOUTH AFTER EACH USE**     • Cholecalciferol 125 MCG (5000 UT) tablet Take 5,000 Units by mouth Daily.     • cyanocobalamin (VITAMIN B-12) 1000 MCG tablet Take 1 tablet by mouth Daily. 90 tablet 1   • Cymbalta 60 MG capsule TAKE 1 CAPSULE EVERY DAY 90 capsule 3   • diphenhydrAMINE (BENADRYL) 25 mg capsule Take 50 mg by mouth.     • docusate sodium (COLACE) 100 MG capsule Take 100 mg by mouth 2 (Two) Times a Day As Needed.     • EPINEPHrine (EPIPEN) 0.3 MG/0.3ML solution auto-injector injection Inject 0.3 mg into the appropriate muscle as directed by prescriber.     • famotidine (PEPCID) 40 MG tablet Take 1 tablet by mouth Every Night. This replaces Zantac 90 tablet 3   • folic acid (FOLVITE) 1 MG tablet TAKE 1 TABLET EVERY DAY (Patient taking differently: Take 1 mg by mouth 3 (Three) Times a Week.) 90 tablet 1   • glucose blood test strip tests BID, Diagnosis: 250.00, 401.9     • indapamide (LOZOL) 2.5 MG tablet Take 2.5 mg by mouth Daily.     • levothyroxine (SYNTHROID, LEVOTHROID) 88 MCG tablet Take 1 tablet by mouth Daily. 90 tablet 3   • loratadine (CLARITIN) 10 MG tablet Take 10 mg by mouth daily.     • metFORMIN (GLUCOPHAGE) 500 MG tablet Take 1 tablet by mouth 2 (Two) Times a Day With Meals. 180 tablet 3   • olmesartan (BENICAR) 40 MG tablet Take 40 mg by mouth Daily.     • Prevacid 30 MG capsule TAKE 1 CAPSULE EVERY MORNING 90  capsule 3   • simvastatin (ZOCOR) 40 MG tablet Take 1 tablet by mouth Every Night. 90 tablet 3   • spironolactone (ALDACTONE) 25 MG tablet TAKE 1 TABLET EVERY DAY  FOR  FLUID 90 tablet 0   • ascorbic acid (VITAMIN C) 250 MG tablet Take 250 mg by mouth Daily.       No facility-administered medications prior to visit.       No opioid medication identified on active medication list. I have reviewed chart for other potential  high risk medication/s and harmful drug interactions in the elderly.          Aspirin is on active medication list. Aspirin use is indicated based on review of current medical condition/s. Pros and cons of this therapy have been discussed today. Benefits of this medication outweigh potential harm.  Patient has been encouraged to continue taking this medication.  .      Patient Active Problem List   Diagnosis   • Vitamin D deficiency   • Spasm of back muscles   • Sleep apnea - On CPAP   • Pain in left hip   • Osteoarthritis   • Osteoarthritis of knee - Bilateral   • Morbid obesity (CMS/HCC)   • Megaloblastic anemia due to vitamin B>12< deficiency   • Lumbar radiculopathy   • Iron deficiency anemia - Improved   • Fatigue   • Essential hypertension   • Degeneration of cervical intervertebral disc   • Disorder of lumbar spine   • Carpal tunnel syndrome - Bilateral   • Asthma - mild intermittent   • Allergic rhinitis   • Acquired hypothyroidism   • Trochanteric bursitis of left hip   • Hip pain, acute, right   • Hip pain, chronic, left   • Chronic pain of both knees   • Acute pain of both shoulders   • Presence of total knee joint prosthesis, left   • History of myocardial infarct at age greater than 60 years   • Heme positive stool   • Anemia   • Tubular adenoma of colon - removed during colonoscopy, 11/2018.   • Hx of adenomatous colonic polyps   • Presence of total left knee joint prosthesis   • Chronic bilateral low back pain with bilateral sciatica   • Degenerative disc disease, lumbar   •  "Diverticulosis of large intestine   • Adverse effect of iron   • Iron deficiency anemia due to chronic blood loss   • Iron adverse reaction   • Cardiomyopathy (CMS/[facility]) -  [place]   • Fibromyalgia   • Lumbar spinal stenosis   • Acute kidney injury superimposed on CKD (CMS/HCC)   • Stage 3a chronic kidney disease (CMS/HCC)   • Family history of breast cancer in female   • Allergy to alpha-gal   • Hypertriglyceridemia   • Allergy, unspecified, initial encounter   • Diarrhea   • Type 2 diabetes mellitus with stage 3a chronic kidney disease, without long-term current use of insulin (CMS/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)     Advance Care Planning  Advance Directive is not on file.  ACP discussion was held with the patient during this visit. Patient does not have an advance directive, information provided.          Objective    Vitals:    09/15/21 1622   Weight: 117 kg (258 lb)   Height: 154.9 cm (61\")   PainSc:   2   PainLoc: Generalized     BMI Readings from Last 1 Encounters:   09/15/21 48.75 kg/m²   BMI is above normal parameters. Recommendations include: exercise counseling and nutrition counseling      No blood pressure obtained today with this telephone visit due to coronavirus pandemic.    Does the patient have evidence of cognitive impairment? No    Physical Exam  Lab Results   Component Value Date    TRIG 301 (H) 07/21/2021     (H) 07/21/2021    HGBA1C 6.12 (H) 07/21/2021            HEALTH RISK ASSESSMENT    Smoking Status:  Social History     Tobacco Use   Smoking Status Never Smoker   Smokeless Tobacco Never Used     Alcohol Consumption:  Social History     Substance and Sexual Activity "   Alcohol Use No     Fall Risk Screen:    IRINEOADI Fall Risk Assessment was completed, and patient is at LOW risk for falls.Assessment completed on:9/15/2021    Depression Screening:  PHQ-2/PHQ-9 Depression Screening 9/15/2021   Little interest or pleasure in doing things 0   Feeling down, depressed, or hopeless 0   Trouble falling or staying asleep, or sleeping too much -   Feeling tired or having little energy -   Poor appetite or overeating -   Feeling bad about yourself - or that you are a failure or have let yourself or your family down -   Trouble concentrating on things, such as reading the newspaper or watching television -   Moving or speaking so slowly that other people could have noticed. Or the opposite - being so fidgety or restless that you have been moving around a lot more than usual -   Thoughts that you would be better off dead, or of hurting yourself in some way -   Total Score 0   If you checked off any problems, how difficult have these problems made it for you to do your work, take care of things at home, or get along with other people? -       Health Habits and Functional and Cognitive Screening:  Functional & Cognitive Status 9/15/2021   Do you have difficulty preparing food and eating? No   Do you have difficulty bathing yourself, getting dressed or grooming yourself? No   Do you have difficulty using the toilet? No   Do you have difficulty moving around from place to place? No   Do you have trouble with steps or getting out of a bed or a chair? No   Current Diet Limited Junk Food   Dental Exam Up to date   Eye Exam Up to date   Exercise (times per week) 4 times per week   Current Exercises Include House Cleaning   Current Exercise Activities Include -   Do you need help using the phone?  No   Are you deaf or do you have serious difficulty hearing?  Yes   Do you need help with transportation? No   Do you need help shopping? No   Do you need help preparing meals?  No   Do you need help with  housework?  No   Do you need help with laundry? No   Do you need help taking your medications? No   Do you need help managing money? No   Do you ever drive or ride in a car without wearing a seat belt? No   Have you felt unusual stress, anger or loneliness in the last month? No   Who do you live with? Spouse   If you need help, do you have trouble finding someone available to you? No   Have you been bothered in the last four weeks by sexual problems? No   Do you have difficulty concentrating, remembering or making decisions? No       Age-appropriate Screening Schedule:  Refer to the list below for future screening recommendations based on patient's age, sex and/or medical conditions. Orders for these recommended tests are listed in the plan section. The patient has been provided with a written plan.    Health Maintenance   Topic Date Due   • URINE MICROALBUMIN  Never done   • DIABETIC FOOT EXAM  Never done   • DIABETIC EYE EXAM  08/10/2021   • DXA SCAN  05/16/2022 (Originally 4/18/2020)   • INFLUENZA VACCINE  10/01/2021   • HEMOGLOBIN A1C  01/21/2022   • LIPID PANEL  07/21/2022   • MAMMOGRAM  04/13/2023   • TDAP/TD VACCINES (3 - Td or Tdap) 08/17/2030   • ZOSTER VACCINE  Completed              Assessment/Plan   CMS Preventative Services Quick Reference  Risk Factors Identified During Encounter  Chronic Pain   Glaucoma or Family History of Glaucoma  Obesity/Overweight   The above risks/problems have been discussed with the patient.  She reports receiving diabetic eye exam from Dr. Herrmann, 8/2021.  We will get a copy of that report.  Follow up actions/plans if indicated are seen below in the Assessment/Plan Section.  Pertinent information has been shared with the patient in the After Visit Summary.    Diagnoses and all orders for this visit:    1. Medicare annual wellness visit, subsequent (Primary)    2. Morbid obesity (CMS/[facility])        Follow Up:   Return in about 1 year (around 9/15/2022).     An After Visit  Summary and PPPS were made available to the patient.

## 2021-10-14 RX ORDER — FOLIC ACID 1 MG/1
1 TABLET ORAL 3 TIMES WEEKLY
Qty: 36 TABLET | Refills: 1 | Status: SHIPPED | OUTPATIENT
Start: 2021-10-15 | End: 2023-03-23 | Stop reason: SDUPTHER

## 2021-10-14 NOTE — TELEPHONE ENCOUNTER
Rx Refill Note  Requested Prescriptions     Pending Prescriptions Disp Refills   • folic acid (FOLVITE) 1 MG tablet [Pharmacy Med Name: FOLIC ACID 1 MG Tablet] 90 tablet 1     Sig: TAKE 1 TABLET EVERY DAY      Last office visit with prescribing clinician: 8/19/2021      Next office visit with prescribing clinician: 12/2/2021            Bren Vogel RN  10/14/21, 14:38 CDT c

## 2021-10-15 ENCOUNTER — CLINICAL SUPPORT (OUTPATIENT)
Dept: FAMILY MEDICINE CLINIC | Facility: CLINIC | Age: 71
End: 2021-10-15

## 2021-10-15 DIAGNOSIS — Z23 NEED FOR IMMUNIZATION AGAINST INFLUENZA: Primary | ICD-10-CM

## 2021-10-15 PROCEDURE — 90662 IIV NO PRSV INCREASED AG IM: CPT | Performed by: INTERNAL MEDICINE

## 2021-10-15 PROCEDURE — G0008 ADMIN INFLUENZA VIRUS VAC: HCPCS | Performed by: INTERNAL MEDICINE

## 2021-10-18 RX ORDER — SIMVASTATIN 40 MG
TABLET ORAL
Qty: 90 TABLET | Refills: 3 | Status: SHIPPED | OUTPATIENT
Start: 2021-10-18 | End: 2022-07-28

## 2021-10-18 RX ORDER — SPIRONOLACTONE 25 MG/1
TABLET ORAL
Qty: 90 TABLET | Refills: 0 | Status: SHIPPED | OUTPATIENT
Start: 2021-10-18 | End: 2022-01-17

## 2021-11-30 ENCOUNTER — LAB (OUTPATIENT)
Dept: LAB | Facility: OTHER | Age: 71
End: 2021-11-30

## 2021-11-30 DIAGNOSIS — D50.0 IRON DEFICIENCY ANEMIA DUE TO CHRONIC BLOOD LOSS: ICD-10-CM

## 2021-11-30 DIAGNOSIS — T45.4X5A ADVERSE EFFECT OF IRON, INITIAL ENCOUNTER: ICD-10-CM

## 2021-11-30 LAB
ALBUMIN SERPL-MCNC: 4.9 G/DL (ref 3.5–5)
ALBUMIN/GLOB SERPL: 1.4 G/DL (ref 1.1–1.8)
ALP SERPL-CCNC: 51 U/L (ref 38–126)
ALT SERPL W P-5'-P-CCNC: 21 U/L
ANION GAP SERPL CALCULATED.3IONS-SCNC: 10 MMOL/L (ref 5–15)
AST SERPL-CCNC: 29 U/L (ref 14–36)
BASOPHILS # BLD AUTO: 0.05 10*3/MM3 (ref 0–0.2)
BASOPHILS NFR BLD AUTO: 0.7 % (ref 0–1.5)
BILIRUB SERPL-MCNC: 0.5 MG/DL (ref 0.2–1.3)
BUN SERPL-MCNC: 25 MG/DL (ref 7–23)
BUN/CREAT SERPL: 20 (ref 7–25)
CALCIUM SPEC-SCNC: 10.2 MG/DL (ref 8.4–10.2)
CHLORIDE SERPL-SCNC: 101 MMOL/L (ref 101–112)
CO2 SERPL-SCNC: 27 MMOL/L (ref 22–30)
CREAT SERPL-MCNC: 1.25 MG/DL (ref 0.52–1.04)
DEPRECATED RDW RBC AUTO: 45.6 FL (ref 37–54)
EOSINOPHIL # BLD AUTO: 0.22 10*3/MM3 (ref 0–0.4)
EOSINOPHIL NFR BLD AUTO: 2.9 % (ref 0.3–6.2)
ERYTHROCYTE [DISTWIDTH] IN BLOOD BY AUTOMATED COUNT: 13.6 % (ref 12.3–15.4)
FERRITIN SERPL-MCNC: 106.3 NG/ML (ref 13–150)
GFR SERPL CREATININE-BSD FRML MDRD: 42 ML/MIN/1.73 (ref 39–90)
GLOBULIN UR ELPH-MCNC: 3.5 GM/DL (ref 2.3–3.5)
GLUCOSE SERPL-MCNC: 112 MG/DL (ref 70–99)
HCT VFR BLD AUTO: 36.2 % (ref 34–46.6)
HGB BLD-MCNC: 11.8 G/DL (ref 12–15.9)
IRON 24H UR-MRATE: 126 MCG/DL (ref 37–145)
IRON SATN MFR SERPL: 27 % (ref 20–50)
LYMPHOCYTES # BLD AUTO: 1.69 10*3/MM3 (ref 0.7–3.1)
LYMPHOCYTES NFR BLD AUTO: 22.1 % (ref 19.6–45.3)
MCH RBC QN AUTO: 31.4 PG (ref 26.6–33)
MCHC RBC AUTO-ENTMCNC: 32.6 G/DL (ref 31.5–35.7)
MCV RBC AUTO: 96.3 FL (ref 79–97)
MONOCYTES # BLD AUTO: 0.62 10*3/MM3 (ref 0.1–0.9)
MONOCYTES NFR BLD AUTO: 8.1 % (ref 5–12)
NEUTROPHILS NFR BLD AUTO: 5.06 10*3/MM3 (ref 1.7–7)
NEUTROPHILS NFR BLD AUTO: 66.2 % (ref 42.7–76)
PLATELET # BLD AUTO: 307 10*3/MM3 (ref 140–450)
PMV BLD AUTO: 9.1 FL (ref 6–12)
POTASSIUM SERPL-SCNC: 4.3 MMOL/L (ref 3.4–5)
PROT SERPL-MCNC: 8.4 G/DL (ref 6.3–8.6)
RBC # BLD AUTO: 3.76 10*6/MM3 (ref 3.77–5.28)
SODIUM SERPL-SCNC: 138 MMOL/L (ref 137–145)
TIBC SERPL-MCNC: 462 MCG/DL (ref 298–536)
TRANSFERRIN SERPL-MCNC: 310 MG/DL (ref 200–360)
WBC NRBC COR # BLD: 7.64 10*3/MM3 (ref 3.4–10.8)

## 2021-11-30 PROCEDURE — 85025 COMPLETE CBC W/AUTO DIFF WBC: CPT | Performed by: INTERNAL MEDICINE

## 2021-11-30 PROCEDURE — 82728 ASSAY OF FERRITIN: CPT | Performed by: INTERNAL MEDICINE

## 2021-11-30 PROCEDURE — 80053 COMPREHEN METABOLIC PANEL: CPT | Performed by: INTERNAL MEDICINE

## 2021-11-30 PROCEDURE — 83540 ASSAY OF IRON: CPT | Performed by: INTERNAL MEDICINE

## 2021-11-30 PROCEDURE — 82746 ASSAY OF FOLIC ACID SERUM: CPT | Performed by: INTERNAL MEDICINE

## 2021-11-30 PROCEDURE — 84466 ASSAY OF TRANSFERRIN: CPT | Performed by: INTERNAL MEDICINE

## 2021-11-30 PROCEDURE — 82607 VITAMIN B-12: CPT | Performed by: INTERNAL MEDICINE

## 2021-12-01 LAB
FOLATE SERPL-MCNC: >20 NG/ML (ref 4.78–24.2)
VIT B12 BLD-MCNC: 905 PG/ML (ref 211–946)

## 2021-12-02 ENCOUNTER — OFFICE VISIT (OUTPATIENT)
Dept: ONCOLOGY | Facility: CLINIC | Age: 71
End: 2021-12-02

## 2021-12-02 VITALS
SYSTOLIC BLOOD PRESSURE: 167 MMHG | DIASTOLIC BLOOD PRESSURE: 77 MMHG | TEMPERATURE: 98.2 F | OXYGEN SATURATION: 94 % | BODY MASS INDEX: 48.67 KG/M2 | HEART RATE: 92 BPM | WEIGHT: 257.6 LBS

## 2021-12-02 DIAGNOSIS — T45.4X5A ADVERSE EFFECT OF IRON, INITIAL ENCOUNTER: Chronic | ICD-10-CM

## 2021-12-02 DIAGNOSIS — D53.1 MEGALOBLASTIC ANEMIA: Chronic | ICD-10-CM

## 2021-12-02 DIAGNOSIS — D50.8 OTHER IRON DEFICIENCY ANEMIA: Primary | Chronic | ICD-10-CM

## 2021-12-02 DIAGNOSIS — Z80.3 FAMILY HISTORY OF BREAST CANCER IN FEMALE: Chronic | ICD-10-CM

## 2021-12-02 PROCEDURE — 1123F ACP DISCUSS/DSCN MKR DOCD: CPT | Performed by: INTERNAL MEDICINE

## 2021-12-02 PROCEDURE — 1125F AMNT PAIN NOTED PAIN PRSNT: CPT | Performed by: INTERNAL MEDICINE

## 2021-12-02 PROCEDURE — G0463 HOSPITAL OUTPT CLINIC VISIT: HCPCS | Performed by: INTERNAL MEDICINE

## 2021-12-02 PROCEDURE — 99214 OFFICE O/P EST MOD 30 MIN: CPT | Performed by: INTERNAL MEDICINE

## 2021-12-02 NOTE — PROGRESS NOTES
DATE OF VISIT: 12/2/2021      REASON FOR VISIT: Anemia with iron deficiency, vitamin B12 deficiency, family history of breast cancer      HISTORY OF PRESENT ILLNESS:   71-year-old female with medical problems consisting of hypertension, dyslipidemia, diabetes mellitus, hypothyroidism, coronary artery disease has been following up with hematology clinic since January 24, 2019.  Due to adverse effect of iron malabsorption patient has received intravenous iron in the past.  Patient is here for follow-up appointment today to discuss recently done blood work.  Denies any recent worsening of fatigue.  Complains of arthritis pain.  Denies any new lymph node enlargement.              Past Medical History, Past Surgical History, Social History, Family History have been reviewed and are without significant changes except as mentioned.    Review of Systems   Constitutional: Positive for fatigue.   Musculoskeletal: Positive for arthralgias.   Skin:        Positive for vitiligo   Hematological: Negative for adenopathy.      A comprehensive 14 point review of systems was performed and was negative except as mentioned.    Medications:  The current medication list was reviewed in the EMR    ALLERGIES:    Allergies   Allergen Reactions   • Beef Allergy Anaphylaxis   • Dairy Aid  [Lactase] Anaphylaxis   • Galactose Anaphylaxis   • Lortab [Hydrocodone-Acetaminophen] Hallucinations   • Milk  [Lac Bovis] Anaphylaxis     D/t alpha gal   • Pork-Derived Products Anaphylaxis   • Codeine Palpitations     Heart race   • Antihistamines, Chlorpheniramine-Type Palpitations     Heart races   • Latex Rash   • Other Palpitations     Decongestants: Heart Race   • Tape Rash       Objective      Vitals:    12/02/21 1339   BP: 167/77   Pulse: 92   Temp: 98.2 °F (36.8 °C)   TempSrc: Temporal   SpO2: 94%   Weight: 117 kg (257 lb 9.6 oz)   PainSc:   4   PainLoc: Knee  Comment: left knee     Current Status 3/19/2021   ECOG score 0       Physical  Exam  Pulmonary:      Breath sounds: Normal breath sounds.   Neurological:      Mental Status: She is alert and oriented to person, place, and time.           RECENT LABS:  Glucose   Date Value Ref Range Status   11/30/2021 112 (H) 70 - 99 mg/dL Final     Sodium   Date Value Ref Range Status   11/30/2021 138 137 - 145 mmol/L Final     Potassium   Date Value Ref Range Status   11/30/2021 4.3 3.4 - 5.0 mmol/L Final     CO2   Date Value Ref Range Status   11/30/2021 27.0 22.0 - 30.0 mmol/L Final     Chloride   Date Value Ref Range Status   11/30/2021 101 101 - 112 mmol/L Final     Anion Gap   Date Value Ref Range Status   11/30/2021 10.0 5.0 - 15.0 mmol/L Final     Creatinine   Date Value Ref Range Status   11/30/2021 1.25 (H) 0.52 - 1.04 mg/dL Final     BUN   Date Value Ref Range Status   11/30/2021 25 (H) 7 - 23 mg/dL Final     BUN/Creatinine Ratio   Date Value Ref Range Status   11/30/2021 20.0 7.0 - 25.0 Final     Calcium   Date Value Ref Range Status   11/30/2021 10.2 8.4 - 10.2 mg/dL Final     eGFR Non  Amer   Date Value Ref Range Status   11/30/2021 42 39 - 90 mL/min/1.73 Final     Alkaline Phosphatase   Date Value Ref Range Status   11/30/2021 51 38 - 126 U/L Final     Total Protein   Date Value Ref Range Status   11/30/2021 8.4 6.3 - 8.6 g/dL Final     ALT (SGPT)   Date Value Ref Range Status   11/30/2021 21 <=35 U/L Final     AST (SGOT)   Date Value Ref Range Status   11/30/2021 29 14 - 36 U/L Final     Total Bilirubin   Date Value Ref Range Status   11/30/2021 0.5 0.2 - 1.3 mg/dL Final     Albumin   Date Value Ref Range Status   11/30/2021 4.90 3.50 - 5.00 g/dL Final     Globulin   Date Value Ref Range Status   11/30/2021 3.5 2.3 - 3.5 gm/dL Final     Lab Results   Component Value Date    WBC 7.64 11/30/2021    HGB 11.8 (L) 11/30/2021    HCT 36.2 11/30/2021    MCV 96.3 11/30/2021     11/30/2021     Lab Results   Component Value Date    NEUTROABS 5.06 11/30/2021    IRON 126 11/30/2021    IRON 93  08/16/2021    IRON 111 05/11/2021    TIBC 462 11/30/2021    TIBC 429 08/16/2021    TIBC 375 05/11/2021    LABIRON 27 11/30/2021    LABIRON 22 08/16/2021    LABIRON 30 05/11/2021    FERRITIN 106.30 11/30/2021    FERRITIN 120.10 08/16/2021    FERRITIN 130.00 07/21/2021    ETKOGSFA19 905 11/30/2021    QTKQKURB36 439 08/16/2021    RDYQVUPM22 496 07/21/2021    FOLATE >20.00 11/30/2021    FOLATE >20.00 08/16/2021    FOLATE >20.00 05/11/2021     No results found for: , LABCA2, AFPTM, HCGQUANT, , CHROMGRNA, [id number], CEA, REFLABREPO      PATHOLOGY:  * Cannot find OR log *         RADIOLOGY DATA :  No radiology results for the last 7 days        Assessment/Plan     1.  Iron deficiency anemia:  -EGD and colonoscopy done in November 2018 that showed no evidence of bleeding.  -Had a capsule endoscopy done in March 2021 that was negative for bleeding  -Due to inability to tolerate iron by mouth patient received 2 dose of Feraheme in March 2021  -Remains on B12 p.o. daily and folic acid 3 times a week.  -Anemia work-up done on November 30, 2021 shows hemoglobin is 11.8.  Iron studies are adequate, no need to start any iron replacement at present  -We will have patient return to clinic in 3 months with repeat CBC, iron studies, ferritin B12 and folate to be done prior to that    2.  Chronic kidney disease:  -Creatinine is elevated at 1.25.  Patient was notified about elevated creatinine and need to increase hydration    3.  Family history of breast cancer:  -Patient has 3 family members with breast cancer  -Genetic counseling done with [facility] hereditary cancer panel which includes BRCA1 and BRCA2 in March 2020 was negative.    4.  Health maintenance: Patient does not smoke    5. Advance Care Planning: For now patient remains full code and is able to make decisions.  Patient has health care surrogate mentioned on chart.                 PHQ-9 Total Score: 0   -Patient is not homicidal or suicidal.  No acute intervention  required.    Lois Parmar reports a pain score of 4.  Given her pain assessment as noted, treatment options were discussed and the following options were decided upon as a follow-up plan to address the patient's pain: continuation of current treatment plan for pain.         Ranjeet De La Torre MD  12/2/2021  14:05 CST        Part of this note may be an electronic transcription/translation of spoken language to printed text using the Dragon Dictation System.          CC:

## 2022-01-17 RX ORDER — LEVOTHYROXINE SODIUM 88 UG/1
TABLET ORAL
Qty: 90 TABLET | Refills: 3 | Status: SHIPPED | OUTPATIENT
Start: 2022-01-17 | End: 2023-01-11

## 2022-01-17 RX ORDER — SPIRONOLACTONE 25 MG/1
TABLET ORAL
Qty: 90 TABLET | Refills: 0 | Status: SHIPPED | OUTPATIENT
Start: 2022-01-17 | End: 2022-04-18

## 2022-01-26 ENCOUNTER — LAB (OUTPATIENT)
Dept: LAB | Facility: OTHER | Age: 72
End: 2022-01-26

## 2022-01-26 DIAGNOSIS — E11.69 HYPERLIPIDEMIA ASSOCIATED WITH TYPE 2 DIABETES MELLITUS: Chronic | ICD-10-CM

## 2022-01-26 DIAGNOSIS — E03.9 ACQUIRED HYPOTHYROIDISM: Chronic | ICD-10-CM

## 2022-01-26 DIAGNOSIS — D53.1 MEGALOBLASTIC ANEMIA: ICD-10-CM

## 2022-01-26 DIAGNOSIS — E78.5 HYPERLIPIDEMIA ASSOCIATED WITH TYPE 2 DIABETES MELLITUS: Chronic | ICD-10-CM

## 2022-01-26 DIAGNOSIS — E11.22 TYPE 2 DIABETES MELLITUS WITH STAGE 3A CHRONIC KIDNEY DISEASE, WITHOUT LONG-TERM CURRENT USE OF INSULIN: Chronic | ICD-10-CM

## 2022-01-26 DIAGNOSIS — I10 ESSENTIAL HYPERTENSION: Chronic | ICD-10-CM

## 2022-01-26 DIAGNOSIS — N18.31 TYPE 2 DIABETES MELLITUS WITH STAGE 3A CHRONIC KIDNEY DISEASE, WITHOUT LONG-TERM CURRENT USE OF INSULIN: Chronic | ICD-10-CM

## 2022-01-26 LAB
ALBUMIN SERPL-MCNC: 4.8 G/DL (ref 3.5–5)
ALBUMIN/GLOB SERPL: 1.3 G/DL (ref 1.1–1.8)
ALP SERPL-CCNC: 42 U/L (ref 38–126)
ALT SERPL W P-5'-P-CCNC: 23 U/L
ANION GAP SERPL CALCULATED.3IONS-SCNC: 13 MMOL/L (ref 5–15)
AST SERPL-CCNC: 29 U/L (ref 14–36)
BASOPHILS # BLD AUTO: 0.06 10*3/MM3 (ref 0–0.2)
BASOPHILS NFR BLD AUTO: 0.7 % (ref 0–1.5)
BILIRUB SERPL-MCNC: 0.4 MG/DL (ref 0.2–1.3)
BUN SERPL-MCNC: 28 MG/DL (ref 7–23)
BUN/CREAT SERPL: 24.6 (ref 7–25)
CALCIUM SPEC-SCNC: 10.2 MG/DL (ref 8.4–10.2)
CHLORIDE SERPL-SCNC: 101 MMOL/L (ref 101–112)
CHOLEST SERPL-MCNC: 173 MG/DL (ref 150–200)
CO2 SERPL-SCNC: 25 MMOL/L (ref 22–30)
CREAT SERPL-MCNC: 1.14 MG/DL (ref 0.52–1.04)
DEPRECATED RDW RBC AUTO: 46.4 FL (ref 37–54)
EOSINOPHIL # BLD AUTO: 0.27 10*3/MM3 (ref 0–0.4)
EOSINOPHIL NFR BLD AUTO: 3 % (ref 0.3–6.2)
ERYTHROCYTE [DISTWIDTH] IN BLOOD BY AUTOMATED COUNT: 13.9 % (ref 12.3–15.4)
GFR SERPL CREATININE-BSD FRML MDRD: 47 ML/MIN/1.73 (ref 39–90)
GLOBULIN UR ELPH-MCNC: 3.6 GM/DL (ref 2.3–3.5)
GLUCOSE SERPL-MCNC: 120 MG/DL (ref 70–99)
HCT VFR BLD AUTO: 36 % (ref 34–46.6)
HDLC SERPL-MCNC: 51 MG/DL (ref 40–59)
HGB BLD-MCNC: 11.8 G/DL (ref 12–15.9)
LDLC SERPL CALC-MCNC: 81 MG/DL
LDLC/HDLC SERPL: 1.4 {RATIO} (ref 0–3.22)
LYMPHOCYTES # BLD AUTO: 1.68 10*3/MM3 (ref 0.7–3.1)
LYMPHOCYTES NFR BLD AUTO: 18.6 % (ref 19.6–45.3)
MCH RBC QN AUTO: 31.5 PG (ref 26.6–33)
MCHC RBC AUTO-ENTMCNC: 32.8 G/DL (ref 31.5–35.7)
MCV RBC AUTO: 96 FL (ref 79–97)
MONOCYTES # BLD AUTO: 0.66 10*3/MM3 (ref 0.1–0.9)
MONOCYTES NFR BLD AUTO: 7.3 % (ref 5–12)
NEUTROPHILS NFR BLD AUTO: 6.38 10*3/MM3 (ref 1.7–7)
NEUTROPHILS NFR BLD AUTO: 70.4 % (ref 42.7–76)
PLATELET # BLD AUTO: 315 10*3/MM3 (ref 140–450)
PMV BLD AUTO: 9.1 FL (ref 6–12)
POTASSIUM SERPL-SCNC: 4.2 MMOL/L (ref 3.4–5)
PROT SERPL-MCNC: 8.4 G/DL (ref 6.3–8.6)
RBC # BLD AUTO: 3.75 10*6/MM3 (ref 3.77–5.28)
SODIUM SERPL-SCNC: 139 MMOL/L (ref 137–145)
TRIGL SERPL-MCNC: 252 MG/DL
VLDLC SERPL-MCNC: 41 MG/DL (ref 5–40)
WBC NRBC COR # BLD: 9.05 10*3/MM3 (ref 3.4–10.8)

## 2022-01-26 PROCEDURE — 80053 COMPREHEN METABOLIC PANEL: CPT | Performed by: INTERNAL MEDICINE

## 2022-01-26 PROCEDURE — 83036 HEMOGLOBIN GLYCOSYLATED A1C: CPT | Performed by: INTERNAL MEDICINE

## 2022-01-26 PROCEDURE — 82607 VITAMIN B-12: CPT | Performed by: INTERNAL MEDICINE

## 2022-01-26 PROCEDURE — 80061 LIPID PANEL: CPT | Performed by: INTERNAL MEDICINE

## 2022-01-26 PROCEDURE — 84443 ASSAY THYROID STIM HORMONE: CPT | Performed by: INTERNAL MEDICINE

## 2022-01-26 PROCEDURE — 84439 ASSAY OF FREE THYROXINE: CPT | Performed by: INTERNAL MEDICINE

## 2022-01-26 PROCEDURE — 36415 COLL VENOUS BLD VENIPUNCTURE: CPT | Performed by: INTERNAL MEDICINE

## 2022-01-26 PROCEDURE — 85025 COMPLETE CBC W/AUTO DIFF WBC: CPT | Performed by: INTERNAL MEDICINE

## 2022-01-27 LAB
HBA1C MFR BLD: 6.57 % (ref 4.8–5.6)
T4 FREE SERPL-MCNC: 1.17 NG/DL (ref 0.93–1.7)
TSH SERPL DL<=0.05 MIU/L-ACNC: 5.28 UIU/ML (ref 0.27–4.2)
VIT B12 BLD-MCNC: 947 PG/ML (ref 211–946)

## 2022-01-28 ENCOUNTER — LAB (OUTPATIENT)
Dept: LAB | Facility: OTHER | Age: 72
End: 2022-01-28

## 2022-01-28 DIAGNOSIS — E11.22 TYPE 2 DIABETES MELLITUS WITH STAGE 3A CHRONIC KIDNEY DISEASE, WITHOUT LONG-TERM CURRENT USE OF INSULIN: Chronic | ICD-10-CM

## 2022-01-28 DIAGNOSIS — N18.31 TYPE 2 DIABETES MELLITUS WITH STAGE 3A CHRONIC KIDNEY DISEASE, WITHOUT LONG-TERM CURRENT USE OF INSULIN: Chronic | ICD-10-CM

## 2022-01-28 PROCEDURE — 82570 ASSAY OF URINE CREATININE: CPT | Performed by: INTERNAL MEDICINE

## 2022-01-28 PROCEDURE — 82043 UR ALBUMIN QUANTITATIVE: CPT | Performed by: INTERNAL MEDICINE

## 2022-01-29 LAB
ALBUMIN UR-MCNC: 1.9 MG/DL
CREAT UR-MCNC: 98.4 MG/DL
MICROALBUMIN/CREAT UR: 19.3 MG/G

## 2022-02-08 ENCOUNTER — OFFICE VISIT (OUTPATIENT)
Dept: FAMILY MEDICINE CLINIC | Facility: CLINIC | Age: 72
End: 2022-02-08

## 2022-02-08 VITALS
DIASTOLIC BLOOD PRESSURE: 78 MMHG | TEMPERATURE: 98.3 F | BODY MASS INDEX: 48.75 KG/M2 | OXYGEN SATURATION: 98 % | SYSTOLIC BLOOD PRESSURE: 130 MMHG | WEIGHT: 258.2 LBS | HEART RATE: 78 BPM | HEIGHT: 61 IN

## 2022-02-08 DIAGNOSIS — M54.41 CHRONIC BILATERAL LOW BACK PAIN WITH BILATERAL SCIATICA: Chronic | ICD-10-CM

## 2022-02-08 DIAGNOSIS — I10 ESSENTIAL HYPERTENSION: Chronic | ICD-10-CM

## 2022-02-08 DIAGNOSIS — N18.31 TYPE 2 DIABETES MELLITUS WITH STAGE 3A CHRONIC KIDNEY DISEASE, WITHOUT LONG-TERM CURRENT USE OF INSULIN: Primary | Chronic | ICD-10-CM

## 2022-02-08 DIAGNOSIS — E66.01 MORBID OBESITY: Chronic | ICD-10-CM

## 2022-02-08 DIAGNOSIS — K21.9 GASTROESOPHAGEAL REFLUX DISEASE, UNSPECIFIED WHETHER ESOPHAGITIS PRESENT: Chronic | ICD-10-CM

## 2022-02-08 DIAGNOSIS — E78.5 HYPERLIPIDEMIA ASSOCIATED WITH TYPE 2 DIABETES MELLITUS: Chronic | ICD-10-CM

## 2022-02-08 DIAGNOSIS — D53.1 MEGALOBLASTIC ANEMIA: Chronic | ICD-10-CM

## 2022-02-08 DIAGNOSIS — E11.22 TYPE 2 DIABETES MELLITUS WITH STAGE 3A CHRONIC KIDNEY DISEASE, WITHOUT LONG-TERM CURRENT USE OF INSULIN: Primary | Chronic | ICD-10-CM

## 2022-02-08 DIAGNOSIS — E78.1 HYPERTRIGLYCERIDEMIA: Chronic | ICD-10-CM

## 2022-02-08 DIAGNOSIS — M48.061 SPINAL STENOSIS OF LUMBAR REGION, UNSPECIFIED WHETHER NEUROGENIC CLAUDICATION PRESENT: Chronic | ICD-10-CM

## 2022-02-08 DIAGNOSIS — G89.29 CHRONIC BILATERAL LOW BACK PAIN WITH BILATERAL SCIATICA: Chronic | ICD-10-CM

## 2022-02-08 DIAGNOSIS — D12.6 TUBULAR ADENOMA OF COLON: Chronic | ICD-10-CM

## 2022-02-08 DIAGNOSIS — E11.69 HYPERLIPIDEMIA ASSOCIATED WITH TYPE 2 DIABETES MELLITUS: Chronic | ICD-10-CM

## 2022-02-08 DIAGNOSIS — D50.9 IRON DEFICIENCY ANEMIA, UNSPECIFIED IRON DEFICIENCY ANEMIA TYPE: Chronic | ICD-10-CM

## 2022-02-08 DIAGNOSIS — M54.42 CHRONIC BILATERAL LOW BACK PAIN WITH BILATERAL SCIATICA: Chronic | ICD-10-CM

## 2022-02-08 DIAGNOSIS — L60.2 ONYCHOGRYPHOSIS: ICD-10-CM

## 2022-02-08 DIAGNOSIS — N18.31 STAGE 3A CHRONIC KIDNEY DISEASE: Chronic | ICD-10-CM

## 2022-02-08 PROCEDURE — 99214 OFFICE O/P EST MOD 30 MIN: CPT | Performed by: INTERNAL MEDICINE

## 2022-02-08 RX ORDER — EZETIMIBE 10 MG/1
1 TABLET ORAL DAILY
COMMUNITY
Start: 2022-01-18

## 2022-02-08 NOTE — PROGRESS NOTES
Chief Complaint  Follow-up (6 month)    Subjective          History of Present Illness     Lois Parmar presents to the clinic for 6-month follow up on chronic medical issues including hypothyroidism, hyperlipidemia, hypertriglyceridemia, impaired fasting glucose, osteoarthritis of the knees and hips, morbid obesity, iron deficiency anemia and vitamin B12 deficiency anemia, among other issues complicated by obesity.  Dr. De La Torre is following patient's iron deficiency and vitamin B12 deficiency. She continues to struggle with spinal stenosis, partially relieved with sitting.  Dr. Augustine is managing her chronic knee pain. I continue to recommend aggressive weight loss to help with her chronic joint and lumbar spinal stenosis.    Dr. Harmon follows her alpha gal syndrome.  She is now resumed pork products, but has cut back to help with cholesterol.  She continues to avoid beef.  With cutting back on pork consumption, her cholesterol are significantly improved at 81 down from 124.      Weight is stable.  Diabetes at goal with metformin 500 mg twice daily.  Patient reports onychogryphosis and is interested in diabetic foot care, for which we will refer to Dr. Xander Burch, podiatry     Six months ago, I increased oral B-12 to four days weekly.  Vitamin B-12 at goal.     Patient is due colonoscopy.  Colonoscopy by Dr. Sutton 11/30/2018 revealed 2 small 4 mm smaller adenomatous polyps removed from her ascending colon  Repeat recommended in three years.       She has missed doses of her Synthroid resulting in elevated TSH, although, Free T4 at goal.  She normally waits to take the Synthroid 30 minutes after eating, making it easy to forget the dose.  I recommended she take the Synthroid with her other morning medications to improve compliance.     DEXA 04/2018 revealed bone density within normal limits.  We will plan to order DEXA with her next 6-month follow up visit.     Patient had two doses of COVID with Moderna, but  "has not had her booster.  I recommended she get the booster ASAP.      The patient's relevant past medical, surgical, and social history was reviewed in Epic.  Lab results are reviewed with the patient today.  CBC reveals stable hemoglobin.  Fasting glucose 120.  A1c 6.57. Renal function improved with creatinine 1.14. As noted aboe, LDL cholesterol improved with cutting back on pork.  Triglycerides above goal at 252, although, cholesterol ratio 1.4.        Objective   Vital Signs:   /78   Pulse 78   Temp 98.3 °F (36.8 °C) (Tympanic)   Ht 154.9 cm (61\")   Wt 117 kg (258 lb 3.2 oz)   SpO2 98%   BMI 48.79 kg/m²       Physical Exam  Vitals reviewed.   Constitutional:       General: She is not in acute distress.     Appearance: She is well-developed.      Comments: Pleasant female, morbidly obese.    HENT:      Head: Normocephalic and atraumatic.      Nose:      Right Sinus: No maxillary sinus tenderness or frontal sinus tenderness.      Left Sinus: No maxillary sinus tenderness or frontal sinus tenderness.      Mouth/Throat:      Mouth: No oral lesions.      Pharynx: Uvula midline.      Tonsils: No tonsillar exudate.   Eyes:      Conjunctiva/sclera: Conjunctivae normal.      Pupils: Pupils are equal, round, and reactive to light.   Neck:      Thyroid: No thyroid mass or thyromegaly.      Vascular: No carotid bruit or JVD.      Trachea: Trachea normal. No tracheal deviation.   Cardiovascular:      Rate and Rhythm: Normal rate and regular rhythm.  No extrasystoles are present.     Chest Wall: PMI is not displaced.      Heart sounds: Normal heart sounds. No murmur heard.      Pulmonary:      Effort: Pulmonary effort is normal. No accessory muscle usage or respiratory distress.      Breath sounds: Normal breath sounds. No decreased breath sounds, wheezing, rhonchi or rales.   Abdominal:      General: Bowel sounds are normal. There is no distension.      Palpations: Abdomen is soft.      Tenderness: There is no " abdominal tenderness.      Comments: Obese abdomen limits exam.    Musculoskeletal:      Cervical back: Neck supple.   Lymphadenopathy:      Cervical: No cervical adenopathy.   Skin:     General: Skin is warm and dry.      Findings: No rash.      Nails: There is no clubbing.   Neurological:      Mental Status: She is alert and oriented to person, place, and time.      Cranial Nerves: No cranial nerve deficit.      Coordination: Coordination normal.   Psychiatric:         Speech: Speech normal.         Behavior: Behavior normal.         Thought Content: Thought content normal.         Judgment: Judgment normal.            Result Review :     CMP    CMP 8/16/21 11/30/21 1/26/22   Glucose 110 (A) 112 (A) 120 (A)   BUN 27 (A) 25 (A) 28 (A)   Creatinine 1.05 (A) 1.25 (A) 1.14 (A)   eGFR Non African Am 52 42 47   Sodium 139 138 139   Potassium 4.6 4.3 4.2   Chloride 106 101 101   Calcium 10.2 10.2 10.2   Albumin 4.80 4.90 4.80   Total Bilirubin 0.3 0.5 0.4   Alkaline Phosphatase 51 51 42   AST (SGOT) 24 29 29   ALT (SGPT) 18 21 23   (A) Abnormal value            CBC w/diff    CBC w/Diff 8/16/21 11/30/21 1/26/22   WBC 8.58 7.64 9.05   RBC 3.62 (A) 3.76 (A) 3.75 (A)   Hemoglobin 12.2 11.8 (A) 11.8 (A)   Hematocrit 35.5 36.2 36.0   MCV 98.1 (A) 96.3 96.0   MCH 33.7 (A) 31.4 31.5   MCHC 34.4 32.6 32.8   RDW 13.5 13.6 13.9   Platelets 282 307 315   Neutrophil Rel % 69.9 66.2 70.4   Lymphocyte Rel % 18.9 (A) 22.1 18.6 (A)   Monocyte Rel % 7.3 8.1 7.3   Eosinophil Rel % 3.3 2.9 3.0   Basophil Rel % 0.6 0.7 0.7   (A) Abnormal value            Lipid Panel    Lipid Panel 7/21/21 7/21/21 1/26/22    0836 0836    Total Cholesterol   173   Triglycerides 301 (A)  252 (A)   HDL Cholesterol   51   VLDL Cholesterol   41 (A)   LDL Cholesterol   124 (A) 81   LDL/HDL Ratio   1.40   (A) Abnormal value            A1C Last 3 Results    HGBA1C Last 3 Results 7/21/21 1/26/22   Hemoglobin A1C 6.12 (A) 6.57 (A)   (A) Abnormal value            Data  reviewed: Consultant notes Dr. Harmon, allergist, Dr. De La Torre, oncolology/hematology, Dr. Augustine, orthopedist          Assessment and Plan    Diagnoses and all orders for this visit:    1. Type 2 diabetes mellitus with stage 3a chronic kidney disease, without long-term current use of insulin (HCC) (Primary)  -     CBC Auto Differential; Future  -     Comprehensive Metabolic Panel; Future  -     Hemoglobin A1c; Future  -     LDL Cholesterol, Direct; Future  -     Triglycerides; Future  -     TSH; Future  -     T4, free; Future    2. Essential hypertension  -     Comprehensive Metabolic Panel; Future    3. Hyperlipidemia associated with type 2 diabetes mellitus (HCC)  -     LDL Cholesterol, Direct; Future    4. Hypertriglyceridemia  -     Triglycerides; Future    5. Morbid obesity (HCC)  -     Comprehensive Metabolic Panel; Future    6. Onychogryphosis  -     Ambulatory Referral to Podiatry    7. Gastroesophageal reflux disease, unspecified whether esophagitis present  -     Ambulatory Referral to General Surgery  -     CBC Auto Differential; Future    8. Tubular adenoma of colon - removed during colonoscopy, 11/2018.  -     Ambulatory Referral to General Surgery    9. Stage 3a chronic kidney disease (HCC)  -     Comprehensive Metabolic Panel; Future    10. Megaloblastic anemia due to vitamin B>12< deficiency  -     CBC Auto Differential; Future    11. Iron deficiency anemia, unspecified iron deficiency anemia type  -     CBC Auto Differential; Future    12. Chronic bilateral low back pain with bilateral sciatica    13. Spinal stenosis of lumbar region, unspecified whether neurogenic claudication present         I spent 33 minutes caring for Lois on this date of service. This time includes time spent by me in the following activities:preparing for the visit, reviewing tests, obtaining and/or reviewing a separately obtained history, performing a medically appropriate examination and/or evaluation , counseling and  educating the patient/family/caregiver, ordering medications, tests, or procedures, referring and communicating with other health care professionals  and documenting information in the medical record     Refer to Dr. Xander Burch, podiatry, to evaluate onychogryphosis and for diabetic foot care.  Continue metformin.   Eliminate all sugar sweetened drinks and decrease carbohydrates to help achieve weight loss. Pursue aggressive weight loss to help delay progression of impaired fasting glucose.  I suggested she may want to consider bariatric surgery if covered due to her multiple medical issues complicated by morbid obesity.    Refer to Dr. Jimmie Sutton, general surgeon, for repeat colonoscopy to follow up on history of adenomatous polyps.    Continue to follow with Dr. Boyle, who follows her cardiomyopathy and other cardiovascular issues. Continue simvastatin and Zetia as well as dietary efforts.  She has had nice improvement in LDL cholesterol with decreasing pork consumption.  We continue to encourage very aggressive weight loss.    Continue Prevacid and Pepcid for GERD, adequately managed.        I recommended she take the Synthroid with her other morning medications to improve compliance       Keep scheduled appointments with Dr. De La Torre.  Continue oral iron and B-12, at goal.    Continue to follow with Dr. Harmon for alpha gal.  She continues to abstain from beef, but has been able to resume pork products.    Continue other medications and vitamin and mineral supplements to treat additional medical problems which we addressed today.      Return in six months for routine follow up with fasting labs one week prior.           Scribed for Dr. Warner by [facility].     Follow Up   Return in about 6 months (around 8/8/2022) for Follow up in six months with labs one week prior..  Patient was given instructions and counseling regarding her condition or for health maintenance advice. Please see specific  information pulled into the AVS if appropriate.

## 2022-02-08 NOTE — PATIENT INSTRUCTIONS
Exercising to Lose Weight  Exercise is structured, repetitive physical activity to improve fitness and health. Getting regular exercise is important for everyone. It is especially important if you are overweight. Being overweight increases your risk of heart disease, stroke, diabetes, high blood pressure, and several types of cancer. Reducing your calorie intake and exercising can help you lose weight.  Exercise is usually categorized as moderate or vigorous intensity. To lose weight, most people need to do a certain amount of moderate-intensity or vigorous-intensity exercise each week.  Moderate-intensity exercise    Moderate-intensity exercise is any activity that gets you moving enough to burn at least three times more energy (calories) than if you were sitting.  Examples of moderate exercise include:  · Walking a mile in 15 minutes.  · Doing light yard work.  · Biking at an easy pace.  Most people should get at least 150 minutes (2 hours and 30 minutes) a week of moderate-intensity exercise to maintain their body weight.  Vigorous-intensity exercise  Vigorous-intensity exercise is any activity that gets you moving enough to burn at least six times more calories than if you were sitting. When you exercise at this intensity, you should be working hard enough that you are not able to carry on a conversation.  Examples of vigorous exercise include:  · Running.  · Playing a team sport, such as football, basketball, and soccer.  · Jumping rope.  Most people should get at least 75 minutes (1 hour and 15 minutes) a week of vigorous-intensity exercise to maintain their body weight.  How can exercise affect me?  When you exercise enough to burn more calories than you eat, you lose weight. Exercise also reduces body fat and builds muscle. The more muscle you have, the more calories you burn. Exercise also:  · Improves mood.  · Reduces stress and tension.  · Improves your overall fitness, flexibility, and  endurance.  · Increases bone strength.  The amount of exercise you need to lose weight depends on:  · Your age.  · The type of exercise.  · Any health conditions you have.  · Your overall physical ability.  Talk to your health care provider about how much exercise you need and what types of activities are safe for you.  What actions can I take to lose weight?  Nutrition    · Make changes to your diet as told by your health care provider or diet and nutrition specialist (dietitian). This may include:  ? Eating fewer calories.  ? Eating more protein.  ? Eating less unhealthy fats.  ? Eating a diet that includes fresh fruits and vegetables, whole grains, low-fat dairy products, and lean protein.  ? Avoiding foods with added fat, salt, and sugar.  · Drink plenty of water while you exercise to prevent dehydration or heat stroke.    Activity  · Choose an activity that you enjoy and set realistic goals. Your health care provider can help you make an exercise plan that works for you.  · Exercise at a moderate or vigorous intensity most days of the week.  ? The intensity of exercise may vary from person to person. You can tell how intense a workout is for you by paying attention to your breathing and heartbeat. Most people will notice their breathing and heartbeat get faster with more intense exercise.  · Do resistance training twice each week, such as:  ? Push-ups.  ? Sit-ups.  ? Lifting weights.  ? Using resistance bands.  · Getting short amounts of exercise can be just as helpful as long structured periods of exercise. If you have trouble finding time to exercise, try to include exercise in your daily routine.  ? Get up, stretch, and walk around every 30 minutes throughout the day.  ? Go for a walk during your lunch break.  ? Park your car farther away from your destination.  ? If you take public transportation, get off one stop early and walk the rest of the way.  ? Make phone calls while standing up and walking  around.  ? Take the stairs instead of elevators or escalators.  · Wear comfortable clothes and shoes with good support.  · Do not exercise so much that you hurt yourself, feel dizzy, or get very short of breath.  Where to find more information  · [place] Department of Health and Human Services: www.hhs.gov  · Centers for Disease Control and Prevention (CDC): www.cdc.gov  Contact a health care provider:  · Before starting a new exercise program.  · If you have questions or concerns about your weight.  · If you have a medical problem that keeps you from exercising.  Get help right away if you have any of the following while exercising:  · Injury.  · Dizziness.  · Difficulty breathing or shortness of breath that does not go away when you stop exercising.  · Chest pain.  · Rapid heartbeat.  Summary  · Being overweight increases your risk of heart disease, stroke, diabetes, high blood pressure, and several types of cancer.  · Losing weight happens when you burn more calories than you eat.  · Reducing the amount of calories you eat in addition to getting regular moderate or vigorous exercise each week helps you lose weight.  This information is not intended to replace advice given to you by your health care provider. Make sure you discuss any questions you have with your health care provider.  Document Revised: 04/15/2021 Document Reviewed: 04/15/2021  [employer] Patient Education © 2021 [employer]      Calorie Counting for Weight Loss  Calories are units of energy. Your body needs a certain number of calories from food to keep going throughout the day. When you eat or drink more calories than your body needs, your body stores the extra calories mostly as fat. When you eat or drink fewer calories than your body needs, your body burns fat to get the energy it needs.  Calorie counting means keeping track of how many calories you eat and drink each day. Calorie counting can be helpful if you need to lose weight. If you eat  fewer calories than your body needs, you should lose weight. Ask your health care provider what a healthy weight is for you.  For calorie counting to work, you will need to eat the right number of calories each day to lose a healthy amount of weight per week. A dietitian can help you figure out how many calories you need in a day and will suggest ways to reach your calorie goal.  · A healthy amount of weight to lose each week is usually 1-2 lb (0.5-0.9 kg). This usually means that your daily calorie intake should be reduced by 500-750 calories.  · Eating 1,200-1,500 calories a day can help most women lose weight.  · Eating 1,500-1,800 calories a day can help most men lose weight.  What do I need to know about calorie counting?  Work with your health care provider or dietitian to determine how many calories you should get each day. To meet your daily calorie goal, you will need to:  · Find out how many calories are in each food that you would like to eat. Try to do this before you eat.  · Decide how much of the food you plan to eat.  · Keep a food log. Do this by writing down what you ate and how many calories it had.  To successfully lose weight, it is important to balance calorie counting with a healthy lifestyle that includes regular activity.  Where do I find calorie information?    The number of calories in a food can be found on a Nutrition Facts label. If a food does not have a Nutrition Facts label, try to look up the calories online or ask your dietitian for help.  Remember that calories are listed per serving. If you choose to have more than one serving of a food, you will have to multiply the calories per serving by the number of servings you plan to eat. For example, the label on a package of bread might say that a serving size is 1 slice and that there are 90 calories in a serving. If you eat 1 slice, you will have eaten 90 calories. If you eat 2 slices, you will have eaten 180 calories.  How do I keep a  food log?  After each time that you eat, record the following in your food log as soon as possible:  · What you ate. Be sure to include toppings, sauces, and other extras on the food.  · How much you ate. This can be measured in cups, ounces, or number of items.  · How many calories were in each food and drink.  · The total number of calories in the food you ate.  Keep your food log near you, such as in a pocket-sized notebook or on an shad or website on your mobile phone. Some programs will calculate calories for you and show you how many calories you have left to meet your daily goal.  What are some portion-control tips?  · Know how many calories are in a serving. This will help you know how many servings you can have of a certain food.  · Use a measuring cup to measure serving sizes. You could also try weighing out portions on a kitchen scale. With time, you will be able to estimate serving sizes for some foods.  · Take time to put servings of different foods on your favorite plates or in your favorite bowls and cups so you know what a serving looks like.  · Try not to eat straight from a food's packaging, such as from a bag or box. Eating straight from the package makes it hard to see how much you are eating and can lead to overeating. Put the amount you would like to eat in a cup or on a plate to make sure you are eating the right portion.  · Use smaller plates, glasses, and bowls for smaller portions and to prevent overeating.  · Try not to multitask. For example, avoid watching TV or using your computer while eating. If it is time to eat, sit down at a table and enjoy your food. This will help you recognize when you are full. It will also help you be more mindful of what and how much you are eating.  What are tips for following this plan?  Reading food labels  · Check the calorie count compared with the serving size. The serving size may be smaller than what you are used to eating.  · Check the source of the  calories. Try to choose foods that are high in protein, fiber, and vitamins, and low in saturated fat, trans fat, and sodium.  Shopping  · Read nutrition labels while you shop. This will help you make healthy decisions about which foods to buy.  · Pay attention to nutrition labels for low-fat or fat-free foods. These foods sometimes have the same number of calories or more calories than the full-fat versions. They also often have added sugar, starch, or salt to make up for flavor that was removed with the fat.  · Make a grocery list of lower-calorie foods and stick to it.  Cooking  · Try to cook your favorite foods in a healthier way. For example, try baking instead of frying.  · Use low-fat dairy products.  Meal planning  · Use more fruits and vegetables. One-half of your plate should be fruits and vegetables.  · Include lean proteins, such as chicken, turkey, and fish.  Lifestyle  Each week, aim to do one of the following:  · 150 minutes of moderate exercise, such as walking.  · 75 minutes of vigorous exercise, such as running.  General information  · Know how many calories are in the foods you eat most often. This will help you calculate calorie counts faster.  · Find a way of tracking calories that works for you. Get creative. Try different apps or programs if writing down calories does not work for you.  What foods should I eat?    · Eat nutritious foods. It is better to have a nutritious, high-calorie food, such as an avocado, than a food with few nutrients, such as a bag of potato chips.  · Use your calories on foods and drinks that will fill you up and will not leave you hungry soon after eating.  ? Examples of foods that fill you up are nuts and nut butters, vegetables, lean proteins, and high-fiber foods such as whole grains. High-fiber foods are foods with more than 5 g of fiber per serving.  · Pay attention to calories in drinks. Low-calorie drinks include water and unsweetened drinks.  The items listed  above may not be a complete list of foods and beverages you can eat. Contact a dietitian for more information.  What foods should I limit?  Limit foods or drinks that are not good sources of vitamins, minerals, or protein or that are high in unhealthy fats. These include:  · Candy.  · Other sweets.  · Sodas, specialty coffee drinks, alcohol, and juice.  The items listed above may not be a complete list of foods and beverages you should avoid. Contact a dietitian for more information.  How do I count calories when eating out?  · Pay attention to portions. Often, portions are much larger when eating out. Try these tips to keep portions smaller:  ? Consider sharing a meal instead of getting your own.  ? If you get your own meal, eat only half of it. Before you start eating, ask for a container and put half of your meal into it.  ? When available, consider ordering smaller portions from the menu instead of full portions.  · Pay attention to your food and drink choices. Knowing the way food is cooked and what is included with the meal can help you eat fewer calories.  ? If calories are listed on the menu, choose the lower-calorie options.  ? Choose dishes that include vegetables, fruits, whole grains, low-fat dairy products, and lean proteins.  ? Choose items that are boiled, broiled, grilled, or steamed. Avoid items that are buttered, battered, fried, or served with cream sauce. Items labeled as crispy are usually fried, unless stated otherwise.  ? Choose water, low-fat milk, unsweetened iced tea, or other drinks without added sugar. If you want an alcoholic beverage, choose a lower-calorie option, such as a glass of wine or light beer.  ? Ask for dressings, sauces, and syrups on the side. These are usually high in calories, so you should limit the amount you eat.  ? If you want a salad, choose a garden salad and ask for grilled meats. Avoid extra toppings such as sullivan, cheese, or fried items. Ask for the dressing on  the side, or ask for olive oil and vinegar or lemon to use as dressing.  · Estimate how many servings of a food you are given. Knowing serving sizes will help you be aware of how much food you are eating at restaurants.  Where to find more information  · Centers for Disease Control and Prevention: www.cdc.gov  · [place] Department of Agriculture: myplate.gov  Summary  · Calorie counting means keeping track of how many calories you eat and drink each day. If you eat fewer calories than your body needs, you should lose weight.  · A healthy amount of weight to lose per week is usually 1-2 lb (0.5-0.9 kg). This usually means reducing your daily calorie intake by 500-750 calories.  · The number of calories in a food can be found on a Nutrition Facts label. If a food does not have a Nutrition Facts label, try to look up the calories online or ask your dietitian for help.  · Use smaller plates, glasses, and bowls for smaller portions and to prevent overeating.  · Use your calories on foods and drinks that will fill you up and not leave you hungry shortly after a meal.  This information is not intended to replace advice given to you by your health care provider. Make sure you discuss any questions you have with your health care provider.  Document Revised: 01/28/2021 Document Reviewed: 01/28/2021  [employer] Patient Education © 2021 [employer]

## 2022-02-15 ENCOUNTER — OFFICE VISIT (OUTPATIENT)
Dept: PODIATRY | Facility: CLINIC | Age: 72
End: 2022-02-15

## 2022-02-15 VITALS
WEIGHT: 258 LBS | OXYGEN SATURATION: 95 % | DIASTOLIC BLOOD PRESSURE: 66 MMHG | BODY MASS INDEX: 48.71 KG/M2 | SYSTOLIC BLOOD PRESSURE: 100 MMHG | HEART RATE: 76 BPM | HEIGHT: 61 IN

## 2022-02-15 DIAGNOSIS — M79.674 PAIN IN TOES OF BOTH FEET: ICD-10-CM

## 2022-02-15 DIAGNOSIS — B35.1 ONYCHOMYCOSIS: ICD-10-CM

## 2022-02-15 DIAGNOSIS — E11.8 DIABETIC FOOT: ICD-10-CM

## 2022-02-15 DIAGNOSIS — M20.42 HAMMER TOES OF BOTH FEET: ICD-10-CM

## 2022-02-15 DIAGNOSIS — E11.9 ENCOUNTER FOR DIABETIC FOOT EXAM: Primary | ICD-10-CM

## 2022-02-15 DIAGNOSIS — M20.41 HAMMER TOES OF BOTH FEET: ICD-10-CM

## 2022-02-15 DIAGNOSIS — M79.675 PAIN IN TOES OF BOTH FEET: ICD-10-CM

## 2022-02-15 PROCEDURE — 99203 OFFICE O/P NEW LOW 30 MIN: CPT | Performed by: PODIATRIST

## 2022-02-15 PROCEDURE — 11721 DEBRIDE NAIL 6 OR MORE: CPT | Performed by: PODIATRIST

## 2022-02-15 NOTE — PROGRESS NOTES
Lois Parmar  1950  72 y.o. female  PCP: Marshal Warner MD: 2/8/22  BS: 118 per pt   A1C: 6.57 1/26/2022    Patient presents to clinic today for a diabetic foot exam.  02/15/2022  Chief Complaint   Patient presents with   • Right Foot - Diabetic foot care   • Left Foot - Diabetic foot care           History of Present Illness    Lois Parmar is a 72 y.o. female who presents for diabetic foot exam, nail care.  Patient is a insulin-dependent diabetic.  Her most recent hemoglobin A1c was 6.57.  She denies significant numbness tingling burning sensations her feet.  Notes that her nails over time have become thickened and difficult for her to care for on her own.      Past Medical History:   Diagnosis Date   • Acquired hypothyroidism - On Synthroid    • Allergic rhinitis    • Allergy to alpha-gal 7/17/2020   • Anemia    • Asthma - mild intermittent    • Cardiac disease    • Carpal tunnel syndrome - Bilateral    • Colitis    • Degeneration of cervical intervertebral disc    • Diabetes (HCC)    • Disorder of lumbar spine    • Diverticulosis of large intestine 1/17/2019   • Essential hypertension    • Essential hypertension    • Fatigue    • History of myocardial infarct at age greater than 60 years 10/17/2018   • Hyperlipidemia - Improved with Zocor    • Hyperlipidemia associated with type 2 diabetes mellitus (HCC) 7/28/2021   • Hypertriglyceridemia 1/18/2021   • IFG (impaired fasting glucose) 10/17/2018   • Impaired fasting glycaemia    • Iron deficiency anemia - Improved    • Lumbar radiculopathy    • Megaloblastic anemia due to vitamin B>12< deficiency    • Moderate persistent asthma without complication 4/9/2021   • Morbid obesity (HCC)    • Osteoarthritis of knee - Bilateral    • Osteoarthritis of multiple joints - Left knee    • Osteoporosis    • Pain in left hip    • Sleep apnea - On CPAP    • Spasm of back muscles    • Stage 3 chronic kidney disease (HCC) 10/23/2019   • Stage 3a chronic kidney disease  ([facility]) 10/23/2019   • Thyroid dysfunction    • Tubular adenoma of colon - removed during colonoscopy, 11/2018. 12/3/2018   • Type 2 diabetes mellitus with stage 3a chronic kidney disease, without long-term current use of insulin ([facility]) 4/9/2021   • Type 2 diabetes mellitus without complication, without long-term current use of insulin ([facility]) 4/9/2021   • Vitamin D deficiency          Past Surgical History:   Procedure Laterality Date   • CARPAL TUNNEL RELEASE Right    • COLONOSCOPY N/A 11/30/2018    Procedure: COLONOSCOPY;  Surgeon: Jimmie Sutton MD;  Location: [facility] ENDOSCOPY;  Service: General   • ENDOSCOPY  09/17/2012    Normal esophagus.  Gastritis.  Normal duodenum   • ENDOSCOPY N/A 11/30/2018    Procedure: ESOPHAGOGASTRODUODENOSCOPY WITH ANESTHESIA;  Surgeon: Jimmie Sutton MD;  Location: [facility] ENDOSCOPY;  Service: General   • ENDOSCOPY AND COLONOSCOPY  09/17/2012    Internal & external hemorrhoids found.   • HYSTERECTOMY  1980    w bso   • JOINT REPLACEMENT      left knee   • LUMBAR LAMINECTOMY  2011    s/p lumbar laminectomy and fusion   • NECK SURGERY     • OOPHORECTOMY  1980   • SHOULDER SURGERY  12/07/2015    Arthroscopy of the left shoudler with rotator cuff repair, Vijay procedure, and subacromial decompression.   • SHOULDER SURGERY Right    • TOTAL KNEE ARTHROPLASTY Left          Family History   Problem Relation Age of Onset   • Breast cancer Mother    • Ovarian cancer Mother    • Cancer Mother    • Heart disease Mother    • Osteoporosis Mother    • Diabetes Mother    • Hypertension Mother    • Breast cancer Sister    • Cancer Sister    • Osteoporosis Sister    • Breast cancer Other    • Heart disease Other    • Hypertension Other    • Lung disease Other    • Diabetes Other    • Cancer Other    • Cancer Brother          Social History     Socioeconomic History   • Marital status:    Tobacco Use   • Smoking status: Never Smoker   • Smokeless tobacco: Never Used   Vaping Use   • Vaping  Use: Never used   Substance and Sexual Activity   • Alcohol use: No   • Drug use: No   • Sexual activity: Defer         Current Outpatient Medications   Medication Sig Dispense Refill   • albuterol sulfate  (90 Base) MCG/ACT inhaler Inhale 2 puffs Every 4 (Four) Hours As Needed for Wheezing. 18 g 11   • amLODIPine (NORVASC) 5 MG tablet Take 5 mg by mouth 2 (two) times a day.     • ascorbic acid (VITAMIN C) 1000 MG tablet Take 1,000 mg by mouth Daily.     • aspirin 81 MG chewable tablet Chew 81 mg Daily.     • Breo Ellipta 100-25 MCG/INH inhaler INHALE 1 PUFF BY MOUTH DAILY **RINSE MOUTH AFTER EACH USE**     • Cholecalciferol 125 MCG (5000 UT) tablet Take 5,000 Units by mouth Daily.     • cyanocobalamin (VITAMIN B-12) 1000 MCG tablet Take 1 tablet by mouth Daily. 90 tablet 1   • Cymbalta 60 MG capsule TAKE 1 CAPSULE EVERY DAY 90 capsule 3   • diphenhydrAMINE (BENADRYL) 25 mg capsule Take 50 mg by mouth.     • docusate sodium (COLACE) 100 MG capsule Take 100 mg by mouth 2 (Two) Times a Day As Needed.     • EPINEPHrine (EPIPEN) 0.3 MG/0.3ML solution auto-injector injection Inject 0.3 mg into the appropriate muscle as directed by prescriber.     • ezetimibe (ZETIA) 10 MG tablet Take 1 tablet by mouth Daily.     • famotidine (PEPCID) 40 MG tablet Take 1 tablet by mouth Every Night. This replaces Zantac 90 tablet 3   • folic acid (FOLVITE) 1 MG tablet Take 1 tablet by mouth 3 (Three) Times a Week. 36 tablet 1   • glucose blood test strip tests BID, Diagnosis: 250.00, 401.9     • levothyroxine (SYNTHROID, LEVOTHROID) 88 MCG tablet TAKE 1 TABLET EVERY DAY 90 tablet 3   • loratadine (CLARITIN) 10 MG tablet Take 10 mg by mouth daily.     • metFORMIN (GLUCOPHAGE) 500 MG tablet Take 1 tablet by mouth 2 (Two) Times a Day With Meals. 180 tablet 3   • Multiple Vitamins-Minerals (ZINC PO) Take  by mouth.     • Prevacid 30 MG capsule TAKE 1 CAPSULE EVERY MORNING 90 capsule 3   • simvastatin (ZOCOR) 40 MG tablet TAKE 1 TABLET  "EVERY NIGHT 90 tablet 3   • spironolactone (ALDACTONE) 25 MG tablet TAKE 1 TABLET EVERY DAY  FOR  FLUID 90 tablet 0   • indapamide (LOZOL) 2.5 MG tablet Take 2.5 mg by mouth Daily.     • olmesartan (BENICAR) 40 MG tablet Take 40 mg by mouth Daily.       No current facility-administered medications for this visit.         OBJECTIVE    /66   Pulse 76   Ht 154.9 cm (61\")   Wt 117 kg (258 lb)   SpO2 95%   BMI 48.75 kg/m²       Review of Systems   Constitutional: Positive for fatigue.   HENT: Positive for hearing loss.    Eyes: Negative.    Respiratory: Positive for shortness of breath and wheezing.    Cardiovascular: Negative.    Gastrointestinal: Positive for constipation.   Endocrine: Negative.    Genitourinary: Negative.    Musculoskeletal: Positive for back pain and joint swelling.        Joint pain  Ankle pain   Skin: Negative.    Allergic/Immunologic: Negative.    Neurological: Positive for numbness.   Hematological: Negative.    Psychiatric/Behavioral: Negative.          Diabetic Foot Exam Performed and Monofilament Test Performed       Constitutional: she appears well-developed and well-nourished.   HEENT: Normocephalic. Atraumatic  CV: No tenderness. RRR  Resp: Non-labored respiration. No wheezes.   Psychiatric: she has a normal mood and affect. her   behavior is normal.      Lower Extremity Exam:  Vascular: DP/PT pulses palpable 1+.   Negative hair growth.   1+ perimalleolar edema  Neuro: Protective sensation intact, b/l.  Light touch sensation intact, b/l  DTRs intact  Integument: No open wounds or lesions.  Skin quality normal  Nails 1-5 b/l thickened, elongated with subungual debris. +pain on palpation*  No masses  Webspaces c/d/i  Musculoskeletal: LE muscle strength 4/5  Gait assisted with cane  Mild, flexible hammertoe deformity toes 2 through 4 bilateral.  Ankle ROM normal, b/l              ASSESSMENT AND PLAN    Diagnoses and all orders for this visit:    1. Encounter for diabetic foot exam " (HCC) (Primary)    2. Diabetic foot (HCC)    3. Onychomycosis    4. Hammer toes of both feet    5. Pain in toes of both feet      -Comprehensive DM foot exam performed. Patient educated on importance of tight glucose control and daily foot checks.   -Patient educated on padding techniques for hammertoes.  Proper extra depth diabetic shoe gear.  Limit barefoot walking.  -Nails 1-5 b/l debrided in length and thickness with nail nipper to decrease pain, fungal load and risk of infection  -Follow up 3 months PRN          This document has been electronically signed by Xander Burch DPM on February 15, 2022 14:09 CST       Much of this encounter note is an electronic transcription/translation of spoken language to printed text.   Xander Burch DPM  2/15/2022  14:09 CST

## 2022-02-24 ENCOUNTER — PREP FOR SURGERY (OUTPATIENT)
Dept: OTHER | Facility: HOSPITAL | Age: 72
End: 2022-02-24

## 2022-02-24 DIAGNOSIS — Z12.11 SCREEN FOR COLON CANCER: Primary | ICD-10-CM

## 2022-02-24 RX ORDER — DEXTROSE AND SODIUM CHLORIDE 5; .45 G/100ML; G/100ML
100 INJECTION, SOLUTION INTRAVENOUS CONTINUOUS PRN
Status: CANCELLED | OUTPATIENT
Start: 2022-04-04

## 2022-03-09 PROBLEM — Z12.11 SCREEN FOR COLON CANCER: Status: ACTIVE | Noted: 2022-03-09

## 2022-03-28 RX ORDER — LANSOPRAZOLE 30 MG/1
30 CAPSULE, DELAYED RELEASE ORAL DAILY
Qty: 90 CAPSULE | Refills: 3 | Status: SHIPPED | OUTPATIENT
Start: 2022-03-28 | End: 2022-04-13 | Stop reason: SDUPTHER

## 2022-03-30 RX ORDER — ROSUVASTATIN CALCIUM 20 MG/1
40 TABLET, COATED ORAL DAILY
COMMUNITY
End: 2022-05-09 | Stop reason: ALTCHOICE

## 2022-03-30 RX ORDER — OLMESARTAN MEDOXOMIL 20 MG/1
40 TABLET ORAL DAILY
COMMUNITY
End: 2022-07-28

## 2022-03-30 RX ORDER — AMLODIPINE BESYLATE 5 MG/1
5 TABLET ORAL 2 TIMES DAILY
COMMUNITY
End: 2022-05-09 | Stop reason: SDUPTHER

## 2022-04-04 ENCOUNTER — HOSPITAL ENCOUNTER (OUTPATIENT)
Facility: HOSPITAL | Age: 72
Setting detail: HOSPITAL OUTPATIENT SURGERY
Discharge: HOME | End: 2022-04-04
Attending: SURGERY | Admitting: SURGERY

## 2022-04-04 ENCOUNTER — ANESTHESIA (OUTPATIENT)
Dept: GASTROENTEROLOGY | Facility: HOSPITAL | Age: 72
End: 2022-04-04

## 2022-04-04 ENCOUNTER — ANESTHESIA EVENT (OUTPATIENT)
Dept: GASTROENTEROLOGY | Facility: HOSPITAL | Age: 72
End: 2022-04-04

## 2022-04-04 VITALS
WEIGHT: 253.09 LBS | HEART RATE: 62 BPM | SYSTOLIC BLOOD PRESSURE: 137 MMHG | BODY MASS INDEX: 47.78 KG/M2 | DIASTOLIC BLOOD PRESSURE: 70 MMHG | HEIGHT: 61 IN | TEMPERATURE: 98.2 F | RESPIRATION RATE: 16 BRPM | OXYGEN SATURATION: 96 %

## 2022-04-04 DIAGNOSIS — Z12.11 SCREEN FOR COLON CANCER: ICD-10-CM

## 2022-04-04 PROCEDURE — 88305 TISSUE EXAM BY PATHOLOGIST: CPT

## 2022-04-04 PROCEDURE — 25010000002 PROPOFOL 10 MG/ML EMULSION: Performed by: NURSE ANESTHETIST, CERTIFIED REGISTERED

## 2022-04-04 PROCEDURE — 82962 GLUCOSE BLOOD TEST: CPT

## 2022-04-04 RX ORDER — DEXTROSE AND SODIUM CHLORIDE 5; .45 G/100ML; G/100ML
100 INJECTION, SOLUTION INTRAVENOUS CONTINUOUS PRN
Status: DISCONTINUED | OUTPATIENT
Start: 2022-04-04 | End: 2022-04-04 | Stop reason: HOSPADM

## 2022-04-04 RX ORDER — PROPOFOL 10 MG/ML
VIAL (ML) INTRAVENOUS AS NEEDED
Status: DISCONTINUED | OUTPATIENT
Start: 2022-04-04 | End: 2022-04-04 | Stop reason: SURG

## 2022-04-04 RX ORDER — LIDOCAINE HYDROCHLORIDE 20 MG/ML
INJECTION, SOLUTION EPIDURAL; INFILTRATION; INTRACAUDAL; PERINEURAL AS NEEDED
Status: DISCONTINUED | OUTPATIENT
Start: 2022-04-04 | End: 2022-04-04 | Stop reason: SURG

## 2022-04-04 RX ADMIN — PROPOFOL 10 MG: 10 INJECTION, EMULSION INTRAVENOUS at 09:08

## 2022-04-04 RX ADMIN — PROPOFOL 20 MG: 10 INJECTION, EMULSION INTRAVENOUS at 09:20

## 2022-04-04 RX ADMIN — PROPOFOL 10 MG: 10 INJECTION, EMULSION INTRAVENOUS at 09:23

## 2022-04-04 RX ADMIN — PROPOFOL 30 MG: 10 INJECTION, EMULSION INTRAVENOUS at 09:45

## 2022-04-04 RX ADMIN — PROPOFOL 10 MG: 10 INJECTION, EMULSION INTRAVENOUS at 09:09

## 2022-04-04 RX ADMIN — PROPOFOL 20 MG: 10 INJECTION, EMULSION INTRAVENOUS at 09:18

## 2022-04-04 RX ADMIN — PROPOFOL 10 MG: 10 INJECTION, EMULSION INTRAVENOUS at 09:35

## 2022-04-04 RX ADMIN — PROPOFOL 10 MG: 10 INJECTION, EMULSION INTRAVENOUS at 09:13

## 2022-04-04 RX ADMIN — PROPOFOL 20 MG: 10 INJECTION, EMULSION INTRAVENOUS at 09:12

## 2022-04-04 RX ADMIN — PROPOFOL 20 MG: 10 INJECTION, EMULSION INTRAVENOUS at 09:26

## 2022-04-04 RX ADMIN — PROPOFOL 10 MG: 10 INJECTION, EMULSION INTRAVENOUS at 09:17

## 2022-04-04 RX ADMIN — PROPOFOL 10 MG: 10 INJECTION, EMULSION INTRAVENOUS at 09:29

## 2022-04-04 RX ADMIN — PROPOFOL 20 MG: 10 INJECTION, EMULSION INTRAVENOUS at 09:24

## 2022-04-04 RX ADMIN — PROPOFOL 20 MG: 10 INJECTION, EMULSION INTRAVENOUS at 09:22

## 2022-04-04 RX ADMIN — PROPOFOL 10 MG: 10 INJECTION, EMULSION INTRAVENOUS at 09:10

## 2022-04-04 RX ADMIN — PROPOFOL 10 MG: 10 INJECTION, EMULSION INTRAVENOUS at 09:25

## 2022-04-04 RX ADMIN — PROPOFOL 10 MG: 10 INJECTION, EMULSION INTRAVENOUS at 09:37

## 2022-04-04 RX ADMIN — DEXTROSE AND SODIUM CHLORIDE 100 ML/HR: 5; 450 INJECTION, SOLUTION INTRAVENOUS at 08:51

## 2022-04-04 RX ADMIN — PROPOFOL 10 MG: 10 INJECTION, EMULSION INTRAVENOUS at 09:16

## 2022-04-04 RX ADMIN — LIDOCAINE HYDROCHLORIDE 50 MG: 20 INJECTION, SOLUTION EPIDURAL; INFILTRATION; INTRACAUDAL; PERINEURAL at 09:07

## 2022-04-04 RX ADMIN — PROPOFOL 10 MG: 10 INJECTION, EMULSION INTRAVENOUS at 09:27

## 2022-04-04 RX ADMIN — PROPOFOL 20 MG: 10 INJECTION, EMULSION INTRAVENOUS at 09:07

## 2022-04-04 RX ADMIN — PROPOFOL 10 MG: 10 INJECTION, EMULSION INTRAVENOUS at 09:31

## 2022-04-04 RX ADMIN — PROPOFOL 10 MG: 10 INJECTION, EMULSION INTRAVENOUS at 09:36

## 2022-04-04 RX ADMIN — PROPOFOL 10 MG: 10 INJECTION, EMULSION INTRAVENOUS at 09:33

## 2022-04-04 RX ADMIN — PROPOFOL 10 MG: 10 INJECTION, EMULSION INTRAVENOUS at 09:34

## 2022-04-04 RX ADMIN — PROPOFOL 10 MG: 10 INJECTION, EMULSION INTRAVENOUS at 09:19

## 2022-04-04 RX ADMIN — PROPOFOL 70 MG: 10 INJECTION, EMULSION INTRAVENOUS at 09:06

## 2022-04-04 RX ADMIN — PROPOFOL 10 MG: 10 INJECTION, EMULSION INTRAVENOUS at 09:21

## 2022-04-04 RX ADMIN — PROPOFOL 10 MG: 10 INJECTION, EMULSION INTRAVENOUS at 09:11

## 2022-04-04 RX ADMIN — PROPOFOL 10 MG: 10 INJECTION, EMULSION INTRAVENOUS at 09:28

## 2022-04-04 RX ADMIN — PROPOFOL 10 MG: 10 INJECTION, EMULSION INTRAVENOUS at 09:14

## 2022-04-04 RX ADMIN — PROPOFOL 10 MG: 10 INJECTION, EMULSION INTRAVENOUS at 09:32

## 2022-04-04 RX ADMIN — PROPOFOL 10 MG: 10 INJECTION, EMULSION INTRAVENOUS at 09:15

## 2022-04-04 RX ADMIN — PROPOFOL 10 MG: 10 INJECTION, EMULSION INTRAVENOUS at 09:30

## 2022-04-04 NOTE — ANESTHESIA PREPROCEDURE EVALUATION
Anesthesia Evaluation     Patient summary reviewed and Nursing notes reviewed   NPO Solid Status: > 8 hours  NPO Liquid Status: > 8 hours           Airway   Mallampati: III  TM distance: >3 FB  Neck ROM: full  No difficulty expected  Dental - normal exam     Pulmonary - normal exam   (+) asthma,shortness of breath, sleep apnea,   Cardiovascular - normal exam    (+) hypertension well controlled less than 2 medications, hyperlipidemia,       Neuro/Psych  (+) numbness,    GI/Hepatic/Renal/Endo    (+) obesity, morbid obesity, GERD well controlled,  renal disease CRI, diabetes mellitus type 2 well controlled, thyroid problem hypothyroidism    Musculoskeletal     Abdominal    Substance History      OB/GYN          Other   arthritis, blood dyscrasia anemia,           Anesthesia Evaluation     NPO Solid Status: > 8 hours  NPO Liquid Status: > 8 hours           Airway   Mallampati: III  TM distance: >3 FB  Neck ROM: full  No difficulty expected  Dental - normal exam     Pulmonary - normal exam   Cardiovascular - normal exam        Neuro/Psych  GI/Hepatic/Renal/Endo      Musculoskeletal     Abdominal    Substance History      OB/GYN          Other                        Anesthesia Plan    ASA 3     MAC     intravenous induction   Anesthetic plan, all risks, benefits, and alternatives have been provided, discussed and informed consent has been obtained with: patient.               Anesthesia Plan    ASA 3     MAC     intravenous induction     Anesthetic plan, all risks, benefits, and alternatives have been provided, discussed and informed consent has been obtained with: patient.    Plan discussed with CRNA.

## 2022-04-04 NOTE — H&P
No chief complaint on file.      Lois Parmar is a 72 y.o. female referred today for evaluation for colonoscopy.  She notes no change in bowel habits, no blood in the stool.     Prior Colonoscopy:yes, had 2 t/a polyps ascending colon in 2018  Prior Polyps:yes  Family History of Colon Cancer:no  On anticoagulation:no    Past Surgical History:   Procedure Laterality Date   • CARPAL TUNNEL RELEASE Right    • COLONOSCOPY N/A 11/30/2018    Procedure: COLONOSCOPY;  Surgeon: Jimmie Sutton MD;  Location: [facility] ENDOSCOPY;  Service: General   • ENDOSCOPY  09/17/2012    Normal esophagus.  Gastritis.  Normal duodenum   • ENDOSCOPY N/A 11/30/2018    Procedure: ESOPHAGOGASTRODUODENOSCOPY WITH ANESTHESIA;  Surgeon: Jimmie Sutton MD;  Location: [facility] ENDOSCOPY;  Service: General   • ENDOSCOPY AND COLONOSCOPY  09/17/2012    Internal & external hemorrhoids found.   • HYSTERECTOMY  1980    w bso   • JOINT REPLACEMENT      left knee   • LUMBAR LAMINECTOMY  2011    s/p lumbar laminectomy and fusion   • NECK SURGERY     • OOPHORECTOMY  1980   • SHOULDER SURGERY  12/07/2015    Arthroscopy of the left shoudler with rotator cuff repair, Vijay procedure, and subacromial decompression.   • SHOULDER SURGERY Right    • TOTAL KNEE ARTHROPLASTY Left      Past Medical History:   Diagnosis Date   • Acquired hypothyroidism - On Synthroid    • Allergic rhinitis    • Allergy to alpha-gal 7/17/2020   • Anemia    • Asthma - mild intermittent    • Cardiac disease    • Carpal tunnel syndrome - Bilateral    • Colitis    • Degeneration of cervical intervertebral disc    • Diabetes (HCC)    • Disorder of lumbar spine    • Diverticulosis of large intestine 1/17/2019   • Essential hypertension    • Essential hypertension    • Fatigue    • History of myocardial infarct at age greater than 60 years 10/17/2018   • Hyperlipidemia - Improved with Zocor    • Hyperlipidemia associated with type 2 diabetes mellitus (HCC) 7/28/2021   •  Hypertriglyceridemia 1/18/2021   • IFG (impaired fasting glucose) 10/17/2018   • Impaired fasting glycaemia    • Iron deficiency anemia - Improved    • Lumbar radiculopathy    • Megaloblastic anemia due to vitamin B>12< deficiency    • Moderate persistent asthma without complication 4/9/2021   • Morbid obesity (HCC)    • Osteoarthritis of knee - Bilateral    • Osteoarthritis of multiple joints - Left knee    • Osteoporosis    • Pain in left hip    • Sleep apnea - On CPAP    • Spasm of back muscles    • Stage 3 chronic kidney disease ([facility]) 10/23/2019   • Stage 3a chronic kidney disease (Former[facility]) 10/23/2019   • Thyroid dysfunction    • Tubular adenoma of colon - removed during colonoscopy, 11/2018. 12/3/2018   • Type 2 diabetes mellitus with stage 3a chronic kidney disease, without long-term current use of insulin ([facility]) 4/9/2021   • Type 2 diabetes mellitus without complication, without long-term current use of insulin ([facility]) 4/9/2021   • Vitamin D deficiency      Social History     Socioeconomic History   • Marital status:    Tobacco Use   • Smoking status: Never Smoker   • Smokeless tobacco: Never Used   Vaping Use   • Vaping Use: Never used   Substance and Sexual Activity   • Alcohol use: No   • Drug use: No   • Sexual activity: Defer     Family History   Problem Relation Age of Onset   • Breast cancer Mother    • Ovarian cancer Mother    • Cancer Mother    • Heart disease Mother    • Osteoporosis Mother    • Diabetes Mother    • Hypertension Mother    • Breast cancer Sister    • Cancer Sister    • Osteoporosis Sister    • Breast cancer Other    • Heart disease Other    • Hypertension Other    • Lung disease Other    • Diabetes Other    • Cancer Other    • Cancer Brother      Allergies   Allergen Reactions   • Beef Allergy Anaphylaxis   • Lortab [Hydrocodone-Acetaminophen] Hallucinations   • Codeine Palpitations     Heart race   • Alpha-Gal GI Intolerance   • Antihistamines, Chlorpheniramine-Type Palpitations      Heart races   • Latex Rash     Latex surgical tape only  Not allergic to latex gloves   • Other Palpitations     Decongestants: Heart Race   • Tape Rash       Home Medications:  Prior to Admission medications    Medication Sig Start Date End Date Taking? Authorizing Provider   albuterol sulfate  (90 Base) MCG/ACT inhaler Inhale 2 puffs Every 4 (Four) Hours As Needed for Wheezing. 4/9/21  Yes Marshal Warner MD   amLODIPine (NORVASC) 5 MG tablet Take 5 mg by mouth 2 (Two) Times a Day.   Yes Artis Smith MD   ascorbic acid (VITAMIN C) 1000 MG tablet Take 1,000 mg by mouth Daily.   Yes Artis Smith MD   Breo Ellipta 100-25 MCG/INH inhaler INHALE 1 PUFF BY MOUTH DAILY **RINSE MOUTH AFTER EACH USE** 5/12/21  Yes Artis Smith MD   Cholecalciferol 125 MCG (5000 UT) tablet Take 5,000 Units by mouth Daily.   Yes Artis Smith MD   cyanocobalamin (VITAMIN B-12) 1000 MCG tablet Take 1 tablet by mouth Daily. 8/19/21  Yes Ranjeet De La Torre MD   Cymbalta 60 MG capsule TAKE 1 CAPSULE EVERY DAY 4/7/21  Yes Marshal Warner MD   ezetimibe (ZETIA) 10 MG tablet Take 1 tablet by mouth Daily. 1/18/22  Yes Artis Smith MD   famotidine (PEPCID) 40 MG tablet Take 1 tablet by mouth Every Night. This replaces Zantac 4/29/21  Yes Marshal Warner MD   folic acid (FOLVITE) 1 MG tablet Take 1 tablet by mouth 3 (Three) Times a Week. 10/15/21  Yes Ranjeet De La Torre MD   glucose blood test strip tests BID, Diagnosis: 250.00, 401.9   Yes Artis Smith MD   lansoprazole (PREVACID) 30 MG capsule Take 1 capsule by mouth Daily. 3/28/22  Yes Marshal Warner MD   levothyroxine (SYNTHROID, LEVOTHROID) 88 MCG tablet TAKE 1 TABLET EVERY DAY 1/17/22  Yes Marshal Warner MD   loratadine (CLARITIN) 10 MG tablet Take 10 mg by mouth daily.   Yes Artis Smith MD   metFORMIN (GLUCOPHAGE) 500 MG tablet Take 1 tablet by mouth 2 (Two) Times a Day With Meals. 4/29/21  Yes Marshal Warner MD   olmesartan (BENICAR)  20 MG tablet Take 40 mg by mouth Daily.   Yes Artis Smith MD   rosuvastatin (CRESTOR) 20 MG tablet Take 40 mg by mouth Daily.   Yes Artis Smith MD   simvastatin (ZOCOR) 40 MG tablet TAKE 1 TABLET EVERY NIGHT 10/18/21  Yes Marshal Warner MD   spironolactone (ALDACTONE) 25 MG tablet TAKE 1 TABLET EVERY DAY  FOR  FLUID 1/17/22  Yes Marshal Warner MD   aspirin 81 MG chewable tablet Chew 81 mg Daily.    Artis Smith MD   diphenhydrAMINE (BENADRYL) 25 mg capsule Take 50 mg by mouth.    Artis Smith MD   docusate sodium (COLACE) 100 MG capsule Take 100 mg by mouth 2 (Two) Times a Day As Needed.    Artis Smith MD   EPINEPHrine (EPIPEN) 0.3 MG/0.3ML solution auto-injector injection Inject 0.3 mg into the appropriate muscle as directed by prescriber. 7/14/20   Artis Smith MD   indapamide (LOZOL) 2.5 MG tablet Take 2.5 mg by mouth Daily. 9/3/20 2/8/22  Artis Smith MD       Review of Systems   Constitutional: Negative.    HENT: Negative for hearing loss, nosebleeds and trouble swallowing.    Respiratory: Negative for apnea, chest tightness and shortness of breath.    Cardiovascular: Negative for chest pain and palpitations.   Gastrointestinal: Negative for abdominal distention, abdominal pain, blood in stool, constipation, diarrhea, nausea and vomiting.   Genitourinary: Negative for difficulty urinating, dysuria, frequency and urgency.   Musculoskeletal: Negative for back pain, joint swelling and neck pain.   Skin: Negative for rash.   Neurological: Negative for dizziness, seizures, weakness, light-headedness, numbness and headaches.   Hematological: Negative for adenopathy.   Psychiatric/Behavioral: Negative for agitation. The patient is not nervous/anxious.        Vitals:    04/04/22 0837   BP: 142/72   Pulse: 88   Resp: 22   Temp: 98.3 °F (36.8 °C)   SpO2: 95%       Physical Exam  Constitutional:       General: She is not in acute distress.     Appearance:  She is well-developed.   HENT:      Head: Normocephalic and atraumatic.   Eyes:      General: No scleral icterus.     Conjunctiva/sclera: Conjunctivae normal.   Neck:      Thyroid: No thyromegaly.      Trachea: No tracheal deviation.   Cardiovascular:      Rate and Rhythm: Normal rate and regular rhythm.      Heart sounds: Normal heart sounds. No murmur heard.    No friction rub. No gallop.   Pulmonary:      Effort: Pulmonary effort is normal. No respiratory distress.      Breath sounds: Normal breath sounds. No stridor. No wheezing or rales.   Chest:      Chest wall: No tenderness.   Abdominal:      General: Bowel sounds are normal. There is no distension.      Palpations: Abdomen is soft. There is no mass.      Tenderness: There is no abdominal tenderness. There is no guarding or rebound.      Hernia: No hernia is present.   Musculoskeletal:         General: No deformity. Normal range of motion.      Cervical back: Normal range of motion and neck supple.   Lymphadenopathy:      Cervical: No cervical adenopathy.   Skin:     General: Skin is warm and dry.      Coloration: Skin is not pale.      Findings: No erythema or rash.      Nails: There is no clubbing.   Neurological:      Mental Status: She is alert and oriented to person, place, and time.   Psychiatric:         Behavior: Behavior normal.         Thought Content: Thought content normal.         Assessment     In need of screening colonoscopy.    Plan     Risks, benefits, rationale and prep for colonoscopy have been discussed with the patient.  The patient indicates understanding of these issues and agrees with the plan.

## 2022-04-04 NOTE — ANESTHESIA POSTPROCEDURE EVALUATION
Patient: Lois Parmar    Procedure Summary     Date: 04/04/22 Room / Location: [facility] ENDOSCOPY 2 / [facility] ENDOSCOPY    Anesthesia Start: 0902 Anesthesia Stop: 0958    Procedure: COLONOSCOPY (N/A ) Diagnosis:       Screen for colon cancer      (Screen for colon cancer [Z12.11])    Surgeons: Jimmie Sutton MD Provider: Hortencia Nunez CRNA    Anesthesia Type: MAC ASA Status: 3          Anesthesia Type: MAC    Vitals  No vitals data found for the desired time range.          Post Anesthesia Care and Evaluation    Patient location during evaluation: PHASE II  Patient participation: complete - patient participated  Level of consciousness: awake and alert  Pain management: adequate  Airway patency: patent  Anesthetic complications: No anesthetic complications  PONV Status: none  Cardiovascular status: acceptable  Respiratory status: acceptable  Hydration status: acceptable

## 2022-04-05 ENCOUNTER — LAB (OUTPATIENT)
Dept: LAB | Facility: OTHER | Age: 72
End: 2022-04-05

## 2022-04-05 DIAGNOSIS — Z80.3 FAMILY HISTORY OF BREAST CANCER IN FEMALE: ICD-10-CM

## 2022-04-05 DIAGNOSIS — D50.8 OTHER IRON DEFICIENCY ANEMIA: ICD-10-CM

## 2022-04-05 DIAGNOSIS — D53.1 MEGALOBLASTIC ANEMIA: ICD-10-CM

## 2022-04-05 DIAGNOSIS — T45.4X5A ADVERSE EFFECT OF IRON, INITIAL ENCOUNTER: ICD-10-CM

## 2022-04-05 LAB
ALBUMIN SERPL-MCNC: 4.7 G/DL (ref 3.5–5)
ALBUMIN/GLOB SERPL: 1.3 G/DL (ref 1.1–1.8)
ALP SERPL-CCNC: 42 U/L (ref 38–126)
ALT SERPL W P-5'-P-CCNC: 17 U/L
ANION GAP SERPL CALCULATED.3IONS-SCNC: 10 MMOL/L (ref 5–15)
AST SERPL-CCNC: 25 U/L (ref 14–36)
BASOPHILS # BLD AUTO: 0.04 10*3/MM3 (ref 0–0.2)
BASOPHILS NFR BLD AUTO: 0.5 % (ref 0–1.5)
BILIRUB SERPL-MCNC: 0.4 MG/DL (ref 0.2–1.3)
BUN SERPL-MCNC: 26 MG/DL (ref 7–23)
BUN/CREAT SERPL: 22.8 (ref 7–25)
CALCIUM SPEC-SCNC: 9.9 MG/DL (ref 8.4–10.2)
CHLORIDE SERPL-SCNC: 104 MMOL/L (ref 101–112)
CO2 SERPL-SCNC: 26 MMOL/L (ref 22–30)
CREAT SERPL-MCNC: 1.14 MG/DL (ref 0.52–1.04)
DEPRECATED RDW RBC AUTO: 46.2 FL (ref 37–54)
EGFRCR SERPLBLD CKD-EPI 2021: 50.9 ML/MIN/1.73
EOSINOPHIL # BLD AUTO: 0.21 10*3/MM3 (ref 0–0.4)
EOSINOPHIL NFR BLD AUTO: 2.8 % (ref 0.3–6.2)
ERYTHROCYTE [DISTWIDTH] IN BLOOD BY AUTOMATED COUNT: 13.8 % (ref 12.3–15.4)
FERRITIN SERPL-MCNC: 88.41 NG/ML (ref 13–150)
GLOBULIN UR ELPH-MCNC: 3.6 GM/DL (ref 2.3–3.5)
GLUCOSE BLDC GLUCOMTR-MCNC: 98 MG/DL (ref 70–130)
GLUCOSE SERPL-MCNC: 107 MG/DL (ref 70–99)
HCT VFR BLD AUTO: 36.3 % (ref 34–46.6)
HGB BLD-MCNC: 11.9 G/DL (ref 12–15.9)
IRON 24H UR-MRATE: 90 MCG/DL (ref 37–145)
IRON SATN MFR SERPL: 19 % (ref 20–50)
LYMPHOCYTES # BLD AUTO: 1.64 10*3/MM3 (ref 0.7–3.1)
LYMPHOCYTES NFR BLD AUTO: 21.6 % (ref 19.6–45.3)
MCH RBC QN AUTO: 31.4 PG (ref 26.6–33)
MCHC RBC AUTO-ENTMCNC: 32.8 G/DL (ref 31.5–35.7)
MCV RBC AUTO: 95.8 FL (ref 79–97)
MONOCYTES # BLD AUTO: 0.65 10*3/MM3 (ref 0.1–0.9)
MONOCYTES NFR BLD AUTO: 8.6 % (ref 5–12)
NEUTROPHILS NFR BLD AUTO: 5.06 10*3/MM3 (ref 1.7–7)
NEUTROPHILS NFR BLD AUTO: 66.5 % (ref 42.7–76)
PLATELET # BLD AUTO: 297 10*3/MM3 (ref 140–450)
PMV BLD AUTO: 9.2 FL (ref 6–12)
POTASSIUM SERPL-SCNC: 4 MMOL/L (ref 3.4–5)
PROT SERPL-MCNC: 8.3 G/DL (ref 6.3–8.6)
RBC # BLD AUTO: 3.79 10*6/MM3 (ref 3.77–5.28)
SODIUM SERPL-SCNC: 140 MMOL/L (ref 137–145)
TIBC SERPL-MCNC: 483 MCG/DL (ref 298–536)
TRANSFERRIN SERPL-MCNC: 324 MG/DL (ref 200–360)
WBC NRBC COR # BLD: 7.6 10*3/MM3 (ref 3.4–10.8)

## 2022-04-05 PROCEDURE — 84466 ASSAY OF TRANSFERRIN: CPT | Performed by: INTERNAL MEDICINE

## 2022-04-05 PROCEDURE — 82607 VITAMIN B-12: CPT | Performed by: INTERNAL MEDICINE

## 2022-04-05 PROCEDURE — 36415 COLL VENOUS BLD VENIPUNCTURE: CPT | Performed by: INTERNAL MEDICINE

## 2022-04-05 PROCEDURE — 80053 COMPREHEN METABOLIC PANEL: CPT | Performed by: INTERNAL MEDICINE

## 2022-04-05 PROCEDURE — 83540 ASSAY OF IRON: CPT | Performed by: INTERNAL MEDICINE

## 2022-04-05 PROCEDURE — 82746 ASSAY OF FOLIC ACID SERUM: CPT | Performed by: INTERNAL MEDICINE

## 2022-04-05 PROCEDURE — 82728 ASSAY OF FERRITIN: CPT | Performed by: INTERNAL MEDICINE

## 2022-04-05 PROCEDURE — 85025 COMPLETE CBC W/AUTO DIFF WBC: CPT | Performed by: INTERNAL MEDICINE

## 2022-04-06 ENCOUNTER — OFFICE VISIT (OUTPATIENT)
Dept: ONCOLOGY | Facility: CLINIC | Age: 72
End: 2022-04-06

## 2022-04-06 VITALS
TEMPERATURE: 98.1 F | BODY MASS INDEX: 48.18 KG/M2 | OXYGEN SATURATION: 94 % | WEIGHT: 255 LBS | SYSTOLIC BLOOD PRESSURE: 168 MMHG | DIASTOLIC BLOOD PRESSURE: 86 MMHG | HEART RATE: 85 BPM

## 2022-04-06 DIAGNOSIS — T45.4X5A ADVERSE EFFECT OF IRON, INITIAL ENCOUNTER: Chronic | ICD-10-CM

## 2022-04-06 DIAGNOSIS — Z80.3 FAMILY HISTORY OF BREAST CANCER IN FEMALE: Chronic | ICD-10-CM

## 2022-04-06 DIAGNOSIS — D50.9 IRON DEFICIENCY ANEMIA, UNSPECIFIED IRON DEFICIENCY ANEMIA TYPE: Primary | Chronic | ICD-10-CM

## 2022-04-06 DIAGNOSIS — D53.1 MEGALOBLASTIC ANEMIA: Chronic | ICD-10-CM

## 2022-04-06 LAB
FOLATE SERPL-MCNC: >20 NG/ML (ref 4.78–24.2)
LAB AP CASE REPORT: NORMAL
PATH REPORT.FINAL DX SPEC: NORMAL
VIT B12 BLD-MCNC: 1080 PG/ML (ref 211–946)

## 2022-04-06 PROCEDURE — G0463 HOSPITAL OUTPT CLINIC VISIT: HCPCS | Performed by: INTERNAL MEDICINE

## 2022-04-06 PROCEDURE — 1125F AMNT PAIN NOTED PAIN PRSNT: CPT | Performed by: INTERNAL MEDICINE

## 2022-04-06 PROCEDURE — 1123F ACP DISCUSS/DSCN MKR DOCD: CPT | Performed by: INTERNAL MEDICINE

## 2022-04-06 PROCEDURE — 99214 OFFICE O/P EST MOD 30 MIN: CPT | Performed by: INTERNAL MEDICINE

## 2022-04-06 NOTE — PROGRESS NOTES
DATE OF VISIT: 4/6/2022      REASON FOR VISIT: Anemia with iron deficiency, vitamin B12 deficiency, family history of breast cancer      HISTORY OF PRESENT ILLNESS:   72-year-old female with medical problem consisting of hypertension, dyslipidemia, diabetes mellitus, hypothyroidism, coronary artery disease has been following up with hematology clinic since January 24, 2019.  Patient is here for follow-up appointment today to discuss recently done blood work.  Denies any recent worsening of fatigue.  Complains of arthralgia.  Denies any new lymph node enlargement.          Past Medical History, Past Surgical History, Social History, Family History have been reviewed and are without significant changes except as mentioned.    Review of Systems   A comprehensive 14 point review of systems was performed and was negative except as mentioned in HPI.    Medications:  The current medication list was reviewed in the EMR    ALLERGIES:    Allergies   Allergen Reactions   • Beef Allergy Anaphylaxis   • Lortab [Hydrocodone-Acetaminophen] Hallucinations   • Codeine Palpitations     Heart race   • Alpha-Gal GI Intolerance   • Antihistamines, Chlorpheniramine-Type Palpitations     Heart races   • Latex Rash     Latex surgical tape only  Not allergic to latex gloves   • Other Palpitations     Decongestants: Heart Race   • Tape Rash       Objective      Vitals:    04/06/22 1416   BP: 168/86   Pulse: 85   Temp: 98.1 °F (36.7 °C)   TempSrc: Temporal   SpO2: 94%   Weight: 116 kg (255 lb)   PainSc:   5   PainLoc: Hip  Comment: LEFT HIP PAIN     Current Status 3/19/2021   ECOG score 0       Physical Exam  Pulmonary:      Breath sounds: Normal breath sounds.   Skin:     Comments: Vitiligo present   Neurological:      Mental Status: She is alert and oriented to person, place, and time.           RECENT LABS:  Glucose   Date Value Ref Range Status   04/05/2022 107 (H) 70 - 99 mg/dL Final     Sodium   Date Value Ref Range Status   04/05/2022  140 137 - 145 mmol/L Final     Potassium   Date Value Ref Range Status   04/05/2022 4.0 3.4 - 5.0 mmol/L Final     CO2   Date Value Ref Range Status   04/05/2022 26.0 22.0 - 30.0 mmol/L Final     Chloride   Date Value Ref Range Status   04/05/2022 104 101 - 112 mmol/L Final     Anion Gap   Date Value Ref Range Status   04/05/2022 10.0 5.0 - 15.0 mmol/L Final     Creatinine   Date Value Ref Range Status   04/05/2022 1.14 (H) 0.52 - 1.04 mg/dL Final     BUN   Date Value Ref Range Status   04/05/2022 26 (H) 7 - 23 mg/dL Final     BUN/Creatinine Ratio   Date Value Ref Range Status   04/05/2022 22.8 7.0 - 25.0 Final     Calcium   Date Value Ref Range Status   04/05/2022 9.9 8.4 - 10.2 mg/dL Final     eGFR Non  Amer   Date Value Ref Range Status   01/26/2022 47 39 - 90 mL/min/1.73 Final     Alkaline Phosphatase   Date Value Ref Range Status   04/05/2022 42 38 - 126 U/L Final     Total Protein   Date Value Ref Range Status   04/05/2022 8.3 6.3 - 8.6 g/dL Final     ALT (SGPT)   Date Value Ref Range Status   04/05/2022 17 <=35 U/L Final     AST (SGOT)   Date Value Ref Range Status   04/05/2022 25 14 - 36 U/L Final     Total Bilirubin   Date Value Ref Range Status   04/05/2022 0.4 0.2 - 1.3 mg/dL Final     Albumin   Date Value Ref Range Status   04/05/2022 4.70 3.50 - 5.00 g/dL Final     Globulin   Date Value Ref Range Status   04/05/2022 3.6 (H) 2.3 - 3.5 gm/dL Final     Lab Results   Component Value Date    WBC 7.60 04/05/2022    HGB 11.9 (L) 04/05/2022    HCT 36.3 04/05/2022    MCV 95.8 04/05/2022     04/05/2022     Lab Results   Component Value Date    NEUTROABS 5.06 04/05/2022    IRON 90 04/05/2022    IRON 126 11/30/2021    IRON 93 08/16/2021    TIBC 483 04/05/2022    TIBC 462 11/30/2021    TIBC 429 08/16/2021    LABIRON 19 (L) 04/05/2022    LABIRON 27 11/30/2021    LABIRON 22 08/16/2021    FERRITIN 88.41 04/05/2022    FERRITIN 106.30 11/30/2021    FERRITIN 120.10 08/16/2021    WLOHXIFA44 1,080 (H)  04/05/2022    KFOLEBKP61 947 (H) 01/26/2022    TGNPPSUZ59 905 11/30/2021    FOLATE >20.00 04/05/2022    FOLATE >20.00 11/30/2021    FOLATE >20.00 08/16/2021     No results found for: , LABCA2, AFPTM, HCGQUANT, , CHROMGRNA, [id number], CEA, REFLABREPO      PATHOLOGY:  * Cannot find OR log *         RADIOLOGY DATA :  No radiology results for the last 7 days        Assessment/Plan     1.  Iron deficiency anemia:  -EGD and colonoscopy done in November 2018 showed no evidence of bleeding  -Capsule endoscopy done in March 2021 was negative for any bleeding as well  -Due to inability to tolerate iron by mouth patient has received 2 doses of Feraheme in March 2021  -Patient is currently on B12 p.o. daily along with folic acid 3 times a week  -Anemia work-up done on April 5, 2022 shows hemoglobin is 11.9.  Iron studies shows developing iron deficiency with iron saturation of 19%.  Due to inability to tolerate iron by mouth, will start patient on intravenous Venofer starting next week.  Side effect of Venofer including allergic reaction were discussed with patient today.  -We will have patient return to clinic in 3 months with repeat CBC, iron studies, ferritin, B12 and folate to be done prior to that    2.  Chronic kidney disease stage II:  -Creatinine is 1.14.  Patient was notified about elevated creatinine and need to increase hydration    3.  Family history of breast cancer:  -Patient had a genetic counseling done with [employer] cancer panel which includes BRCA 1 and 2 in March 2020, results did not show any pathology mutation.    4.  Health maintenance: Patient does not smoke    5. Advance Care Planning: For now patient remains full code and is able to make decisions.  Patient has health care surrogate mentioned on chart.    6.  Prescriptions: Patient has enough prescription for B12 and folic acid.             PHQ-9 Total Score: 0   -Patient is not homicidal or suicidal.  No acute intervention  required.    Lois Parmar reports a pain score of 5.  Given her pain assessment as noted, treatment options were discussed and the following options were decided upon as a follow-up plan to address the patient's pain: continuation of current treatment plan for pain.         Ranjeet De La Torre MD  4/6/2022  14:32 CDT        Part of this note may be an electronic transcription/translation of spoken language to printed text using the Dragon Dictation System.          CC:

## 2022-04-12 RX ORDER — FAMOTIDINE 20 MG/1
20 TABLET, FILM COATED ORAL ONCE
Status: CANCELLED | OUTPATIENT
Start: 2022-04-18

## 2022-04-12 RX ORDER — SODIUM CHLORIDE 9 MG/ML
250 INJECTION, SOLUTION INTRAVENOUS ONCE
Status: CANCELLED | OUTPATIENT
Start: 2022-04-18

## 2022-04-12 RX ORDER — DIPHENHYDRAMINE HCL 25 MG
25 CAPSULE ORAL ONCE
Status: CANCELLED | OUTPATIENT
Start: 2022-04-18

## 2022-04-13 ENCOUNTER — TELEPHONE (OUTPATIENT)
Dept: FAMILY MEDICINE CLINIC | Facility: CLINIC | Age: 72
End: 2022-04-13

## 2022-04-13 RX ORDER — LANSOPRAZOLE 30 MG
30 CAPSULE,DELAYED RELEASE (ENTERIC COATED) ORAL DAILY
Qty: 90 CAPSULE | Refills: 3 | Status: SHIPPED | OUTPATIENT
Start: 2022-04-13 | End: 2022-04-19 | Stop reason: SDUPTHER

## 2022-04-15 ENCOUNTER — INFUSION (OUTPATIENT)
Dept: ONCOLOGY | Facility: HOSPITAL | Age: 72
End: 2022-04-15

## 2022-04-15 VITALS
HEART RATE: 77 BPM | TEMPERATURE: 96.8 F | RESPIRATION RATE: 20 BRPM | DIASTOLIC BLOOD PRESSURE: 73 MMHG | SYSTOLIC BLOOD PRESSURE: 162 MMHG

## 2022-04-15 DIAGNOSIS — T45.4X5A ADVERSE EFFECT OF IRON, INITIAL ENCOUNTER: ICD-10-CM

## 2022-04-15 DIAGNOSIS — D50.0 IRON DEFICIENCY ANEMIA DUE TO CHRONIC BLOOD LOSS: Primary | ICD-10-CM

## 2022-04-15 PROCEDURE — 63710000001 DIPHENHYDRAMINE PER 50 MG: Performed by: INTERNAL MEDICINE

## 2022-04-15 PROCEDURE — 25010000002 IRON SUCROSE PER 1 MG: Performed by: INTERNAL MEDICINE

## 2022-04-15 RX ORDER — FAMOTIDINE 20 MG/1
20 TABLET, FILM COATED ORAL ONCE
Status: COMPLETED | OUTPATIENT
Start: 2022-04-15 | End: 2022-04-15

## 2022-04-15 RX ORDER — DIPHENHYDRAMINE HCL 25 MG
25 CAPSULE ORAL ONCE
Status: COMPLETED | OUTPATIENT
Start: 2022-04-15 | End: 2022-04-15

## 2022-04-15 RX ORDER — DIPHENHYDRAMINE HCL 25 MG
25 CAPSULE ORAL ONCE
Status: CANCELLED | OUTPATIENT
Start: 2022-04-17

## 2022-04-15 RX ORDER — FAMOTIDINE 20 MG/1
20 TABLET, FILM COATED ORAL ONCE
Status: CANCELLED | OUTPATIENT
Start: 2022-04-17

## 2022-04-15 RX ORDER — SODIUM CHLORIDE 9 MG/ML
250 INJECTION, SOLUTION INTRAVENOUS ONCE
Status: COMPLETED | OUTPATIENT
Start: 2022-04-15 | End: 2022-04-15

## 2022-04-15 RX ORDER — SODIUM CHLORIDE 9 MG/ML
250 INJECTION, SOLUTION INTRAVENOUS ONCE
Status: CANCELLED | OUTPATIENT
Start: 2022-04-17

## 2022-04-15 RX ADMIN — DIPHENHYDRAMINE HYDROCHLORIDE 25 MG: 25 CAPSULE ORAL at 09:59

## 2022-04-15 RX ADMIN — SODIUM CHLORIDE 250 ML: 9 INJECTION, SOLUTION INTRAVENOUS at 09:59

## 2022-04-15 RX ADMIN — FAMOTIDINE 20 MG: 20 TABLET ORAL at 09:59

## 2022-04-15 RX ADMIN — IRON SUCROSE 200 MG: 20 INJECTION, SOLUTION INTRAVENOUS at 10:32

## 2022-04-18 ENCOUNTER — INFUSION (OUTPATIENT)
Dept: ONCOLOGY | Facility: HOSPITAL | Age: 72
End: 2022-04-18

## 2022-04-18 VITALS
SYSTOLIC BLOOD PRESSURE: 140 MMHG | RESPIRATION RATE: 18 BRPM | HEART RATE: 81 BPM | TEMPERATURE: 98 F | DIASTOLIC BLOOD PRESSURE: 69 MMHG

## 2022-04-18 DIAGNOSIS — T45.4X5A ADVERSE EFFECT OF IRON, INITIAL ENCOUNTER: ICD-10-CM

## 2022-04-18 DIAGNOSIS — D50.0 IRON DEFICIENCY ANEMIA DUE TO CHRONIC BLOOD LOSS: Primary | ICD-10-CM

## 2022-04-18 PROCEDURE — 63710000001 DIPHENHYDRAMINE PER 50 MG: Performed by: INTERNAL MEDICINE

## 2022-04-18 PROCEDURE — 25010000002 IRON SUCROSE PER 1 MG: Performed by: INTERNAL MEDICINE

## 2022-04-18 PROCEDURE — 96365 THER/PROPH/DIAG IV INF INIT: CPT | Performed by: INTERNAL MEDICINE

## 2022-04-18 RX ORDER — SODIUM CHLORIDE 9 MG/ML
250 INJECTION, SOLUTION INTRAVENOUS ONCE
Status: CANCELLED | OUTPATIENT
Start: 2022-04-19

## 2022-04-18 RX ORDER — SODIUM CHLORIDE 9 MG/ML
250 INJECTION, SOLUTION INTRAVENOUS ONCE
Status: COMPLETED | OUTPATIENT
Start: 2022-04-18 | End: 2022-04-18

## 2022-04-18 RX ORDER — DIPHENHYDRAMINE HCL 25 MG
25 CAPSULE ORAL ONCE
Status: CANCELLED | OUTPATIENT
Start: 2022-04-19

## 2022-04-18 RX ORDER — FAMOTIDINE 20 MG/1
20 TABLET, FILM COATED ORAL ONCE
Status: CANCELLED | OUTPATIENT
Start: 2022-04-19

## 2022-04-18 RX ORDER — FAMOTIDINE 20 MG/1
20 TABLET, FILM COATED ORAL ONCE
Status: COMPLETED | OUTPATIENT
Start: 2022-04-18 | End: 2022-04-18

## 2022-04-18 RX ORDER — DIPHENHYDRAMINE HCL 25 MG
25 CAPSULE ORAL ONCE
Status: COMPLETED | OUTPATIENT
Start: 2022-04-18 | End: 2022-04-18

## 2022-04-18 RX ORDER — SPIRONOLACTONE 25 MG/1
TABLET ORAL
Qty: 90 TABLET | Refills: 0 | Status: SHIPPED | OUTPATIENT
Start: 2022-04-18 | End: 2022-05-03

## 2022-04-18 RX ORDER — LANSOPRAZOLE 30 MG
CAPSULE,DELAYED RELEASE (ENTERIC COATED) ORAL
Qty: 90 CAPSULE | Refills: 3 | OUTPATIENT
Start: 2022-04-18

## 2022-04-18 RX ADMIN — FAMOTIDINE 20 MG: 20 TABLET ORAL at 14:58

## 2022-04-18 RX ADMIN — DIPHENHYDRAMINE HYDROCHLORIDE 25 MG: 25 CAPSULE ORAL at 14:58

## 2022-04-18 RX ADMIN — SODIUM CHLORIDE 250 ML: 9 INJECTION, SOLUTION INTRAVENOUS at 14:58

## 2022-04-18 RX ADMIN — IRON SUCROSE 200 MG: 20 INJECTION, SOLUTION INTRAVENOUS at 15:30

## 2022-04-19 ENCOUNTER — TELEPHONE (OUTPATIENT)
Dept: FAMILY MEDICINE CLINIC | Facility: CLINIC | Age: 72
End: 2022-04-19

## 2022-04-19 RX ORDER — LANSOPRAZOLE 30 MG
30 CAPSULE,DELAYED RELEASE (ENTERIC COATED) ORAL DAILY
Qty: 90 CAPSULE | Refills: 3 | Status: SHIPPED | OUTPATIENT
Start: 2022-04-19 | End: 2022-11-28

## 2022-04-19 NOTE — TELEPHONE ENCOUNTER
Prevacid 30 MG capsule        WANTS NAME BRAND SENT TO MAIL ORDER [employer]  90 DAY SUPPLY

## 2022-04-20 ENCOUNTER — INFUSION (OUTPATIENT)
Dept: ONCOLOGY | Facility: HOSPITAL | Age: 72
End: 2022-04-20

## 2022-04-20 VITALS — TEMPERATURE: 97.9 F | HEART RATE: 76 BPM | SYSTOLIC BLOOD PRESSURE: 135 MMHG | DIASTOLIC BLOOD PRESSURE: 61 MMHG

## 2022-04-20 DIAGNOSIS — T45.4X5A ADVERSE EFFECT OF IRON, INITIAL ENCOUNTER: ICD-10-CM

## 2022-04-20 DIAGNOSIS — D50.0 IRON DEFICIENCY ANEMIA DUE TO CHRONIC BLOOD LOSS: Primary | ICD-10-CM

## 2022-04-20 PROCEDURE — 63710000001 DIPHENHYDRAMINE PER 50 MG: Performed by: INTERNAL MEDICINE

## 2022-04-20 PROCEDURE — 96365 THER/PROPH/DIAG IV INF INIT: CPT | Performed by: INTERNAL MEDICINE

## 2022-04-20 PROCEDURE — 25010000002 IRON SUCROSE PER 1 MG: Performed by: INTERNAL MEDICINE

## 2022-04-20 RX ORDER — SODIUM CHLORIDE 9 MG/ML
250 INJECTION, SOLUTION INTRAVENOUS ONCE
Status: CANCELLED | OUTPATIENT
Start: 2022-04-22

## 2022-04-20 RX ORDER — DIPHENHYDRAMINE HCL 25 MG
25 CAPSULE ORAL ONCE
Status: COMPLETED | OUTPATIENT
Start: 2022-04-20 | End: 2022-04-20

## 2022-04-20 RX ORDER — FAMOTIDINE 20 MG/1
20 TABLET, FILM COATED ORAL ONCE
Status: CANCELLED | OUTPATIENT
Start: 2022-04-22

## 2022-04-20 RX ORDER — FAMOTIDINE 20 MG/1
20 TABLET, FILM COATED ORAL ONCE
Status: COMPLETED | OUTPATIENT
Start: 2022-04-20 | End: 2022-04-20

## 2022-04-20 RX ORDER — DIPHENHYDRAMINE HCL 25 MG
25 CAPSULE ORAL ONCE
Status: CANCELLED | OUTPATIENT
Start: 2022-04-22

## 2022-04-20 RX ORDER — SODIUM CHLORIDE 9 MG/ML
250 INJECTION, SOLUTION INTRAVENOUS ONCE
Status: COMPLETED | OUTPATIENT
Start: 2022-04-20 | End: 2022-04-20

## 2022-04-20 RX ADMIN — DIPHENHYDRAMINE HYDROCHLORIDE 25 MG: 25 CAPSULE ORAL at 14:09

## 2022-04-20 RX ADMIN — FAMOTIDINE 20 MG: 20 TABLET ORAL at 14:09

## 2022-04-20 RX ADMIN — SODIUM CHLORIDE 250 ML: 9 INJECTION, SOLUTION INTRAVENOUS at 14:23

## 2022-04-20 RX ADMIN — IRON SUCROSE 200 MG: 20 INJECTION, SOLUTION INTRAVENOUS at 14:39

## 2022-04-22 ENCOUNTER — INFUSION (OUTPATIENT)
Dept: ONCOLOGY | Facility: HOSPITAL | Age: 72
End: 2022-04-22

## 2022-04-22 VITALS — DIASTOLIC BLOOD PRESSURE: 71 MMHG | SYSTOLIC BLOOD PRESSURE: 135 MMHG | TEMPERATURE: 98.1 F | HEART RATE: 76 BPM

## 2022-04-22 DIAGNOSIS — T45.4X5A ADVERSE EFFECT OF IRON, INITIAL ENCOUNTER: ICD-10-CM

## 2022-04-22 DIAGNOSIS — D50.0 IRON DEFICIENCY ANEMIA DUE TO CHRONIC BLOOD LOSS: Primary | ICD-10-CM

## 2022-04-22 PROCEDURE — 63710000001 DIPHENHYDRAMINE PER 50 MG: Performed by: INTERNAL MEDICINE

## 2022-04-22 PROCEDURE — 96365 THER/PROPH/DIAG IV INF INIT: CPT | Performed by: NURSE PRACTITIONER

## 2022-04-22 PROCEDURE — 25010000002 IRON SUCROSE PER 1 MG: Performed by: INTERNAL MEDICINE

## 2022-04-22 RX ORDER — DIPHENHYDRAMINE HCL 25 MG
25 CAPSULE ORAL ONCE
Status: CANCELLED | OUTPATIENT
Start: 2022-04-24

## 2022-04-22 RX ORDER — DIPHENHYDRAMINE HCL 25 MG
25 CAPSULE ORAL ONCE
Status: COMPLETED | OUTPATIENT
Start: 2022-04-22 | End: 2022-04-22

## 2022-04-22 RX ORDER — FAMOTIDINE 20 MG/1
20 TABLET, FILM COATED ORAL ONCE
Status: CANCELLED | OUTPATIENT
Start: 2022-04-24

## 2022-04-22 RX ORDER — FAMOTIDINE 20 MG/1
20 TABLET, FILM COATED ORAL ONCE
Status: COMPLETED | OUTPATIENT
Start: 2022-04-22 | End: 2022-04-22

## 2022-04-22 RX ORDER — SODIUM CHLORIDE 9 MG/ML
250 INJECTION, SOLUTION INTRAVENOUS ONCE
Status: COMPLETED | OUTPATIENT
Start: 2022-04-22 | End: 2022-04-22

## 2022-04-22 RX ORDER — SODIUM CHLORIDE 9 MG/ML
250 INJECTION, SOLUTION INTRAVENOUS ONCE
Status: CANCELLED | OUTPATIENT
Start: 2022-04-24

## 2022-04-22 RX ADMIN — DIPHENHYDRAMINE HYDROCHLORIDE 25 MG: 25 CAPSULE ORAL at 13:31

## 2022-04-22 RX ADMIN — SODIUM CHLORIDE 250 ML: 9 INJECTION, SOLUTION INTRAVENOUS at 13:36

## 2022-04-22 RX ADMIN — IRON SUCROSE 200 MG: 20 INJECTION, SOLUTION INTRAVENOUS at 14:05

## 2022-04-22 RX ADMIN — FAMOTIDINE 20 MG: 20 TABLET ORAL at 13:31

## 2022-04-25 ENCOUNTER — INFUSION (OUTPATIENT)
Dept: ONCOLOGY | Facility: HOSPITAL | Age: 72
End: 2022-04-25

## 2022-04-25 VITALS — RESPIRATION RATE: 18 BRPM | DIASTOLIC BLOOD PRESSURE: 75 MMHG | HEART RATE: 78 BPM | SYSTOLIC BLOOD PRESSURE: 155 MMHG

## 2022-04-25 DIAGNOSIS — T45.4X5A ADVERSE EFFECT OF IRON, INITIAL ENCOUNTER: ICD-10-CM

## 2022-04-25 DIAGNOSIS — D50.0 IRON DEFICIENCY ANEMIA DUE TO CHRONIC BLOOD LOSS: Primary | ICD-10-CM

## 2022-04-25 PROCEDURE — 25010000002 IRON SUCROSE PER 1 MG: Performed by: INTERNAL MEDICINE

## 2022-04-25 PROCEDURE — 96365 THER/PROPH/DIAG IV INF INIT: CPT | Performed by: INTERNAL MEDICINE

## 2022-04-25 PROCEDURE — 63710000001 DIPHENHYDRAMINE PER 50 MG: Performed by: INTERNAL MEDICINE

## 2022-04-25 RX ORDER — DIPHENHYDRAMINE HCL 25 MG
25 CAPSULE ORAL ONCE
Status: COMPLETED | OUTPATIENT
Start: 2022-04-25 | End: 2022-04-25

## 2022-04-25 RX ORDER — DIPHENHYDRAMINE HCL 25 MG
25 CAPSULE ORAL ONCE
Status: CANCELLED | OUTPATIENT
Start: 2022-04-26

## 2022-04-25 RX ORDER — FAMOTIDINE 20 MG/1
20 TABLET, FILM COATED ORAL ONCE
Status: CANCELLED | OUTPATIENT
Start: 2022-04-26

## 2022-04-25 RX ORDER — FAMOTIDINE 20 MG/1
20 TABLET, FILM COATED ORAL ONCE
Status: COMPLETED | OUTPATIENT
Start: 2022-04-25 | End: 2022-04-25

## 2022-04-25 RX ORDER — SODIUM CHLORIDE 9 MG/ML
250 INJECTION, SOLUTION INTRAVENOUS ONCE
Status: CANCELLED | OUTPATIENT
Start: 2022-04-26

## 2022-04-25 RX ORDER — SODIUM CHLORIDE 9 MG/ML
250 INJECTION, SOLUTION INTRAVENOUS ONCE
Status: COMPLETED | OUTPATIENT
Start: 2022-04-25 | End: 2022-04-25

## 2022-04-25 RX ADMIN — SODIUM CHLORIDE 250 ML: 9 INJECTION, SOLUTION INTRAVENOUS at 13:39

## 2022-04-25 RX ADMIN — DIPHENHYDRAMINE HYDROCHLORIDE 25 MG: 25 CAPSULE ORAL at 13:35

## 2022-04-25 RX ADMIN — IRON SUCROSE 200 MG: 20 INJECTION, SOLUTION INTRAVENOUS at 14:05

## 2022-04-25 RX ADMIN — FAMOTIDINE 20 MG: 20 TABLET ORAL at 13:35

## 2022-05-03 RX ORDER — LANOLIN ALCOHOL/MO/W.PET/CERES
CREAM (GRAM) TOPICAL
Qty: 90 TABLET | Refills: 1 | Status: SHIPPED | OUTPATIENT
Start: 2022-05-03 | End: 2023-02-01

## 2022-05-03 RX ORDER — FAMOTIDINE 40 MG/1
40 TABLET, FILM COATED ORAL NIGHTLY
Qty: 90 TABLET | Refills: 1 | Status: SHIPPED | OUTPATIENT
Start: 2022-05-03 | End: 2023-04-05

## 2022-05-03 RX ORDER — SPIRONOLACTONE 25 MG/1
TABLET ORAL
Qty: 90 TABLET | Refills: 1 | Status: SHIPPED | OUTPATIENT
Start: 2022-05-03 | End: 2023-01-11

## 2022-05-03 NOTE — TELEPHONE ENCOUNTER
Rx Refill Note  Requested Prescriptions     Pending Prescriptions Disp Refills   • vitamin B-12 (CYANOCOBALAMIN) 1000 MCG tablet [Pharmacy Med Name: VITAMIN B-12 1000 MCG Tablet] 90 tablet 1     Sig: TAKE 1 TABLET EVERY DAY      Last office visit with prescribing clinician: 4/6/2022      Next office visit with prescribing clinician: 7/14/2022            Bren Vogel RN  05/03/22, 11:21 CDT

## 2022-05-09 ENCOUNTER — OFFICE VISIT (OUTPATIENT)
Dept: SURGERY | Facility: CLINIC | Age: 72
End: 2022-05-09

## 2022-05-09 VITALS
WEIGHT: 258 LBS | HEIGHT: 61 IN | TEMPERATURE: 96.9 F | SYSTOLIC BLOOD PRESSURE: 140 MMHG | BODY MASS INDEX: 48.71 KG/M2 | HEART RATE: 61 BPM | DIASTOLIC BLOOD PRESSURE: 80 MMHG

## 2022-05-09 DIAGNOSIS — K51.40 PSEUDOPOLYP OF ASCENDING COLON WITHOUT COMPLICATION: Primary | ICD-10-CM

## 2022-05-09 PROCEDURE — 99212 OFFICE O/P EST SF 10 MIN: CPT | Performed by: SURGERY

## 2022-05-09 RX ORDER — AMLODIPINE BESYLATE 5 MG/1
1 TABLET ORAL 2 TIMES DAILY
COMMUNITY
Start: 2022-04-18 | End: 2022-11-28

## 2022-05-09 RX ORDER — ROSUVASTATIN CALCIUM 40 MG/1
TABLET, COATED ORAL
COMMUNITY
Start: 2022-05-03 | End: 2022-07-28

## 2022-05-09 NOTE — PROGRESS NOTES
72-year-old female who is here to follow-up after recent colonoscopy.  This was done for history of prior tubular adenomatous polyps and previous anemia work-up 3 years ago.  Patient's prep was adequate identify larger polyps.  She did well with the procedure no complaints.  She had a 10 mm polyp in the cecum that biopsy demonstrates this to be an inflammatory pseudo polyp with reactive colonic and intestinal mucosa.  No ulceration granulomas or crypt abscesses.  Initial report suggest this polyp 15 mm but reviewing the pictures I think it is more along the lines of about 10 mm.  I went over these results with her.  Answered all of her questions.  Recommend we repeat her colonoscopy in 3 years or sooner she has any other concerns or questions

## 2022-05-17 ENCOUNTER — PRIOR AUTHORIZATION (OUTPATIENT)
Dept: FAMILY MEDICINE CLINIC | Facility: CLINIC | Age: 72
End: 2022-05-17

## 2022-05-17 NOTE — TELEPHONE ENCOUNTER
5/17/2022  Lois Cook Key: [id number] - Rx #: [id number] help? Call us at [phone]  Outcome  Additional Information Required  Available without authorization.  Drug  Prevacid 30MG dr capsule    5/17/2022 Called james  and completed a verbal pa reference #[id number] due to numerous pa request received.  Cover My Meds Pa request state NO Pa Needed.

## 2022-05-23 ENCOUNTER — OFFICE VISIT (OUTPATIENT)
Dept: PODIATRY | Facility: CLINIC | Age: 72
End: 2022-05-23

## 2022-05-23 VITALS — HEART RATE: 61 BPM | WEIGHT: 258 LBS | BODY MASS INDEX: 48.71 KG/M2 | HEIGHT: 61 IN | OXYGEN SATURATION: 98 %

## 2022-05-23 DIAGNOSIS — M79.675 PAIN IN TOES OF BOTH FEET: ICD-10-CM

## 2022-05-23 DIAGNOSIS — E11.9 ENCOUNTER FOR DIABETIC FOOT EXAM: Primary | ICD-10-CM

## 2022-05-23 DIAGNOSIS — M79.674 PAIN IN TOES OF BOTH FEET: ICD-10-CM

## 2022-05-23 DIAGNOSIS — E11.8 DIABETIC FOOT: ICD-10-CM

## 2022-05-23 DIAGNOSIS — B35.1 ONYCHOMYCOSIS: ICD-10-CM

## 2022-05-23 PROCEDURE — 11721 DEBRIDE NAIL 6 OR MORE: CPT | Performed by: PODIATRIST

## 2022-05-23 NOTE — PROGRESS NOTES
Lois Parmar  1950  72 y.o. female  PCP: Marshal Warner MD: 2/8/22  BS: 98 per pt   A1C: 6.57 1/26/2022 05/23/2022      Chief Complaint   Patient presents with   • Left Foot - Follow-up     Diabetic foot care   • Right Foot - Follow-up     Diabetic foot care           History of Present Illness    Lois Parmar is a 72 y.o. female who presents for diabetic foot exam, nail care.  No new issues.    Past Medical History:   Diagnosis Date   • Acquired hypothyroidism - On Synthroid    • Allergic rhinitis    • Allergy to alpha-gal 7/17/2020   • Anemia    • Asthma - mild intermittent    • Cardiac disease    • Carpal tunnel syndrome - Bilateral    • Colitis    • Degeneration of cervical intervertebral disc    • Diabetes ([facility])    • Disorder of lumbar spine    • Diverticulosis of large intestine 1/17/2019   • Essential hypertension    • Essential hypertension    • Fatigue    • History of myocardial infarct at age greater than 60 years 10/17/2018   • Hyperlipidemia - Improved with Zocor    • Hyperlipidemia associated with type 2 diabetes mellitus ([facility]) 7/28/2021   • Hypertriglyceridemia 1/18/2021   • IFG (impaired fasting glucose) 10/17/2018   • Impaired fasting glycaemia    • Iron deficiency anemia - Improved    • Lumbar radiculopathy    • Megaloblastic anemia due to vitamin B>12< deficiency    • Moderate persistent asthma without complication 4/9/2021   • Morbid obesity ([facility])    • Osteoarthritis of knee - Bilateral    • Osteoarthritis of multiple joints - Left knee    • Osteoporosis    • Pain in left hip    • Sleep apnea - On CPAP    • Spasm of back muscles    • Stage 3 chronic kidney disease ([facility]) 10/23/2019   • Stage 3a chronic kidney disease ([facility]) 10/23/2019   • Thyroid dysfunction    • Tubular adenoma of colon - removed during colonoscopy, 11/2018. 12/3/2018   • Type 2 diabetes mellitus with stage 3a chronic kidney disease, without long-term current use of insulin ([facility]) 4/9/2021   • Type 2 diabetes  mellitus without complication, without long-term current use of insulin ([facility]) 4/9/2021   • Vitamin D deficiency          Past Surgical History:   Procedure Laterality Date   • CARPAL TUNNEL RELEASE Right    • COLONOSCOPY N/A 11/30/2018    Procedure: COLONOSCOPY;  Surgeon: Jimmie Sutton MD;  Location: [facility] ENDOSCOPY;  Service: General   • COLONOSCOPY N/A 4/4/2022    Procedure: COLONOSCOPY;  Surgeon: Jimmie Sutton MD;  Location: [facility] ENDOSCOPY;  Service: General;  Laterality: N/A;  tattoo 7 ml  @ cecum    • ENDOSCOPY  09/17/2012    Normal esophagus.  Gastritis.  Normal duodenum   • ENDOSCOPY N/A 11/30/2018    Procedure: ESOPHAGOGASTRODUODENOSCOPY WITH ANESTHESIA;  Surgeon: Jimmie Sutton MD;  Location: [facility] ENDOSCOPY;  Service: General   • ENDOSCOPY AND COLONOSCOPY  09/17/2012    Internal & external hemorrhoids found.   • HYSTERECTOMY  1980    w bso   • JOINT REPLACEMENT      left knee   • LUMBAR LAMINECTOMY  2011    s/p lumbar laminectomy and fusion   • NECK SURGERY     • OOPHORECTOMY  1980   • SHOULDER SURGERY  12/07/2015    Arthroscopy of the left shoudler with rotator cuff repair, Vijay procedure, and subacromial decompression.   • SHOULDER SURGERY Right    • TOTAL KNEE ARTHROPLASTY Left          Family History   Problem Relation Age of Onset   • Breast cancer Mother    • Ovarian cancer Mother    • Cancer Mother    • Heart disease Mother    • Osteoporosis Mother    • Diabetes Mother    • Hypertension Mother    • Breast cancer Sister    • Cancer Sister    • Osteoporosis Sister    • Breast cancer Other    • Heart disease Other    • Hypertension Other    • Lung disease Other    • Diabetes Other    • Cancer Other    • Cancer Brother          Social History     Socioeconomic History   • Marital status:    Tobacco Use   • Smoking status: Never Smoker   • Smokeless tobacco: Never Used   Vaping Use   • Vaping Use: Never used   Substance and Sexual Activity   • Alcohol use: No   • Drug use: No    • Sexual activity: Defer         Current Outpatient Medications   Medication Sig Dispense Refill   • albuterol sulfate  (90 Base) MCG/ACT inhaler Inhale 2 puffs Every 4 (Four) Hours As Needed for Wheezing. 18 g 11   • amLODIPine (NORVASC) 5 MG tablet Take 1 tablet by mouth 2 (Two) Times a Day.     • ascorbic acid (VITAMIN C) 1000 MG tablet Take 1,000 mg by mouth Daily.     • aspirin 81 MG chewable tablet Chew 81 mg Daily.     • Breo Ellipta 100-25 MCG/INH inhaler INHALE 1 PUFF BY MOUTH DAILY **RINSE MOUTH AFTER EACH USE**     • Cholecalciferol 125 MCG (5000 UT) tablet Take 5,000 Units by mouth Daily.     • Cymbalta 60 MG capsule TAKE 1 CAPSULE EVERY DAY 90 capsule 3   • diphenhydrAMINE (BENADRYL) 25 mg capsule Take 50 mg by mouth.     • docusate sodium (COLACE) 100 MG capsule Take 100 mg by mouth 2 (Two) Times a Day As Needed.     • EPINEPHrine (EPIPEN) 0.3 MG/0.3ML solution auto-injector injection Inject 0.3 mg into the appropriate muscle as directed by prescriber.     • ezetimibe (ZETIA) 10 MG tablet Take 1 tablet by mouth Daily.     • famotidine (PEPCID) 40 MG tablet TAKE 1 TABLET BY MOUTH EVERY NIGHT. THIS REPLACES ZANTAC 90 tablet 1   • folic acid (FOLVITE) 1 MG tablet Take 1 tablet by mouth 3 (Three) Times a Week. 36 tablet 1   • glucose blood test strip tests BID, Diagnosis: 250.00, 401.9     • levothyroxine (SYNTHROID, LEVOTHROID) 88 MCG tablet TAKE 1 TABLET EVERY DAY 90 tablet 3   • loratadine (CLARITIN) 10 MG tablet Take 10 mg by mouth daily.     • metFORMIN (GLUCOPHAGE) 500 MG tablet Take 1 tablet by mouth 2 (Two) Times a Day With Meals. 180 tablet 3   • olmesartan (BENICAR) 20 MG tablet Take 40 mg by mouth Daily.     • Prevacid 30 MG capsule Take 1 capsule by mouth Daily. 90 capsule 3   • rosuvastatin (CRESTOR) 40 MG tablet      • simvastatin (ZOCOR) 40 MG tablet TAKE 1 TABLET EVERY NIGHT 90 tablet 3   • spironolactone (ALDACTONE) 25 MG tablet TAKE 1 TABLET EVERY DAY FOR FLUID 90 tablet 1   •  "vitamin B-12 (CYANOCOBALAMIN) 1000 MCG tablet TAKE 1 TABLET EVERY DAY 90 tablet 1   • indapamide (LOZOL) 2.5 MG tablet Take 2.5 mg by mouth Daily.       No current facility-administered medications for this visit.         OBJECTIVE    Pulse 61   Ht 154.9 cm (61\")   Wt 117 kg (258 lb)   SpO2 98%   BMI 48.75 kg/m²       Review of Systems   Constitutional: Positive for fatigue.   HENT: Positive for hearing loss.    Eyes: Negative.    Respiratory: Positive for shortness of breath and wheezing.    Cardiovascular: Negative.    Gastrointestinal: Positive for constipation.   Endocrine: Negative.    Genitourinary: Negative.    Musculoskeletal: Positive for back pain and joint swelling.        Joint pain  Ankle pain   Skin: Negative.    Allergic/Immunologic: Negative.    Neurological: Positive for numbness.   Hematological: Negative.    Psychiatric/Behavioral: Negative.          Diabetic Foot Exam Performed and Monofilament Test Performed       Constitutional: she appears well-developed and well-nourished.   HEENT: Normocephalic. Atraumatic  CV: No tenderness. RRR  Resp: Non-labored respiration. No wheezes.   Psychiatric: she has a normal mood and affect. her   behavior is normal.      Lower Extremity Exam:  Vascular: DP/PT pulses palpable 1+.   Negative hair growth.   1+ perimalleolar edema  Neuro: Protective sensation intact, b/l.  Light touch sensation intact, b/l  DTRs intact  Integument: No open wounds or lesions.  Skin quality normal  Nails 1-5 b/l thickened, elongated with subungual debris. +pain on palpation*  No masses  Webspaces c/d/i  Musculoskeletal: LE muscle strength 4/5  Gait assisted with cane  Mild, flexible hammertoe deformity toes 2 through 4 bilateral.  Ankle ROM normal, b/l              ASSESSMENT AND PLAN    Diagnoses and all orders for this visit:    1. Encounter for diabetic foot exam (HCC) (Primary)    2. Diabetic foot (HCC)    3. Onychomycosis    4. Pain in toes of both feet      -Comprehensive " DM foot exam performed. Patient educated on importance of tight glucose control and daily foot checks.   -Patient educated on padding techniques for hammertoes.  Proper extra depth diabetic shoe gear.  Limit barefoot walking.  -Nails 1-5 b/l debrided in length and thickness with nail nipper to decrease pain, fungal load and risk of infection  -Follow up 3 months PRN          This document has been electronically signed by Xander Burch DPM on May 23, 2022 14:31 CDT       Much of this encounter note is an electronic transcription/translation of spoken language to printed text.   Xander Burch DPM  5/23/2022  14:31 CDT

## 2022-06-10 ENCOUNTER — OFFICE VISIT (OUTPATIENT)
Dept: FAMILY MEDICINE CLINIC | Facility: CLINIC | Age: 72
End: 2022-06-10

## 2022-06-10 VITALS
WEIGHT: 257.6 LBS | TEMPERATURE: 98 F | DIASTOLIC BLOOD PRESSURE: 70 MMHG | SYSTOLIC BLOOD PRESSURE: 120 MMHG | HEART RATE: 92 BPM | BODY MASS INDEX: 48.64 KG/M2 | HEIGHT: 61 IN

## 2022-06-10 DIAGNOSIS — M54.41 CHRONIC BILATERAL LOW BACK PAIN WITH BILATERAL SCIATICA: Chronic | ICD-10-CM

## 2022-06-10 DIAGNOSIS — E66.01 MORBID OBESITY: Chronic | ICD-10-CM

## 2022-06-10 DIAGNOSIS — M54.42 CHRONIC BILATERAL LOW BACK PAIN WITH BILATERAL SCIATICA: Chronic | ICD-10-CM

## 2022-06-10 DIAGNOSIS — M47.12 OSTEOARTHRITIS OF CERVICAL SPINE WITH MYELOPATHY: Primary | ICD-10-CM

## 2022-06-10 DIAGNOSIS — M62.81 MUSCLE WEAKNESS OF ALL 4 EXTREMITIES: ICD-10-CM

## 2022-06-10 DIAGNOSIS — Z91.018 ALLERGY TO ALPHA-GAL: Chronic | ICD-10-CM

## 2022-06-10 DIAGNOSIS — R53.81 PHYSICAL DECONDITIONING: ICD-10-CM

## 2022-06-10 DIAGNOSIS — G89.29 CHRONIC BILATERAL LOW BACK PAIN WITH BILATERAL SCIATICA: Chronic | ICD-10-CM

## 2022-06-10 DIAGNOSIS — I10 ESSENTIAL HYPERTENSION: Chronic | ICD-10-CM

## 2022-06-10 PROBLEM — B39.9 HISTOPLASMOSIS: Status: ACTIVE | Noted: 2022-06-10

## 2022-06-10 PROCEDURE — 99214 OFFICE O/P EST MOD 30 MIN: CPT | Performed by: INTERNAL MEDICINE

## 2022-06-10 NOTE — PATIENT INSTRUCTIONS
Exercising to Lose Weight  Exercise is structured, repetitive physical activity to improve fitness and health. Getting regular exercise is important for everyone. It is especially important if you are overweight. Being overweight increases your risk of heart disease, stroke, diabetes, high blood pressure, and several types of cancer. Reducing your calorie intake and exercising can help you lose weight.  Exercise is usually categorized as moderate or vigorous intensity. To lose weight, most people need to do a certain amount of moderate-intensity or vigorous-intensity exercise each week.  Moderate-intensity exercise    Moderate-intensity exercise is any activity that gets you moving enough to burn at least three times more energy (calories) than if you were sitting.  Examples of moderate exercise include:  Walking a mile in 15 minutes.  Doing light yard work.  Biking at an easy pace.  Most people should get at least 150 minutes (2 hours and 30 minutes) a week of moderate-intensity exercise to maintain their body weight.  Vigorous-intensity exercise  Vigorous-intensity exercise is any activity that gets you moving enough to burn at least six times more calories than if you were sitting. When you exercise at this intensity, you should be working hard enough that you are not able to carry on a conversation.  Examples of vigorous exercise include:  Running.  Playing a team sport, such as football, basketball, and soccer.  Jumping rope.  Most people should get at least 75 minutes (1 hour and 15 minutes) a week of vigorous-intensity exercise to maintain their body weight.  How can exercise affect me?  When you exercise enough to burn more calories than you eat, you lose weight. Exercise also reduces body fat and builds muscle. The more muscle you have, the more calories you burn. Exercise also:  Improves mood.  Reduces stress and tension.  Improves your overall fitness, flexibility, and endurance.  Increases bone  strength.  The amount of exercise you need to lose weight depends on:  Your age.  The type of exercise.  Any health conditions you have.  Your overall physical ability.  Talk to your health care provider about how much exercise you need and what types of activities are safe for you.  What actions can I take to lose weight?  Nutrition    Make changes to your diet as told by your health care provider or diet and nutrition specialist (dietitian). This may include:  Eating fewer calories.  Eating more protein.  Eating less unhealthy fats.  Eating a diet that includes fresh fruits and vegetables, whole grains, low-fat dairy products, and lean protein.  Avoiding foods with added fat, salt, and sugar.  Drink plenty of water while you exercise to prevent dehydration or heat stroke.    Activity  Choose an activity that you enjoy and set realistic goals. Your health care provider can help you make an exercise plan that works for you.  Exercise at a moderate or vigorous intensity most days of the week.  The intensity of exercise may vary from person to person. You can tell how intense a workout is for you by paying attention to your breathing and heartbeat. Most people will notice their breathing and heartbeat get faster with more intense exercise.  Do resistance training twice each week, such as:  Push-ups.  Sit-ups.  Lifting weights.  Using resistance bands.  Getting short amounts of exercise can be just as helpful as long structured periods of exercise. If you have trouble finding time to exercise, try to include exercise in your daily routine.  Get up, stretch, and walk around every 30 minutes throughout the day.  Go for a walk during your lunch break.  Park your car farther away from your destination.  If you take public transportation, get off one stop early and walk the rest of the way.  Make phone calls while standing up and walking around.  Take the stairs instead of elevators or escalators.  Wear comfortable clothes  and shoes with good support.  Do not exercise so much that you hurt yourself, feel dizzy, or get very short of breath.  Where to find more information  [place] Department of Health and Human Services: www.hhs.gov  Centers for Disease Control and Prevention (CDC): www.cdc.gov  Contact a health care provider:  Before starting a new exercise program.  If you have questions or concerns about your weight.  If you have a medical problem that keeps you from exercising.  Get help right away if you have any of the following while exercising:  Injury.  Dizziness.  Difficulty breathing or shortness of breath that does not go away when you stop exercising.  Chest pain.  Rapid heartbeat.  Summary  Being overweight increases your risk of heart disease, stroke, diabetes, high blood pressure, and several types of cancer.  Losing weight happens when you burn more calories than you eat.  Reducing the amount of calories you eat in addition to getting regular moderate or vigorous exercise each week helps you lose weight.  This information is not intended to replace advice given to you by your health care provider. Make sure you discuss any questions you have with your health care provider.  Document Revised: 04/15/2021 Document Reviewed: 04/15/2021  [employer] Patient Education © 2021 [employer]  Calorie Counting for Weight Loss  Calories are units of energy. Your body needs a certain number of calories from food to keep going throughout the day. When you eat or drink more calories than your body needs, your body stores the extra calories mostly as fat. When you eat or drink fewer calories than your body needs, your body burns fat to get the energy it needs.  Calorie counting means keeping track of how many calories you eat and drink each day. Calorie counting can be helpful if you need to lose weight. If you eat fewer calories than your body needs, you should lose weight. Ask your health care provider what a healthy weight is for  you.  For calorie counting to work, you will need to eat the right number of calories each day to lose a healthy amount of weight per week. A dietitian can help you figure out how many calories you need in a day and will suggest ways to reach your calorie goal.  A healthy amount of weight to lose each week is usually 1-2 lb (0.5-0.9 kg). This usually means that your daily calorie intake should be reduced by 500-750 calories.  Eating 1,200-1,500 calories a day can help most women lose weight.  Eating 1,500-1,800 calories a day can help most men lose weight.  What do I need to know about calorie counting?  Work with your health care provider or dietitian to determine how many calories you should get each day. To meet your daily calorie goal, you will need to:  Find out how many calories are in each food that you would like to eat. Try to do this before you eat.  Decide how much of the food you plan to eat.  Keep a food log. Do this by writing down what you ate and how many calories it had.  To successfully lose weight, it is important to balance calorie counting with a healthy lifestyle that includes regular activity.  Where do I find calorie information?    The number of calories in a food can be found on a Nutrition Facts label. If a food does not have a Nutrition Facts label, try to look up the calories online or ask your dietitian for help.  Remember that calories are listed per serving. If you choose to have more than one serving of a food, you will have to multiply the calories per serving by the number of servings you plan to eat. For example, the label on a package of bread might say that a serving size is 1 slice and that there are 90 calories in a serving. If you eat 1 slice, you will have eaten 90 calories. If you eat 2 slices, you will have eaten 180 calories.  How do I keep a food log?  After each time that you eat, record the following in your food log as soon as possible:  What you ate. Be sure to  include toppings, sauces, and other extras on the food.  How much you ate. This can be measured in cups, ounces, or number of items.  How many calories were in each food and drink.  The total number of calories in the food you ate.  Keep your food log near you, such as in a pocket-sized notebook or on an shad or website on your mobile phone. Some programs will calculate calories for you and show you how many calories you have left to meet your daily goal.  What are some portion-control tips?  Know how many calories are in a serving. This will help you know how many servings you can have of a certain food.  Use a measuring cup to measure serving sizes. You could also try weighing out portions on a kitchen scale. With time, you will be able to estimate serving sizes for some foods.  Take time to put servings of different foods on your favorite plates or in your favorite bowls and cups so you know what a serving looks like.  Try not to eat straight from a food's packaging, such as from a bag or box. Eating straight from the package makes it hard to see how much you are eating and can lead to overeating. Put the amount you would like to eat in a cup or on a plate to make sure you are eating the right portion.  Use smaller plates, glasses, and bowls for smaller portions and to prevent overeating.  Try not to multitask. For example, avoid watching TV or using your computer while eating. If it is time to eat, sit down at a table and enjoy your food. This will help you recognize when you are full. It will also help you be more mindful of what and how much you are eating.  What are tips for following this plan?  Reading food labels  Check the calorie count compared with the serving size. The serving size may be smaller than what you are used to eating.  Check the source of the calories. Try to choose foods that are high in protein, fiber, and vitamins, and low in saturated fat, trans fat, and sodium.  Shopping  Read nutrition  labels while you shop. This will help you make healthy decisions about which foods to buy.  Pay attention to nutrition labels for low-fat or fat-free foods. These foods sometimes have the same number of calories or more calories than the full-fat versions. They also often have added sugar, starch, or salt to make up for flavor that was removed with the fat.  Make a grocery list of lower-calorie foods and stick to it.  Cooking  Try to cook your favorite foods in a healthier way. For example, try baking instead of frying.  Use low-fat dairy products.  Meal planning  Use more fruits and vegetables. One-half of your plate should be fruits and vegetables.  Include lean proteins, such as chicken, turkey, and fish.  Lifestyle  Each week, aim to do one of the followin minutes of moderate exercise, such as walking.  75 minutes of vigorous exercise, such as running.  General information  Know how many calories are in the foods you eat most often. This will help you calculate calorie counts faster.  Find a way of tracking calories that works for you. Get creative. Try different apps or programs if writing down calories does not work for you.  What foods should I eat?    Eat nutritious foods. It is better to have a nutritious, high-calorie food, such as an avocado, than a food with few nutrients, such as a bag of potato chips.  Use your calories on foods and drinks that will fill you up and will not leave you hungry soon after eating.  Examples of foods that fill you up are nuts and nut butters, vegetables, lean proteins, and high-fiber foods such as whole grains. High-fiber foods are foods with more than 5 g of fiber per serving.  Pay attention to calories in drinks. Low-calorie drinks include water and unsweetened drinks.  The items listed above may not be a complete list of foods and beverages you can eat. Contact a dietitian for more information.  What foods should I limit?  Limit foods or drinks that are not good  sources of vitamins, minerals, or protein or that are high in unhealthy fats. These include:  Candy.  Other sweets.  Sodas, specialty coffee drinks, alcohol, and juice.  The items listed above may not be a complete list of foods and beverages you should avoid. Contact a dietitian for more information.  How do I count calories when eating out?  Pay attention to portions. Often, portions are much larger when eating out. Try these tips to keep portions smaller:  Consider sharing a meal instead of getting your own.  If you get your own meal, eat only half of it. Before you start eating, ask for a container and put half of your meal into it.  When available, consider ordering smaller portions from the menu instead of full portions.  Pay attention to your food and drink choices. Knowing the way food is cooked and what is included with the meal can help you eat fewer calories.  If calories are listed on the menu, choose the lower-calorie options.  Choose dishes that include vegetables, fruits, whole grains, low-fat dairy products, and lean proteins.  Choose items that are boiled, broiled, grilled, or steamed. Avoid items that are buttered, battered, fried, or served with cream sauce. Items labeled as crispy are usually fried, unless stated otherwise.  Choose water, low-fat milk, unsweetened iced tea, or other drinks without added sugar. If you want an alcoholic beverage, choose a lower-calorie option, such as a glass of wine or light beer.  Ask for dressings, sauces, and syrups on the side. These are usually high in calories, so you should limit the amount you eat.  If you want a salad, choose a garden salad and ask for grilled meats. Avoid extra toppings such as sullivan, cheese, or fried items. Ask for the dressing on the side, or ask for olive oil and vinegar or lemon to use as dressing.  Estimate how many servings of a food you are given. Knowing serving sizes will help you be aware of how much food you are eating at  restaurants.  Where to find more information  Centers for Disease Control and Prevention: www.cdc.gov  [place]. Department of Agriculture: myplate.gov  Summary  Calorie counting means keeping track of how many calories you eat and drink each day. If you eat fewer calories than your body needs, you should lose weight.  A healthy amount of weight to lose per week is usually 1-2 lb (0.5-0.9 kg). This usually means reducing your daily calorie intake by 500-750 calories.  The number of calories in a food can be found on a Nutrition Facts label. If a food does not have a Nutrition Facts label, try to look up the calories online or ask your dietitian for help.  Use smaller plates, glasses, and bowls for smaller portions and to prevent overeating.  Use your calories on foods and drinks that will fill you up and not leave you hungry shortly after a meal.  This information is not intended to replace advice given to you by your health care provider. Make sure you discuss any questions you have with your health care provider.  Document Revised: 01/28/2021 Document Reviewed: 01/28/2021  [employer] Patient Education © 2021 [employer]

## 2022-06-10 NOTE — PROGRESS NOTES
"Chief Complaint  Neck Pain (She had surgery 20 years ago by Dr. Cooper and wants to see him again. That office told her she has to see PCP first for xrays and MRI )    Subjective        History of Present Illness     Lois Parmar presents to the office reporting posterior neck pain with muscle spasm as well as upper and lower extremity weakness with the upper extremity weakness more impressive.  Patient was seen at [facility] in 2015 where cervical MRI ordered by Dr. Way showed spondylosis with moderate cord compression below prior ACDF. She has been having weekly massage by her daughter, a massage therapist, which gives 4 to 5 days of relief.     She describes a new problem of progressive weakness.  She can stand for no more then 10 to 15 minutes without experiencing weakness and reports she had to stop and rest on her way from the parking lot to office.  Patient denies diplopia or weakness in the neck.  Her motor strength of upper and lower extremities is not at her baseline.  It is unclear if this is due to physical deconditioning, multifactorial, or perhaps some onset of a neurodegenerative disorder.    Patient zeroed out on alpha gal titers followed by Dr. Luz Butts, allergist, and is gradually introducing beef.     Objective   Vital Signs:  /70   Pulse 92   Temp 98 °F (36.7 °C) (Tympanic)   Ht 154.9 cm (61\")   Wt 117 kg (257 lb 9.6 oz)   BMI 48.67 kg/m²   Estimated body mass index is 48.67 kg/m² as calculated from the following:    Height as of this encounter: 154.9 cm (61\").    Weight as of this encounter: 117 kg (257 lb 9.6 oz).          Physical Exam  Vitals reviewed.   Constitutional:       General: She is not in acute distress.     Appearance: She is well-developed.      Comments: Pleasant morbidly obese female.    HENT:      Head: Normocephalic and atraumatic.      Nose:      Right Sinus: No maxillary sinus tenderness or frontal sinus tenderness.      Left Sinus: No " maxillary sinus tenderness or frontal sinus tenderness.      Mouth/Throat:      Mouth: No oral lesions.      Pharynx: Uvula midline.      Tonsils: No tonsillar exudate.   Eyes:      Conjunctiva/sclera: Conjunctivae normal.      Pupils: Pupils are equal, round, and reactive to light.   Neck:      Thyroid: No thyroid mass or thyromegaly.      Vascular: No carotid bruit or JVD.      Trachea: Trachea normal. No tracheal deviation.   Cardiovascular:      Rate and Rhythm: Normal rate and regular rhythm.  No extrasystoles are present.     Chest Wall: PMI is not displaced.      Heart sounds: Normal heart sounds. No murmur heard.  Pulmonary:      Effort: Pulmonary effort is normal. No accessory muscle usage or respiratory distress.      Breath sounds: Normal breath sounds. No decreased breath sounds, wheezing, rhonchi or rales.   Abdominal:      General: Bowel sounds are normal. There is no distension.      Palpations: Abdomen is soft.      Tenderness: There is no abdominal tenderness.      Comments: Obese abdomen.    Musculoskeletal:      Cervical back: Neck supple.   Lymphadenopathy:      Cervical: No cervical adenopathy.   Skin:     General: Skin is warm and dry.      Findings: No rash.      Nails: There is no clubbing.   Neurological:      Mental Status: She is alert and oriented to person, place, and time.      Cranial Nerves: No cranial nerve deficit.      Coordination: Coordination normal.      Comments: Neuro exam reveals 4/5  strength symmetrically, 3+/5 extensor and flexor strength bilateral upper extremities, 4+/5 strength bilateral distal lower extremities and  4/5 proximal lower extremities.    Psychiatric:         Speech: Speech normal.         Behavior: Behavior normal.         Thought Content: Thought content normal.         Judgment: Judgment normal.        Future Appointments   Date Time Provider Department [place]   7/14/2022  2:30 PM Ranjeet De La Torre MD [facility] ONC POW [facility]   8/16/2022 10:30 AM Marshal Warner,  MD W [facility]   8/23/2022  1:15 PM Xander Burch, ZULLY [facility] POD [facility]       Result Review :    Common labs    Common Labsle 1/28/22 4/5/22 4/5/22 5/2/22     1245 1245    Glucose   107 (A)    BUN   26 (A)    Creatinine   1.14 (A)    Sodium   140    Potassium   4.0    Chloride   104    Calcium   9.9    Albumin   4.70    Total Bilirubin   0.4    Alkaline Phosphatase   42    AST (SGOT)   25    ALT (SGPT)   17    WBC  7.60     Hemoglobin  11.9 (A)     Hematocrit  36.3     Platelets  297     Total Cholesterol    127   Triglycerides    206 (A)   HDL Cholesterol    52   LDL Cholesterol     34   Microalbumin, Urine 1.9      (A) Abnormal value       Comments are available for some flowsheets but are not being displayed.           Data reviewed: Radiologic studies Cervical spine x-rays       Future Appointments   Date Time Provider Department Center   7/14/2022  2:30 PM Ranjeet De La Torre MD [facility] ONC [facility]   8/16/2022 10:30 AM Marshal Warner MD [facility]   8/23/2022  1:15 PM Xander Burch, [place] [facility]        Assessment and Plan   Diagnoses and all orders for this visit:    1. Osteoarthritis of cervical spine with myelopathy (Primary)  -     XR Spine Cervical Complete 4 or 5 View  -     MRI Cervical Spine Without Contrast; Future    2. Chronic bilateral low back pain with bilateral sciatica    3. Muscle weakness of all 4 extremities  -     XR Spine Cervical Complete 4 or 5 View  -     MRI Cervical Spine Without Contrast; Future    4. Physical deconditioning    5. Essential hypertension    6. Morbid obesity (HCC)    7. Allergy to alpha-gal           I spent 32 minutes caring for Lois on this date of service. This time includes time spent by me in the following activities:preparing for the visit, reviewing tests, obtaining and/or reviewing a separately obtained history, performing a medically appropriate examination and/or evaluation , counseling and educating the patient/family/caregiver, ordering  medications, tests, or procedures, referring and communicating with other health care professionals  and documenting information in the medical record     Refer to Dr. Cooper/Dr. Gallegos, neurosurgery, for chronic cervical and lumbar pain with muscle weakness of upper and lower extremities.   Orders are placed for patient to schedule x-ray of the cervical spine and we will refer for MRI.  I recommended she ask about neurology referral if surgical intervention is not an option.  If neurosurgery does not feel that her weakness is due to a C-spine etiology, and if they do not refer her to neurology, we will want to do so this summer.  There also appears to be significant physical deconditioning as she is becoming more sedentary in the setting of morbid obesity.  We continue to encourage aggressive weight loss and increased physical activity/exercise.  Water-based exercise therapy would likely be the best for her.    We discussed her improvement in titers with alpha gal allergy.  She agrees that he will be very cautious consuming any mammal meats, but is happy to no longer have to probably extremely strict diet avoiding all mammal meats, etc.  Continue to keep Benadryl with her at all times.    Blood pressure is at goal today.  Continue Norvasc, spironolactone, Benicar.    Return to the clinic 08/16/2022 for routine follow up with fasting labs one week prior or sooner if needed.     Scribed for Dr. Warner by Adore Marrero [facility].     Follow Up   Return if symptoms worsen or fail to improve, for Next scheduled follow up.  Patient was given instructions and counseling regarding her condition or for health maintenance advice. Please see specific information pulled into the AVS if appropriate.

## 2022-07-22 ENCOUNTER — TELEPHONE (OUTPATIENT)
Dept: FAMILY MEDICINE CLINIC | Facility: CLINIC | Age: 72
End: 2022-07-22

## 2022-07-22 DIAGNOSIS — M47.12 OSTEOARTHRITIS OF CERVICAL SPINE WITH MYELOPATHY: Primary | ICD-10-CM

## 2022-07-22 NOTE — TELEPHONE ENCOUNTER
Relayed results of MRI of C-spine to patient, this is what we were needing to make referral to neurosurgeon. Referral will be placed. TP

## 2022-07-26 ENCOUNTER — LAB (OUTPATIENT)
Dept: LAB | Facility: OTHER | Age: 72
End: 2022-07-26

## 2022-07-26 DIAGNOSIS — D50.9 IRON DEFICIENCY ANEMIA, UNSPECIFIED IRON DEFICIENCY ANEMIA TYPE: ICD-10-CM

## 2022-07-26 DIAGNOSIS — T45.4X5A ADVERSE EFFECT OF IRON, INITIAL ENCOUNTER: ICD-10-CM

## 2022-07-26 DIAGNOSIS — D53.1 MEGALOBLASTIC ANEMIA: ICD-10-CM

## 2022-07-26 DIAGNOSIS — Z80.3 FAMILY HISTORY OF BREAST CANCER IN FEMALE: ICD-10-CM

## 2022-07-26 LAB
ALBUMIN SERPL-MCNC: 4.5 G/DL (ref 3.5–5)
ALBUMIN/GLOB SERPL: 1.3 G/DL (ref 1.1–1.8)
ALP SERPL-CCNC: 53 U/L (ref 38–126)
ALT SERPL W P-5'-P-CCNC: 19 U/L
ANION GAP SERPL CALCULATED.3IONS-SCNC: 11 MMOL/L (ref 5–15)
AST SERPL-CCNC: 23 U/L (ref 14–36)
BASOPHILS # BLD AUTO: 0.03 10*3/MM3 (ref 0–0.2)
BASOPHILS NFR BLD AUTO: 0.3 % (ref 0–1.5)
BILIRUB SERPL-MCNC: 0.3 MG/DL (ref 0.2–1.3)
BUN SERPL-MCNC: 21 MG/DL (ref 7–23)
BUN/CREAT SERPL: 21 (ref 7–25)
CALCIUM SPEC-SCNC: 9.6 MG/DL (ref 8.4–10.2)
CHLORIDE SERPL-SCNC: 104 MMOL/L (ref 101–112)
CO2 SERPL-SCNC: 25 MMOL/L (ref 22–30)
CREAT SERPL-MCNC: 1 MG/DL (ref 0.52–1.04)
DEPRECATED RDW RBC AUTO: 47.6 FL (ref 37–54)
EGFRCR SERPLBLD CKD-EPI 2021: 59.6 ML/MIN/1.73
EOSINOPHIL # BLD AUTO: 0.2 10*3/MM3 (ref 0–0.4)
EOSINOPHIL NFR BLD AUTO: 2.2 % (ref 0.3–6.2)
ERYTHROCYTE [DISTWIDTH] IN BLOOD BY AUTOMATED COUNT: 14.2 % (ref 12.3–15.4)
GLOBULIN UR ELPH-MCNC: 3.4 GM/DL (ref 2.3–3.5)
GLUCOSE SERPL-MCNC: 103 MG/DL (ref 70–99)
HCT VFR BLD AUTO: 35.1 % (ref 34–46.6)
HGB BLD-MCNC: 11.5 G/DL (ref 12–15.9)
LYMPHOCYTES # BLD AUTO: 1.52 10*3/MM3 (ref 0.7–3.1)
LYMPHOCYTES NFR BLD AUTO: 16.9 % (ref 19.6–45.3)
MCH RBC QN AUTO: 31.5 PG (ref 26.6–33)
MCHC RBC AUTO-ENTMCNC: 32.8 G/DL (ref 31.5–35.7)
MCV RBC AUTO: 96.2 FL (ref 79–97)
MONOCYTES # BLD AUTO: 0.72 10*3/MM3 (ref 0.1–0.9)
MONOCYTES NFR BLD AUTO: 8 % (ref 5–12)
NEUTROPHILS NFR BLD AUTO: 6.55 10*3/MM3 (ref 1.7–7)
NEUTROPHILS NFR BLD AUTO: 72.6 % (ref 42.7–76)
PLATELET # BLD AUTO: 231 10*3/MM3 (ref 140–450)
PMV BLD AUTO: 9.7 FL (ref 6–12)
POTASSIUM SERPL-SCNC: 4.2 MMOL/L (ref 3.4–5)
PROT SERPL-MCNC: 7.9 G/DL (ref 6.3–8.6)
RBC # BLD AUTO: 3.65 10*6/MM3 (ref 3.77–5.28)
SODIUM SERPL-SCNC: 140 MMOL/L (ref 137–145)
WBC NRBC COR # BLD: 9.02 10*3/MM3 (ref 3.4–10.8)

## 2022-07-26 PROCEDURE — 80053 COMPREHEN METABOLIC PANEL: CPT | Performed by: INTERNAL MEDICINE

## 2022-07-26 PROCEDURE — 82607 VITAMIN B-12: CPT | Performed by: INTERNAL MEDICINE

## 2022-07-26 PROCEDURE — 82746 ASSAY OF FOLIC ACID SERUM: CPT | Performed by: INTERNAL MEDICINE

## 2022-07-26 PROCEDURE — 84466 ASSAY OF TRANSFERRIN: CPT | Performed by: INTERNAL MEDICINE

## 2022-07-26 PROCEDURE — 36415 COLL VENOUS BLD VENIPUNCTURE: CPT | Performed by: INTERNAL MEDICINE

## 2022-07-26 PROCEDURE — 83540 ASSAY OF IRON: CPT | Performed by: INTERNAL MEDICINE

## 2022-07-26 PROCEDURE — 82728 ASSAY OF FERRITIN: CPT | Performed by: INTERNAL MEDICINE

## 2022-07-26 PROCEDURE — 85025 COMPLETE CBC W/AUTO DIFF WBC: CPT | Performed by: INTERNAL MEDICINE

## 2022-07-27 LAB
FERRITIN SERPL-MCNC: 275 NG/ML (ref 13–150)
FOLATE SERPL-MCNC: >20 NG/ML (ref 4.78–24.2)
IRON 24H UR-MRATE: 89 MCG/DL (ref 37–145)
IRON SATN MFR SERPL: 22 % (ref 20–50)
TIBC SERPL-MCNC: 401 MCG/DL (ref 298–536)
TRANSFERRIN SERPL-MCNC: 269 MG/DL (ref 200–360)
VIT B12 BLD-MCNC: 875 PG/ML (ref 211–946)

## 2022-07-28 ENCOUNTER — OFFICE VISIT (OUTPATIENT)
Dept: HEMATOLOGY/ONCOLOGY | Facility: CLINIC | Age: 72
End: 2022-07-28

## 2022-07-28 VITALS
WEIGHT: 258 LBS | SYSTOLIC BLOOD PRESSURE: 148 MMHG | RESPIRATION RATE: 20 BRPM | HEIGHT: 61 IN | BODY MASS INDEX: 48.71 KG/M2 | TEMPERATURE: 98.4 F | DIASTOLIC BLOOD PRESSURE: 79 MMHG | HEART RATE: 72 BPM

## 2022-07-28 DIAGNOSIS — Z80.3 FAMILY HISTORY OF BREAST CANCER IN FEMALE: Chronic | ICD-10-CM

## 2022-07-28 DIAGNOSIS — D50.0 IRON DEFICIENCY ANEMIA DUE TO CHRONIC BLOOD LOSS: Primary | Chronic | ICD-10-CM

## 2022-07-28 DIAGNOSIS — T45.4X5A ADVERSE EFFECT OF IRON, INITIAL ENCOUNTER: Chronic | ICD-10-CM

## 2022-07-28 PROCEDURE — 1123F ACP DISCUSS/DSCN MKR DOCD: CPT | Performed by: INTERNAL MEDICINE

## 2022-07-28 PROCEDURE — 1125F AMNT PAIN NOTED PAIN PRSNT: CPT | Performed by: INTERNAL MEDICINE

## 2022-07-28 PROCEDURE — 99214 OFFICE O/P EST MOD 30 MIN: CPT | Performed by: INTERNAL MEDICINE

## 2022-07-28 RX ORDER — ROSUVASTATIN CALCIUM 40 MG/1
40 TABLET, COATED ORAL DAILY
COMMUNITY
Start: 2022-07-13

## 2022-07-28 RX ORDER — OLMESARTAN MEDOXOMIL 40 MG/1
40 TABLET ORAL DAILY
COMMUNITY
Start: 2022-05-30 | End: 2022-11-28

## 2022-07-28 NOTE — PROGRESS NOTES
"DATE OF VISIT: 7/28/2022      REASON FOR VISIT: Anemia with iron deficiency, vitamin B12 deficiency, family history of breast cancer      HISTORY OF PRESENT ILLNESS:   72-year-old female with medical problem consisting of hypertension, dyslipidemia, diabetes mellitus, hypothyroidism, coronary artery disease has been following up with hematology clinic since January 24, 2019.  Patient is here for follow-up appointment today to discuss recently done blood work.  Denies any recent worsening of fatigue.  Complains of arthralgia.  Denies any new lymph node enlargement.          Past Medical History, Past Surgical History, Social History, Family History have been reviewed and are without significant changes except as mentioned.    Review of Systems   A comprehensive 14 point review of systems was performed and was negative except as mentioned in HPI.    Medications:  The current medication list was reviewed in the EMR    ALLERGIES:    Allergies   Allergen Reactions   • Beef Allergy Anaphylaxis   • Lortab [Hydrocodone-Acetaminophen] Hallucinations   • Codeine Palpitations     Heart race   • Antihistamines, Chlorpheniramine-Type Palpitations     Heart races   • Latex Rash     Latex surgical tape only  Not allergic to latex gloves   • Other Palpitations     Decongestants: Heart Race   • Tape Rash       Objective      Vitals:    07/28/22 1326   BP: 148/79   Pulse: 72   Resp: 20   Temp: 98.4 °F (36.9 °C)   Weight: 117 kg (258 lb)   Height: 154.9 cm (61\")   PainSc:   4   PainLoc: Shoulder     Current Status 4/15/2022   ECOG score 0       Physical Exam  Pulmonary:      Breath sounds: Normal breath sounds.   Neurological:      Mental Status: She is alert and oriented to person, place, and time.           RECENT LABS:  Glucose   Date Value Ref Range Status   07/26/2022 103 (H) 70 - 99 mg/dL Final     Sodium   Date Value Ref Range Status   07/26/2022 140 137 - 145 mmol/L Final     Potassium   Date Value Ref Range Status "   07/26/2022 4.2 3.4 - 5.0 mmol/L Final     CO2   Date Value Ref Range Status   07/26/2022 25.0 22.0 - 30.0 mmol/L Final     Chloride   Date Value Ref Range Status   07/26/2022 104 101 - 112 mmol/L Final     Anion Gap   Date Value Ref Range Status   07/26/2022 11.0 5.0 - 15.0 mmol/L Final     Creatinine   Date Value Ref Range Status   07/26/2022 1.00 0.52 - 1.04 mg/dL Final     BUN   Date Value Ref Range Status   07/26/2022 21 7 - 23 mg/dL Final     BUN/Creatinine Ratio   Date Value Ref Range Status   07/26/2022 21.0 7.0 - 25.0 Final     Calcium   Date Value Ref Range Status   07/26/2022 9.6 8.4 - 10.2 mg/dL Final     eGFR Non  Amer   Date Value Ref Range Status   01/26/2022 47 39 - 90 mL/min/1.73 Final     Alkaline Phosphatase   Date Value Ref Range Status   07/26/2022 53 38 - 126 U/L Final     Total Protein   Date Value Ref Range Status   07/26/2022 7.9 6.3 - 8.6 g/dL Final     ALT (SGPT)   Date Value Ref Range Status   07/26/2022 19 <=35 U/L Final     AST (SGOT)   Date Value Ref Range Status   07/26/2022 23 14 - 36 U/L Final     Total Bilirubin   Date Value Ref Range Status   07/26/2022 0.3 0.2 - 1.3 mg/dL Final     Albumin   Date Value Ref Range Status   07/26/2022 4.50 3.50 - 5.00 g/dL Final     Globulin   Date Value Ref Range Status   07/26/2022 3.4 2.3 - 3.5 gm/dL Final     Lab Results   Component Value Date    WBC 9.02 07/26/2022    HGB 11.5 (L) 07/26/2022    HCT 35.1 07/26/2022    MCV 96.2 07/26/2022     07/26/2022     Lab Results   Component Value Date    NEUTROABS 6.55 07/26/2022    IRON 89 07/26/2022    IRON 90 04/05/2022    IRON 126 11/30/2021    TIBC 401 07/26/2022    TIBC 483 04/05/2022    TIBC 462 11/30/2021    LABIRON 22 07/26/2022    LABIRON 19 (L) 04/05/2022    LABIRON 27 11/30/2021    FERRITIN 275.00 (H) 07/26/2022    FERRITIN 88.41 04/05/2022    FERRITIN 106.30 11/30/2021    QQFMTTOY22 875 07/26/2022    QHCHUWRZ30 1,080 (H) 04/05/2022    WLPUDEJZ61 947 (H) 01/26/2022    FOLATE  >20.00 07/26/2022    FOLATE >20.00 04/05/2022    FOLATE >20.00 11/30/2021     No results found for: , LABCA2, AFPTM, HCGQUANT, , CHROMGRNA, [id number], CEA, REFLABREPO      PATHOLOGY:  * Cannot find OR log *         RADIOLOGY DATA :  MRI Cyberknife Cervical Spine Without Contrast    Result Date: 7/21/2022  1. Anterior fusion at the C4-5 level without radiographic evidence of complication. 2.  Spondylitic changes as above at C3-4 and C5-6 with mild interval progression at C5/C6 when compared to the prior exam. There is persistent moderate central canal stenosis at this level with bilateral neural foraminal stenosis which is severe on the right with effacement of the right lateral recess. Workstation: [id number]          Assessment & Plan     1.  Iron deficiency anemia:  - EGD and colonoscopy done in November 2018 showed no evidence of bleeding.  Capsule endoscopy in March 2021 was negative for any as well  - Due to inability to tolerate iron by mouth, patient received Venofer and completed on 04/25/2022  - Anemia work-up done on July 26, 2022 shows hemoglobin is 11.5.  Iron studies are showing adequate amount of iron.  No need for any intravenous iron replacement at present  -Recommend continuing with B12 p.o. daily with folic acid 3 times a week  - Patient will return to clinic in 3 months with repeat CBC, iron studies, ferritin, B12 and folate to be done prior to that.    2.  Chronic kidney disease stage III on  - Creatinine is 1.0.  Recommend continue with increase hydration    3.  Family history of breast cancer:  - Patient had a genetic counseling done and had a testing done with pneumonia and BRCA 2020.  Recommend yearly mammogram.    4.  Health maintenance: Patient does not smoke    5. Advance Care Planning: For now patient remains full code and is able to make decisions.  Patient has health care surrogate mentioned on chart.                 PHQ-9 Total Score:       Lois Parmar reports a pain  score of 4.  Given her pain assessment as noted, treatment options were discussed and the following options were decided upon as a follow-up plan to address the patient's pain: continuation of current treatment plan for pain.         Ranjeet De La Torre MD  7/28/2022  13:49 CDT        Part of this note may be an electronic transcription/translation of spoken language to printed text using the Dragon Dictation System.          CC:

## 2022-08-08 ENCOUNTER — LAB (OUTPATIENT)
Dept: LAB | Facility: OTHER | Age: 72
End: 2022-08-08

## 2022-08-08 DIAGNOSIS — N18.31 STAGE 3A CHRONIC KIDNEY DISEASE: Chronic | ICD-10-CM

## 2022-08-08 DIAGNOSIS — E66.01 MORBID OBESITY: Chronic | ICD-10-CM

## 2022-08-08 DIAGNOSIS — N18.31 TYPE 2 DIABETES MELLITUS WITH STAGE 3A CHRONIC KIDNEY DISEASE, WITHOUT LONG-TERM CURRENT USE OF INSULIN: Chronic | ICD-10-CM

## 2022-08-08 DIAGNOSIS — D50.9 IRON DEFICIENCY ANEMIA, UNSPECIFIED IRON DEFICIENCY ANEMIA TYPE: Chronic | ICD-10-CM

## 2022-08-08 DIAGNOSIS — E11.69 HYPERLIPIDEMIA ASSOCIATED WITH TYPE 2 DIABETES MELLITUS: Chronic | ICD-10-CM

## 2022-08-08 DIAGNOSIS — I10 ESSENTIAL HYPERTENSION: Chronic | ICD-10-CM

## 2022-08-08 DIAGNOSIS — E78.5 HYPERLIPIDEMIA ASSOCIATED WITH TYPE 2 DIABETES MELLITUS: Chronic | ICD-10-CM

## 2022-08-08 DIAGNOSIS — K21.9 GASTROESOPHAGEAL REFLUX DISEASE, UNSPECIFIED WHETHER ESOPHAGITIS PRESENT: Chronic | ICD-10-CM

## 2022-08-08 DIAGNOSIS — D53.1 MEGALOBLASTIC ANEMIA: Chronic | ICD-10-CM

## 2022-08-08 DIAGNOSIS — E78.1 HYPERTRIGLYCERIDEMIA: Chronic | ICD-10-CM

## 2022-08-08 DIAGNOSIS — E11.22 TYPE 2 DIABETES MELLITUS WITH STAGE 3A CHRONIC KIDNEY DISEASE, WITHOUT LONG-TERM CURRENT USE OF INSULIN: Chronic | ICD-10-CM

## 2022-08-08 LAB
ALBUMIN SERPL-MCNC: 4.4 G/DL (ref 3.5–5)
ALBUMIN/GLOB SERPL: 1.4 G/DL (ref 1.1–1.8)
ALP SERPL-CCNC: 47 U/L (ref 38–126)
ALT SERPL W P-5'-P-CCNC: 16 U/L
ANION GAP SERPL CALCULATED.3IONS-SCNC: 8 MMOL/L (ref 5–15)
AST SERPL-CCNC: 20 U/L (ref 14–36)
BASOPHILS # BLD AUTO: 0.03 10*3/MM3 (ref 0–0.2)
BASOPHILS NFR BLD AUTO: 0.4 % (ref 0–1.5)
BILIRUB SERPL-MCNC: 0.3 MG/DL (ref 0.2–1.3)
BUN SERPL-MCNC: 28 MG/DL (ref 7–23)
BUN/CREAT SERPL: 24.6 (ref 7–25)
CALCIUM SPEC-SCNC: 9.4 MG/DL (ref 8.4–10.2)
CHLORIDE SERPL-SCNC: 106 MMOL/L (ref 101–112)
CO2 SERPL-SCNC: 27 MMOL/L (ref 22–30)
CREAT SERPL-MCNC: 1.14 MG/DL (ref 0.52–1.04)
DEPRECATED RDW RBC AUTO: 49.4 FL (ref 37–54)
EGFRCR SERPLBLD CKD-EPI 2021: 50.9 ML/MIN/1.73
EOSINOPHIL # BLD AUTO: 0.28 10*3/MM3 (ref 0–0.4)
EOSINOPHIL NFR BLD AUTO: 4.1 % (ref 0.3–6.2)
ERYTHROCYTE [DISTWIDTH] IN BLOOD BY AUTOMATED COUNT: 14.6 % (ref 12.3–15.4)
GLOBULIN UR ELPH-MCNC: 3.2 GM/DL (ref 2.3–3.5)
GLUCOSE SERPL-MCNC: 103 MG/DL (ref 70–99)
HBA1C MFR BLD: 6.6 % (ref 4.8–5.6)
HCT VFR BLD AUTO: 34.5 % (ref 34–46.6)
HGB BLD-MCNC: 11.2 G/DL (ref 12–15.9)
LYMPHOCYTES # BLD AUTO: 1.39 10*3/MM3 (ref 0.7–3.1)
LYMPHOCYTES NFR BLD AUTO: 20.5 % (ref 19.6–45.3)
MCH RBC QN AUTO: 31.4 PG (ref 26.6–33)
MCHC RBC AUTO-ENTMCNC: 32.5 G/DL (ref 31.5–35.7)
MCV RBC AUTO: 96.6 FL (ref 79–97)
MONOCYTES # BLD AUTO: 0.64 10*3/MM3 (ref 0.1–0.9)
MONOCYTES NFR BLD AUTO: 9.5 % (ref 5–12)
NEUTROPHILS NFR BLD AUTO: 4.43 10*3/MM3 (ref 1.7–7)
NEUTROPHILS NFR BLD AUTO: 65.5 % (ref 42.7–76)
PLATELET # BLD AUTO: 296 10*3/MM3 (ref 140–450)
PMV BLD AUTO: 8.9 FL (ref 6–12)
POTASSIUM SERPL-SCNC: 4.4 MMOL/L (ref 3.4–5)
PROT SERPL-MCNC: 7.6 G/DL (ref 6.3–8.6)
RBC # BLD AUTO: 3.57 10*6/MM3 (ref 3.77–5.28)
SODIUM SERPL-SCNC: 141 MMOL/L (ref 137–145)
TRIGL SERPL-MCNC: 186 MG/DL
WBC NRBC COR # BLD: 6.77 10*3/MM3 (ref 3.4–10.8)

## 2022-08-08 PROCEDURE — 84439 ASSAY OF FREE THYROXINE: CPT | Performed by: INTERNAL MEDICINE

## 2022-08-08 PROCEDURE — 83036 HEMOGLOBIN GLYCOSYLATED A1C: CPT | Performed by: INTERNAL MEDICINE

## 2022-08-08 PROCEDURE — 80053 COMPREHEN METABOLIC PANEL: CPT | Performed by: INTERNAL MEDICINE

## 2022-08-08 PROCEDURE — 84478 ASSAY OF TRIGLYCERIDES: CPT | Performed by: INTERNAL MEDICINE

## 2022-08-08 PROCEDURE — 85025 COMPLETE CBC W/AUTO DIFF WBC: CPT | Performed by: INTERNAL MEDICINE

## 2022-08-08 PROCEDURE — 36415 COLL VENOUS BLD VENIPUNCTURE: CPT | Performed by: INTERNAL MEDICINE

## 2022-08-08 PROCEDURE — 84443 ASSAY THYROID STIM HORMONE: CPT | Performed by: INTERNAL MEDICINE

## 2022-08-08 PROCEDURE — 83721 ASSAY OF BLOOD LIPOPROTEIN: CPT | Performed by: INTERNAL MEDICINE

## 2022-08-09 LAB
ARTICHOKE IGE QN: 53 MG/DL (ref 0–100)
T4 FREE SERPL-MCNC: 1.17 NG/DL (ref 0.93–1.7)
TSH SERPL DL<=0.05 MIU/L-ACNC: 2.4 UIU/ML (ref 0.27–4.2)

## 2022-08-16 ENCOUNTER — OFFICE VISIT (OUTPATIENT)
Dept: FAMILY MEDICINE CLINIC | Facility: CLINIC | Age: 72
End: 2022-08-16

## 2022-08-16 VITALS
TEMPERATURE: 98.4 F | BODY MASS INDEX: 48.82 KG/M2 | DIASTOLIC BLOOD PRESSURE: 60 MMHG | HEART RATE: 64 BPM | SYSTOLIC BLOOD PRESSURE: 118 MMHG | WEIGHT: 258.6 LBS | HEIGHT: 61 IN

## 2022-08-16 DIAGNOSIS — G47.33 OBSTRUCTIVE SLEEP APNEA SYNDROME: Chronic | ICD-10-CM

## 2022-08-16 DIAGNOSIS — Z78.0 MENOPAUSE: ICD-10-CM

## 2022-08-16 DIAGNOSIS — E03.9 ACQUIRED HYPOTHYROIDISM: Chronic | ICD-10-CM

## 2022-08-16 DIAGNOSIS — N18.31 STAGE 3A CHRONIC KIDNEY DISEASE: Chronic | ICD-10-CM

## 2022-08-16 DIAGNOSIS — D50.0 IRON DEFICIENCY ANEMIA DUE TO CHRONIC BLOOD LOSS: Chronic | ICD-10-CM

## 2022-08-16 DIAGNOSIS — E55.9 VITAMIN D DEFICIENCY: Chronic | ICD-10-CM

## 2022-08-16 DIAGNOSIS — D53.1 MEGALOBLASTIC ANEMIA: Chronic | ICD-10-CM

## 2022-08-16 DIAGNOSIS — I42.9 CARDIOMYOPATHY, UNSPECIFIED TYPE: Chronic | ICD-10-CM

## 2022-08-16 DIAGNOSIS — E78.1 HYPERTRIGLYCERIDEMIA: Chronic | ICD-10-CM

## 2022-08-16 DIAGNOSIS — E66.01 MORBID OBESITY: Chronic | ICD-10-CM

## 2022-08-16 DIAGNOSIS — E11.69 HYPERLIPIDEMIA ASSOCIATED WITH TYPE 2 DIABETES MELLITUS: Chronic | ICD-10-CM

## 2022-08-16 DIAGNOSIS — N18.31 TYPE 2 DIABETES MELLITUS WITH STAGE 3A CHRONIC KIDNEY DISEASE, WITHOUT LONG-TERM CURRENT USE OF INSULIN: Primary | Chronic | ICD-10-CM

## 2022-08-16 DIAGNOSIS — I10 ESSENTIAL HYPERTENSION: Chronic | ICD-10-CM

## 2022-08-16 DIAGNOSIS — E78.5 HYPERLIPIDEMIA ASSOCIATED WITH TYPE 2 DIABETES MELLITUS: Chronic | ICD-10-CM

## 2022-08-16 DIAGNOSIS — E11.22 TYPE 2 DIABETES MELLITUS WITH STAGE 3A CHRONIC KIDNEY DISEASE, WITHOUT LONG-TERM CURRENT USE OF INSULIN: Primary | Chronic | ICD-10-CM

## 2022-08-16 PROCEDURE — 99214 OFFICE O/P EST MOD 30 MIN: CPT | Performed by: INTERNAL MEDICINE

## 2022-08-16 RX ORDER — LABETALOL 300 MG/1
300 TABLET, FILM COATED ORAL 2 TIMES DAILY
COMMUNITY
End: 2023-03-13

## 2022-08-16 RX ORDER — CHLORAL HYDRATE 500 MG
1 CAPSULE ORAL 2 TIMES DAILY
COMMUNITY
End: 2022-11-28

## 2022-08-16 NOTE — PROGRESS NOTES
Chief Complaint  Follow-up (6 month. States got a letter stating BMD is due)    Subjective        History of Present Illness     Lois Parmar presents to the office for 6-month follow up on chronic medical issues including hypothyroidism, hyperlipidemia, hypertriglyceridemia, type 2 diabetes, osteoarthritis of the knees and hips, morbid obesity, iron deficiency anemia and vitamin B12 deficiency anemia, among other issues complicated by obesity.  Dr. De La Torre is following patient's iron deficiency and vitamin B12 deficiency anemia.  Patient reports compliance with CPAP to manage obstructive sleep apnea.  Patient tested positive for COVID one month ago.     She continues to struggle with spinal stenosis as well as posterior neck pain with muscle spasm as well as upper and lower extremity weakness with the upper extremity weakness more impressive.  Patient was seen at [facility] in 2015 where cervical MRI ordered by Dr. Way showed spondylosis with moderate cord compression below prior ACDF.  She is scheduled to see Dr. Cooper, neurosurgeon next month.         Patient is due repeat DEXA and order is placed.  DEXA 04/2018 revealed bone density within normal limits.        Patient had colonoscopy by Dr. Sutton 04/04/2022 demonstrating a 10 mm polyp in the cecum that biopsy demonstrates this to be an inflammatory pseudo polyp with reactive colonic and intestinal mucosa.  Repeat colonoscopy recommended in 3 years maing her due 04/2025. .     Weight is stable at 258.  BP and HR at goal.        The patient's relevant past medical, surgical, and social history was reviewed in Epic.   Lab results are reviewed with the patient today.  CBC reveals hemoglobin slightly decreased at 11.2.  Dr. De La Torre is following iron deficiency anemia.  Fasting glucose 103.  Renal function slightly declined with creatinine 1.14. Triglycerides slightly above goal, although, trending down at 186      Objective   Vital Signs:  BP  "118/60   Pulse 64   Temp 98.4 °F (36.9 °C) (Tympanic)   Ht 154.9 cm (61\")   Wt 117 kg (258 lb 9.6 oz)   BMI 48.86 kg/m²   Estimated body mass index is 48.86 kg/m² as calculated from the following:    Height as of this encounter: 154.9 cm (61\").    Weight as of this encounter: 117 kg (258 lb 9.6 oz).          Physical Exam  Vitals reviewed.   Constitutional:       General: She is not in acute distress.     Appearance: She is well-developed.   HENT:      Head: Normocephalic and atraumatic.      Nose:      Right Sinus: No maxillary sinus tenderness or frontal sinus tenderness.      Left Sinus: No maxillary sinus tenderness or frontal sinus tenderness.      Mouth/Throat:      Mouth: No oral lesions.      Pharynx: Uvula midline.      Tonsils: No tonsillar exudate.   Eyes:      Conjunctiva/sclera: Conjunctivae normal.      Pupils: Pupils are equal, round, and reactive to light.   Neck:      Thyroid: No thyroid mass or thyromegaly.      Vascular: No carotid bruit or JVD.      Trachea: Trachea normal. No tracheal deviation.   Cardiovascular:      Rate and Rhythm: Normal rate and regular rhythm.  No extrasystoles are present.     Chest Wall: PMI is not displaced.      Heart sounds: Normal heart sounds. No murmur heard.  Pulmonary:      Effort: Pulmonary effort is normal. No accessory muscle usage or respiratory distress.      Breath sounds: Normal breath sounds. No decreased breath sounds, wheezing, rhonchi or rales.   Abdominal:      General: Bowel sounds are normal. There is no distension.      Palpations: Abdomen is soft.      Tenderness: There is no abdominal tenderness.   Musculoskeletal:      Cervical back: Neck supple.   Lymphadenopathy:      Cervical: No cervical adenopathy.   Skin:     General: Skin is warm and dry.      Findings: No rash.      Nails: There is no clubbing.   Neurological:      Mental Status: She is alert and oriented to person, place, and time.      Cranial Nerves: No cranial nerve deficit.     "  Coordination: Coordination normal.   Psychiatric:         Speech: Speech normal.         Behavior: Behavior normal.         Thought Content: Thought content normal.         Judgment: Judgment normal.            Result Review :    CMP    CMP 4/5/22 7/26/22 8/8/22   Glucose 107 (A) 103 (A) 103 (A)   BUN 26 (A) 21 28 (A)   Creatinine 1.14 (A) 1.00 1.14 (A)   Sodium 140 140 141   Potassium 4.0 4.2 4.4   Chloride 104 104 106   Calcium 9.9 9.6 9.4   Albumin 4.70 4.50 4.40   Total Bilirubin 0.4 0.3 0.3   Alkaline Phosphatase 42 53 47   AST (SGOT) 25 23 20   ALT (SGPT) 17 19 16   (A) Abnormal value            CBC w/diff    CBC w/Diff 4/5/22 7/26/22 8/8/22   WBC 7.60 9.02 6.77   RBC 3.79 3.65 (A) 3.57 (A)   Hemoglobin 11.9 (A) 11.5 (A) 11.2 (A)   Hematocrit 36.3 35.1 34.5   MCV 95.8 96.2 96.6   MCH 31.4 31.5 31.4   MCHC 32.8 32.8 32.5   RDW 13.8 14.2 14.6   Platelets 297 231 296   Neutrophil Rel % 66.5 72.6 65.5   Lymphocyte Rel % 21.6 16.9 (A) 20.5   Monocyte Rel % 8.6 8.0 9.5   Eosinophil Rel % 2.8 2.2 4.1   Basophil Rel % 0.5 0.3 0.4   (A) Abnormal value            Lipid Panel    Lipid Panel 1/26/22 5/2/22 8/8/22 8/8/22      0924 0924   Total Cholesterol 173      Total Cholesterol  127     Triglycerides 252 (A) 206 (A) 186 (A)    HDL Cholesterol 51 52     VLDL Cholesterol 41 (A)      LDL Cholesterol  81 34  53   LDL/HDL Ratio 1.40      (A) Abnormal value       Comments are available for some flowsheets but are not being displayed.           TSH    TSH 1/26/22 8/8/22   TSH 5.280 (A) 2.400   (A) Abnormal value            A1C Last 3 Results    HGBA1C Last 3 Results 1/26/22 8/8/22   Hemoglobin A1C 6.57 (A) 6.60 (A)   (A) Abnormal value                      Assessment and Plan   Diagnoses and all orders for this visit:    1. Type 2 diabetes mellitus with stage 3a chronic kidney disease, without long-term current use of insulin (HCC) (Primary)  -     CBC Auto Differential; Future  -     Comprehensive Metabolic Panel; Future  -      Hemoglobin A1c; Future  -     Lipid Panel; Future  -     Microalbumin / Creatinine Urine Ratio - Urine, Clean Catch; Future  -     TSH; Future  -     T4, free; Future    2. Hypertriglyceridemia  -     Lipid Panel; Future    3. Essential hypertension  -     Comprehensive Metabolic Panel; Future    4. Cardiomyopathy, unspecified type (HCC)  -     Comprehensive Metabolic Panel; Future    5. Hyperlipidemia associated with type 2 diabetes mellitus (HCC)  -     Lipid Panel; Future    6. Vitamin D deficiency  -     Vitamin D 25 Hydroxy; Future    7. Morbid obesity (HCC)  -     Comprehensive Metabolic Panel; Future    8. Acquired hypothyroidism  -     TSH; Future  -     T4, free; Future    9. Iron deficiency anemia due to chronic blood loss  -     CBC Auto Differential; Future    10. Megaloblastic anemia due to vitamin B>12< deficiency  -     CBC Auto Differential; Future    11. Obstructive sleep apnea syndrome    12. Stage 3a chronic kidney disease (HCC)  -     Comprehensive Metabolic Panel; Future  -     Hemoglobin A1c; Future    13. Menopause  -     DEXA Bone Density Axial; Future    Other orders  -     metFORMIN (GLUCOPHAGE) 500 MG tablet; Take 1 tablet by mouth 2 (Two) Times a Day With Meals.  Dispense: 180 tablet; Refill: 3           I spent 34 minutes caring for Lois on this date of service. This time includes time spent by me in the following activities:preparing for the visit, reviewing tests, obtaining and/or reviewing a separately obtained history, performing a medically appropriate examination and/or evaluation , counseling and educating the patient/family/caregiver, ordering medications, tests, or procedures and documenting information in the medical record     I recommended patient walk with walker for conditioning gradually increasing the time.  Keep appointment with Dr. Cooper, neurosurgery, scheduled for 09/21/2022 to address the cervical and lumbar pain with upper and lower extremity weakness.       Order is placed for patient to schedule DEXA.  We will notify patient with results when available.      Continue to follow with Dr. Boyle, who follows her cardiomyopathy and other cardiovascular issues. Continue simvastatin and Zetia as well as dietary efforts.  LDL at goal.  Pursue aggressive weight loss.    Continue metformin.  Diabetes control acceptable. I continue to recommended aggressive weight loss.       Continue compliance with CPAP to manage LUDWIG.  Pursue weight loss.     Renal function slightly declined.  Continue to avoid chronic NSAIDs and other nephrotoxic agents. Although, her renal function would allow occasional NSAID use at the current level.   She has found Tylenol Arthritis works fairly well to manage osteoarthritic pain.       Continue Prevacid and Pepcid for GERD, adequately managed.        Continue Synthroid, hypothyroidism at goal.       Keep scheduled appointments with Dr. De La Torre.  Continue oral iron and B-12, at goal.     Continue to follow with Dr. Harmon for alpha gal.       Continue other medications and vitamin and mineral supplements to treat additional medical problems which we addressed today.    Return in six months for routine follow up with fasting labs one week prior.            Scribed for Dr. Warner by Adore Marrero [facility].     Follow Up   Return in about 6 months (around 2/16/2023) for Follow up in six months with labs one week prior..  Patient was given instructions and counseling regarding her condition or for health maintenance advice. Please see specific information pulled into the AVS if appropriate.

## 2022-08-25 ENCOUNTER — OFFICE VISIT (OUTPATIENT)
Dept: PODIATRY | Facility: CLINIC | Age: 72
End: 2022-08-25

## 2022-08-25 VITALS — BODY MASS INDEX: 48.82 KG/M2 | OXYGEN SATURATION: 97 % | HEART RATE: 94 BPM | WEIGHT: 258.6 LBS | HEIGHT: 61 IN

## 2022-08-25 DIAGNOSIS — M79.675 PAIN IN TOES OF BOTH FEET: ICD-10-CM

## 2022-08-25 DIAGNOSIS — M79.674 PAIN IN TOES OF BOTH FEET: ICD-10-CM

## 2022-08-25 DIAGNOSIS — M20.42 HAMMER TOES OF BOTH FEET: ICD-10-CM

## 2022-08-25 DIAGNOSIS — B35.1 ONYCHOMYCOSIS: ICD-10-CM

## 2022-08-25 DIAGNOSIS — E11.8 DIABETIC FOOT: Primary | ICD-10-CM

## 2022-08-25 DIAGNOSIS — M20.41 HAMMER TOES OF BOTH FEET: ICD-10-CM

## 2022-08-25 PROCEDURE — 11721 DEBRIDE NAIL 6 OR MORE: CPT | Performed by: PODIATRIST

## 2022-09-01 ENCOUNTER — TELEPHONE (OUTPATIENT)
Dept: FAMILY MEDICINE CLINIC | Facility: CLINIC | Age: 72
End: 2022-09-01

## 2022-09-01 NOTE — TELEPHONE ENCOUNTER
Attempted to contact the patient regarding message, but there was no answer. Per verbal release form I left the message in a voicemail. Advised her to call the office with any questions or concerns.     ----- Message from Marshal Warner MD sent at 9/1/2022 12:49 PM CDT -----  Please inform Lois that the bone density screen reveals normal density of the left forearm and left hip.  She has lost 5% density from the left hip in the past 4 years, which is typical.  We will repeat DEXA in 3 to 4 years.  EB

## 2022-10-28 ENCOUNTER — TELEPHONE (OUTPATIENT)
Dept: FAMILY MEDICINE CLINIC | Facility: CLINIC | Age: 72
End: 2022-10-28

## 2022-10-28 NOTE — TELEPHONE ENCOUNTER
I called patient and recommended she go to ER or Urgent care d/t she would need a workup. Voiced understanding. TP

## 2022-11-23 ENCOUNTER — LAB (OUTPATIENT)
Dept: LAB | Facility: OTHER | Age: 72
End: 2022-11-23

## 2022-11-23 ENCOUNTER — CLINICAL SUPPORT (OUTPATIENT)
Dept: FAMILY MEDICINE CLINIC | Facility: CLINIC | Age: 72
End: 2022-11-23

## 2022-11-23 DIAGNOSIS — Z80.3 FAMILY HISTORY OF BREAST CANCER IN FEMALE: ICD-10-CM

## 2022-11-23 DIAGNOSIS — D50.0 IRON DEFICIENCY ANEMIA DUE TO CHRONIC BLOOD LOSS: ICD-10-CM

## 2022-11-23 DIAGNOSIS — Z23 NEED FOR INFLUENZA VACCINATION: Primary | ICD-10-CM

## 2022-11-23 DIAGNOSIS — T45.4X5A ADVERSE EFFECT OF IRON, INITIAL ENCOUNTER: ICD-10-CM

## 2022-11-23 LAB
ALBUMIN SERPL-MCNC: 4.8 G/DL (ref 3.5–5)
ALBUMIN/GLOB SERPL: 1.3 G/DL (ref 1.1–1.8)
ALP SERPL-CCNC: 52 U/L (ref 38–126)
ALT SERPL W P-5'-P-CCNC: 14 U/L
ANION GAP SERPL CALCULATED.3IONS-SCNC: 11 MMOL/L (ref 5–15)
AST SERPL-CCNC: 21 U/L (ref 14–36)
BASOPHILS # BLD AUTO: 0.06 10*3/MM3 (ref 0–0.2)
BASOPHILS NFR BLD AUTO: 0.8 % (ref 0–1.5)
BILIRUB SERPL-MCNC: 0.4 MG/DL (ref 0.2–1.3)
BUN SERPL-MCNC: 23 MG/DL (ref 7–23)
BUN/CREAT SERPL: 19.7 (ref 7–25)
CALCIUM SPEC-SCNC: 9.8 MG/DL (ref 8.4–10.2)
CHLORIDE SERPL-SCNC: 102 MMOL/L (ref 101–112)
CO2 SERPL-SCNC: 27 MMOL/L (ref 22–30)
CREAT SERPL-MCNC: 1.17 MG/DL (ref 0.52–1.04)
DEPRECATED RDW RBC AUTO: 46.7 FL (ref 37–54)
EGFRCR SERPLBLD CKD-EPI 2021: 49.4 ML/MIN/1.73
EOSINOPHIL # BLD AUTO: 0.25 10*3/MM3 (ref 0–0.4)
EOSINOPHIL NFR BLD AUTO: 3.3 % (ref 0.3–6.2)
ERYTHROCYTE [DISTWIDTH] IN BLOOD BY AUTOMATED COUNT: 13.2 % (ref 12.3–15.4)
FERRITIN SERPL-MCNC: 169.1 NG/ML (ref 13–150)
GLOBULIN UR ELPH-MCNC: 3.8 GM/DL (ref 2.3–3.5)
GLUCOSE SERPL-MCNC: 100 MG/DL (ref 70–99)
HCT VFR BLD AUTO: 36.5 % (ref 34–46.6)
HGB BLD-MCNC: 11.8 G/DL (ref 12–15.9)
IMM GRANULOCYTES # BLD AUTO: 0.03 10*3/MM3 (ref 0–0.05)
IMM GRANULOCYTES NFR BLD AUTO: 0.4 % (ref 0–0.5)
IRON 24H UR-MRATE: 95 MCG/DL (ref 37–145)
IRON SATN MFR SERPL: 21 % (ref 20–50)
LYMPHOCYTES # BLD AUTO: 1.52 10*3/MM3 (ref 0.7–3.1)
LYMPHOCYTES NFR BLD AUTO: 20.2 % (ref 19.6–45.3)
MCH RBC QN AUTO: 30.8 PG (ref 26.6–33)
MCHC RBC AUTO-ENTMCNC: 32.3 G/DL (ref 31.5–35.7)
MCV RBC AUTO: 95.3 FL (ref 79–97)
MONOCYTES # BLD AUTO: 0.54 10*3/MM3 (ref 0.1–0.9)
MONOCYTES NFR BLD AUTO: 7.2 % (ref 5–12)
NEUTROPHILS NFR BLD AUTO: 5.12 10*3/MM3 (ref 1.7–7)
NEUTROPHILS NFR BLD AUTO: 68.1 % (ref 42.7–76)
NRBC BLD AUTO-RTO: 0 /100 WBC (ref 0–0.2)
PLATELET # BLD AUTO: 318 10*3/MM3 (ref 140–450)
PMV BLD AUTO: 10 FL (ref 6–12)
POTASSIUM SERPL-SCNC: 4.5 MMOL/L (ref 3.4–5)
PROT SERPL-MCNC: 8.6 G/DL (ref 6.3–8.6)
RBC # BLD AUTO: 3.83 10*6/MM3 (ref 3.77–5.28)
SODIUM SERPL-SCNC: 140 MMOL/L (ref 137–145)
TIBC SERPL-MCNC: 460 MCG/DL (ref 298–536)
TRANSFERRIN SERPL-MCNC: 309 MG/DL (ref 200–360)
WBC NRBC COR # BLD: 7.52 10*3/MM3 (ref 3.4–10.8)

## 2022-11-23 PROCEDURE — 83540 ASSAY OF IRON: CPT | Performed by: INTERNAL MEDICINE

## 2022-11-23 PROCEDURE — 82607 VITAMIN B-12: CPT | Performed by: INTERNAL MEDICINE

## 2022-11-23 PROCEDURE — 36415 COLL VENOUS BLD VENIPUNCTURE: CPT | Performed by: INTERNAL MEDICINE

## 2022-11-23 PROCEDURE — 84466 ASSAY OF TRANSFERRIN: CPT | Performed by: INTERNAL MEDICINE

## 2022-11-23 PROCEDURE — 85025 COMPLETE CBC W/AUTO DIFF WBC: CPT | Performed by: INTERNAL MEDICINE

## 2022-11-23 PROCEDURE — 90662 IIV NO PRSV INCREASED AG IM: CPT | Performed by: INTERNAL MEDICINE

## 2022-11-23 PROCEDURE — 80053 COMPREHEN METABOLIC PANEL: CPT | Performed by: INTERNAL MEDICINE

## 2022-11-23 PROCEDURE — 82728 ASSAY OF FERRITIN: CPT | Performed by: INTERNAL MEDICINE

## 2022-11-23 PROCEDURE — 82746 ASSAY OF FOLIC ACID SERUM: CPT | Performed by: INTERNAL MEDICINE

## 2022-11-23 PROCEDURE — G0008 ADMIN INFLUENZA VIRUS VAC: HCPCS | Performed by: INTERNAL MEDICINE

## 2022-11-24 LAB
FOLATE SERPL-MCNC: >20 NG/ML (ref 4.78–24.2)
VIT B12 BLD-MCNC: 919 PG/ML (ref 211–946)

## 2022-11-28 ENCOUNTER — OFFICE VISIT (OUTPATIENT)
Dept: ONCOLOGY | Facility: CLINIC | Age: 72
End: 2022-11-28

## 2022-11-28 VITALS
OXYGEN SATURATION: 92 % | HEART RATE: 107 BPM | SYSTOLIC BLOOD PRESSURE: 151 MMHG | WEIGHT: 247.3 LBS | BODY MASS INDEX: 46.73 KG/M2 | TEMPERATURE: 97.4 F | DIASTOLIC BLOOD PRESSURE: 93 MMHG

## 2022-11-28 DIAGNOSIS — T45.4X5A ADVERSE EFFECT OF IRON, INITIAL ENCOUNTER: Chronic | ICD-10-CM

## 2022-11-28 DIAGNOSIS — N18.31 STAGE 3A CHRONIC KIDNEY DISEASE: Chronic | ICD-10-CM

## 2022-11-28 DIAGNOSIS — Z80.3 FAMILY HISTORY OF BREAST CANCER IN FEMALE: Chronic | ICD-10-CM

## 2022-11-28 DIAGNOSIS — D50.9 IRON DEFICIENCY ANEMIA, UNSPECIFIED IRON DEFICIENCY ANEMIA TYPE: Primary | Chronic | ICD-10-CM

## 2022-11-28 PROCEDURE — 1125F AMNT PAIN NOTED PAIN PRSNT: CPT | Performed by: INTERNAL MEDICINE

## 2022-11-28 PROCEDURE — G0463 HOSPITAL OUTPT CLINIC VISIT: HCPCS | Performed by: INTERNAL MEDICINE

## 2022-11-28 PROCEDURE — 99214 OFFICE O/P EST MOD 30 MIN: CPT | Performed by: INTERNAL MEDICINE

## 2022-11-28 PROCEDURE — 1123F ACP DISCUSS/DSCN MKR DOCD: CPT | Performed by: INTERNAL MEDICINE

## 2022-11-28 RX ORDER — OLMESARTAN MEDOXOMIL 20 MG/1
TABLET ORAL
COMMUNITY
End: 2022-11-28

## 2022-11-28 RX ORDER — KETOCONAZOLE 20 MG/G
CREAM TOPICAL
COMMUNITY
Start: 2022-06-14

## 2022-11-28 RX ORDER — SIMVASTATIN 10 MG
TABLET ORAL EVERY 24 HOURS
COMMUNITY
End: 2023-02-23 | Stop reason: ALTCHOICE

## 2022-11-28 RX ORDER — FAMOTIDINE 10 MG
TABLET ORAL
COMMUNITY
End: 2022-11-28

## 2022-11-28 RX ORDER — HYDROCODONE BITARTRATE AND ACETAMINOPHEN 7.5; 325 MG/1; MG/1
TABLET ORAL
COMMUNITY
Start: 2022-10-07 | End: 2023-02-23

## 2022-11-28 RX ORDER — DIAZEPAM 10 MG/1
TABLET ORAL
COMMUNITY
Start: 2022-10-07 | End: 2023-02-23

## 2022-11-28 RX ORDER — CLOBETASOL PROPIONATE 0.5 MG/G
CREAM TOPICAL
COMMUNITY
Start: 2022-11-15

## 2022-11-28 RX ORDER — LANSOPRAZOLE 15 MG/1
CAPSULE, DELAYED RELEASE ORAL
COMMUNITY
End: 2023-03-13

## 2022-11-28 RX ORDER — AMLODIPINE BESYLATE 5 MG/1
5 TABLET ORAL
COMMUNITY
Start: 2022-08-17

## 2022-11-28 NOTE — PROGRESS NOTES
DATE OF VISIT: 11/28/2022      REASON FOR VISIT: Anemia with iron deficiency, vitamin B12 deficiency, family history of breast cancer      HISTORY OF PRESENT ILLNESS:   72-year-old female with medical problem consisting of hypertension, dyslipidemia, diabetes mellitus, hypothyroidism, coronary artery disease has been following up with hematology clinic since January 24, 2019 for anemia, B12 deficiency and family history of breast cancer.  Patient is here for follow-up appointment today to discuss recently done blood work.  Denies any recent worsening of fatigue.  Complains of arthralgia.  Denies any bleeding.  Denies any new lymph node enlargement.              Past Medical History, Past Surgical History, Social History, Family History have been reviewed and are without significant changes except as mentioned.    Review of Systems   A comprehensive 14 point review of systems was performed and was negative except as mentioned in HPI.    Medications:  The current medication list was reviewed in the EMR    ALLERGIES:    Allergies   Allergen Reactions   • Beef Allergy Anaphylaxis   • Lortab [Hydrocodone-Acetaminophen] Hallucinations   • Codeine Palpitations     Heart race   • Antihistamines, Chlorpheniramine-Type Palpitations     Heart races   • Latex Rash     Latex surgical tape only  Not allergic to latex gloves   • Other Palpitations     Decongestants: Heart Race   • Tape Rash       Objective      Vitals:    11/28/22 1423   BP: 151/93   Pulse: 107   Temp: 97.4 °F (36.3 °C)   TempSrc: Temporal   SpO2: 92%   Weight: 112 kg (247 lb 4.8 oz)   PainSc:   5   PainLoc: Hip  Comment: LEFT HIP PAIN     Current Status 4/15/2022   ECOG score 0       Physical Exam  Pulmonary:      Breath sounds: Normal breath sounds.   Neurological:      Mental Status: She is alert and oriented to person, place, and time.           RECENT LABS:  Glucose   Date Value Ref Range Status   11/23/2022 100 (H) 70 - 99 mg/dL Final     Sodium   Date Value  Ref Range Status   11/23/2022 140 137 - 145 mmol/L Final   11/02/2022 139 136 - 145 mmol/L Final     Potassium   Date Value Ref Range Status   11/23/2022 4.5 3.4 - 5.0 mmol/L Final   11/02/2022 4.8 3.5 - 5.1 mmol/L Final     CO2   Date Value Ref Range Status   11/23/2022 27.0 22.0 - 30.0 mmol/L Final     Total CO2   Date Value Ref Range Status   11/02/2022 28 21 - 31 mmol/L Final     Chloride   Date Value Ref Range Status   11/23/2022 102 101 - 112 mmol/L Final   11/02/2022 99 98 - 107 mmol/L Final     Anion Gap   Date Value Ref Range Status   11/23/2022 11.0 5.0 - 15.0 mmol/L Final   11/02/2022 12 4 - 12 mmol/L Final     Creatinine   Date Value Ref Range Status   11/23/2022 1.17 (H) 0.52 - 1.04 mg/dL Final   11/02/2022 1.3 0.7 - 1.3 mg/dL Final     BUN   Date Value Ref Range Status   11/23/2022 23 7 - 23 mg/dL Final   11/02/2022 24 7 - 25 mg/dL Final     BUN/Creatinine Ratio   Date Value Ref Range Status   11/23/2022 19.7 7.0 - 25.0 Final     Calcium   Date Value Ref Range Status   11/23/2022 9.8 8.4 - 10.2 mg/dL Final   11/02/2022 9.8 8.6 - 10.3 mg/dL Final     eGFR Non  Amer   Date Value Ref Range Status   01/26/2022 47 39 - 90 mL/min/1.73 Final     Alkaline Phosphatase   Date Value Ref Range Status   11/23/2022 52 38 - 126 U/L Final     Total Protein   Date Value Ref Range Status   11/23/2022 8.6 6.3 - 8.6 g/dL Final     ALT (SGPT)   Date Value Ref Range Status   11/23/2022 14 <=35 U/L Final     AST (SGOT)   Date Value Ref Range Status   11/23/2022 21 14 - 36 U/L Final     Total Bilirubin   Date Value Ref Range Status   11/23/2022 0.4 0.2 - 1.3 mg/dL Final     Albumin   Date Value Ref Range Status   11/23/2022 4.80 3.50 - 5.00 g/dL Final     Globulin   Date Value Ref Range Status   11/23/2022 3.8 (H) 2.3 - 3.5 gm/dL Final     Lab Results   Component Value Date    WBC 7.52 11/23/2022    HGB 11.8 (L) 11/23/2022    HCT 36.5 11/23/2022    MCV 95.3 11/23/2022     11/23/2022     Lab Results   Component  Value Date    NEUTROABS 5.12 11/23/2022    IRON 95 11/23/2022    IRON 89 07/26/2022    IRON 90 04/05/2022    TIBC 460 11/23/2022    TIBC 401 07/26/2022    TIBC 483 04/05/2022    LABIRON 21 11/23/2022    LABIRON 22 07/26/2022    LABIRON 19 (L) 04/05/2022    FERRITIN 169.10 (H) 11/23/2022    FERRITIN 275.00 (H) 07/26/2022    FERRITIN 88.41 04/05/2022    HCKGJBMC87 919 11/23/2022    TDGTGVFR00 875 07/26/2022    JJIMZOQP58 1,080 (H) 04/05/2022    FOLATE >20.00 11/23/2022    FOLATE >20.00 07/26/2022    FOLATE >20.00 04/05/2022     No results found for: , LABCA2, AFPTM, HCGQUANT, , CHROMGRNA, 2CEHN17TMP, CEA, REFLABREPO      PATHOLOGY:  * Cannot find OR log *         RADIOLOGY DATA :  No radiology results for the last 7 days        Assessment & Plan     1.  Iron deficiency anemia:  - EGD and colonoscopy done in November 2018 showed no evidence of bleeding.  Capsule endoscopy in March 2021 was negative for any bleeding.  - Due to inability to tolerate iron by mouth patient received intravenous Venofer in April 2022  - Recent anemia work-up shows hemoglobin is 11.8.  Iron studies are adequate, no need for any intravenous iron replacement at present  - In view of B12 deficiency, recommend continue with B12 p.o. daily and folic acid 3 times a week  - We will have patient return to clinic in 3 months with repeat CBC, iron studies, ferritin, B12 and folate to be done prior to that.    2.  Chronic kidney disease stage II  - Creatinine is 1.17.  Patient was notified about elevated creatinine and need to increase hydration    3.  Family history of breast cancer:  - Patient had a genetic testing done with [facility] hereditary cancer panel which includes BRCA1 and BRCA2 which was negative in March 2020.  Recommend continue with yearly mammogram in view of family history of breast cancer    4.  Health maintenance: Patient does not smoke    5. Advance Care Planning: For now patient remains full code and is able to make  decisions.  Patient has health care surrogate mentioned on chart.                 PHQ-9 Total Score: 0   -Patient is not homicidal or suicidal.  No acute intervention required.    Lois Parmar reports a pain score of 5.  Given her pain assessment as noted, treatment options were discussed and the following options were decided upon as a follow-up plan to address the patient's pain: continuation of current treatment plan for pain.         Ranjeet De La Torre MD  11/28/2022  14:44 CST        Part of this note may be an electronic transcription/translation of spoken language to printed text using the Dragon Dictation System.          CC:

## 2023-01-11 RX ORDER — LEVOTHYROXINE SODIUM 88 UG/1
TABLET ORAL
Qty: 90 TABLET | Refills: 1 | Status: SHIPPED | OUTPATIENT
Start: 2023-01-11

## 2023-01-11 RX ORDER — SPIRONOLACTONE 25 MG/1
TABLET ORAL
Qty: 90 TABLET | Refills: 1 | Status: SHIPPED | OUTPATIENT
Start: 2023-01-11

## 2023-01-16 ENCOUNTER — OFFICE VISIT (OUTPATIENT)
Dept: PODIATRY | Facility: CLINIC | Age: 73
End: 2023-01-16
Payer: MEDICARE

## 2023-01-16 VITALS — WEIGHT: 247 LBS | BODY MASS INDEX: 46.63 KG/M2 | HEIGHT: 61 IN | HEART RATE: 78 BPM | OXYGEN SATURATION: 97 %

## 2023-01-16 DIAGNOSIS — M20.42 HAMMER TOES OF BOTH FEET: ICD-10-CM

## 2023-01-16 DIAGNOSIS — E11.9 ENCOUNTER FOR DIABETIC FOOT EXAM: ICD-10-CM

## 2023-01-16 DIAGNOSIS — B35.1 ONYCHOMYCOSIS: ICD-10-CM

## 2023-01-16 DIAGNOSIS — M79.675 PAIN IN TOES OF BOTH FEET: Primary | ICD-10-CM

## 2023-01-16 DIAGNOSIS — M20.41 HAMMER TOES OF BOTH FEET: ICD-10-CM

## 2023-01-16 DIAGNOSIS — E11.8 DIABETIC FOOT: ICD-10-CM

## 2023-01-16 DIAGNOSIS — M79.674 PAIN IN TOES OF BOTH FEET: Primary | ICD-10-CM

## 2023-01-16 PROCEDURE — 11721 DEBRIDE NAIL 6 OR MORE: CPT | Performed by: NURSE PRACTITIONER

## 2023-01-16 PROCEDURE — 99212 OFFICE O/P EST SF 10 MIN: CPT | Performed by: NURSE PRACTITIONER

## 2023-01-16 NOTE — PROGRESS NOTES
Lois Parmar  1950  73 y.o. female   PCP: Marshal Warner : 08/31/2022  BS: 98 per pt     01/16/2023    Chief Complaint   Patient presents with   • Right Foot - Follow-up     Diabetic foot care   • Left Foot - Follow-up     Diabetic foot care       History of Present Illness    Lois Parmar is a 73 y.o.female presents to the clinic today for diabetic foot care.      Past Medical History:   Diagnosis Date   • Acquired hypothyroidism - On Synthroid    • Allergic rhinitis    • Allergy to alpha-gal 07/17/2020   • Anemia    • Asthma - mild intermittent    • Cardiac disease    • Carpal tunnel syndrome - Bilateral    • Colitis    • COVID-19 2022   • Degeneration of cervical intervertebral disc    • Diabetes (HCC)    • Disorder of lumbar spine    • Diverticulosis of large intestine 01/17/2019   • Essential hypertension    • Essential hypertension    • Fatigue    • History of myocardial infarct at age greater than 60 years 10/17/2018   • Hyperlipidemia - Improved with Zocor    • Hyperlipidemia associated with type 2 diabetes mellitus ([facility]) 07/28/2021   • Hypertriglyceridemia 01/18/2021   • IFG (impaired fasting glucose) 10/17/2018   • Impaired fasting glycaemia    • Iron deficiency anemia - Improved    • Lumbar radiculopathy    • Megaloblastic anemia due to vitamin B>12< deficiency    • Moderate persistent asthma without complication 04/09/2021   • Morbid obesity ([facility])    • Osteoarthritis of knee - Bilateral    • Osteoarthritis of multiple joints - Left knee    • Osteoporosis    • Pain in left hip    • Sleep apnea - On CPAP    • Spasm of back muscles    • Stage 3 chronic kidney disease ([facility]) 10/23/2019   • Stage 3a chronic kidney disease ([facility]) 10/23/2019   • Thyroid dysfunction    • Tubular adenoma of colon - removed during colonoscopy, 11/2018. 12/03/2018   • Type 2 diabetes mellitus with stage 3a chronic kidney disease, without long-term current use of insulin ([facility]) 04/09/2021   • Type 2 diabetes mellitus  without complication, without long-term current use of insulin ([facility]) 04/09/2021   • Vitamin D deficiency          Past Surgical History:   Procedure Laterality Date   • CARPAL TUNNEL RELEASE Right    • COLONOSCOPY N/A 11/30/2018    Procedure: COLONOSCOPY;  Surgeon: Jimmie Sutton MD;  Location: [facility] ENDOSCOPY;  Service: General   • COLONOSCOPY N/A 4/4/2022    Procedure: COLONOSCOPY;  Surgeon: Jimmie Sutton MD;  Location: [facility] ENDOSCOPY;  Service: General;  Laterality: N/A;  tattoo 7 ml  @ cecum    • ENDOSCOPY  09/17/2012    Normal esophagus.  Gastritis.  Normal duodenum   • ENDOSCOPY N/A 11/30/2018    Procedure: ESOPHAGOGASTRODUODENOSCOPY WITH ANESTHESIA;  Surgeon: Jimmie Sutton MD;  Location: [facility] ENDOSCOPY;  Service: General   • ENDOSCOPY AND COLONOSCOPY  09/17/2012    Internal & external hemorrhoids found.   • HYSTERECTOMY  1980    w bso   • JOINT REPLACEMENT      left knee   • LUMBAR LAMINECTOMY  2011    s/p lumbar laminectomy and fusion   • NECK SURGERY     • OOPHORECTOMY  1980   • SHOULDER SURGERY  12/07/2015    Arthroscopy of the left shoudler with rotator cuff repair, Vijay procedure, and subacromial decompression.   • SHOULDER SURGERY Right    • TOTAL KNEE ARTHROPLASTY Left          Family History   Problem Relation Age of Onset   • Breast cancer Mother    • Ovarian cancer Mother    • Cancer Mother    • Heart disease Mother    • Osteoporosis Mother    • Diabetes Mother    • Hypertension Mother    • Breast cancer Sister    • Cancer Sister    • Osteoporosis Sister    • Breast cancer Other    • Heart disease Other    • Hypertension Other    • Lung disease Other    • Diabetes Other    • Cancer Other    • Cancer Brother        Allergies   Allergen Reactions   • Beef Allergy Anaphylaxis   • Lortab [Hydrocodone-Acetaminophen] Hallucinations   • Codeine Palpitations     Heart race   • Antihistamines, Chlorpheniramine-Type Palpitations     Heart races   • Latex Rash     Latex surgical tape  only  Not allergic to latex gloves   • Other Palpitations     Decongestants: Heart Race   • Tape Rash       Social History     Socioeconomic History   • Marital status:    Tobacco Use   • Smoking status: Never   • Smokeless tobacco: Never   Vaping Use   • Vaping Use: Never used   Substance and Sexual Activity   • Alcohol use: No   • Drug use: No   • Sexual activity: Defer         Current Outpatient Medications   Medication Sig Dispense Refill   • albuterol sulfate  (90 Base) MCG/ACT inhaler Inhale 2 puffs Every 4 (Four) Hours As Needed for Wheezing. 18 g 11   • amLODIPine (NORVASC) 5 MG tablet Take 5 mg by mouth.     • ascorbic acid (VITAMIN C) 1000 MG tablet Take 1,000 mg by mouth Daily.     • aspirin 81 MG chewable tablet Chew 81 mg Daily.     • Breo Ellipta 100-25 MCG/INH inhaler INHALE 1 PUFF BY MOUTH DAILY **RINSE MOUTH AFTER EACH USE**     • Cholecalciferol 125 MCG (5000 UT) tablet Take 5,000 Units by mouth Daily.     • clobetasol (TEMOVATE) 0.05 % cream APPLY TOPICALLY TO AFFECTED AREA(S) TWO TIMES A DAY FOR 2 WEEKS, THEN STOP USING FOR 2 WEEKS     • Cymbalta 60 MG capsule TAKE 1 CAPSULE EVERY DAY 90 capsule 1   • diphenhydrAMINE (BENADRYL) 25 mg capsule Take 50 mg by mouth.     • docusate sodium (COLACE) 100 MG capsule Take 100 mg by mouth 2 (Two) Times a Day As Needed.     • EPINEPHrine 0.3 MG/0.3ML solution prefilled syringe Inject  as directed.     • ezetimibe (ZETIA) 10 MG tablet Take 1 tablet by mouth Daily.     • famotidine (PEPCID) 40 MG tablet TAKE 1 TABLET BY MOUTH EVERY NIGHT. THIS REPLACES ZANTAC 90 tablet 1   • folic acid (FOLVITE) 1 MG tablet Take 1 tablet by mouth 3 (Three) Times a Week. 36 tablet 1   • glucose blood test strip tests BID, Diagnosis: 250.00, 401.9     • ketoconazole (NIZORAL) 2 % cream Apply  topically to the appropriate area as directed.     • labetalol (NORMODYNE) 300 MG tablet Take 300 mg by mouth 2 (Two) Times a Day.     • lansoprazole (PREVACID) 15 MG capsule  "Take  by mouth.     • levothyroxine (SYNTHROID, LEVOTHROID) 88 MCG tablet TAKE 1 TABLET EVERY DAY 90 tablet 1   • loratadine (CLARITIN) 10 MG tablet Take 10 mg by mouth daily.     • metFORMIN (GLUCOPHAGE) 500 MG tablet Take 1 tablet by mouth 2 (Two) Times a Day With Meals. 180 tablet 3   • rosuvastatin (CRESTOR) 40 MG tablet Take 40 mg by mouth Daily.     • simvastatin (ZOCOR) 10 MG tablet Daily.     • spironolactone (ALDACTONE) 25 MG tablet TAKE 1 TABLET EVERY DAY FOR FLUID 90 tablet 1   • vitamin B-12 (CYANOCOBALAMIN) 1000 MCG tablet TAKE 1 TABLET EVERY DAY 90 tablet 1   • diazePAM (VALIUM) 10 MG tablet      • HYDROcodone-acetaminophen (NORCO) 7.5-325 MG per tablet      • indapamide (LOZOL) 2.5 MG tablet Take 2.5 mg by mouth Daily.       No current facility-administered medications for this visit.       Review of Systems   Constitutional: Negative.    HENT: Negative.    Eyes: Negative.    Respiratory: Negative.    Cardiovascular: Negative.    Gastrointestinal: Negative.    Endocrine: Negative.    Genitourinary: Negative.    Musculoskeletal:        Foot care   Skin: Negative.    Allergic/Immunologic: Negative.    Neurological: Negative.    Hematological: Negative.    Psychiatric/Behavioral: Negative.          OBJECTIVE    Pulse 78   Ht 154.9 cm (61\")   Wt 112 kg (247 lb)   SpO2 97%   BMI 46.67 kg/m²     Physical Exam  Vitals reviewed.   Constitutional:       Appearance: Normal appearance. She is well-developed.   HENT:      Head: Normocephalic and atraumatic.   Neck:      Trachea: Trachea and phonation normal.   Cardiovascular:      Pulses:           Dorsalis pedis pulses are 1+ on the right side and 1+ on the left side.        Posterior tibial pulses are 1+ on the right side and 1+ on the left side.   Pulmonary:      Effort: Pulmonary effort is normal. No respiratory distress.   Abdominal:      General: There is no distension.      Palpations: Abdomen is soft.   Feet:      Right foot:      Protective " Sensation: 10 sites tested. 10 sites sensed.      Toenail Condition: Right toenails are abnormally thick and long. Fungal disease present.     Left foot:      Protective Sensation: 10 sites tested. 10 sites sensed.      Toenail Condition: Left toenails are abnormally thick and long. Fungal disease present.  Skin:     General: Skin is warm and dry.   Neurological:      Mental Status: She is alert and oriented to person, place, and time.      GCS: GCS eye subscore is 4. GCS verbal subscore is 5. GCS motor subscore is 6.   Psychiatric:         Speech: Speech normal.         Behavior: Behavior normal. Behavior is cooperative.         Thought Content: Thought content normal.         Judgment: Judgment normal.                Procedures        ASSESSMENT AND PLAN    Diagnoses and all orders for this visit:    1. Pain in toes of both feet (Primary)    2. Onychomycosis    3. Diabetic foot (HCC)    4. Hammer toes of both feet    5. Encounter for diabetic foot exam (HCC)        - A diabetic foot screening exam was performed and the patient was educated on the foot complications related to diabetes,  preventative foot care and what signs and symptoms to watch for.  Instructed to contact our office if any foot problems develop before next visit.  -Discussed treatments for  painful toenails. Treatment options discussed included nail removal versus debridement. Patient elected for routine nail debridement. An ABN has been signed by the patient.  - Toenails 1-5 bilateral were debrided in length and thickness with nail nipper and electric  to decrease fungal load and risk of infection.  - All the patients questions were answered.  - RTC 3 months or sooner if needed.         This document has been electronically signed by Homer HARTLEY FNTAMRA, ONP-C on January 16, 2023 10:53 CST        [Memory Lapses or Loss] : memory loss [Numbness] : numbness [Tingling] : tingling [Negative] : Heme/Lymph

## 2023-02-01 RX ORDER — LANOLIN ALCOHOL/MO/W.PET/CERES
CREAM (GRAM) TOPICAL
Qty: 90 TABLET | Refills: 1 | Status: SHIPPED | OUTPATIENT
Start: 2023-02-01

## 2023-02-01 NOTE — TELEPHONE ENCOUNTER
Rx Refill Note  Requested Prescriptions     Pending Prescriptions Disp Refills   • vitamin B-12 (CYANOCOBALAMIN) 1000 MCG tablet [Pharmacy Med Name: VITAMIN B-12 1000 MCG Tablet] 90 tablet 1     Sig: TAKE 1 TABLET EVERY DAY      Last office visit with prescribing clinician: 11/28/2022   Last telemedicine visit with prescribing clinician: Visit date not found   Next office visit with prescribing clinician: 3/23/2023                         Would you like a call back once the refill request has been completed: [] Yes [] No    If the office needs to give you a call back, can they leave a voicemail: [] Yes [] No    Melony Arita  02/01/23, 07:57 CST

## 2023-02-17 ENCOUNTER — LAB (OUTPATIENT)
Dept: LAB | Facility: OTHER | Age: 73
End: 2023-02-17
Payer: MEDICARE

## 2023-02-17 DIAGNOSIS — E78.5 HYPERLIPIDEMIA ASSOCIATED WITH TYPE 2 DIABETES MELLITUS: Chronic | ICD-10-CM

## 2023-02-17 DIAGNOSIS — E11.69 HYPERLIPIDEMIA ASSOCIATED WITH TYPE 2 DIABETES MELLITUS: Chronic | ICD-10-CM

## 2023-02-17 DIAGNOSIS — E55.9 VITAMIN D DEFICIENCY: Chronic | ICD-10-CM

## 2023-02-17 DIAGNOSIS — I10 ESSENTIAL HYPERTENSION: Chronic | ICD-10-CM

## 2023-02-17 DIAGNOSIS — E78.1 HYPERTRIGLYCERIDEMIA: Chronic | ICD-10-CM

## 2023-02-17 DIAGNOSIS — E66.01 MORBID OBESITY: Chronic | ICD-10-CM

## 2023-02-17 DIAGNOSIS — E03.9 ACQUIRED HYPOTHYROIDISM: Chronic | ICD-10-CM

## 2023-02-17 DIAGNOSIS — D50.0 IRON DEFICIENCY ANEMIA DUE TO CHRONIC BLOOD LOSS: Chronic | ICD-10-CM

## 2023-02-17 DIAGNOSIS — N18.31 TYPE 2 DIABETES MELLITUS WITH STAGE 3A CHRONIC KIDNEY DISEASE, WITHOUT LONG-TERM CURRENT USE OF INSULIN: Chronic | ICD-10-CM

## 2023-02-17 DIAGNOSIS — N18.31 STAGE 3A CHRONIC KIDNEY DISEASE: Chronic | ICD-10-CM

## 2023-02-17 DIAGNOSIS — I42.9 CARDIOMYOPATHY, UNSPECIFIED TYPE: Chronic | ICD-10-CM

## 2023-02-17 DIAGNOSIS — E11.22 TYPE 2 DIABETES MELLITUS WITH STAGE 3A CHRONIC KIDNEY DISEASE, WITHOUT LONG-TERM CURRENT USE OF INSULIN: Chronic | ICD-10-CM

## 2023-02-17 DIAGNOSIS — D53.1 MEGALOBLASTIC ANEMIA: Chronic | ICD-10-CM

## 2023-02-17 LAB
ALBUMIN SERPL-MCNC: 4.7 G/DL (ref 3.5–5)
ALBUMIN/GLOB SERPL: 1.3 G/DL (ref 1.1–1.8)
ALP SERPL-CCNC: 48 U/L (ref 38–126)
ALT SERPL W P-5'-P-CCNC: 18 U/L
ANION GAP SERPL CALCULATED.3IONS-SCNC: 10 MMOL/L (ref 5–15)
AST SERPL-CCNC: 22 U/L (ref 14–36)
BASOPHILS # BLD AUTO: 0.05 10*3/MM3 (ref 0–0.2)
BASOPHILS NFR BLD AUTO: 0.6 % (ref 0–1.5)
BILIRUB SERPL-MCNC: 0.4 MG/DL (ref 0.2–1.3)
BUN SERPL-MCNC: 25 MG/DL (ref 7–23)
BUN/CREAT SERPL: 20.2 (ref 7–25)
CALCIUM SPEC-SCNC: 9.7 MG/DL (ref 8.4–10.2)
CHLORIDE SERPL-SCNC: 102 MMOL/L (ref 101–112)
CHOLEST SERPL-MCNC: 154 MG/DL (ref 150–200)
CO2 SERPL-SCNC: 26 MMOL/L (ref 22–30)
CREAT SERPL-MCNC: 1.24 MG/DL (ref 0.52–1.04)
DEPRECATED RDW RBC AUTO: 47.7 FL (ref 37–54)
EGFRCR SERPLBLD CKD-EPI 2021: 45.8 ML/MIN/1.73
EOSINOPHIL # BLD AUTO: 0.33 10*3/MM3 (ref 0–0.4)
EOSINOPHIL NFR BLD AUTO: 3.7 % (ref 0.3–6.2)
ERYTHROCYTE [DISTWIDTH] IN BLOOD BY AUTOMATED COUNT: 14.8 % (ref 12.3–15.4)
GLOBULIN UR ELPH-MCNC: 3.6 GM/DL (ref 2.3–3.5)
GLUCOSE SERPL-MCNC: 109 MG/DL (ref 70–99)
HCT VFR BLD AUTO: 35.8 % (ref 34–46.6)
HDLC SERPL-MCNC: 52 MG/DL (ref 40–59)
HGB BLD-MCNC: 12 G/DL (ref 12–15.9)
LDLC SERPL CALC-MCNC: 59 MG/DL
LDLC/HDLC SERPL: 0.89 {RATIO} (ref 0–3.22)
LYMPHOCYTES # BLD AUTO: 2.06 10*3/MM3 (ref 0.7–3.1)
LYMPHOCYTES NFR BLD AUTO: 22.8 % (ref 19.6–45.3)
MCH RBC QN AUTO: 30.6 PG (ref 26.6–33)
MCHC RBC AUTO-ENTMCNC: 33.5 G/DL (ref 31.5–35.7)
MCV RBC AUTO: 91.3 FL (ref 79–97)
MONOCYTES # BLD AUTO: 0.73 10*3/MM3 (ref 0.1–0.9)
MONOCYTES NFR BLD AUTO: 8.1 % (ref 5–12)
NEUTROPHILS NFR BLD AUTO: 5.87 10*3/MM3 (ref 1.7–7)
NEUTROPHILS NFR BLD AUTO: 64.8 % (ref 42.7–76)
PLATELET # BLD AUTO: 328 10*3/MM3 (ref 140–450)
PMV BLD AUTO: 9.1 FL (ref 6–12)
POTASSIUM SERPL-SCNC: 4.4 MMOL/L (ref 3.4–5)
PROT SERPL-MCNC: 8.3 G/DL (ref 6.3–8.6)
RBC # BLD AUTO: 3.92 10*6/MM3 (ref 3.77–5.28)
SODIUM SERPL-SCNC: 138 MMOL/L (ref 137–145)
TRIGL SERPL-MCNC: 278 MG/DL
VLDLC SERPL-MCNC: 43 MG/DL (ref 5–40)
WBC NRBC COR # BLD: 9.04 10*3/MM3 (ref 3.4–10.8)

## 2023-02-17 PROCEDURE — 80053 COMPREHEN METABOLIC PANEL: CPT | Performed by: INTERNAL MEDICINE

## 2023-02-17 PROCEDURE — 84443 ASSAY THYROID STIM HORMONE: CPT | Performed by: INTERNAL MEDICINE

## 2023-02-17 PROCEDURE — 85025 COMPLETE CBC W/AUTO DIFF WBC: CPT | Performed by: INTERNAL MEDICINE

## 2023-02-17 PROCEDURE — 82043 UR ALBUMIN QUANTITATIVE: CPT | Performed by: INTERNAL MEDICINE

## 2023-02-17 PROCEDURE — 82570 ASSAY OF URINE CREATININE: CPT | Performed by: INTERNAL MEDICINE

## 2023-02-17 PROCEDURE — 36415 COLL VENOUS BLD VENIPUNCTURE: CPT | Performed by: INTERNAL MEDICINE

## 2023-02-17 PROCEDURE — 83036 HEMOGLOBIN GLYCOSYLATED A1C: CPT | Performed by: INTERNAL MEDICINE

## 2023-02-17 PROCEDURE — 82306 VITAMIN D 25 HYDROXY: CPT | Performed by: INTERNAL MEDICINE

## 2023-02-17 PROCEDURE — 84439 ASSAY OF FREE THYROXINE: CPT | Performed by: INTERNAL MEDICINE

## 2023-02-17 PROCEDURE — 80061 LIPID PANEL: CPT | Performed by: INTERNAL MEDICINE

## 2023-02-18 LAB
25(OH)D3 SERPL-MCNC: 21.9 NG/ML (ref 30–100)
ALBUMIN UR-MCNC: 3.1 MG/DL
CREAT UR-MCNC: 160.1 MG/DL
HBA1C MFR BLD: 6.4 % (ref 4.8–5.6)
MICROALBUMIN/CREAT UR: 19.4 MG/G
T4 FREE SERPL-MCNC: 1.07 NG/DL (ref 0.93–1.7)
TSH SERPL DL<=0.05 MIU/L-ACNC: 3.61 UIU/ML (ref 0.27–4.2)

## 2023-02-23 ENCOUNTER — OFFICE VISIT (OUTPATIENT)
Dept: FAMILY MEDICINE CLINIC | Facility: CLINIC | Age: 73
End: 2023-02-23
Payer: MEDICARE

## 2023-02-23 VITALS
TEMPERATURE: 97.5 F | BODY MASS INDEX: 47.35 KG/M2 | WEIGHT: 250.8 LBS | SYSTOLIC BLOOD PRESSURE: 124 MMHG | HEART RATE: 95 BPM | OXYGEN SATURATION: 93 % | DIASTOLIC BLOOD PRESSURE: 82 MMHG | HEIGHT: 61 IN

## 2023-02-23 DIAGNOSIS — R06.09 DYSPNEA ON EXERTION: ICD-10-CM

## 2023-02-23 DIAGNOSIS — N18.31 ANEMIA DUE TO STAGE 3A CHRONIC KIDNEY DISEASE: Chronic | ICD-10-CM

## 2023-02-23 DIAGNOSIS — N18.31 TYPE 2 DIABETES MELLITUS WITH STAGE 3A CHRONIC KIDNEY DISEASE, WITHOUT LONG-TERM CURRENT USE OF INSULIN: ICD-10-CM

## 2023-02-23 DIAGNOSIS — E78.5 HYPERLIPIDEMIA ASSOCIATED WITH TYPE 2 DIABETES MELLITUS: Chronic | ICD-10-CM

## 2023-02-23 DIAGNOSIS — D53.1 MEGALOBLASTIC ANEMIA: Chronic | ICD-10-CM

## 2023-02-23 DIAGNOSIS — E03.9 ACQUIRED HYPOTHYROIDISM: Chronic | ICD-10-CM

## 2023-02-23 DIAGNOSIS — E11.22 TYPE 2 DIABETES MELLITUS WITH STAGE 3A CHRONIC KIDNEY DISEASE, WITHOUT LONG-TERM CURRENT USE OF INSULIN: ICD-10-CM

## 2023-02-23 DIAGNOSIS — E66.01 MORBID OBESITY: Chronic | ICD-10-CM

## 2023-02-23 DIAGNOSIS — E11.69 HYPERLIPIDEMIA ASSOCIATED WITH TYPE 2 DIABETES MELLITUS: Chronic | ICD-10-CM

## 2023-02-23 DIAGNOSIS — D63.1 ANEMIA DUE TO STAGE 3A CHRONIC KIDNEY DISEASE: Chronic | ICD-10-CM

## 2023-02-23 DIAGNOSIS — E78.1 HYPERTRIGLYCERIDEMIA: Chronic | ICD-10-CM

## 2023-02-23 DIAGNOSIS — I42.9 CARDIOMYOPATHY, UNSPECIFIED TYPE: Chronic | ICD-10-CM

## 2023-02-23 DIAGNOSIS — M79.7 FIBROMYALGIA: Chronic | ICD-10-CM

## 2023-02-23 DIAGNOSIS — Z91.018 ALLERGY TO ALPHA-GAL: Chronic | ICD-10-CM

## 2023-02-23 DIAGNOSIS — I10 ESSENTIAL HYPERTENSION: Chronic | ICD-10-CM

## 2023-02-23 DIAGNOSIS — G47.33 OBSTRUCTIVE SLEEP APNEA SYNDROME: Chronic | ICD-10-CM

## 2023-02-23 DIAGNOSIS — E55.9 VITAMIN D DEFICIENCY: Chronic | ICD-10-CM

## 2023-02-23 DIAGNOSIS — Z00.00 MEDICARE ANNUAL WELLNESS VISIT, SUBSEQUENT: Primary | ICD-10-CM

## 2023-02-23 DIAGNOSIS — N18.31 STAGE 3A CHRONIC KIDNEY DISEASE: Chronic | ICD-10-CM

## 2023-02-23 PROBLEM — D50.0 IRON DEFICIENCY ANEMIA DUE TO CHRONIC BLOOD LOSS: Chronic | Status: RESOLVED | Noted: 2019-01-24 | Resolved: 2023-02-23

## 2023-02-23 PROCEDURE — 0001A PR IMM ADMN SARSCOV2 30MCG/0.3ML DIL RECON 1ST DOSE: CPT | Performed by: INTERNAL MEDICINE

## 2023-02-23 PROCEDURE — 96160 PT-FOCUSED HLTH RISK ASSMT: CPT | Performed by: INTERNAL MEDICINE

## 2023-02-23 PROCEDURE — 3044F HG A1C LEVEL LT 7.0%: CPT | Performed by: INTERNAL MEDICINE

## 2023-02-23 PROCEDURE — 1159F MED LIST DOCD IN RCRD: CPT | Performed by: INTERNAL MEDICINE

## 2023-02-23 PROCEDURE — G0439 PPPS, SUBSEQ VISIT: HCPCS | Performed by: INTERNAL MEDICINE

## 2023-02-23 PROCEDURE — 99214 OFFICE O/P EST MOD 30 MIN: CPT | Performed by: INTERNAL MEDICINE

## 2023-02-23 PROCEDURE — 91312 COVID-19 (PFIZER) BIVALENT BOOSTER 12+YRS: CPT | Performed by: INTERNAL MEDICINE

## 2023-02-23 RX ORDER — LANSOPRAZOLE 30 MG
30 CAPSULE,DELAYED RELEASE (ENTERIC COATED) ORAL DAILY
COMMUNITY
Start: 2023-02-01 | End: 2023-04-05 | Stop reason: SDUPTHER

## 2023-02-23 RX ORDER — ERGOCALCIFEROL 1.25 MG/1
50000 CAPSULE ORAL WEEKLY
Qty: 13 CAPSULE | Refills: 3 | Status: SHIPPED | OUTPATIENT
Start: 2023-02-23

## 2023-02-23 RX ORDER — OLMESARTAN MEDOXOMIL 40 MG/1
40 TABLET ORAL DAILY
COMMUNITY
Start: 2023-01-26

## 2023-02-23 NOTE — PROGRESS NOTES
The ABCs of the Annual Wellness Visit  Subsequent Medicare Wellness Visit    Subjective    Lois Parmar is a 73 y.o. female who presents for a Subsequent Medicare Wellness Visit.    The following portions of the patient's history were reviewed and   updated as appropriate: allergies, current medications, past family history, past medical history, past social history, past surgical history and problem list.    Compared to one year ago, the patient feels her physical   health is worse.    Compared to one year ago, the patient feels her mental   health is the same.    Recent Hospitalizations:  She was admitted within the past 365 days at [facility].       Current Medical Providers:  Patient Care Team:  Marshal Warner MD as PCP - [facility], ODILIA Jimenez as Gynecologist (Nurse Practitioner)  Ranjeet De La Torre MD as Consulting Physician (Hematology and Oncology)  Moon Delarosa APRN as Nurse Practitioner (Oncology)    Outpatient Medications Prior to Visit   Medication Sig Dispense Refill   • albuterol sulfate  (90 Base) MCG/ACT inhaler Inhale 2 puffs Every 4 (Four) Hours As Needed for Wheezing. 18 g 11   • amLODIPine (NORVASC) 5 MG tablet Take 5 mg by mouth.     • ascorbic acid (VITAMIN C) 1000 MG tablet Take 1,000 mg by mouth Daily.     • aspirin 81 MG chewable tablet Chew 81 mg Daily.     • Breo Ellipta 100-25 MCG/INH inhaler INHALE 1 PUFF BY MOUTH DAILY **RINSE MOUTH AFTER EACH USE**     • clobetasol (TEMOVATE) 0.05 % cream APPLY TOPICALLY TO AFFECTED AREA(S) TWO TIMES A DAY FOR 2 WEEKS, THEN STOP USING FOR 2 WEEKS     • Cymbalta 60 MG capsule TAKE 1 CAPSULE EVERY DAY 90 capsule 1   • diphenhydrAMINE (BENADRYL) 25 mg capsule Take 50 mg by mouth.     • docusate sodium (COLACE) 100 MG capsule Take 100 mg by mouth 2 (Two) Times a Day As Needed.     • empagliflozin (JARDIANCE) 10 MG tablet tablet Take 10 mg by mouth Daily.     • EPINEPHrine 0.3 MG/0.3ML solution prefilled syringe Inject   as directed.     • ezetimibe (ZETIA) 10 MG tablet Take 1 tablet by mouth Daily.     • famotidine (PEPCID) 40 MG tablet TAKE 1 TABLET BY MOUTH EVERY NIGHT. THIS REPLACES ZANTAC 90 tablet 1   • folic acid (FOLVITE) 1 MG tablet Take 1 tablet by mouth 3 (Three) Times a Week. 36 tablet 1   • ketoconazole (NIZORAL) 2 % cream Apply  topically to the appropriate area as directed.     • levothyroxine (SYNTHROID, LEVOTHROID) 88 MCG tablet TAKE 1 TABLET EVERY DAY 90 tablet 1   • loratadine (CLARITIN) 10 MG tablet Take 10 mg by mouth daily.     • metFORMIN (GLUCOPHAGE) 500 MG tablet Take 1 tablet by mouth 2 (Two) Times a Day With Meals. 180 tablet 3   • olmesartan (BENICAR) 40 MG tablet      • Prevacid 30 MG capsule      • rosuvastatin (CRESTOR) 40 MG tablet Take 40 mg by mouth Daily.     • spironolactone (ALDACTONE) 25 MG tablet TAKE 1 TABLET EVERY DAY FOR FLUID 90 tablet 1   • vitamin B-12 (CYANOCOBALAMIN) 1000 MCG tablet TAKE 1 TABLET EVERY DAY 90 tablet 1   • Cholecalciferol 125 MCG (5000 UT) tablet Take 5,000 Units by mouth Daily.     • glucose blood test strip tests BID, Diagnosis: 250.00, 401.9     • indapamide (LOZOL) 2.5 MG tablet Take 2.5 mg by mouth Daily.     • labetalol (NORMODYNE) 300 MG tablet Take 300 mg by mouth 2 (Two) Times a Day.     • lansoprazole (PREVACID) 15 MG capsule Take  by mouth.     • simvastatin (ZOCOR) 10 MG tablet Daily.     • diazePAM (VALIUM) 10 MG tablet      • HYDROcodone-acetaminophen (NORCO) 7.5-325 MG per tablet        No facility-administered medications prior to visit.       No opioid medication identified on active medication list. I have reviewed chart for other potential  high risk medication/s and harmful drug interactions in the elderly.          Aspirin is on active medication list. Aspirin use is indicated based on review of current medical condition/s. Pros and cons of this therapy have been discussed today. Benefits of this medication outweigh potential harm.  Patient has been  encouraged to continue taking this medication.  .      Patient Active Problem List   Diagnosis   • Vitamin D deficiency   • Spasm of back muscles   • Sleep apnea - On CPAP   • Pain in left hip   • Osteoarthritis   • Osteoarthritis of knee - Bilateral   • Morbid obesity (HCC)   • Megaloblastic anemia due to vitamin B>12< deficiency   • Lumbar radiculopathy   • Anemia due to chronic kidney disease   • Fatigue   • Essential hypertension   • Degeneration of cervical intervertebral disc   • Disorder of lumbar spine   • Carpal tunnel syndrome - Bilateral   • Asthma - mild intermittent   • Allergic rhinitis   • Acquired hypothyroidism   • Trochanteric bursitis of left hip   • Hip pain, acute, right   • Hip pain, chronic, left   • Chronic pain of both knees   • Acute pain of both shoulders   • Presence of total knee joint prosthesis, left   • History of myocardial infarct at age greater than 60 years   • Heme positive stool   • Anemia   • Tubular adenoma of colon - removed during colonoscopy, 11/2018.   • Hx of adenomatous colonic polyps   • Presence of total left knee joint prosthesis   • Chronic bilateral low back pain with bilateral sciatica   • Degenerative disc disease, lumbar   • Diverticulosis of large intestine   • Adverse effect of iron   • Iron adverse reaction   • Cardiomyopathy (CMS/HCC) -  [place]   • Fibromyalgia   • Lumbar spinal stenosis   • Acute kidney injury superimposed on CKD ([facility])   • Stage 3a chronic kidney disease (HCC)   • Family history of breast cancer in female   • Allergy to alpha-gal   • Hypertriglyceridemia   • Allergy, unspecified, initial encounter   • Diarrhea   • Type 2 diabetes mellitus with stage 3a chronic kidney disease, without long-term current use of insulin ([facility])   • GERD (gastroesophageal reflux disease)   • 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  ACP discussion was held with the patient during this visit. Patient does not have an advance directive, declines further assistance.     Objective    Vitals:    02/23/23 1116   BP: 124/82   BP Location: Left arm   Patient Position: Sitting   Cuff Size: Large Adult   Pulse: 95   Temp: 97.5 °F (36.4 °C)   TempSrc: Tympanic   SpO2: 93%   Weight: 114 kg (250 lb 12.8 oz)   Height: 154.9 cm (60.98\")   PainSc:   5   PainLoc: Hip  Comment: bilateral and lower back     Estimated body mass index is 47.41 kg/m² as calculated from the following:    Height as of this encounter: 154.9 cm (60.98\").    Weight as of this encounter: 114 kg (250 lb 12.8 oz).    Class 3 Severe Obesity (BMI >=40). Obesity-related health conditions include the following: hypertension, diabetes mellitus, GERD and osteoarthritis. Obesity is unchanged. BMI is is above average; BMI management plan is completed. We discussed low calorie, low carb based diet program, portion control, increasing exercise and joining a fitness center or start home based exercise program.  She has just recently joined a fitness center and [place].      Does the patient have evidence of cognitive impairment? No    Lab Results   Component Value Date    TRIG 278 (H) 02/17/2023    HDL 52 02/17/2023    LDL 59 02/17/2023    VLDL 43 (H) 02/17/2023    HGBA1C 6.40 (H) 02/17/2023        HEALTH RISK ASSESSMENT    Smoking Status:  Social History     Tobacco Use   Smoking Status Never   Smokeless Tobacco Never     Alcohol Consumption:  Social History     Substance and Sexual Activity   Alcohol Use No     Fall Risk Screen:    CLIVE Fall Risk Assessment was completed, and patient is at LOW risk for " falls.Assessment completed on:2/23/2023    Depression Screening:  PHQ-2/PHQ-9 Depression Screening 2/23/2023   Little Interest or Pleasure in Doing Things 0-->not at all   Feeling Down, Depressed or Hopeless 0-->not at all   PHQ-9: Brief Depression Severity Measure Score 0       Health Habits and Functional and Cognitive Screening:  Functional & Cognitive Status 2/23/2023   Do you have difficulty preparing food and eating? No   Do you have difficulty bathing yourself, getting dressed or grooming yourself? No   Do you have difficulty using the toilet? No   Do you have difficulty moving around from place to place? No   Do you have trouble with steps or getting out of a bed or a chair? No   Current Diet Well Balanced Diet   Dental Exam Up to date   Eye Exam Up to date   Exercise (times per week) 6 times per week   Current Exercises Include Walking   Current Exercise Activities Include -   Do you need help using the phone?  No   Are you deaf or do you have serious difficulty hearing?  No   Do you need help with transportation? No   Do you need help shopping? No   Do you need help preparing meals?  No   Do you need help with housework?  No   Do you need help with laundry? No   Do you need help taking your medications? No   Do you need help managing money? No   Do you ever drive or ride in a car without wearing a seat belt? No   Have you felt unusual stress, anger or loneliness in the last month? No   Who do you live with? Spouse   If you need help, do you have trouble finding someone available to you? No   Have you been bothered in the last four weeks by sexual problems? No   Do you have difficulty concentrating, remembering or making decisions? No       Age-appropriate Screening Schedule:  Refer to the list below for future screening recommendations based on patient's age, sex and/or medical conditions. Orders for these recommended tests are listed in the plan section. The patient has been provided with a written  plan.    Health Maintenance   Topic Date Due   • ANNUAL WELLNESS VISIT  09/15/2022   • DIABETIC EYE EXAM  09/16/2022   • HEMOGLOBIN A1C  08/17/2023   • DIABETIC FOOT EXAM  01/16/2024   • LIPID PANEL  02/17/2024   • URINE MICROALBUMIN  02/17/2024   • MAMMOGRAM  06/03/2024   • DXA SCAN  08/31/2024   • COLORECTAL CANCER SCREENING  04/04/2025   • TDAP/TD VACCINES (3 - Td or Tdap) 08/17/2030   • HEPATITIS C SCREENING  Completed   • COVID-19 Vaccine  Completed   • INFLUENZA VACCINE  Completed   • Pneumococcal Vaccine 65+  Completed   • ZOSTER VACCINE  Completed                CMS Preventative Services Quick Reference  Risk Factors Identified During Encounter  Chronic Pain: Home exercise plan outlined.  Depression/Dysphoria: Current medication is effective, no change recommended  Fall Risk-High or Moderate: Discussed Fall Prevention in the home  Immunizations Discussed/Encouraged: COVID19  The above risks/problems have been discussed with the patient.    She reports diabetic eye exam performed by Dr Herrmann.  We will get copy of the report.  She has also had bilateral cataract surgery this past year  She receives bivalent COVID-19 booster today without difficulty    Pertinent information has been shared with the patient in the After Visit Summary.  An After Visit Summary and PPPS were made available to the patient.    Follow Up:   Next Medicare Wellness visit to be scheduled in 1 year.       Additional E&M Note during same encounter follows:  Patient has multiple medical problems which are significant and separately identifiable that require additional work above and beyond the Medicare Wellness Visit.      Chief Complaint  Medicare Wellness-subsequent (Living will: Not currently, in the process /Hospitalized in the last year: Yes, surgery /Mental health in the last year: Same /Physical health in the lats year: Worse ), Essential hypertension, Hyperlipidemia associated with type 2 diabetes mellitus (HC, Acquired  hypothyroidism, Stage 3a chronic kidney disease (HCC), Iron deficiency anemia , Lumbar radiculopathy, Type 2 diabetes mellitus with stage 3a chronic kidney disea, covid booster (1st covid booster), and Hypertension    Subjective        HPI  Lois Parmar is also being seen today for complaint of very poor exercise tolerance, as well as 6-month follow-up of multiple chronic complex medical problems including type 2 diabetes, hypertension, hyperlipidemia, hypothyroidism, chronic kidney disease, anemia, cervical and lumbar radiculopathy, lumbar spinal stenosis, morbid obesity, among other medical issues.    Dr. Cooper performed cervical laminectomy last fall, but the patient has not experienced as much improvement as he desired.  She also feels that she has experienced significant issues with poor exercise tolerance and dyspnea on exertion ever since the surgery.  Dr. Cooper and Dr. De La Torre have not found any reason for her to be experiencing this change in condition.  Dr. Boyle has seen her recently and has ordered a battery of tests to evaluate this issue.  He also referred her to Dr. Walker, pulmonologist.    She is at risk for falls.  We discussed exercising with her walker to reduce fall risk and gradually improve exercise tolerance.  She is ambulating with a cane today and was able to walk from the parking lot down to my office, but reports that he required a great deal of effort.  I am pleased to hear that she has joined the gym in [place] and plans to participate in exercise with some of the equipment, including a recumbent bicycle, and I also encouraged low impact exercise classes and water aerobics.  She has seen benefit with this strategy in the past.    Despite the disappointment regarding lack of improvement following the cervical laminectomy/discectomy, she does not feel that depression or anxiety is uncontrolled.  She continues on Cymbalta and feels that the symptoms are adequately controlled  "currently.    All of her lab results are reviewed with her today with results below.         Objective   Vital Signs:  /82 (BP Location: Left arm, Patient Position: Sitting, Cuff Size: Large Adult)   Pulse 95   Temp 97.5 °F (36.4 °C) (Tympanic)   Ht 154.9 cm (60.98\")   Wt 114 kg (250 lb 12.8 oz)   SpO2 93%   BMI 47.41 kg/m²     Physical Exam  Vitals and nursing note reviewed.   Constitutional:       General: She is not in acute distress.     Appearance: She is well-developed.      Comments: Very pleasant female   HENT:      Head: Normocephalic and atraumatic.      Nose:      Right Sinus: No maxillary sinus tenderness or frontal sinus tenderness.      Left Sinus: No maxillary sinus tenderness or frontal sinus tenderness.      Mouth/Throat:      Mouth: No oral lesions.      Pharynx: Uvula midline.      Tonsils: No tonsillar exudate.   Eyes:      Conjunctiva/sclera: Conjunctivae normal.      Pupils: Pupils are equal, round, and reactive to light.   Neck:      Thyroid: No thyroid mass or thyromegaly.      Vascular: No carotid bruit or JVD.      Trachea: Trachea normal. No tracheal deviation.   Cardiovascular:      Rate and Rhythm: Normal rate and regular rhythm.  No extrasystoles are present.     Chest Wall: PMI is not displaced.      Pulses: Normal pulses.      Heart sounds: Normal heart sounds. No murmur heard.  Pulmonary:      Effort: Pulmonary effort is normal. No accessory muscle usage or respiratory distress.      Breath sounds: Normal breath sounds. No decreased breath sounds, wheezing, rhonchi or rales.   Abdominal:      General: Bowel sounds are normal. There is no distension.      Palpations: Abdomen is soft.      Tenderness: There is no abdominal tenderness.      Comments: Obese abdomen limits exam   Musculoskeletal:      Cervical back: Neck supple.   Lymphadenopathy:      Cervical: No cervical adenopathy.   Skin:     General: Skin is warm and dry.      Findings: No rash.      Nails: There is no " clubbing.   Neurological:      Mental Status: She is alert and oriented to person, place, and time. Mental status is at baseline.      Cranial Nerves: No cranial nerve deficit.      Coordination: Coordination normal.   Psychiatric:         Mood and Affect: Mood normal.         Speech: Speech normal.         Behavior: Behavior normal.         Thought Content: Thought content normal.         Judgment: Judgment normal.            Common labs    Common Labs 11/2/22 11/23/22 11/23/22 2/17/23 2/17/23 2/17/23 2/17/23 2/17/23     1159 1159 1030 1030 1030 1030 1030   Glucose  100 (A)   109 (A)      Glucose 93          BUN 24 23   25 (A)      Creatinine 1.3 1.17 (A)   1.24 (A)      Sodium 139 140   138      Potassium 4.8 4.5   4.4      Chloride 99 102   102      Calcium 9.8 9.8   9.7      Albumin  4.80   4.7      Total Bilirubin  0.4   0.4      Alkaline Phosphatase  52   48      AST (SGOT)  21   22      ALT (SGPT)  14   18      WBC   7.52 9.04       Hemoglobin   11.8 (A) 12.0       Hematocrit   36.5 35.8       Platelets   318 328       Total Cholesterol      154     Triglycerides      278 (A)     HDL Cholesterol      52     LDL Cholesterol       59     Hemoglobin A1C        6.40 (A)   Microalbumin, Urine       3.1    (A) Abnormal value            Data reviewed: Consultant notes Cardiology           Assessment and Plan   Diagnoses and all orders for this visit:    1. Medicare annual wellness visit, subsequent (Primary)    2. Type 2 diabetes mellitus with stage 3a chronic kidney disease, without long-term current use of insulin ([facility])  -     glucose blood test strip; tests BID, Diagnosis: 250.00, 401.9  Dispense: 100 each; Refill: 3    3. Fibromyalgia    4. Obstructive sleep apnea syndrome    5. Allergy to alpha-gal    6. Dyspnea on exertion    7. Hypertriglyceridemia    8. Cardiomyopathy, unspecified type (HCC)    9. Hyperlipidemia associated with type 2 diabetes mellitus (HCC)    10. Essential hypertension    11. Vitamin D  deficiency    12. Morbid obesity (HCC)    13. Acquired hypothyroidism    14. Stage 3a chronic kidney disease (HCC)    15. Iron deficiency anemia due to chronic blood loss    16. Megaloblastic anemia due to vitamin B>12< deficiency    17. Anemia due to stage 3a chronic kidney disease (HCC)    Other orders  -     COVID-19 Bivalent Booster ([employer]) 12+yrs  -     vitamin D (ERGOCALCIFEROL) 1.25 MG (84089 UT) capsule capsule; Take 1 capsule by mouth 1 (One) Time Per Week.  Dispense: 13 capsule; Refill: 3    Diabetes control is quite adequate with A1c 6.4.  Continue current interventions and greatly intensify efforts at compliance with diabetic diet, exercise, weight loss goals.    Continue current dose Synthroid to address hypothyroidism which is at goal.    Continue to avoid mammal meats and continue to follow with Dr. Harmon as he recommends.  Keep Benadryl and EpiPen available at all times.    Continue current lipid management with Crestor and Zetia.    Vitamin D level is not at goal with OTC oral replacement.  Start prescription vitamin D 50,000 units weekly.    Continue to avoid NSAIDs and other nephrotoxic drugs in this patient with stage III CKD.    Continue the current vitamin and mineral supplements to address her anemia and other deficiency issues.  Continue to follow with Dr. De La Torre as he recommends.    Continue the current Prevacid in the morning and Pepcid in the evening for GERD symptoms, which she reports to be adequately controlled.    Continue her other current medications to address the other chronic medical issues we discussed today.    Dr. Boyle is making sure there is no metabolic/unknown physiologic etiology for her PALOMINO/poor exercise tolerance.  If no new issues found, I believe resuming the regular exercise and gradually reconditioning and pursuing aggressive weight loss is the dueñas to improvement.  If she is not achieving significant weight loss by next visit, I would like to see if we could get her  approved for Mounjaro.  She is on Jardiance, but I do not believe she has attempted a GLP-1 agonist    Return to clinic in 6 months with fasting labs prior, sooner if needed.     I spent 38 minutes caring for Lois on this date of service. This time includes time spent by me in the following activities:preparing for the visit, reviewing tests, obtaining and/or reviewing a separately obtained history, performing a medically appropriate examination and/or evaluation , counseling and educating the patient/family/caregiver, ordering medications, tests, or procedures and documenting information in the medical record.  This does not include time spent on her Medicare AW today  Follow Up   Return in about 6 months (around 8/23/2023) for Next scheduled follow up - labs 1 week prior.  Patient was given instructions and counseling regarding her condition or for health maintenance advice. Please see specific information pulled into the AVS if appropriate.

## 2023-03-13 ENCOUNTER — OFFICE VISIT (OUTPATIENT)
Dept: FAMILY MEDICINE CLINIC | Facility: CLINIC | Age: 73
End: 2023-03-13
Payer: MEDICARE

## 2023-03-13 VITALS
HEART RATE: 91 BPM | HEIGHT: 61 IN | TEMPERATURE: 98.7 F | OXYGEN SATURATION: 96 % | SYSTOLIC BLOOD PRESSURE: 118 MMHG | BODY MASS INDEX: 47.2 KG/M2 | WEIGHT: 250 LBS | DIASTOLIC BLOOD PRESSURE: 68 MMHG

## 2023-03-13 DIAGNOSIS — I10 ESSENTIAL HYPERTENSION: Chronic | ICD-10-CM

## 2023-03-13 DIAGNOSIS — E11.22 TYPE 2 DIABETES MELLITUS WITH STAGE 3A CHRONIC KIDNEY DISEASE, WITHOUT LONG-TERM CURRENT USE OF INSULIN: Chronic | ICD-10-CM

## 2023-03-13 DIAGNOSIS — N18.31 TYPE 2 DIABETES MELLITUS WITH STAGE 3A CHRONIC KIDNEY DISEASE, WITHOUT LONG-TERM CURRENT USE OF INSULIN: Chronic | ICD-10-CM

## 2023-03-13 DIAGNOSIS — K21.9 GASTROESOPHAGEAL REFLUX DISEASE, UNSPECIFIED WHETHER ESOPHAGITIS PRESENT: Chronic | ICD-10-CM

## 2023-03-13 DIAGNOSIS — M54.31 SCIATICA OF RIGHT SIDE: ICD-10-CM

## 2023-03-13 DIAGNOSIS — R29.6 FALL IN ELDERLY PATIENT: Primary | ICD-10-CM

## 2023-03-13 DIAGNOSIS — M25.551 RIGHT HIP PAIN: ICD-10-CM

## 2023-03-13 PROBLEM — R00.1 BRADYCARDIA: Status: RESOLVED | Noted: 2021-05-12 | Resolved: 2023-03-13

## 2023-03-13 PROBLEM — H26.9 CATARACT: Status: ACTIVE | Noted: 2022-12-01

## 2023-03-13 PROCEDURE — 3074F SYST BP LT 130 MM HG: CPT | Performed by: INTERNAL MEDICINE

## 2023-03-13 PROCEDURE — 96372 THER/PROPH/DIAG INJ SC/IM: CPT | Performed by: INTERNAL MEDICINE

## 2023-03-13 PROCEDURE — 3044F HG A1C LEVEL LT 7.0%: CPT | Performed by: INTERNAL MEDICINE

## 2023-03-13 PROCEDURE — 99214 OFFICE O/P EST MOD 30 MIN: CPT | Performed by: INTERNAL MEDICINE

## 2023-03-13 PROCEDURE — 3078F DIAST BP <80 MM HG: CPT | Performed by: INTERNAL MEDICINE

## 2023-03-13 RX ORDER — METHYLPREDNISOLONE ACETATE 40 MG/ML
40 INJECTION, SUSPENSION INTRA-ARTICULAR; INTRALESIONAL; INTRAMUSCULAR; SOFT TISSUE ONCE
Status: COMPLETED | OUTPATIENT
Start: 2023-03-13 | End: 2023-03-13

## 2023-03-13 RX ORDER — CELECOXIB 200 MG/1
200 CAPSULE ORAL DAILY
Qty: 30 CAPSULE | Refills: 0 | Status: SHIPPED | OUTPATIENT
Start: 2023-03-13

## 2023-03-13 RX ORDER — TRIAMCINOLONE ACETONIDE 40 MG/ML
20 INJECTION, SUSPENSION INTRA-ARTICULAR; INTRAMUSCULAR ONCE
Status: COMPLETED | OUTPATIENT
Start: 2023-03-13 | End: 2023-03-13

## 2023-03-13 RX ORDER — CELECOXIB 100 MG/1
100 CAPSULE ORAL DAILY PRN
Qty: 12 CAPSULE | Refills: 0 | Status: SHIPPED | OUTPATIENT
Start: 2023-03-13

## 2023-03-13 RX ORDER — OXYCODONE HYDROCHLORIDE AND ACETAMINOPHEN 5; 325 MG/1; MG/1
1 TABLET ORAL EVERY 6 HOURS PRN
Qty: 25 TABLET | Refills: 0 | Status: SHIPPED | OUTPATIENT
Start: 2023-03-13

## 2023-03-13 RX ADMIN — TRIAMCINOLONE ACETONIDE 20 MG: 40 INJECTION, SUSPENSION INTRA-ARTICULAR; INTRAMUSCULAR at 14:56

## 2023-03-13 RX ADMIN — METHYLPREDNISOLONE ACETATE 40 MG: 40 INJECTION, SUSPENSION INTRA-ARTICULAR; INTRALESIONAL; INTRAMUSCULAR; SOFT TISSUE at 14:55

## 2023-03-13 NOTE — PROGRESS NOTES
"Chief Complaint  Fall (Fell out of a chair 03/08/2023 and landed on right side. She c/o pain right knee, back and hip shoulder but most of pain now is right hip )    Subjective        History of Present Illness     Lois Parmar presents to the office after recent fall six days ago 03/08/2023.  She was seated in her computer chair when she reached over to  something out of the floor and turned over her computer chair.  She crawled to her recliner.  Initially, she was experiencing right-sided pain involving the right shoulder, back, hip, knee, but now she is mostly having persistent right low back/buttock pain with radicular symptoms to the right thigh to level of the right knee, worse with movement.   Tylenol Arthritis is not helping manage the pain.      She has undergone low back surgery with laminectomy and hardware placement/fusion 2011.  It appears that the recent fall has aggravated some lumbar radicular issues.  Dr. Cooper performed cervical laminectomy last fall, but patient has not had the improvement as he desired.  Dr. Cooper and Dr. De La Torre have not found any reason for her to be experiencing this change in condition.  Dr. Boyle, cardiologist, ordered VQ scan revealing low probability for pulmonary embolism.  He has an extensive cardiac work-up planned to reassess her fatigue/PALOMINO.  This may be multifactorial, but I suspect a large component of deconditioning.  See last note for more details.    Patient continues have uncontrolled GERD symptoms despite Prevacid 30 mg in the a.m. and Pepcid 40 mg each evening, for which we will refer to CLIFTON Goodson.    Blood pressure is well controlled today.  Weight is stable with BMI 47.      Objective   Vital Signs:  /68   Pulse 91   Temp 98.7 °F (37.1 °C)   Ht 154.9 cm (61\")   Wt 113 kg (250 lb)   SpO2 96%   BMI 47.24 kg/m²   Estimated body mass index is 47.24 kg/m² as calculated from the following:    Height as of this encounter: 154.9 cm " "(61\").    Weight as of this encounter: 113 kg (250 lb).             Physical Exam  Vitals reviewed.   Constitutional:       General: She is not in acute distress.     Appearance: She is well-developed.      Comments: Pleasant female, obese.    HENT:      Head: Normocephalic and atraumatic.      Nose:      Right Sinus: No maxillary sinus tenderness or frontal sinus tenderness.      Left Sinus: No maxillary sinus tenderness or frontal sinus tenderness.      Mouth/Throat:      Mouth: No oral lesions.      Pharynx: Uvula midline.      Tonsils: No tonsillar exudate.   Eyes:      Conjunctiva/sclera: Conjunctivae normal.      Pupils: Pupils are equal, round, and reactive to light.   Neck:      Thyroid: No thyroid mass or thyromegaly.      Vascular: No carotid bruit or JVD.      Trachea: Trachea normal. No tracheal deviation.   Cardiovascular:      Rate and Rhythm: Normal rate and regular rhythm.  No extrasystoles are present.     Chest Wall: PMI is not displaced.      Heart sounds: Normal heart sounds. No murmur heard.  Pulmonary:      Effort: Pulmonary effort is normal. No accessory muscle usage or respiratory distress.      Breath sounds: Normal breath sounds. No decreased breath sounds, wheezing, rhonchi or rales.   Abdominal:      General: Bowel sounds are normal. There is no distension.      Palpations: Abdomen is soft.      Tenderness: There is no abdominal tenderness.   Musculoskeletal:      Cervical back: Neck supple.      Comments: Exam reveals right anterior lateral hip tenderness.  No evidence of bruising   Lymphadenopathy:      Cervical: No cervical adenopathy.   Skin:     General: Skin is warm and dry.      Findings: No rash.      Nails: There is no clubbing.   Neurological:      Mental Status: She is alert and oriented to person, place, and time.      Cranial Nerves: No cranial nerve deficit.      Coordination: Coordination normal.   Psychiatric:         Speech: Speech normal.         Behavior: Behavior " normal.         Thought Content: Thought content normal.         Judgment: Judgment normal.            Result Review :    Renal Profile    Renal Profile 11/2/22 11/23/22 2/17/23   BUN 24 23 25 (A)   Creatinine 1.3 1.17 (A) 1.24 (A)   (A) Abnormal value            A1C Last 3 Results    HGBA1C Last 3 Results 8/8/22 2/17/23   Hemoglobin A1C 6.60 (A) 6.40 (A)   (A) Abnormal value            Data reviewed: Radiologic studies X-rays today and recent VQ scan and Consultant notes Dr. Boyle    Future Appointments   Date Time Provider Department Center   3/23/2023  2:15 PM Ranjeet De La Torre MD [facility]   4/17/2023 10:20 AM Homer Tony APRN [facility]   8/31/2023 11:00 AM Marshal Warner MD [facility] PC [facility]            Assessment and Plan   Diagnoses and all orders for this visit:    1. Fall in elderly patient (Primary)    2. Sciatica of right side  -     methylPREDNISolone acetate (DEPO-medrol) injection 40 mg  -     triamcinolone acetonide (KENALOG-40) injection 20 mg  -     oxyCODONE-acetaminophen (Percocet) 5-325 MG per tablet; Take 1 tablet by mouth Every 6 (Six) Hours As Needed for Severe Pain.  Dispense: 25 tablet; Refill: 0  -     XR Hip With or Without Pelvis 2 - 3 View Right  -     XR Spine Lumbar 2 or 3 View    3. Right hip pain  -     XR Hip With or Without Pelvis 2 - 3 View Right    4. Type 2 diabetes mellitus with stage 3a chronic kidney disease, without long-term current use of insulin ([facility])  -     glucose blood test strip; Check once daily  Dispense: 100 each; Refill: 3    5. Gastroesophageal reflux disease, unspecified whether esophagitis present  -     Ambulatory Referral to Gastroenterology    6. Essential hypertension    Other orders  -     celecoxib (CeleBREX) 200 MG capsule; Take 1 capsule by mouth Daily. With food  Dispense: 30 capsule; Refill: 0  -     celecoxib (CeleBREX) 100 MG capsule; Take 1 capsule by mouth Daily As Needed for Moderate Pain. With food  Dispense: 12 capsule; Refill:  0         I spent 32 minutes caring for Lois on this date of service. This time includes time spent by me in the following activities:preparing for the visit, reviewing tests, obtaining and/or reviewing a separately obtained history, performing a medically appropriate examination and/or evaluation , counseling and educating the patient/family/caregiver, ordering medications, tests, or procedures, referring and communicating with other health care professionals  and documenting information in the medical record     Orders placed for patient to stop by for x-rays of umbar spine and right hip and pelvis.  We will notify patient with results. Prescriptions sent for Celebrex 200 mg to take one q.d. with food and Percocet 5-325 mg to take one q. 6h. p.r.n.  She can continue the Tylenol Arthritis.  After informed verbal consent, patient is given Kenalog 20 mg and Depo-Medrol 80 mg IM without difficulty or complications.  Patient tolerated well without complications.       Refer to Luz Blakely for uncontrolled GERD.  Continue the Prevacid in the a.m. and Pepcid in the evening.    She understands that the steroid injection today will temporarily increase her glucose.  Continue current diabetic management and intensify efforts at diabetic diet and weight loss.    Blood pressure is well controlled today with current medical therapy.    Return 08/2023 for routine follow up with fasting labs one week prior or sooner if needed.     [facility]     Follow Up   Return if symptoms worsen or fail to improve, for Next scheduled follow up.  Patient was given instructions and counseling regarding her condition or for health maintenance advice. Please see specific information pulled into the AVS if appropriate.

## 2023-03-20 NOTE — PROGRESS NOTES
"DATE OF VISIT: 3/23/2023      REASON FOR VISIT: Anemia with iron deficiency, vitamin B12 deficiency, family history of breast cancer      HISTORY OF PRESENT ILLNESS:   73-year-old female with medical problem consisting of hypertension, dyslipidemia, diabetes mellitus, hypothyroidism, coronary artery disease has been following up with hematology clinic since January 24, 2019 for anemia, B12 deficiency and family history of breast cancer.  Patient is here for follow-up appointment today to discuss recently done blood work.  Denies any recent worsening of fatigue.  Complains of arthralgia.  Denies any bleeding.  Denies any new lymph node enlargement.              Past Medical History, Past Surgical History, Social History, Family History have been reviewed and are without significant changes except as mentioned.    Review of Systems   A comprehensive 14 point review of systems was performed and was negative except as mentioned in HPI.    Medications:  The current medication list was reviewed in the EMR    ALLERGIES:    Allergies   Allergen Reactions   • Beef Allergy Anaphylaxis   • Lortab [Hydrocodone-Acetaminophen] Hallucinations   • Codeine Palpitations     Heart race   • Antihistamines, Chlorpheniramine-Type Palpitations     Heart races   • Latex Rash     Latex surgical tape only  Not allergic to latex gloves   • Other Palpitations     Decongestants: Heart Race   • Tape Rash       Objective      Vitals:    03/23/23 1418   BP: 131/66   Pulse: 80   Resp: 18   Temp: 97.2 °F (36.2 °C)   TempSrc: Temporal   Weight: 112 kg (248 lb)   Height: 154.9 cm (61\")   PainSc:   3   PainLoc: Hip  Comment: right     Current Status 4/15/2022   ECOG score 0       Physical Exam  Pulmonary:      Breath sounds: Normal breath sounds.   Neurological:      Mental Status: She is alert and oriented to person, place, and time.           RECENT LABS:  Glucose   Date Value Ref Range Status   03/22/2023 92 70 - 99 mg/dL Final     Sodium   Date " Value Ref Range Status   03/22/2023 143 137 - 145 mmol/L Final   11/02/2022 139 136 - 145 mmol/L Final     Potassium   Date Value Ref Range Status   03/22/2023 4.7 3.4 - 5.0 mmol/L Final   11/02/2022 4.8 3.5 - 5.1 mmol/L Final     CO2   Date Value Ref Range Status   03/22/2023 24.0 22.0 - 30.0 mmol/L Final     Total CO2   Date Value Ref Range Status   11/02/2022 28 21 - 31 mmol/L Final     Chloride   Date Value Ref Range Status   03/22/2023 104 101 - 112 mmol/L Final   11/02/2022 99 98 - 107 mmol/L Final     Anion Gap   Date Value Ref Range Status   03/22/2023 15.0 5.0 - 15.0 mmol/L Final   11/02/2022 12 4 - 12 mmol/L Final     Creatinine   Date Value Ref Range Status   03/22/2023 1.62 (H) 0.52 - 1.04 mg/dL Final   11/02/2022 1.3 0.7 - 1.3 mg/dL Final     BUN   Date Value Ref Range Status   03/22/2023 38 (H) 7 - 23 mg/dL Final   11/02/2022 24 7 - 25 mg/dL Final     BUN/Creatinine Ratio   Date Value Ref Range Status   03/22/2023 23.5 7.0 - 25.0 Final     Calcium   Date Value Ref Range Status   03/22/2023 9.7 8.4 - 10.2 mg/dL Final   11/02/2022 9.8 8.6 - 10.3 mg/dL Final     eGFR Non  Amer   Date Value Ref Range Status   01/26/2022 47 39 - 90 mL/min/1.73 Final     Alkaline Phosphatase   Date Value Ref Range Status   03/22/2023 60 38 - 126 U/L Final     Total Protein   Date Value Ref Range Status   03/22/2023 8.8 (H) 6.3 - 8.6 g/dL Final     ALT (SGPT)   Date Value Ref Range Status   03/22/2023 18 <=35 U/L Final     AST (SGOT)   Date Value Ref Range Status   03/22/2023 19 14 - 36 U/L Final     Total Bilirubin   Date Value Ref Range Status   03/22/2023 0.4 0.2 - 1.3 mg/dL Final     Albumin   Date Value Ref Range Status   03/22/2023 4.9 3.5 - 5.0 g/dL Final     Globulin   Date Value Ref Range Status   03/22/2023 3.9 (H) 2.3 - 3.5 gm/dL Final     Lab Results   Component Value Date    WBC 11.38 (H) 03/22/2023    HGB 11.9 (L) 03/22/2023    HCT 36.6 03/22/2023    MCV 96.8 03/22/2023     03/22/2023     Lab  Results   Component Value Date    NEUTROABS 8.30 (H) 03/22/2023    IRON 92 03/22/2023    IRON 95 11/23/2022    IRON 89 07/26/2022    TIBC 468 03/22/2023    TIBC 460 11/23/2022    TIBC 401 07/26/2022    LABIRON 20 03/22/2023    LABIRON 21 11/23/2022    LABIRON 22 07/26/2022    FERRITIN 122.20 03/22/2023    FERRITIN 169.10 (H) 11/23/2022    FERRITIN 275.00 (H) 07/26/2022    BFEZIVQQ52 1,225 (H) 03/22/2023    RYHIKHJE78 919 11/23/2022    NTMTDPVH05 875 07/26/2022    FOLATE >20.00 03/22/2023    FOLATE >20.00 11/23/2022    FOLATE >20.00 07/26/2022     No results found for: , LABCA2, AFPTM, HCGQUANT, , CHROMGRNA, 2QZAH18PPS, CEA, REFLABREPO      PATHOLOGY:  * Cannot find OR log *         RADIOLOGY DATA :  No radiology results for the last 7 days        Assessment & Plan     1.  Iron deficiency anemia:  - EGD and colonoscopy done in November 2018 showed no evidence of bleeding.  Capsule endoscopy in March 2021 was negative for bleeding  - Due to inability to tolerate iron by mouth, patient received Venofer in April 2022  - Recent anemia work-up shows hemoglobin is 11.9.  Iron studies are adequate no need for any intravenous iron replacement at present.  - In view of B12 deficiency, recommend B12 and folic acid 3 times a week  - Patient will return to clinic in 4 months with repeat CBC, iron studies, ferritin, B12 and folate to be done prior to that    2.  Chronic kidney disease stage II  - Creatinine is 1.62.  Recommend continue with increase hydration    3.  Family history of breast cancer  - Patient had a genetic testing done with [facility] hereditary cancer panel which includes BRCA1 and BRCA2 which was negative in March 2020.  Recommend continuing with yearly mammogram in view of family history of breast cancer    4.  Health maintenance: Patient does not smoke    5.  Advance care planning:  - CODE STATUS and resuscitation were discussed with patient today.  Patient remains full code.    6.  Leukocytosis:  -  White blood cell count is 11.62 which is predominantly increase in neutrophil.  Likely reactive due to inflammation.  Will monitor with CBC    7.  Prescriptions: Prescription for folic acid has been sent to patient's pharmacy today             PHQ-9 Total Score:       Lois Parmar reports a pain score of 3.  Given her pain assessment as noted, treatment options were discussed and the following options were decided upon as a follow-up plan to address the patient's pain: continuation of current treatment plan for pain.         Ranjeet De La Torre MD  3/23/2023  14:38 CDT        Part of this note may be an electronic transcription/translation of spoken language to printed text using the Dragon Dictation System.          CC:

## 2023-03-22 ENCOUNTER — LAB (OUTPATIENT)
Dept: LAB | Facility: OTHER | Age: 73
End: 2023-03-22
Payer: MEDICARE

## 2023-03-22 DIAGNOSIS — N18.31 STAGE 3A CHRONIC KIDNEY DISEASE: ICD-10-CM

## 2023-03-22 DIAGNOSIS — T45.4X5A ADVERSE EFFECT OF IRON, INITIAL ENCOUNTER: ICD-10-CM

## 2023-03-22 DIAGNOSIS — Z80.3 FAMILY HISTORY OF BREAST CANCER IN FEMALE: ICD-10-CM

## 2023-03-22 DIAGNOSIS — D50.9 IRON DEFICIENCY ANEMIA, UNSPECIFIED IRON DEFICIENCY ANEMIA TYPE: ICD-10-CM

## 2023-03-22 LAB
ALBUMIN SERPL-MCNC: 4.9 G/DL (ref 3.5–5)
ALBUMIN/GLOB SERPL: 1.3 G/DL (ref 1.1–1.8)
ALP SERPL-CCNC: 60 U/L (ref 38–126)
ALT SERPL W P-5'-P-CCNC: 18 U/L
ANION GAP SERPL CALCULATED.3IONS-SCNC: 15 MMOL/L (ref 5–15)
AST SERPL-CCNC: 19 U/L (ref 14–36)
BASOPHILS # BLD AUTO: 0.05 10*3/MM3 (ref 0–0.2)
BASOPHILS NFR BLD AUTO: 0.4 % (ref 0–1.5)
BILIRUB SERPL-MCNC: 0.4 MG/DL (ref 0.2–1.3)
BUN SERPL-MCNC: 38 MG/DL (ref 7–23)
BUN/CREAT SERPL: 23.5 (ref 7–25)
CALCIUM SPEC-SCNC: 9.7 MG/DL (ref 8.4–10.2)
CHLORIDE SERPL-SCNC: 104 MMOL/L (ref 101–112)
CO2 SERPL-SCNC: 24 MMOL/L (ref 22–30)
CREAT SERPL-MCNC: 1.62 MG/DL (ref 0.52–1.04)
DEPRECATED RDW RBC AUTO: 52 FL (ref 37–54)
EGFRCR SERPLBLD CKD-EPI 2021: 33.2 ML/MIN/1.73
EOSINOPHIL # BLD AUTO: 0.22 10*3/MM3 (ref 0–0.4)
EOSINOPHIL NFR BLD AUTO: 1.9 % (ref 0.3–6.2)
ERYTHROCYTE [DISTWIDTH] IN BLOOD BY AUTOMATED COUNT: 15.2 % (ref 12.3–15.4)
FERRITIN SERPL-MCNC: 122.2 NG/ML (ref 13–150)
GLOBULIN UR ELPH-MCNC: 3.9 GM/DL (ref 2.3–3.5)
GLUCOSE SERPL-MCNC: 92 MG/DL (ref 70–99)
HCT VFR BLD AUTO: 36.6 % (ref 34–46.6)
HGB BLD-MCNC: 11.9 G/DL (ref 12–15.9)
IRON 24H UR-MRATE: 92 MCG/DL (ref 37–145)
IRON SATN MFR SERPL: 20 % (ref 20–50)
LYMPHOCYTES # BLD AUTO: 2.03 10*3/MM3 (ref 0.7–3.1)
LYMPHOCYTES NFR BLD AUTO: 17.8 % (ref 19.6–45.3)
MCH RBC QN AUTO: 31.5 PG (ref 26.6–33)
MCHC RBC AUTO-ENTMCNC: 32.5 G/DL (ref 31.5–35.7)
MCV RBC AUTO: 96.8 FL (ref 79–97)
MONOCYTES # BLD AUTO: 0.78 10*3/MM3 (ref 0.1–0.9)
MONOCYTES NFR BLD AUTO: 6.9 % (ref 5–12)
NEUTROPHILS NFR BLD AUTO: 73 % (ref 42.7–76)
NEUTROPHILS NFR BLD AUTO: 8.3 10*3/MM3 (ref 1.7–7)
PLATELET # BLD AUTO: 333 10*3/MM3 (ref 140–450)
PMV BLD AUTO: 8.7 FL (ref 6–12)
POTASSIUM SERPL-SCNC: 4.7 MMOL/L (ref 3.4–5)
PROT SERPL-MCNC: 8.8 G/DL (ref 6.3–8.6)
RBC # BLD AUTO: 3.78 10*6/MM3 (ref 3.77–5.28)
SODIUM SERPL-SCNC: 143 MMOL/L (ref 137–145)
TIBC SERPL-MCNC: 468 MCG/DL (ref 298–536)
TRANSFERRIN SERPL-MCNC: 314 MG/DL (ref 200–360)
WBC NRBC COR # BLD: 11.38 10*3/MM3 (ref 3.4–10.8)

## 2023-03-22 PROCEDURE — 82728 ASSAY OF FERRITIN: CPT | Performed by: INTERNAL MEDICINE

## 2023-03-22 PROCEDURE — 83540 ASSAY OF IRON: CPT | Performed by: INTERNAL MEDICINE

## 2023-03-22 PROCEDURE — 84466 ASSAY OF TRANSFERRIN: CPT | Performed by: INTERNAL MEDICINE

## 2023-03-22 PROCEDURE — 80053 COMPREHEN METABOLIC PANEL: CPT | Performed by: INTERNAL MEDICINE

## 2023-03-22 PROCEDURE — 85025 COMPLETE CBC W/AUTO DIFF WBC: CPT | Performed by: INTERNAL MEDICINE

## 2023-03-22 PROCEDURE — 82746 ASSAY OF FOLIC ACID SERUM: CPT | Performed by: INTERNAL MEDICINE

## 2023-03-22 PROCEDURE — 36415 COLL VENOUS BLD VENIPUNCTURE: CPT | Performed by: INTERNAL MEDICINE

## 2023-03-22 PROCEDURE — 82607 VITAMIN B-12: CPT | Performed by: INTERNAL MEDICINE

## 2023-03-23 ENCOUNTER — OFFICE VISIT (OUTPATIENT)
Dept: HEMATOLOGY/ONCOLOGY | Facility: CLINIC | Age: 73
End: 2023-03-23
Payer: MEDICARE

## 2023-03-23 VITALS
TEMPERATURE: 97.2 F | DIASTOLIC BLOOD PRESSURE: 66 MMHG | HEIGHT: 61 IN | HEART RATE: 80 BPM | WEIGHT: 248 LBS | BODY MASS INDEX: 46.82 KG/M2 | RESPIRATION RATE: 18 BRPM | SYSTOLIC BLOOD PRESSURE: 131 MMHG

## 2023-03-23 DIAGNOSIS — Z80.3 FAMILY HISTORY OF BREAST CANCER IN FEMALE: Chronic | ICD-10-CM

## 2023-03-23 DIAGNOSIS — D63.1 ANEMIA DUE TO STAGE 3A CHRONIC KIDNEY DISEASE: Primary | Chronic | ICD-10-CM

## 2023-03-23 DIAGNOSIS — N18.31 ANEMIA DUE TO STAGE 3A CHRONIC KIDNEY DISEASE: Primary | Chronic | ICD-10-CM

## 2023-03-23 DIAGNOSIS — T45.4X5A ADVERSE EFFECT OF IRON, INITIAL ENCOUNTER: Chronic | ICD-10-CM

## 2023-03-23 LAB
FOLATE SERPL-MCNC: >20 NG/ML (ref 4.78–24.2)
VIT B12 BLD-MCNC: 1225 PG/ML (ref 211–946)

## 2023-03-23 PROCEDURE — 1123F ACP DISCUSS/DSCN MKR DOCD: CPT | Performed by: INTERNAL MEDICINE

## 2023-03-23 PROCEDURE — 3075F SYST BP GE 130 - 139MM HG: CPT | Performed by: INTERNAL MEDICINE

## 2023-03-23 PROCEDURE — 1125F AMNT PAIN NOTED PAIN PRSNT: CPT | Performed by: INTERNAL MEDICINE

## 2023-03-23 PROCEDURE — 99214 OFFICE O/P EST MOD 30 MIN: CPT | Performed by: INTERNAL MEDICINE

## 2023-03-23 PROCEDURE — 3078F DIAST BP <80 MM HG: CPT | Performed by: INTERNAL MEDICINE

## 2023-03-23 RX ORDER — AMOXICILLIN 500 MG/1
TABLET, FILM COATED ORAL
COMMUNITY
Start: 2023-03-13

## 2023-03-23 RX ORDER — FOLIC ACID 1 MG/1
1 TABLET ORAL 3 TIMES WEEKLY
Qty: 36 TABLET | Refills: 1 | Status: SHIPPED | OUTPATIENT
Start: 2023-03-24 | End: 2023-03-30

## 2023-03-23 NOTE — TELEPHONE ENCOUNTER
Rx Refill Note  Requested Prescriptions     Pending Prescriptions Disp Refills   • folic acid (FOLVITE) 1 MG tablet 36 tablet 1     Sig: Take 1 tablet by mouth 3 (Three) Times a Week.      Last office visit with prescribing clinician: 3/23/2023   Last telemedicine visit with prescribing clinician: Visit date not found   Next office visit with prescribing clinician: Visit date not found                         Would you like a call back once the refill request has been completed: [] Yes [] No    If the office needs to give you a call back, can they leave a voicemail: [] Yes [] No    Bren Vogel RN  03/23/23, 14:33 CDT

## 2023-03-30 RX ORDER — FOLIC ACID 1 MG/1
TABLET ORAL
Qty: 36 TABLET | Refills: 1 | Status: SHIPPED | OUTPATIENT
Start: 2023-03-30

## 2023-03-30 NOTE — TELEPHONE ENCOUNTER
Rx Refill Note  Requested Prescriptions     Pending Prescriptions Disp Refills   • folic acid (FOLVITE) 1 MG tablet [Pharmacy Med Name: FOLIC ACID 1 MG Tablet] 36 tablet 1     Sig: TAKE 1 TABLET THREE TIMES WEEKLY      Last office visit with prescribing clinician: 3/23/2023   Last telemedicine visit with prescribing clinician: Visit date not found   Next office visit with prescribing clinician: 7/27/2023                         Would you like a call back once the refill request has been completed: [] Yes [] No    If the office needs to give you a call back, can they leave a voicemail: [] Yes [] No    Melony Arita  03/30/23, 09:30 CDT

## 2023-04-05 RX ORDER — LANSOPRAZOLE 30 MG/1
30 CAPSULE, DELAYED RELEASE ORAL DAILY
Qty: 90 CAPSULE | Refills: 3 | Status: SHIPPED | OUTPATIENT
Start: 2023-04-05

## 2023-04-05 RX ORDER — LANSOPRAZOLE 30 MG
CAPSULE,DELAYED RELEASE (ENTERIC COATED) ORAL
Qty: 90 CAPSULE | Status: CANCELLED | OUTPATIENT
Start: 2023-04-05

## 2023-04-05 RX ORDER — FAMOTIDINE 40 MG/1
TABLET, FILM COATED ORAL
Qty: 90 TABLET | Refills: 3 | Status: SHIPPED | OUTPATIENT
Start: 2023-04-05

## 2023-04-19 ENCOUNTER — OFFICE VISIT (OUTPATIENT)
Dept: PODIATRY | Facility: CLINIC | Age: 73
End: 2023-04-19
Payer: MEDICARE

## 2023-04-19 VITALS — HEIGHT: 61 IN | OXYGEN SATURATION: 99 % | BODY MASS INDEX: 46.82 KG/M2 | WEIGHT: 248 LBS | HEART RATE: 95 BPM

## 2023-04-19 DIAGNOSIS — M79.675 PAIN IN TOES OF BOTH FEET: Primary | ICD-10-CM

## 2023-04-19 DIAGNOSIS — B35.1 ONYCHOMYCOSIS: ICD-10-CM

## 2023-04-19 DIAGNOSIS — E11.8 DIABETIC FOOT: ICD-10-CM

## 2023-04-19 DIAGNOSIS — M20.42 HAMMER TOES OF BOTH FEET: ICD-10-CM

## 2023-04-19 DIAGNOSIS — M20.41 HAMMER TOES OF BOTH FEET: ICD-10-CM

## 2023-04-19 DIAGNOSIS — E11.9 ENCOUNTER FOR DIABETIC FOOT EXAM: ICD-10-CM

## 2023-04-19 DIAGNOSIS — M79.674 PAIN IN TOES OF BOTH FEET: Primary | ICD-10-CM

## 2023-04-19 NOTE — PROGRESS NOTES
Lois Parmar  1950  73 y.o. female   PCP: Marshal Warner : 08/31/2022  BS: 100 per pt     4/19/2023    Chief Complaint   Patient presents with   • Left Foot - Follow-up     Diabetic foot care    • Right Foot - Follow-up     Diabetic foot care        History of Present Illness    Lois Parmar is a 73 y.o.female presents to the clinic today for diabetic foot care.      Past Medical History:   Diagnosis Date   • Acquired hypothyroidism - On Synthroid    • Allergic rhinitis    • Allergy to alpha-gal 07/17/2020   • Anemia    • Asthma - mild intermittent    • Cardiac disease    • Carpal tunnel syndrome - Bilateral    • Colitis    • COVID-19 2022   • Degeneration of cervical intervertebral disc    • Diabetes    • Disorder of lumbar spine    • Diverticulosis of large intestine 01/17/2019   • Essential hypertension    • Essential hypertension    • Fatigue    • History of myocardial infarct at age greater than 60 years 10/17/2018   • Hyperlipidemia - Improved with Zocor    • Hyperlipidemia associated with type 2 diabetes mellitus 07/28/2021   • Hypertriglyceridemia 01/18/2021   • IFG (impaired fasting glucose) 10/17/2018   • Impaired fasting glycaemia    • Iron deficiency anemia - Improved    • Lumbar radiculopathy    • Megaloblastic anemia due to vitamin B>12< deficiency    • Moderate persistent asthma without complication 04/09/2021   • Morbid obesity    • Osteoarthritis of knee - Bilateral    • Osteoarthritis of multiple joints - Left knee    • Osteoporosis    • Pain in left hip    • Sleep apnea - On CPAP    • Spasm of back muscles    • Stage 3 chronic kidney disease 10/23/2019   • Stage 3a chronic kidney disease 10/23/2019   • Thyroid dysfunction    • Tubular adenoma of colon - removed during colonoscopy, 11/2018. 12/03/2018   • Type 2 diabetes mellitus with stage 3a chronic kidney disease, without long-term current use of insulin 04/09/2021   • Type 2 diabetes mellitus without complication, without long-term  current use of insulin 04/09/2021   • Vitamin D deficiency          Past Surgical History:   Procedure Laterality Date   • CARPAL TUNNEL RELEASE Right    • CATARACT EXTRACTION Left 12/01/2022    Dr. Stauffer   • CATARACT EXTRACTION Right 12/15/2022    Dr. Stauffer   • CERVICAL DISCECTOMY ANTERIOR N/A 10/06/2022    Dr. Cooper   • COLONOSCOPY N/A 11/30/2018    Procedure: COLONOSCOPY;  Surgeon: Jimmie Sutton MD;  Location: [facility] ENDOSCOPY;  Service: General   • COLONOSCOPY N/A 04/04/2022    Procedure: COLONOSCOPY;  Surgeon: Jimmie Sutton MD;  Location: [facility] ENDOSCOPY;  Service: General;  Laterality: N/A;  tattoo 7 ml  @ cecum    • ENDOSCOPY  09/17/2012    Normal esophagus.  Gastritis.  Normal duodenum   • ENDOSCOPY N/A 11/30/2018    Procedure: ESOPHAGOGASTRODUODENOSCOPY WITH ANESTHESIA;  Surgeon: Jimmie Sutton MD;  Location: [facility] ENDOSCOPY;  Service: General   • ENDOSCOPY AND COLONOSCOPY  09/17/2012    Internal & external hemorrhoids found.   • HYSTERECTOMY  1980    w bso   • JOINT REPLACEMENT      left knee   • LUMBAR LAMINECTOMY  2011    s/p lumbar laminectomy and fusion   • OOPHORECTOMY  1980   • SHOULDER SURGERY  12/07/2015    Arthroscopy of the left shoudler with rotator cuff repair, Vijay procedure, and subacromial decompression.   • SHOULDER SURGERY Right    • TOTAL KNEE ARTHROPLASTY Left          Family History   Problem Relation Age of Onset   • Breast cancer Mother    • Ovarian cancer Mother    • Cancer Mother    • Heart disease Mother    • Osteoporosis Mother    • Diabetes Mother    • Hypertension Mother    • Breast cancer Sister    • Cancer Sister    • Osteoporosis Sister    • Breast cancer Other    • Heart disease Other    • Hypertension Other    • Lung disease Other    • Diabetes Other    • Cancer Other    • Cancer Brother        Allergies   Allergen Reactions   • Beef Allergy Anaphylaxis   • Lortab [Hydrocodone-Acetaminophen] Hallucinations   • Codeine Palpitations     Heart race   •  Antihistamines, Chlorpheniramine-Type Palpitations     Heart races   • Latex Rash     Latex surgical tape only  Not allergic to latex gloves   • Other Palpitations     Decongestants: Heart Race   • Tape Rash       Social History     Socioeconomic History   • Marital status:    Tobacco Use   • Smoking status: Never     Passive exposure: Past   • Smokeless tobacco: Never   Vaping Use   • Vaping Use: Never used   Substance and Sexual Activity   • Alcohol use: No   • Drug use: No   • Sexual activity: Defer         Current Outpatient Medications   Medication Sig Dispense Refill   • albuterol sulfate  (90 Base) MCG/ACT inhaler Inhale 2 puffs Every 4 (Four) Hours As Needed for Wheezing. 18 g 11   • amLODIPine (NORVASC) 5 MG tablet Take 1 tablet by mouth.     • amoxicillin (AMOXIL) 500 MG tablet TAKE 4 TABLETS (2 GRAM) BY ORAL ROUTE 1 HOUR PRIOR TO DENTAL APPOINTMENT     • ascorbic acid (VITAMIN C) 1000 MG tablet Take 1 tablet by mouth Daily.     • aspirin 81 MG chewable tablet Chew 1 tablet Daily.     • Breo Ellipta 100-25 MCG/INH inhaler INHALE 1 PUFF BY MOUTH DAILY **RINSE MOUTH AFTER EACH USE**     • celecoxib (CeleBREX) 100 MG capsule Take 1 capsule by mouth Daily As Needed for Moderate Pain. With food 12 capsule 0   • celecoxib (CeleBREX) 200 MG capsule Take 1 capsule by mouth Daily. With food 30 capsule 0   • clobetasol (TEMOVATE) 0.05 % cream APPLY TOPICALLY TO AFFECTED AREA(S) TWO TIMES A DAY FOR 2 WEEKS, THEN STOP USING FOR 2 WEEKS     • Cymbalta 60 MG capsule TAKE 1 CAPSULE EVERY DAY 90 capsule 1   • diphenhydrAMINE (BENADRYL) 25 mg capsule Take 2 capsules by mouth.     • docusate sodium (COLACE) 100 MG capsule Take 1 capsule by mouth 2 (Two) Times a Day As Needed.     • empagliflozin (JARDIANCE) 10 MG tablet tablet Take 1 tablet by mouth Daily.     • EPINEPHrine 0.3 MG/0.3ML solution prefilled syringe Inject  as directed.     • ezetimibe (ZETIA) 10 MG tablet Take 1 tablet by mouth Daily.     •  "famotidine (PEPCID) 40 MG tablet TAKE 1 TABLET EVERY NIGHT 90 tablet 3   • folic acid (FOLVITE) 1 MG tablet TAKE 1 TABLET THREE TIMES WEEKLY 36 tablet 1   • glucose blood test strip Check once daily 100 each 3   • ketoconazole (NIZORAL) 2 % cream Apply  topically to the appropriate area as directed.     • lansoprazole (Prevacid) 30 MG capsule Take 1 capsule by mouth Daily. 90 capsule 3   • levothyroxine (SYNTHROID, LEVOTHROID) 88 MCG tablet TAKE 1 TABLET EVERY DAY 90 tablet 1   • loratadine (CLARITIN) 10 MG tablet Take 1 tablet by mouth Daily.     • metFORMIN (GLUCOPHAGE) 500 MG tablet Take 1 tablet by mouth 2 (Two) Times a Day With Meals. 180 tablet 3   • olmesartan (BENICAR) 40 MG tablet Take 1 tablet by mouth Daily.     • oxyCODONE-acetaminophen (Percocet) 5-325 MG per tablet Take 1 tablet by mouth Every 6 (Six) Hours As Needed for Severe Pain. 25 tablet 0   • rosuvastatin (CRESTOR) 40 MG tablet Take 1 tablet by mouth Daily.     • spironolactone (ALDACTONE) 25 MG tablet TAKE 1 TABLET EVERY DAY FOR FLUID 90 tablet 1   • vitamin B-12 (CYANOCOBALAMIN) 1000 MCG tablet TAKE 1 TABLET EVERY DAY 90 tablet 1   • vitamin D (ERGOCALCIFEROL) 1.25 MG (49455 UT) capsule capsule Take 1 capsule by mouth 1 (One) Time Per Week. 13 capsule 3   • indapamide (LOZOL) 2.5 MG tablet Take 1 tablet by mouth Daily.       No current facility-administered medications for this visit.       Review of Systems   Constitutional: Negative.    HENT: Negative.    Eyes: Negative.    Respiratory: Negative.    Cardiovascular: Negative.    Gastrointestinal: Negative.    Endocrine: Negative.    Genitourinary: Negative.    Musculoskeletal:        Foot care   Skin: Negative.    Allergic/Immunologic: Negative.    Neurological: Negative.    Hematological: Negative.    Psychiatric/Behavioral: Negative.          OBJECTIVE    Pulse 95   Ht 154.9 cm (61\")   Wt 112 kg (248 lb)   SpO2 99%   BMI 46.86 kg/m²     Physical Exam  Vitals reviewed.   Constitutional:  "      Appearance: Normal appearance. She is well-developed.   HENT:      Head: Normocephalic and atraumatic.   Neck:      Trachea: Trachea and phonation normal.   Cardiovascular:      Pulses:           Dorsalis pedis pulses are 1+ on the right side and 1+ on the left side.        Posterior tibial pulses are 1+ on the right side and 1+ on the left side.   Pulmonary:      Effort: Pulmonary effort is normal. No respiratory distress.   Abdominal:      General: There is no distension.      Palpations: Abdomen is soft.   Feet:      Right foot:      Protective Sensation: 10 sites tested. 10 sites sensed.      Toenail Condition: Right toenails are abnormally thick and long. Fungal disease present.     Left foot:      Protective Sensation: 10 sites tested. 10 sites sensed.      Toenail Condition: Left toenails are abnormally thick and long. Fungal disease present.  Skin:     General: Skin is warm and dry.   Neurological:      Mental Status: She is alert and oriented to person, place, and time.      GCS: GCS eye subscore is 4. GCS verbal subscore is 5. GCS motor subscore is 6.   Psychiatric:         Speech: Speech normal.         Behavior: Behavior normal. Behavior is cooperative.         Thought Content: Thought content normal.         Judgment: Judgment normal.                Procedures        ASSESSMENT AND PLAN    Diagnoses and all orders for this visit:    1. Pain in toes of both feet (Primary)    2. Diabetic foot    3. Onychomycosis    4. Encounter for diabetic foot exam    5. Hammer toes of both feet        - A diabetic foot screening exam was performed and the patient was educated on the foot complications related to diabetes,  preventative foot care and what signs and symptoms to watch for.  Instructed to contact our office if any foot problems develop before next visit.  -Discussed treatments for  painful toenails. Treatment options discussed included nail removal versus debridement. Patient elected for routine nail  debridement. An ABN has been signed by the patient.  - Toenails 1-5 bilateral were debrided in length and thickness with nail nipper and electric  to decrease fungal load and risk of infection.  - All the patients questions were answered.  - RTC 3 months or sooner if needed.         This document has been electronically signed by Homer HARTLEY, FNP-C, ONP-C on April 19, 2023 08:56 CDT

## 2023-04-24 ENCOUNTER — OFFICE VISIT (OUTPATIENT)
Dept: GASTROENTEROLOGY | Facility: CLINIC | Age: 73
End: 2023-04-24
Payer: MEDICARE

## 2023-04-24 VITALS
WEIGHT: 246.8 LBS | SYSTOLIC BLOOD PRESSURE: 130 MMHG | HEART RATE: 92 BPM | HEIGHT: 61 IN | BODY MASS INDEX: 46.6 KG/M2 | DIASTOLIC BLOOD PRESSURE: 88 MMHG

## 2023-04-24 DIAGNOSIS — K21.9 GASTROESOPHAGEAL REFLUX DISEASE, UNSPECIFIED WHETHER ESOPHAGITIS PRESENT: Primary | ICD-10-CM

## 2023-04-24 DIAGNOSIS — M62.08 DIASTASIS RECTI: ICD-10-CM

## 2023-04-24 DIAGNOSIS — K44.9 HIATAL HERNIA: ICD-10-CM

## 2023-04-24 NOTE — PROGRESS NOTES
[facility]      Chief Complaint:   Chief Complaint   Patient presents with   • Heartburn       Subjective     HPI:   Patient was scheduled for evaluation of continued heartburn symptoms despite medication.  Upon arrival, patient states that she does not have heartburn symptoms as long as she takes her Prevacid.  Patient states she is currently taking Prevacid 30 mg each morning and Pepcid 40 mg each evening.  Patient states that she could eat a whole pizza and immediately go to bed without symptoms.    Patient felt that she was referred for evaluation of an abdominal hernia.  Patient states she has been experiencing some shortness of breath and thought that this hernia might be the reason.  Patient states she is still undergoing evaluation for the shortness of breath.    Patient last had colonoscopy completed in April 2022 and is not due for screening colonoscopy until April 2025.  Patient denies any difficulty with her bowel movements or bleeding with bowel movements.  Patient has chronic anemia and is followed by Dr. De La Torre.  Dr. De La Torre indicates that her anemia is caused from her chronic kidney disease.  Patient previously had EGD and colonoscopy for evaluation in 2018 and 2019, no source of bleeding was found.  EGD showed small hiatal hernia.    Assessment/plan:  1.  GERD- controlled with current medication.  Continue as per current.  2.  Hiatal hernia- continue with medication as per current.  Patient will likely require long-term PPI.  3.  Diastases recti    Patient may follow-up on an as-needed basis.  If she begins to have pain or symptoms from her diastases recti, she can follow-up with general surgery for recommendations.    Past Medical History:   Past Medical History:   Diagnosis Date   • Acquired hypothyroidism - On Synthroid    • Allergic rhinitis    • Allergy to alpha-gal 07/17/2020   • Anemia    • Asthma - mild intermittent    • Cardiac disease    • Carpal  tunnel syndrome - Bilateral    • Colitis    • COVID-19 2022   • Degeneration of cervical intervertebral disc    • Diabetes    • Disorder of lumbar spine    • Diverticulosis of large intestine 01/17/2019   • Essential hypertension    • Essential hypertension    • Fatigue    • History of myocardial infarct at age greater than 60 years 10/17/2018   • Hyperlipidemia - Improved with Zocor    • Hyperlipidemia associated with type 2 diabetes mellitus 07/28/2021   • Hypertriglyceridemia 01/18/2021   • IFG (impaired fasting glucose) 10/17/2018   • Impaired fasting glycaemia    • Iron deficiency anemia - Improved    • Lumbar radiculopathy    • Megaloblastic anemia due to vitamin B>12< deficiency    • Moderate persistent asthma without complication 04/09/2021   • Morbid obesity    • Osteoarthritis of knee - Bilateral    • Osteoarthritis of multiple joints - Left knee    • Osteoporosis    • Pain in left hip    • Sleep apnea - On CPAP    • Spasm of back muscles    • Stage 3 chronic kidney disease 10/23/2019   • Stage 3a chronic kidney disease 10/23/2019   • Thyroid dysfunction    • Tubular adenoma of colon - removed during colonoscopy, 11/2018. 12/03/2018   • Type 2 diabetes mellitus with stage 3a chronic kidney disease, without long-term current use of insulin 04/09/2021   • Type 2 diabetes mellitus without complication, without long-term current use of insulin 04/09/2021   • Vitamin D deficiency        Past Surgical History:    Past Surgical History:   Procedure Laterality Date   • CARPAL TUNNEL RELEASE Right    • CATARACT EXTRACTION Left 12/01/2022    Dr. Stauffer   • CATARACT EXTRACTION Right 12/15/2022    Dr. Stauffer   • CERVICAL DISCECTOMY ANTERIOR N/A 10/06/2022    Dr. Cooper   • COLONOSCOPY N/A 11/30/2018    Procedure: COLONOSCOPY;  Surgeon: Jimmie Sutton MD;  Location: [facility] ENDOSCOPY;  Service: General   • COLONOSCOPY N/A 04/04/2022    Procedure: COLONOSCOPY;  Surgeon: Jimmie Sutton MD;  Location: [facility]  ENDOSCOPY;  Service: General;  Laterality: N/A;  tattoo 7 ml  @ cecum    • ENDOSCOPY  09/17/2012    Normal esophagus.  Gastritis.  Normal duodenum   • ENDOSCOPY N/A 11/30/2018    Procedure: ESOPHAGOGASTRODUODENOSCOPY WITH ANESTHESIA;  Surgeon: Jimmie Sutton MD;  Location: [facility] ENDOSCOPY;  Service: General   • ENDOSCOPY AND COLONOSCOPY  09/17/2012    Internal & external hemorrhoids found.   • HYSTERECTOMY  1980    w bso   • JOINT REPLACEMENT      left knee   • LUMBAR LAMINECTOMY  2011    s/p lumbar laminectomy and fusion   • OOPHORECTOMY  1980   • SHOULDER SURGERY  12/07/2015    Arthroscopy of the left shoudler with rotator cuff repair, Vijay procedure, and subacromial decompression.   • SHOULDER SURGERY Right    • TOTAL KNEE ARTHROPLASTY Left        Family History:  Family History   Problem Relation Age of Onset   • Breast cancer Mother    • Ovarian cancer Mother    • Cancer Mother    • Heart disease Mother    • Osteoporosis Mother    • Diabetes Mother    • Hypertension Mother    • Breast cancer Sister    • Cancer Sister    • Osteoporosis Sister    • Breast cancer Other    • Heart disease Other    • Hypertension Other    • Lung disease Other    • Diabetes Other    • Cancer Other    • Cancer Brother        Social History:   reports that she has never smoked. She has been exposed to tobacco smoke. She has never used smokeless tobacco. She reports that she does not drink alcohol and does not use drugs.    Medications:   Prior to Admission medications    Medication Sig Start Date End Date Taking? Authorizing Provider   albuterol sulfate  (90 Base) MCG/ACT inhaler Inhale 2 puffs Every 4 (Four) Hours As Needed for Wheezing. 4/9/21  Yes Marshal Warner MD   amLODIPine (NORVASC) 5 MG tablet Take 1 tablet by mouth. 8/17/22  Yes ProviderArtis MD   ascorbic acid (VITAMIN C) 1000 MG tablet Take 1 tablet by mouth Daily.   Yes ProviderArtis MD   aspirin 81 MG chewable tablet Chew 1 tablet Daily.    Yes Artis Smith MD   Breo Ellipta 100-25 MCG/INH inhaler INHALE 1 PUFF BY MOUTH DAILY **RINSE MOUTH AFTER EACH USE** 5/12/21  Yes Artis Smith MD   clobetasol (TEMOVATE) 0.05 % cream APPLY TOPICALLY TO AFFECTED AREA(S) TWO TIMES A DAY FOR 2 WEEKS, THEN STOP USING FOR 2 WEEKS 11/15/22  Yes Artis Smith MD   Cymbalta 60 MG capsule TAKE 1 CAPSULE EVERY DAY 1/11/23  Yes Marshal Warner MD   diphenhydrAMINE (BENADRYL) 25 mg capsule Take 2 capsules by mouth.   Yes Artis Smith MD   docusate sodium (COLACE) 100 MG capsule Take 1 capsule by mouth 2 (Two) Times a Day As Needed.   Yes Artis Smith MD   empagliflozin (JARDIANCE) 10 MG tablet tablet Take 1 tablet by mouth Daily. 2/22/23  Yes Artis Smith MD   EPINEPHrine 0.3 MG/0.3ML solution prefilled syringe Inject  as directed.   Yes Artis Smith MD   ezetimibe (ZETIA) 10 MG tablet Take 1 tablet by mouth Daily. 1/18/22  Yes Artis Smith MD   famotidine (PEPCID) 40 MG tablet TAKE 1 TABLET EVERY NIGHT 4/5/23  Yes Marshal Warner MD   folic acid (FOLVITE) 1 MG tablet TAKE 1 TABLET THREE TIMES WEEKLY 3/30/23  Yes Ranjeet De La Torre MD   glucose blood test strip Check once daily 3/13/23  Yes Marshal Warner MD   ketoconazole (NIZORAL) 2 % cream Apply  topically to the appropriate area as directed. 6/14/22  Yes Artis Smith MD   lansoprazole (Prevacid) 30 MG capsule Take 1 capsule by mouth Daily. 4/5/23  Yes Marshal Warner MD   levothyroxine (SYNTHROID, LEVOTHROID) 88 MCG tablet TAKE 1 TABLET EVERY DAY 1/11/23  Yes Marshal Warner MD   loratadine (CLARITIN) 10 MG tablet Take 1 tablet by mouth Daily.   Yes Artis Smith MD   metFORMIN (GLUCOPHAGE) 500 MG tablet Take 1 tablet by mouth 2 (Two) Times a Day With Meals. 8/16/22  Yes Marshal Warner MD   olmesartan (BENICAR) 40 MG tablet Take 1 tablet by mouth Daily. 1/26/23  Yes Provider, MD Artis   rosuvastatin (CRESTOR) 40 MG tablet Take 1 tablet by  "mouth Daily. 7/13/22  Yes Artis Smith MD   spironolactone (ALDACTONE) 25 MG tablet TAKE 1 TABLET EVERY DAY FOR FLUID 1/11/23  Yes Marshal Warner MD   vitamin B-12 (CYANOCOBALAMIN) 1000 MCG tablet TAKE 1 TABLET EVERY DAY 2/1/23  Yes Ranjeet De La Torre MD   vitamin D (ERGOCALCIFEROL) 1.25 MG (89608 UT) capsule capsule Take 1 capsule by mouth 1 (One) Time Per Week. 2/23/23  Yes Marshal Warner MD   indapamide (LOZOL) 2.5 MG tablet Take 1 tablet by mouth Daily. 9/3/20 3/13/23  Artis Smith MD   amoxicillin (AMOXIL) 500 MG tablet TAKE 4 TABLETS (2 GRAM) BY ORAL ROUTE 1 HOUR PRIOR TO DENTAL APPOINTMENT 3/13/23 4/24/23  Artis Smith MD   celecoxib (CeleBREX) 100 MG capsule Take 1 capsule by mouth Daily As Needed for Moderate Pain. With food 3/13/23 4/24/23  Marshal Warner MD   celecoxib (CeleBREX) 200 MG capsule Take 1 capsule by mouth Daily. With food 3/13/23 4/24/23  Marshal Warner MD   oxyCODONE-acetaminophen (Percocet) 5-325 MG per tablet Take 1 tablet by mouth Every 6 (Six) Hours As Needed for Severe Pain. 3/13/23 4/24/23  Marshal Warner MD       Allergies:  Beef allergy; Lortab [hydrocodone-acetaminophen]; Codeine; Antihistamines, chlorpheniramine-type; Latex; Other; and Tape    ROS:    Review of Systems   Constitutional: Negative.  Negative for fever.   HENT: Negative.    Eyes: Negative.    Respiratory: Positive for shortness of breath.    Cardiovascular: Negative.  Negative for chest pain.   Gastrointestinal: Negative.  Negative for abdominal distention, abdominal pain, anal bleeding, blood in stool, constipation, diarrhea, nausea, rectal pain and vomiting.   Endocrine: Negative.    Genitourinary: Negative.    Skin: Negative.    Neurological: Negative.    Hematological: Negative.    Psychiatric/Behavioral: Negative.      Objective     Blood pressure 130/88, pulse 92, height 154.9 cm (61\"), weight 112 kg (246 lb 12.8 oz), not currently breastfeeding.    Physical Exam  Vitals and nursing note " reviewed.   Constitutional:       Appearance: Normal appearance.   HENT:      Head: Normocephalic and atraumatic.   Cardiovascular:      Rate and Rhythm: Normal rate and regular rhythm.   Pulmonary:      Effort: Pulmonary effort is normal.      Breath sounds: Normal breath sounds.   Abdominal:      General: Bowel sounds are normal.      Palpations: Abdomen is soft.      Tenderness: There is no abdominal tenderness. There is no guarding or rebound.      Hernia: No hernia (Diastases recti) is present.   Skin:     General: Skin is warm.      Coloration: Skin is not jaundiced.   Neurological:      General: No focal deficit present.      Mental Status: She is alert.   Psychiatric:         Behavior: Behavior normal.          Assessment & Plan   Diagnoses and all orders for this visit:    1. Gastroesophageal reflux disease, unspecified whether esophagitis present (Primary)    2. Hiatal hernia    3. Diastasis recti        * Surgery not found *     Diagnosis Plan   1. Gastroesophageal reflux disease, unspecified whether esophagitis present        2. Hiatal hernia        3. Diastasis recti            Anticipated Surgical Procedure:  No orders of the defined types were placed in this encounter.      The risks, benefits, and alternatives of this procedure have been discussed with the patient or the responsible party- the patient understands and agrees to proceed.

## 2023-05-04 ENCOUNTER — OFFICE VISIT (OUTPATIENT)
Dept: ORTHOPEDIC SURGERY | Facility: CLINIC | Age: 73
End: 2023-05-04
Payer: MEDICARE

## 2023-05-04 VITALS — HEIGHT: 61 IN | BODY MASS INDEX: 47.29 KG/M2 | WEIGHT: 250.5 LBS

## 2023-05-04 DIAGNOSIS — G47.33 OBSTRUCTIVE SLEEP APNEA SYNDROME: Chronic | ICD-10-CM

## 2023-05-04 DIAGNOSIS — J45.20 MILD INTERMITTENT ASTHMA WITHOUT COMPLICATION: Chronic | ICD-10-CM

## 2023-05-04 DIAGNOSIS — M25.511 ACUTE PAIN OF RIGHT SHOULDER: ICD-10-CM

## 2023-05-04 DIAGNOSIS — M25.551 ACUTE PAIN OF RIGHT HIP: Primary | ICD-10-CM

## 2023-05-04 DIAGNOSIS — M25.561 CHRONIC PAIN OF RIGHT KNEE: ICD-10-CM

## 2023-05-04 DIAGNOSIS — G89.29 CHRONIC PAIN OF RIGHT KNEE: ICD-10-CM

## 2023-05-04 DIAGNOSIS — E66.01 MORBID OBESITY WITH BMI OF 45.0-49.9, ADULT: ICD-10-CM

## 2023-05-04 DIAGNOSIS — I10 ESSENTIAL HYPERTENSION: ICD-10-CM

## 2023-05-04 PROCEDURE — 99214 OFFICE O/P EST MOD 30 MIN: CPT | Performed by: ORTHOPAEDIC SURGERY

## 2023-05-04 NOTE — PROGRESS NOTES
Lois Parmar is a 73 y.o. female   Primary provider:  Marshal Warner MD       Chief Complaint   Patient presents with   • Right Hip - Pain   • Right Shoulder - Pain       HISTORY OF PRESENT ILLNESS: Patient fell in her home on 03/04/2023. She was in a chair and it turned to the side and she landed on her right side.  She states that her hip hit the armrest of her chair.  She initially did not have much shoulder pain but her shoulder pain has progressively worsened over the last month.  She denies numbness or tingling.  She has pain in the lateral aspect of her arm that comes down to her elbow but she does not have pain progresses past her elbow.      Patient had injections in the right hip at  in [place] and it helped with the pain, but patient believes injection has worn off. Pain is constant.  She reports that the pain is mostly in her buttock and along the side of her hip.  The pain does come down somewhat down into her thigh.  She does not particularly complain of groin pain.  No numbness or tingling in her leg either.  She walks without an assistive device.      Pain  This is a new problem. The current episode started more than 1 month ago. The problem occurs constantly. The problem has been gradually worsening. Associated symptoms include joint swelling. The symptoms are aggravated by standing and walking. She has tried acetaminophen and rest for the symptoms. The treatment provided mild relief.   Lower Extremity Issue  This is a new problem. The current episode started more than 1 month ago. The problem occurs constantly. The problem has been gradually worsening. Associated symptoms include joint swelling. The symptoms are aggravated by movement, weight bearing and walking. She has tried acetaminophen and rest for the symptoms. The treatment provided mild relief.        CONCURRENT MEDICAL HISTORY:    Past Medical History:   Diagnosis Date   • Acquired hypothyroidism - On Synthroid    • Allergic  rhinitis    • Allergy to alpha-gal 07/17/2020   • Anemia    • Asthma - mild intermittent    • Cardiac disease    • Carpal tunnel syndrome - Bilateral    • Colitis    • COVID-19 2022   • Degeneration of cervical intervertebral disc    • Diabetes    • Disorder of lumbar spine    • Diverticulosis of large intestine 01/17/2019   • Essential hypertension    • Essential hypertension    • Fatigue    • History of myocardial infarct at age greater than 60 years 10/17/2018   • Hyperlipidemia - Improved with Zocor    • Hyperlipidemia associated with type 2 diabetes mellitus 07/28/2021   • Hypertriglyceridemia 01/18/2021   • IFG (impaired fasting glucose) 10/17/2018   • Impaired fasting glycaemia    • Iron deficiency anemia - Improved    • Lumbar radiculopathy    • Megaloblastic anemia due to vitamin B>12< deficiency    • Moderate persistent asthma without complication 04/09/2021   • Morbid obesity    • Osteoarthritis of knee - Bilateral    • Osteoarthritis of multiple joints - Left knee    • Osteoporosis    • Pain in left hip    • Sleep apnea - On CPAP    • Spasm of back muscles    • Stage 3 chronic kidney disease 10/23/2019   • Stage 3a chronic kidney disease 10/23/2019   • Thyroid dysfunction    • Tubular adenoma of colon - removed during colonoscopy, 11/2018. 12/03/2018   • Type 2 diabetes mellitus with stage 3a chronic kidney disease, without long-term current use of insulin 04/09/2021   • Type 2 diabetes mellitus without complication, without long-term current use of insulin 04/09/2021   • Vitamin D deficiency        Allergies   Allergen Reactions   • Beef Allergy Anaphylaxis   • Lortab [Hydrocodone-Acetaminophen] Hallucinations   • Codeine Palpitations     Heart race   • Antihistamines, Chlorpheniramine-Type Palpitations     Heart races   • Latex Rash     Latex surgical tape only  Not allergic to latex gloves   • Other Palpitations     Decongestants: Heart Race   • Tape Rash         Current Outpatient Medications:   •   albuterol sulfate  (90 Base) MCG/ACT inhaler, Inhale 2 puffs Every 4 (Four) Hours As Needed for Wheezing., Disp: 18 g, Rfl: 11  •  amLODIPine (NORVASC) 5 MG tablet, Take 1 tablet by mouth., Disp: , Rfl:   •  ascorbic acid (VITAMIN C) 1000 MG tablet, Take 1 tablet by mouth Daily., Disp: , Rfl:   •  aspirin 81 MG chewable tablet, Chew 1 tablet Daily., Disp: , Rfl:   •  Breo Ellipta 100-25 MCG/INH inhaler, INHALE 1 PUFF BY MOUTH DAILY **RINSE MOUTH AFTER EACH USE**, Disp: , Rfl:   •  clobetasol (TEMOVATE) 0.05 % cream, APPLY TOPICALLY TO AFFECTED AREA(S) TWO TIMES A DAY FOR 2 WEEKS, THEN STOP USING FOR 2 WEEKS, Disp: , Rfl:   •  Cymbalta 60 MG capsule, TAKE 1 CAPSULE EVERY DAY, Disp: 90 capsule, Rfl: 1  •  diphenhydrAMINE (BENADRYL) 25 mg capsule, Take 2 capsules by mouth., Disp: , Rfl:   •  docusate sodium (COLACE) 100 MG capsule, Take 1 capsule by mouth 2 (Two) Times a Day As Needed., Disp: , Rfl:   •  empagliflozin (JARDIANCE) 10 MG tablet tablet, Take 1 tablet by mouth Daily., Disp: , Rfl:   •  EPINEPHrine 0.3 MG/0.3ML solution prefilled syringe, Inject  as directed., Disp: , Rfl:   •  ezetimibe (ZETIA) 10 MG tablet, Take 1 tablet by mouth Daily., Disp: , Rfl:   •  famotidine (PEPCID) 40 MG tablet, TAKE 1 TABLET EVERY NIGHT, Disp: 90 tablet, Rfl: 3  •  folic acid (FOLVITE) 1 MG tablet, TAKE 1 TABLET THREE TIMES WEEKLY, Disp: 36 tablet, Rfl: 1  •  glucose blood test strip, Check once daily, Disp: 100 each, Rfl: 3  •  ketoconazole (NIZORAL) 2 % cream, Apply  topically to the appropriate area as directed., Disp: , Rfl:   •  lansoprazole (Prevacid) 30 MG capsule, Take 1 capsule by mouth Daily., Disp: 90 capsule, Rfl: 3  •  levothyroxine (SYNTHROID, LEVOTHROID) 88 MCG tablet, TAKE 1 TABLET EVERY DAY, Disp: 90 tablet, Rfl: 1  •  loratadine (CLARITIN) 10 MG tablet, Take 1 tablet by mouth Daily., Disp: , Rfl:   •  metFORMIN (GLUCOPHAGE) 500 MG tablet, Take 1 tablet by mouth 2 (Two) Times a Day With Meals., Disp: 180  tablet, Rfl: 3  •  olmesartan (BENICAR) 40 MG tablet, Take 1 tablet by mouth Daily., Disp: , Rfl:   •  rosuvastatin (CRESTOR) 40 MG tablet, Take 1 tablet by mouth Daily., Disp: , Rfl:   •  spironolactone (ALDACTONE) 25 MG tablet, TAKE 1 TABLET EVERY DAY FOR FLUID, Disp: 90 tablet, Rfl: 1  •  vitamin B-12 (CYANOCOBALAMIN) 1000 MCG tablet, TAKE 1 TABLET EVERY DAY, Disp: 90 tablet, Rfl: 1  •  vitamin D (ERGOCALCIFEROL) 1.25 MG (18942 UT) capsule capsule, Take 1 capsule by mouth 1 (One) Time Per Week., Disp: 13 capsule, Rfl: 3  •  indapamide (LOZOL) 2.5 MG tablet, Take 1 tablet by mouth Daily., Disp: , Rfl:     Past Surgical History:   Procedure Laterality Date   • CARPAL TUNNEL RELEASE Right    • CATARACT EXTRACTION Left 12/01/2022    Dr. Stauffer   • CATARACT EXTRACTION Right 12/15/2022    Dr. Stauffer   • CERVICAL DISCECTOMY ANTERIOR N/A 10/06/2022    Dr. Cooper   • COLONOSCOPY N/A 11/30/2018    Procedure: COLONOSCOPY;  Surgeon: Jimmie Sutton MD;  Location: [facility] ENDOSCOPY;  Service: General   • COLONOSCOPY N/A 04/04/2022    Procedure: COLONOSCOPY;  Surgeon: Jimmie Sutton MD;  Location: [facility] ENDOSCOPY;  Service: General;  Laterality: N/A;  tattoo 7 ml  @ cecum    • ENDOSCOPY  09/17/2012    Normal esophagus.  Gastritis.  Normal duodenum   • ENDOSCOPY N/A 11/30/2018    Procedure: ESOPHAGOGASTRODUODENOSCOPY WITH ANESTHESIA;  Surgeon: Jimmie Sutton MD;  Location: [facility] ENDOSCOPY;  Service: General   • ENDOSCOPY AND COLONOSCOPY  09/17/2012    Internal & external hemorrhoids found.   • HYSTERECTOMY  1980    w bso   • JOINT REPLACEMENT      left knee   • LUMBAR LAMINECTOMY  2011    s/p lumbar laminectomy and fusion   • OOPHORECTOMY  1980   • SHOULDER SURGERY  12/07/2015    Arthroscopy of the left shoudler with rotator cuff repair, Vijay procedure, and subacromial decompression.   • SHOULDER SURGERY Right    • TOTAL KNEE ARTHROPLASTY Left        Family History   Problem Relation Age of Onset   • Breast  "cancer Mother    • Ovarian cancer Mother    • Cancer Mother    • Heart disease Mother    • Osteoporosis Mother    • Diabetes Mother    • Hypertension Mother    • Breast cancer Sister    • Cancer Sister    • Osteoporosis Sister    • Breast cancer Other    • Heart disease Other    • Hypertension Other    • Lung disease Other    • Diabetes Other    • Cancer Other    • Cancer Brother         Social History     Socioeconomic History   • Marital status:    Tobacco Use   • Smoking status: Never     Passive exposure: Past   • Smokeless tobacco: Never   Vaping Use   • Vaping Use: Never used   Substance and Sexual Activity   • Alcohol use: No   • Drug use: No   • Sexual activity: Defer        Review of Systems   HENT: Positive for postnasal drip.    Respiratory: Positive for shortness of breath.    Musculoskeletal: Positive for joint swelling.        Right hip and shoulder pain     All other systems reviewed and are negative.      PHYSICAL EXAMINATION:       Ht 154.9 cm (61\")   Wt 114 kg (250 lb 8 oz)   BMI 47.33 kg/m²     Physical Exam  Constitutional:       General: She is not in acute distress.     Appearance: Normal appearance. She is obese.   Pulmonary:      Effort: Pulmonary effort is normal. No respiratory distress.   Neurological:      Mental Status: She is alert and oriented to person, place, and time.         GAIT:     []  Normal  []  Antalgic    Assistive device: []  None  []  Walker     []  Crutches  []  Cane     []  Wheelchair  []  Stretcher    Right Hip Exam     Comments:  Good range of motion.  Negative Stinchfield test.  Negative logroll test.  She does have tenderness along the lateral aspect of the hip and tender to touch with firm palpation in the buttock.      Right Shoulder Exam     Range of Motion   Active abduction: 100   Forward flexion: 110     Muscle Strength   Abduction: 5/5   Supraspinatus: 5/5     Tests   Beverly test: negative  Impingement: negative    Other   Erythema: " absent  Sensation: normal  Pulse: present    Comments:  Mild pain with empty can test but maintains good strength.                XR Shoulder 2+ View Right    Result Date: 5/4/2023  Narrative: Ordering Provider:  Curt Augustine MD Ordering Diagnosis/Indication:  Acute pain of right shoulder Procedure:  XR SHOULDER 2+ VW RIGHT Exam Date:  5/4/23 COMPARISON:  Todays X-rays were compared to previous images dated August 16, 2018.     Impression:  3 views of the right shoulder show acceptable position and alignment with no evidence of acute bony abnormality.  There appears to be a question of a healed or healing small fracture of the greater tuberosity.  Essentially complete consolidation is noted at this time.  Alternative explanation could be degenerative arthritic changes involving the rotator cuff insertion.  There also is progressive osteophyte on the inferior aspect of the humeral head in comparison to prior x-ray.  No definitive acute bony abnormality. Curt Augustine MD 5/4/23     XR Hip With or Without Pelvis 2 - 3 View Right  Order date: 3/13/2023  Authorizing: Marshal Warner MD  Ordered by Marshal Warner MD on 3/13/2023.     Narrative & Impression    Comparison: 12/4/2019, 12/12/2018     Indication: Right hip pain after fall last night     Findings: AP view the pelvis and 2 views of the right hip are  obtained. There is normal alignment. No convincing fracture. Bone  detail is degraded by habitus. Mild joint space narrowing at the  hips. Degeneration of the sacroiliac joints. Postsurgical spine.     IMPRESSION:  Impression: No convincing acute finding. If ongoing symptoms  recommend CT given limitations of bone detail due to habitus.     Electronically signed by:  Marshal Power MD  3/13/2023 3:34 PM CDT  Workstation: [id number]           ASSESSMENT:    Diagnoses and all orders for this visit:    Acute pain of right hip    Chronic pain of right knee    Acute pain of right shoulder  -     XR Shoulder  2+ View Right  -     Ambulatory Referral to Physical Therapy    Morbid obesity with BMI of 45.0-49.9, adult    Essential hypertension    Mild intermittent asthma without complication    Obstructive sleep apnea syndrome          PLAN    Patient has increased pain in her shoulder since her fall.  She has limitation in motion but she states that this limitation in motion is the same as it was prior to her fall.  She had prior rotator cuff repair several years ago and has had some limitation in motion since then.  However, the pain has been significantly worse in the last 4 weeks.  In addition, she has had pain in her right hip since her fall.  She is not complaining of groin pain.  She is not using an assistive device at this time.  She does have a mild limp with ambulation.  X-rays do not show any acute bony abnormality.  The x-ray of her shoulder does show question of an age undetermined greater tuberosity fracture that appears to be completely or almost completely healed.  This may or may not be related to her fall.  We discussed progressing with range of motion and strengthening of both her hip and her shoulder.  We will get her started in physical therapy to help progress her motion and activity.  Slowly increase activity as tolerated.  Recheck in 4 weeks.  We discussed the possibility of MRI if her symptoms worsen in her shoulder or in her hip.  Her last creatinine was 1.62 therefore she does have some chronic renal insufficiency and therefore NSAIDs are not indicated.      PT:  Right buttock and right shoulder pain after a fall.  Modalities as needed.  Motion and strength as tolerated.  Teach HEP  If one or both are getting worse then will proceed with MRI.    Return in about 6 weeks (around 6/15/2023).    Curt Augustine MD    Answers for HPI/ROS submitted by the patient on 5/3/2023  What is the primary reason for your visit?: Lower Extremity Injury  Incident occurred: more than 1 week ago  Incident  location: at home  Injury mechanism: a fall  Pain location: right hip  Pain quality: shooting, stabbing  Pain - numeric: 10/10  Pain course: worsening  tingling: No  inability to bear weight: Yes  loss of motion: Yes  loss of sensation: No  muscle weakness: Yes  Foreign body present: no foreign bodies

## 2023-06-12 ENCOUNTER — TELEPHONE (OUTPATIENT)
Dept: ORTHOPEDIC SURGERY | Facility: CLINIC | Age: 73
End: 2023-06-12
Payer: MEDICARE

## 2023-06-12 NOTE — TELEPHONE ENCOUNTER
Pt. Called and stated that her Rt. Shoulder popped and she is now in a lot of pain. The Pt. Would like to know what she needs to do and if she needs to be seen sooner.       You can call back at [phone]

## 2023-06-14 ENCOUNTER — OFFICE VISIT (OUTPATIENT)
Dept: ORTHOPEDIC SURGERY | Facility: CLINIC | Age: 73
End: 2023-06-14
Payer: MEDICARE

## 2023-06-14 VITALS — WEIGHT: 249.6 LBS | HEIGHT: 61 IN | BODY MASS INDEX: 47.13 KG/M2

## 2023-06-14 DIAGNOSIS — Z74.09 LIMITED MOBILITY: ICD-10-CM

## 2023-06-14 DIAGNOSIS — M25.511 ACUTE PAIN OF RIGHT SHOULDER: Primary | ICD-10-CM

## 2023-06-14 DIAGNOSIS — S49.91XS INJURY OF RIGHT SHOULDER, SEQUELA: ICD-10-CM

## 2023-06-14 DIAGNOSIS — M24.811 INTERNAL DERANGEMENT OF RIGHT SHOULDER: ICD-10-CM

## 2023-06-14 NOTE — PROGRESS NOTES
Lois Parmar is a 73 y.o. female returns for     Chief Complaint   Patient presents with    Right Shoulder - Follow-up, Pain       HISTORY OF PRESENT ILLNESS: This 73-year-old female patient presents today with complaints of new injury to the right shoulder that occurred on Sunday, June 11, 2023.  The patient reports she was trying to close her car door and felt a pop.  Patient reports prior to the pop, she felt she was improving and her range of motion had improved with physical therapy since her previous injury in March.  The patient reports she has been in physical therapy and her progress was going well until Sunday.    This patient fell in her home on 3/4/2023, when a chair she was sitting and turned on her side and landed on her right shoulder.  The patient reports she did not initially have much shoulder pain but that it progressively gotten worse after her injury.  Patient last saw Dr. Augustine on 5/4/2023, at that time, the patient was referred to physical therapy.  The plan was to treat the patient's right shoulder and right hip pain.  The patient reports the therapist has been treating her right shoulder pain and not her hip.  This patient reports a history of a rotator cuff repair several years ago and previously had some limitations in mobility.    The patient has no significant complaints about her right hip pain at today's visit.  The patient states that it is slightly improved but states after her rehab on her shoulder, she would like to start therapy on her hip.    Denies numbness and tingling of the shoulder.  Denies fever and chills.  The patient reports the pain radiates from the humeral head toward the elbow.  Sent the patient a new x-ray of the right shoulder upon arrival.    CONCURRENT MEDICAL HISTORY:    The following portions of the patient's history were reviewed and updated as appropriate: allergies, current medications, past family history, past medical history, past social history,  "past surgical history and problem list.     ROS  No fevers or chills.  No chest pain or shortness of air.  No GI or  disturbances.    PHYSICAL EXAMINATION:       Ht 154.9 cm (61\")   Wt 113 kg (249 lb 9.6 oz)   BMI 47.16 kg/m²     Physical Exam    GAIT:     []  Normal  []  Antalgic    Assistive device: []  None  []  Walker     []  Crutches  []  Cane     []  Wheelchair  []  Stretcher    Left Shoulder Exam     Range of Motion   Active abduction:  120   Passive abduction:  130   Extension:  abnormal   External rotation:  abnormal   Forward flexion:  160     Muscle Strength   Abduction: 4/5   Internal rotation: 4/5   External rotation: 4/5   Supraspinatus: 4/5   Subscapularis: 4/5   Biceps: 4/5     Tests   Apprehension: positive  Beverly test: positive  Cross arm: positive  Impingement: positive  Drop arm: negative    Other   Erythema: absent  Sensation: normal  Pulse: present     Comments:  Pain in certain planes of motion.  Good  strength.  Radial pulse palpable.  Cap refill good.               XR Shoulder 2+ View Right    Result Date: 6/14/2023  Narrative: TECHNIQUE: 3 views of the right shoulder were obtained. FINDINGS: There is narrowing of the glenohumeral joint.  There is degenerative change of the inferior aspect of the glenoid and there is humeral head spurring. There is also moderate degenerative change of the AC joint.     Impression: Moderate degenerative changes, as described.  No acute abnormality.  No significant change from 5/4/2023.            ASSESSMENT:    Diagnoses and all orders for this visit:    Acute pain of right shoulder  -     MRI shoulder right wo contrast; Future    Injury of right shoulder, sequela  -     MRI shoulder right wo contrast; Future    Limited mobility  -     MRI shoulder right wo contrast; Future    Internal derangement of right shoulder  -     MRI shoulder right wo contrast; Future          PLAN  Sent for the right shoulder x-ray.  Reviewed and discussed with the " patient.  Advised the patient there are no acute bony abnormalities noted.  Advised the patient there appears to be healing from a previous fracture or degenerative changes.  Advised the patient no new abnormalities are noted compared to the previous x-ray performed on 5/4/2023.  After reviewing the chart, discussed with the patient it appears that Dr. Augustine recommends an MRI of the right shoulder.  The MRI will evaluate for internal derangement, rotator cuff injury, limited Ana over a fracture not seen on x-ray.  MRI right shoulder ordered.  Advised patient she will be notified to schedule an appointment.  Advised patient she will follow-up to discuss results when available.  Recommend the patient continue over-the-counter acetaminophen arthritis as needed for pain.  Recommended the patient to avoid any NSAIDs.  Reviewed the patient's last labs, her last GFR was 33.2.  The patient reports she sees Dr. De La Torre who manages her kidney function.  Advised the patient to follow-up with her PCP or Dr. De La Torre to discuss further.  As for the shoulder, other recommendation include to avoid any external rotation or extension.  Avoid other activities that may cause pain.  May continue physical therapy however, recommend she progress as tolerated and avoid activities that are painful.  Recommended to rest, ice and modify activity as needed.  Advised the patient she may keep her follow-up appointment with Dr. Augustine or, she may call to schedule a new appointment with me to discuss her MRI after completed.  Advised however, she may follow-up sooner as needed if symptoms change, worsen or for new complaint.     All questions and concerns are addressed with understanding noted. They are aware and are in agreement to this plan.    Return for for MRI results. .    ODILIA Zurita     This document has been electronically signed by ODILIA Zurita on June 14, 2023 13:13 CDT      EM[facility] Dragon/Rapt Mediaiption Disclaimer: Some  of this note may be an electronic transcription/translation of spoken language to printed text using the Dragon Dictation System.     Time spent of a minimum of 30 minutes including the face to face evaluation, reviewing of medical history and prior medial records, reviewing of diagnostic studies, documentation, patient education and coordination of care.

## 2023-06-29 PROBLEM — M24.811 INTERNAL DERANGEMENT OF RIGHT SHOULDER: Status: ACTIVE | Noted: 2023-06-29

## 2023-07-25 ENCOUNTER — LAB (OUTPATIENT)
Dept: LAB | Facility: OTHER | Age: 73
End: 2023-07-25
Payer: MEDICARE

## 2023-07-25 DIAGNOSIS — N18.31 ANEMIA DUE TO STAGE 3A CHRONIC KIDNEY DISEASE: ICD-10-CM

## 2023-07-25 DIAGNOSIS — Z80.3 FAMILY HISTORY OF BREAST CANCER IN FEMALE: ICD-10-CM

## 2023-07-25 DIAGNOSIS — T45.4X5A ADVERSE EFFECT OF IRON, INITIAL ENCOUNTER: ICD-10-CM

## 2023-07-25 DIAGNOSIS — D63.1 ANEMIA DUE TO STAGE 3A CHRONIC KIDNEY DISEASE: ICD-10-CM

## 2023-07-25 LAB
ALBUMIN SERPL-MCNC: 4.5 G/DL (ref 3.5–5)
ALBUMIN/GLOB SERPL: 1.2 G/DL (ref 1.1–1.8)
ALP SERPL-CCNC: 48 U/L (ref 38–126)
ALT SERPL W P-5'-P-CCNC: 15 U/L
ANION GAP SERPL CALCULATED.3IONS-SCNC: 8 MMOL/L (ref 5–15)
AST SERPL-CCNC: 21 U/L (ref 14–36)
BASOPHILS # BLD AUTO: 0.05 10*3/MM3 (ref 0–0.2)
BASOPHILS NFR BLD AUTO: 0.5 % (ref 0–1.5)
BILIRUB SERPL-MCNC: 0.4 MG/DL (ref 0.2–1.3)
BUN SERPL-MCNC: 21 MG/DL (ref 7–23)
BUN/CREAT SERPL: 18.1 (ref 7–25)
CALCIUM SPEC-SCNC: 9.8 MG/DL (ref 8.4–10.2)
CHLORIDE SERPL-SCNC: 101 MMOL/L (ref 101–112)
CO2 SERPL-SCNC: 31 MMOL/L (ref 22–30)
CREAT SERPL-MCNC: 1.16 MG/DL (ref 0.52–1.04)
DEPRECATED RDW RBC AUTO: 48.2 FL (ref 37–54)
EGFRCR SERPLBLD CKD-EPI 2021: 49.6 ML/MIN/1.73
EOSINOPHIL # BLD AUTO: 0.38 10*3/MM3 (ref 0–0.4)
EOSINOPHIL NFR BLD AUTO: 4 % (ref 0.3–6.2)
ERYTHROCYTE [DISTWIDTH] IN BLOOD BY AUTOMATED COUNT: 14 % (ref 12.3–15.4)
FERRITIN SERPL-MCNC: 56.49 NG/ML (ref 13–150)
GLOBULIN UR ELPH-MCNC: 3.7 GM/DL (ref 2.3–3.5)
GLUCOSE SERPL-MCNC: 114 MG/DL (ref 70–99)
HCT VFR BLD AUTO: 35.2 % (ref 34–46.6)
HGB BLD-MCNC: 11.6 G/DL (ref 12–15.9)
IRON 24H UR-MRATE: 69 MCG/DL (ref 37–145)
IRON SATN MFR SERPL: 17 % (ref 20–50)
LYMPHOCYTES # BLD AUTO: 1.2 10*3/MM3 (ref 0.7–3.1)
LYMPHOCYTES NFR BLD AUTO: 12.7 % (ref 19.6–45.3)
MCH RBC QN AUTO: 32.2 PG (ref 26.6–33)
MCHC RBC AUTO-ENTMCNC: 33 G/DL (ref 31.5–35.7)
MCV RBC AUTO: 97.8 FL (ref 79–97)
MONOCYTES # BLD AUTO: 0.62 10*3/MM3 (ref 0.1–0.9)
MONOCYTES NFR BLD AUTO: 6.6 % (ref 5–12)
NEUTROPHILS NFR BLD AUTO: 7.19 10*3/MM3 (ref 1.7–7)
NEUTROPHILS NFR BLD AUTO: 76.2 % (ref 42.7–76)
PLATELET # BLD AUTO: 322 10*3/MM3 (ref 140–450)
PMV BLD AUTO: 8.4 FL (ref 6–12)
POTASSIUM SERPL-SCNC: 4.1 MMOL/L (ref 3.4–5)
PROT SERPL-MCNC: 8.2 G/DL (ref 6.3–8.6)
RBC # BLD AUTO: 3.6 10*6/MM3 (ref 3.77–5.28)
SODIUM SERPL-SCNC: 140 MMOL/L (ref 137–145)
TIBC SERPL-MCNC: 417 MCG/DL (ref 298–536)
TRANSFERRIN SERPL-MCNC: 280 MG/DL (ref 200–360)
WBC NRBC COR # BLD: 9.44 10*3/MM3 (ref 3.4–10.8)

## 2023-07-25 PROCEDURE — 82607 VITAMIN B-12: CPT | Performed by: INTERNAL MEDICINE

## 2023-07-25 PROCEDURE — 84466 ASSAY OF TRANSFERRIN: CPT | Performed by: INTERNAL MEDICINE

## 2023-07-25 PROCEDURE — 83540 ASSAY OF IRON: CPT | Performed by: INTERNAL MEDICINE

## 2023-07-25 PROCEDURE — 85025 COMPLETE CBC W/AUTO DIFF WBC: CPT | Performed by: INTERNAL MEDICINE

## 2023-07-25 PROCEDURE — 36415 COLL VENOUS BLD VENIPUNCTURE: CPT | Performed by: INTERNAL MEDICINE

## 2023-07-25 PROCEDURE — 80053 COMPREHEN METABOLIC PANEL: CPT | Performed by: INTERNAL MEDICINE

## 2023-07-25 PROCEDURE — 82728 ASSAY OF FERRITIN: CPT | Performed by: INTERNAL MEDICINE

## 2023-07-25 PROCEDURE — 82746 ASSAY OF FOLIC ACID SERUM: CPT | Performed by: INTERNAL MEDICINE

## 2023-07-25 NOTE — PROGRESS NOTES
"DATE OF VISIT: 7/27/2023      REASON FOR VISIT: Anemia with iron deficiency, vitamin B12 deficiency, family history of breast cancer      HISTORY OF PRESENT ILLNESS:   73-year-old female with medical problem consisting of hypertension, dyslipidemia, diabetes mellitus, hypothyroidism, coronary artery disease has been following up with hematology clinic since January 24, 2019 for anemia, B12 deficiency and family history of breast cancer.  Patient is here for follow-up appointment today to discuss recently done blood work.  Complains of worsening shortness of breath.  States she is having shortness of breath with very minimal exertion at present and has been getting worse since last October.  States she was evaluated by pulmonologist as well as cardiology team without any clear etiology for worsening shortness of breath.  Complains of worsening muscle pain and muscle weakness that is ongoing for last 5 years.  Denies any recent worsening of fatigue.  Complains of arthralgia.  Denies any bleeding.  Denies any new lymph node enlargement.      Past Medical History, Past Surgical History, Social History, Family History have been reviewed and are without significant changes except as mentioned.    Review of Systems   A comprehensive 14 point review of systems was performed and was negative except as mentioned in HPI.    Medications:  The current medication list was reviewed in the EMR    ALLERGIES:    Allergies   Allergen Reactions    Lortab [Hydrocodone-Acetaminophen] Hallucinations    Codeine Palpitations     Heart race    Antihistamines, Chlorpheniramine-Type Palpitations     Heart races    Latex Rash     Latex surgical tape only  Not allergic to latex gloves    Other Palpitations     Decongestants: Heart Race    Tape Rash       Objective      Vitals:    07/27/23 1339   BP: 151/86   Pulse: 81   Resp: 22   Temp: 99.2 °F (37.3 °C)   TempSrc: Temporal   SpO2: 94%   Weight: 114 kg (251 lb)   Height: 154.9 cm (61\")   PainSc: " 7  Comment: with walking   PainLoc: Generalized  Comment: muscles         4/15/2022    10:02 AM   Current Status   ECOG score 0       Physical Exam  Pulmonary:      Breath sounds: Normal breath sounds.   Neurological:      Mental Status: She is alert and oriented to person, place, and time.         RECENT LABS:  Glucose   Date Value Ref Range Status   07/25/2023 114 (H) 70 - 99 mg/dL Final     Sodium   Date Value Ref Range Status   07/25/2023 140 137 - 145 mmol/L Final   11/02/2022 139 136 - 145 mmol/L Final     Potassium   Date Value Ref Range Status   07/25/2023 4.1 3.4 - 5.0 mmol/L Final   11/02/2022 4.8 3.5 - 5.1 mmol/L Final     CO2   Date Value Ref Range Status   07/25/2023 31.0 (H) 22.0 - 30.0 mmol/L Final     Total CO2   Date Value Ref Range Status   11/02/2022 28 21 - 31 mmol/L Final     Chloride   Date Value Ref Range Status   07/25/2023 101 101 - 112 mmol/L Final   11/02/2022 99 98 - 107 mmol/L Final     Anion Gap   Date Value Ref Range Status   07/25/2023 8.0 5.0 - 15.0 mmol/L Final   11/02/2022 12 4 - 12 mmol/L Final     Creatinine   Date Value Ref Range Status   07/25/2023 1.16 (H) 0.52 - 1.04 mg/dL Final   11/02/2022 1.3 0.7 - 1.3 mg/dL Final     BUN   Date Value Ref Range Status   07/25/2023 21 7 - 23 mg/dL Final   11/02/2022 24 7 - 25 mg/dL Final     BUN/Creatinine Ratio   Date Value Ref Range Status   07/25/2023 18.1 7.0 - 25.0 Final     Calcium   Date Value Ref Range Status   07/25/2023 9.8 8.4 - 10.2 mg/dL Final   11/02/2022 9.8 8.6 - 10.3 mg/dL Final     eGFR Non  Amer   Date Value Ref Range Status   01/26/2022 47 39 - 90 mL/min/1.73 Final     Alkaline Phosphatase   Date Value Ref Range Status   07/25/2023 48 38 - 126 U/L Final     Total Protein   Date Value Ref Range Status   07/25/2023 8.2 6.3 - 8.6 g/dL Final     ALT (SGPT)   Date Value Ref Range Status   07/25/2023 15 <=35 U/L Final     AST (SGOT)   Date Value Ref Range Status   07/25/2023 21 14 - 36 U/L Final     Total Bilirubin    Date Value Ref Range Status   07/25/2023 0.4 0.2 - 1.3 mg/dL Final     Albumin   Date Value Ref Range Status   07/25/2023 4.5 3.5 - 5.0 g/dL Final     Globulin   Date Value Ref Range Status   07/25/2023 3.7 (H) 2.3 - 3.5 gm/dL Final     Lab Results   Component Value Date    WBC 9.44 07/25/2023    HGB 11.6 (L) 07/25/2023    HCT 35.2 07/25/2023    MCV 97.8 (H) 07/25/2023     07/25/2023     Lab Results   Component Value Date    NEUTROABS 7.19 (H) 07/25/2023    IRON 69 07/25/2023    IRON 92 03/22/2023    IRON 95 11/23/2022    TIBC 417 07/25/2023    TIBC 468 03/22/2023    TIBC 460 11/23/2022    LABIRON 17 (L) 07/25/2023    LABIRON 20 03/22/2023    LABIRON 21 11/23/2022    FERRITIN 56.49 07/25/2023    FERRITIN 122.20 03/22/2023    FERRITIN 169.10 (H) 11/23/2022    LAPZXUSY28 765 07/25/2023    ZRWDGOFP85 1,225 (H) 03/22/2023    IYDZWKSF36 919 11/23/2022    FOLATE >20.00 07/25/2023    FOLATE >20.00 03/22/2023    FOLATE >20.00 11/23/2022     No results found for: , LABCA2, AFPTM, HCGQUANT, , CHROMGRNA, [id number], CEA, REFLABREPO      PATHOLOGY:  * Cannot find OR log *         RADIOLOGY DATA :  No radiology results for the last 7 days        Assessment & Plan     1.  Iron deficiency anemia:  - EGD and colonoscopy in November 2021 did not show any evidence of bleeding.  Capsule endoscopy in March 2021 was negative for bleeding  - Due to inability to tolerate iron by mouth patient has received Venofer in April 2022  - Anemia work-up done recently shows hemoglobin is 11.6.  Iron saturation is decreased to 17% with ferritin of 56 consistent with iron deficiency.  Recommend restarting intravenous Venofer starting next week.  Side effect of Venofer including allergic reaction were discussed with patient.  - In view of B12 deficiency, patient remains on B12 and folic acid 3 times a week  - We will have patient return to clinic in 3 months with repeat CBC, iron studies, ferritin, B12, folate and CMP to be done  prior to that    2.  Chronic kidney disease stage II:  - Creatinine is 1.16.  Recommend continue with increase hydration    3.  Family history of breast cancer:  - Patient had a genetic testing done with [employer] [facility] cancer panel which includes BRCA1 and BRCA2 which was negative in March 2020.  Recommend continue with yearly surveillance mammogram in view of family history of breast cancer    4.  Health maintenance: Patient does not smoke    5.  Advance care planning:  - Patient remains full code.    6.  Shortness of breath:  - Patient is complaining of shortness of breath with minimal exertion.  It was discussed with patient her hemoglobin is 11.6 that should not be causing significant shortness of breath at this level.  Recommend following up with  pulmonary team for further evaluation of worsening shortness of breath.                 PHQ-9 Total Score:       Lois Parmar reports a pain score of 7.  Given her pain assessment as noted, treatment options were discussed and the following options were decided upon as a follow-up plan to address the patient's pain: continuation of current treatment plan for pain.         Ranjeet De La Torre MD  7/27/2023  13:59 CDT        Part of this note may be an electronic transcription/translation of spoken language to printed text using the Dragon Dictation System.          CC:

## 2023-07-26 LAB
FOLATE SERPL-MCNC: >20 NG/ML (ref 4.78–24.2)
VIT B12 BLD-MCNC: 765 PG/ML (ref 211–946)

## 2023-07-27 ENCOUNTER — OFFICE VISIT (OUTPATIENT)
Dept: HEMATOLOGY/ONCOLOGY | Facility: CLINIC | Age: 73
End: 2023-07-27
Payer: MEDICARE

## 2023-07-27 VITALS
OXYGEN SATURATION: 94 % | SYSTOLIC BLOOD PRESSURE: 151 MMHG | WEIGHT: 251 LBS | HEIGHT: 61 IN | HEART RATE: 81 BPM | DIASTOLIC BLOOD PRESSURE: 86 MMHG | TEMPERATURE: 99.2 F | RESPIRATION RATE: 22 BRPM | BODY MASS INDEX: 47.39 KG/M2

## 2023-07-27 DIAGNOSIS — Z80.3 FAMILY HISTORY OF BREAST CANCER IN FEMALE: ICD-10-CM

## 2023-07-27 DIAGNOSIS — T45.4X5A ADVERSE EFFECT OF IRON, INITIAL ENCOUNTER: ICD-10-CM

## 2023-07-27 DIAGNOSIS — D50.0 IRON DEFICIENCY ANEMIA DUE TO CHRONIC BLOOD LOSS: Primary | ICD-10-CM

## 2023-07-27 PROBLEM — D64.9 ANEMIA: Chronic | Status: ACTIVE | Noted: 2018-11-06

## 2023-07-27 RX ORDER — AMLODIPINE BESYLATE 5 MG/1
1 TABLET ORAL 2 TIMES DAILY
COMMUNITY
Start: 2023-07-10

## 2023-07-27 RX ORDER — INDAPAMIDE 2.5 MG/1
1 TABLET, FILM COATED ORAL EVERY MORNING
COMMUNITY
Start: 2023-07-10

## 2023-07-27 RX ORDER — DIPHENHYDRAMINE HYDROCHLORIDE 50 MG/ML
50 INJECTION INTRAMUSCULAR; INTRAVENOUS AS NEEDED
OUTPATIENT
Start: 2023-08-04

## 2023-07-27 RX ORDER — FLUTICASONE FUROATE AND VILANTEROL 100; 25 UG/1; UG/1
1 POWDER RESPIRATORY (INHALATION) DAILY
COMMUNITY
Start: 2023-05-09

## 2023-07-27 RX ORDER — FAMOTIDINE 10 MG/ML
20 INJECTION, SOLUTION INTRAVENOUS AS NEEDED
OUTPATIENT
Start: 2023-08-04

## 2023-07-27 RX ORDER — DIPHENHYDRAMINE HCL 25 MG
25 CAPSULE ORAL ONCE
OUTPATIENT
Start: 2023-08-04

## 2023-07-27 RX ORDER — SODIUM CHLORIDE 9 MG/ML
250 INJECTION, SOLUTION INTRAVENOUS ONCE
OUTPATIENT
Start: 2023-08-04

## 2023-07-27 RX ORDER — FAMOTIDINE 20 MG/1
20 TABLET, FILM COATED ORAL ONCE
OUTPATIENT
Start: 2023-08-04

## 2023-07-28 ENCOUNTER — TELEPHONE (OUTPATIENT)
Dept: FAMILY MEDICINE CLINIC | Facility: CLINIC | Age: 73
End: 2023-07-28
Payer: MEDICARE

## 2023-07-28 NOTE — TELEPHONE ENCOUNTER
TRISH Winston 07/28/2023  Patient contacted the office requesting an appointment due to ongoing shortness of  breath and muscle weakness. She stated she has been experiencing breathing complications since 10/2022. I advised her I will inform the  to contact her back when possible to schedule and in the meantime seek care with ED if these symptoms are to worsen. She stated understanding and thanked me.

## 2023-08-01 ENCOUNTER — OFFICE VISIT (OUTPATIENT)
Dept: FAMILY MEDICINE CLINIC | Facility: CLINIC | Age: 73
End: 2023-08-01
Payer: MEDICARE

## 2023-08-01 VITALS
HEART RATE: 76 BPM | SYSTOLIC BLOOD PRESSURE: 110 MMHG | BODY MASS INDEX: 47.69 KG/M2 | HEIGHT: 61 IN | OXYGEN SATURATION: 98 % | TEMPERATURE: 98.6 F | WEIGHT: 252.6 LBS | DIASTOLIC BLOOD PRESSURE: 78 MMHG

## 2023-08-01 DIAGNOSIS — E66.01 MORBID OBESITY: Chronic | ICD-10-CM

## 2023-08-01 DIAGNOSIS — R53.1 WEAKNESS: ICD-10-CM

## 2023-08-01 DIAGNOSIS — R06.09 DOE (DYSPNEA ON EXERTION): Primary | ICD-10-CM

## 2023-08-01 DIAGNOSIS — E11.22 TYPE 2 DIABETES MELLITUS WITH STAGE 3A CHRONIC KIDNEY DISEASE, WITHOUT LONG-TERM CURRENT USE OF INSULIN: Chronic | ICD-10-CM

## 2023-08-01 DIAGNOSIS — N18.31 TYPE 2 DIABETES MELLITUS WITH STAGE 3A CHRONIC KIDNEY DISEASE, WITHOUT LONG-TERM CURRENT USE OF INSULIN: Chronic | ICD-10-CM

## 2023-08-01 DIAGNOSIS — R53.83 FATIGUE, UNSPECIFIED TYPE: ICD-10-CM

## 2023-08-01 DIAGNOSIS — I10 ESSENTIAL HYPERTENSION: Chronic | ICD-10-CM

## 2023-08-01 PROBLEM — I11.0 BENIGN HYPERTENSIVE HEART DISEASE WITH CONGESTIVE HEART FAILURE: Status: ACTIVE | Noted: 2023-08-01

## 2023-08-01 RX ORDER — ONDANSETRON HYDROCHLORIDE 8 MG/1
TABLET, FILM COATED ORAL
Qty: 30 TABLET | Refills: 0 | Status: SHIPPED | OUTPATIENT
Start: 2023-08-01

## 2023-08-01 RX ORDER — TIRZEPATIDE 5 MG/.5ML
5 INJECTION, SOLUTION SUBCUTANEOUS WEEKLY
Qty: 2 ML | Refills: 5 | Status: SHIPPED | OUTPATIENT
Start: 2023-08-01

## 2023-08-02 ENCOUNTER — INFUSION (OUTPATIENT)
Dept: ONCOLOGY | Facility: HOSPITAL | Age: 73
End: 2023-08-02
Payer: MEDICARE

## 2023-08-02 VITALS
TEMPERATURE: 98.6 F | RESPIRATION RATE: 20 BRPM | HEART RATE: 96 BPM | SYSTOLIC BLOOD PRESSURE: 143 MMHG | DIASTOLIC BLOOD PRESSURE: 76 MMHG

## 2023-08-02 DIAGNOSIS — D50.9 IRON DEFICIENCY ANEMIA, UNSPECIFIED IRON DEFICIENCY ANEMIA TYPE: ICD-10-CM

## 2023-08-02 DIAGNOSIS — T45.4X5A ADVERSE EFFECT OF IRON, INITIAL ENCOUNTER: Primary | ICD-10-CM

## 2023-08-02 PROCEDURE — 25010000002 IRON SUCROSE PER 1 MG: Performed by: INTERNAL MEDICINE

## 2023-08-02 PROCEDURE — 63710000001 DIPHENHYDRAMINE PER 50 MG: Performed by: INTERNAL MEDICINE

## 2023-08-02 PROCEDURE — A9270 NON-COVERED ITEM OR SERVICE: HCPCS | Performed by: INTERNAL MEDICINE

## 2023-08-02 RX ORDER — DIPHENHYDRAMINE HYDROCHLORIDE 50 MG/ML
50 INJECTION INTRAMUSCULAR; INTRAVENOUS AS NEEDED
Status: CANCELLED | OUTPATIENT
Start: 2023-08-04

## 2023-08-02 RX ORDER — FAMOTIDINE 20 MG/1
20 TABLET, FILM COATED ORAL ONCE
Status: DISCONTINUED | OUTPATIENT
Start: 2023-08-02 | End: 2023-08-02 | Stop reason: HOSPADM

## 2023-08-02 RX ORDER — SODIUM CHLORIDE 9 MG/ML
250 INJECTION, SOLUTION INTRAVENOUS ONCE
Status: CANCELLED | OUTPATIENT
Start: 2023-08-04

## 2023-08-02 RX ORDER — DIPHENHYDRAMINE HCL 25 MG
25 CAPSULE ORAL ONCE
Status: COMPLETED | OUTPATIENT
Start: 2023-08-02 | End: 2023-08-02

## 2023-08-02 RX ORDER — DIPHENHYDRAMINE HCL 25 MG
25 CAPSULE ORAL ONCE
Status: CANCELLED | OUTPATIENT
Start: 2023-08-04

## 2023-08-02 RX ORDER — FAMOTIDINE 10 MG/ML
20 INJECTION, SOLUTION INTRAVENOUS AS NEEDED
Status: CANCELLED | OUTPATIENT
Start: 2023-08-04

## 2023-08-02 RX ORDER — SODIUM CHLORIDE 9 MG/ML
250 INJECTION, SOLUTION INTRAVENOUS ONCE
Status: COMPLETED | OUTPATIENT
Start: 2023-08-02 | End: 2023-08-02

## 2023-08-02 RX ORDER — FAMOTIDINE 20 MG/1
20 TABLET, FILM COATED ORAL ONCE
Status: CANCELLED | OUTPATIENT
Start: 2023-08-04

## 2023-08-02 RX ADMIN — SODIUM CHLORIDE 250 ML: 9 INJECTION, SOLUTION INTRAVENOUS at 13:21

## 2023-08-02 RX ADMIN — IRON SUCROSE 200 MG: 20 INJECTION, SOLUTION INTRAVENOUS at 13:48

## 2023-08-02 RX ADMIN — DIPHENHYDRAMINE HYDROCHLORIDE 25 MG: 25 CAPSULE ORAL at 13:17

## 2023-08-03 ENCOUNTER — LAB (OUTPATIENT)
Dept: LAB | Facility: OTHER | Age: 73
End: 2023-08-03
Payer: MEDICARE

## 2023-08-03 DIAGNOSIS — R53.1 WEAKNESS: ICD-10-CM

## 2023-08-03 PROCEDURE — 83519 RIA NONANTIBODY: CPT | Performed by: INTERNAL MEDICINE

## 2023-08-03 PROCEDURE — 36415 COLL VENOUS BLD VENIPUNCTURE: CPT | Performed by: INTERNAL MEDICINE

## 2023-08-04 ENCOUNTER — INFUSION (OUTPATIENT)
Dept: ONCOLOGY | Facility: HOSPITAL | Age: 73
End: 2023-08-04
Payer: MEDICARE

## 2023-08-04 VITALS
TEMPERATURE: 97.9 F | OXYGEN SATURATION: 96 % | SYSTOLIC BLOOD PRESSURE: 136 MMHG | RESPIRATION RATE: 18 BRPM | HEART RATE: 107 BPM | DIASTOLIC BLOOD PRESSURE: 71 MMHG

## 2023-08-04 DIAGNOSIS — T45.4X5A ADVERSE EFFECT OF IRON, INITIAL ENCOUNTER: Primary | ICD-10-CM

## 2023-08-04 DIAGNOSIS — D50.9 IRON DEFICIENCY ANEMIA, UNSPECIFIED IRON DEFICIENCY ANEMIA TYPE: ICD-10-CM

## 2023-08-04 PROCEDURE — A9270 NON-COVERED ITEM OR SERVICE: HCPCS | Performed by: INTERNAL MEDICINE

## 2023-08-04 PROCEDURE — 25010000002 IRON SUCROSE PER 1 MG: Performed by: INTERNAL MEDICINE

## 2023-08-04 PROCEDURE — 63710000001 DIPHENHYDRAMINE PER 50 MG: Performed by: INTERNAL MEDICINE

## 2023-08-04 RX ORDER — FAMOTIDINE 10 MG/ML
20 INJECTION, SOLUTION INTRAVENOUS AS NEEDED
Status: DISCONTINUED | OUTPATIENT
Start: 2023-08-04 | End: 2023-08-04 | Stop reason: HOSPADM

## 2023-08-04 RX ORDER — SODIUM CHLORIDE 9 MG/ML
250 INJECTION, SOLUTION INTRAVENOUS ONCE
Status: COMPLETED | OUTPATIENT
Start: 2023-08-04 | End: 2023-08-04

## 2023-08-04 RX ORDER — FAMOTIDINE 20 MG/1
20 TABLET, FILM COATED ORAL ONCE
Status: CANCELLED | OUTPATIENT
Start: 2023-08-06

## 2023-08-04 RX ORDER — DIPHENHYDRAMINE HYDROCHLORIDE 50 MG/ML
50 INJECTION INTRAMUSCULAR; INTRAVENOUS AS NEEDED
Status: CANCELLED | OUTPATIENT
Start: 2023-08-06

## 2023-08-04 RX ORDER — FAMOTIDINE 10 MG/ML
20 INJECTION, SOLUTION INTRAVENOUS AS NEEDED
Status: CANCELLED | OUTPATIENT
Start: 2023-08-06

## 2023-08-04 RX ORDER — DIPHENHYDRAMINE HYDROCHLORIDE 50 MG/ML
50 INJECTION INTRAMUSCULAR; INTRAVENOUS AS NEEDED
Status: DISCONTINUED | OUTPATIENT
Start: 2023-08-04 | End: 2023-08-04 | Stop reason: HOSPADM

## 2023-08-04 RX ORDER — SODIUM CHLORIDE 9 MG/ML
250 INJECTION, SOLUTION INTRAVENOUS ONCE
Status: CANCELLED | OUTPATIENT
Start: 2023-08-06

## 2023-08-04 RX ORDER — DIPHENHYDRAMINE HCL 25 MG
25 CAPSULE ORAL ONCE
Status: CANCELLED | OUTPATIENT
Start: 2023-08-06

## 2023-08-04 RX ORDER — FAMOTIDINE 20 MG/1
20 TABLET, FILM COATED ORAL ONCE
Status: DISCONTINUED | OUTPATIENT
Start: 2023-08-04 | End: 2023-08-04 | Stop reason: HOSPADM

## 2023-08-04 RX ORDER — DIPHENHYDRAMINE HCL 25 MG
25 CAPSULE ORAL ONCE
Status: COMPLETED | OUTPATIENT
Start: 2023-08-04 | End: 2023-08-04

## 2023-08-04 RX ADMIN — IRON SUCROSE 200 MG: 20 INJECTION, SOLUTION INTRAVENOUS at 14:09

## 2023-08-04 RX ADMIN — SODIUM CHLORIDE 250 ML: 9 INJECTION, SOLUTION INTRAVENOUS at 13:37

## 2023-08-04 RX ADMIN — DIPHENHYDRAMINE HYDROCHLORIDE 25 MG: 25 CAPSULE ORAL at 13:37

## 2023-08-07 ENCOUNTER — INFUSION (OUTPATIENT)
Dept: ONCOLOGY | Facility: HOSPITAL | Age: 73
End: 2023-08-07
Payer: MEDICARE

## 2023-08-07 VITALS
DIASTOLIC BLOOD PRESSURE: 65 MMHG | TEMPERATURE: 97 F | HEART RATE: 99 BPM | RESPIRATION RATE: 18 BRPM | SYSTOLIC BLOOD PRESSURE: 142 MMHG

## 2023-08-07 DIAGNOSIS — T45.4X5A ADVERSE EFFECT OF IRON, INITIAL ENCOUNTER: Primary | ICD-10-CM

## 2023-08-07 DIAGNOSIS — D50.9 IRON DEFICIENCY ANEMIA, UNSPECIFIED IRON DEFICIENCY ANEMIA TYPE: ICD-10-CM

## 2023-08-07 PROCEDURE — 96374 THER/PROPH/DIAG INJ IV PUSH: CPT | Performed by: INTERNAL MEDICINE

## 2023-08-07 PROCEDURE — A9270 NON-COVERED ITEM OR SERVICE: HCPCS | Performed by: INTERNAL MEDICINE

## 2023-08-07 PROCEDURE — 25010000002 IRON SUCROSE PER 1 MG: Performed by: INTERNAL MEDICINE

## 2023-08-07 PROCEDURE — 63710000001 DIPHENHYDRAMINE PER 50 MG: Performed by: INTERNAL MEDICINE

## 2023-08-07 RX ORDER — DIPHENHYDRAMINE HYDROCHLORIDE 50 MG/ML
50 INJECTION INTRAMUSCULAR; INTRAVENOUS AS NEEDED
Status: CANCELLED | OUTPATIENT
Start: 2023-08-08

## 2023-08-07 RX ORDER — DIPHENHYDRAMINE HYDROCHLORIDE 50 MG/ML
50 INJECTION INTRAMUSCULAR; INTRAVENOUS AS NEEDED
Status: DISCONTINUED | OUTPATIENT
Start: 2023-08-07 | End: 2023-08-07 | Stop reason: HOSPADM

## 2023-08-07 RX ORDER — FAMOTIDINE 20 MG/1
20 TABLET, FILM COATED ORAL ONCE
Status: DISCONTINUED | OUTPATIENT
Start: 2023-08-07 | End: 2023-08-07 | Stop reason: HOSPADM

## 2023-08-07 RX ORDER — FAMOTIDINE 10 MG/ML
20 INJECTION, SOLUTION INTRAVENOUS AS NEEDED
Status: CANCELLED | OUTPATIENT
Start: 2023-08-08

## 2023-08-07 RX ORDER — FAMOTIDINE 20 MG/1
20 TABLET, FILM COATED ORAL ONCE
Status: CANCELLED | OUTPATIENT
Start: 2023-08-08

## 2023-08-07 RX ORDER — SODIUM CHLORIDE 9 MG/ML
250 INJECTION, SOLUTION INTRAVENOUS ONCE
Status: CANCELLED | OUTPATIENT
Start: 2023-08-08

## 2023-08-07 RX ORDER — DIPHENHYDRAMINE HCL 25 MG
25 CAPSULE ORAL ONCE
Status: COMPLETED | OUTPATIENT
Start: 2023-08-07 | End: 2023-08-07

## 2023-08-07 RX ORDER — DIPHENHYDRAMINE HCL 25 MG
25 CAPSULE ORAL ONCE
Status: CANCELLED | OUTPATIENT
Start: 2023-08-08

## 2023-08-07 RX ORDER — SODIUM CHLORIDE 9 MG/ML
250 INJECTION, SOLUTION INTRAVENOUS ONCE
Status: COMPLETED | OUTPATIENT
Start: 2023-08-07 | End: 2023-08-07

## 2023-08-07 RX ORDER — FAMOTIDINE 10 MG/ML
20 INJECTION, SOLUTION INTRAVENOUS AS NEEDED
Status: DISCONTINUED | OUTPATIENT
Start: 2023-08-07 | End: 2023-08-07 | Stop reason: HOSPADM

## 2023-08-07 RX ADMIN — DIPHENHYDRAMINE HYDROCHLORIDE 25 MG: 25 CAPSULE ORAL at 13:19

## 2023-08-07 RX ADMIN — IRON SUCROSE 200 MG: 20 INJECTION, SOLUTION INTRAVENOUS at 13:49

## 2023-08-07 RX ADMIN — SODIUM CHLORIDE 250 ML: 9 INJECTION, SOLUTION INTRAVENOUS at 13:48

## 2023-08-09 ENCOUNTER — INFUSION (OUTPATIENT)
Dept: ONCOLOGY | Facility: HOSPITAL | Age: 73
End: 2023-08-09
Payer: MEDICARE

## 2023-08-09 VITALS
TEMPERATURE: 98.4 F | SYSTOLIC BLOOD PRESSURE: 140 MMHG | RESPIRATION RATE: 18 BRPM | DIASTOLIC BLOOD PRESSURE: 67 MMHG | HEART RATE: 95 BPM

## 2023-08-09 DIAGNOSIS — D50.9 IRON DEFICIENCY ANEMIA, UNSPECIFIED IRON DEFICIENCY ANEMIA TYPE: ICD-10-CM

## 2023-08-09 DIAGNOSIS — T45.4X5A ADVERSE EFFECT OF IRON, INITIAL ENCOUNTER: Primary | ICD-10-CM

## 2023-08-09 PROCEDURE — A9270 NON-COVERED ITEM OR SERVICE: HCPCS | Performed by: INTERNAL MEDICINE

## 2023-08-09 PROCEDURE — 25010000002 IRON SUCROSE PER 1 MG: Performed by: INTERNAL MEDICINE

## 2023-08-09 PROCEDURE — 63710000001 DIPHENHYDRAMINE PER 50 MG: Performed by: INTERNAL MEDICINE

## 2023-08-09 RX ORDER — SODIUM CHLORIDE 9 MG/ML
250 INJECTION, SOLUTION INTRAVENOUS ONCE
OUTPATIENT
Start: 2023-08-11

## 2023-08-09 RX ORDER — FAMOTIDINE 20 MG/1
20 TABLET, FILM COATED ORAL ONCE
OUTPATIENT
Start: 2023-08-11

## 2023-08-09 RX ORDER — DIPHENHYDRAMINE HYDROCHLORIDE 50 MG/ML
50 INJECTION INTRAMUSCULAR; INTRAVENOUS AS NEEDED
OUTPATIENT
Start: 2023-08-11

## 2023-08-09 RX ORDER — FAMOTIDINE 10 MG/ML
20 INJECTION, SOLUTION INTRAVENOUS AS NEEDED
OUTPATIENT
Start: 2023-08-11

## 2023-08-09 RX ORDER — FAMOTIDINE 10 MG/ML
20 INJECTION, SOLUTION INTRAVENOUS AS NEEDED
Status: DISCONTINUED | OUTPATIENT
Start: 2023-08-09 | End: 2023-08-09 | Stop reason: HOSPADM

## 2023-08-09 RX ORDER — DIPHENHYDRAMINE HCL 25 MG
25 CAPSULE ORAL ONCE
Status: COMPLETED | OUTPATIENT
Start: 2023-08-09 | End: 2023-08-09

## 2023-08-09 RX ORDER — SODIUM CHLORIDE 9 MG/ML
250 INJECTION, SOLUTION INTRAVENOUS ONCE
Status: COMPLETED | OUTPATIENT
Start: 2023-08-09 | End: 2023-08-09

## 2023-08-09 RX ORDER — DIPHENHYDRAMINE HYDROCHLORIDE 50 MG/ML
50 INJECTION INTRAMUSCULAR; INTRAVENOUS AS NEEDED
Status: DISCONTINUED | OUTPATIENT
Start: 2023-08-09 | End: 2023-08-09 | Stop reason: HOSPADM

## 2023-08-09 RX ORDER — DIPHENHYDRAMINE HCL 25 MG
25 CAPSULE ORAL ONCE
OUTPATIENT
Start: 2023-08-11

## 2023-08-09 RX ORDER — FAMOTIDINE 20 MG/1
20 TABLET, FILM COATED ORAL ONCE
Status: DISCONTINUED | OUTPATIENT
Start: 2023-08-09 | End: 2023-08-09 | Stop reason: HOSPADM

## 2023-08-09 RX ADMIN — SODIUM CHLORIDE 250 ML: 9 INJECTION, SOLUTION INTRAVENOUS at 13:10

## 2023-08-09 RX ADMIN — DIPHENHYDRAMINE HYDROCHLORIDE 25 MG: 25 CAPSULE ORAL at 13:10

## 2023-08-09 RX ADMIN — IRON SUCROSE 200 MG: 20 INJECTION, SOLUTION INTRAVENOUS at 13:42

## 2023-08-10 ENCOUNTER — OFFICE VISIT (OUTPATIENT)
Dept: ORTHOPEDIC SURGERY | Facility: CLINIC | Age: 73
End: 2023-08-10
Payer: MEDICARE

## 2023-08-10 VITALS — WEIGHT: 245 LBS | BODY MASS INDEX: 46.26 KG/M2 | HEIGHT: 61 IN

## 2023-08-10 DIAGNOSIS — I10 ESSENTIAL HYPERTENSION: ICD-10-CM

## 2023-08-10 DIAGNOSIS — M70.61 TROCHANTERIC BURSITIS, RIGHT HIP: Primary | ICD-10-CM

## 2023-08-10 DIAGNOSIS — E66.01 MORBID OBESITY WITH BMI OF 45.0-49.9, ADULT: ICD-10-CM

## 2023-08-10 DIAGNOSIS — M25.551 HIP PAIN, ACUTE, RIGHT: ICD-10-CM

## 2023-08-10 DIAGNOSIS — M25.511 ACUTE PAIN OF RIGHT SHOULDER: ICD-10-CM

## 2023-08-10 DIAGNOSIS — J45.20 MILD INTERMITTENT ASTHMA WITHOUT COMPLICATION: ICD-10-CM

## 2023-08-10 DIAGNOSIS — G47.33 OBSTRUCTIVE SLEEP APNEA SYNDROME: ICD-10-CM

## 2023-08-10 LAB
ACHR BIND AB SER-SCNC: <0.03 NMOL/L (ref 0–0.24)
ACHR BLOCK AB SER-ACNC: 20 % (ref 0–25)
ACHR MOD AB SER QL FC: 0 % (ref 0–45)

## 2023-08-10 RX ADMIN — LIDOCAINE HYDROCHLORIDE 2 ML: 10 INJECTION, SOLUTION INFILTRATION; PERINEURAL at 11:05

## 2023-08-10 RX ADMIN — TRIAMCINOLONE ACETONIDE 40 MG: 40 INJECTION, SUSPENSION INTRA-ARTICULAR; INTRAMUSCULAR at 11:05

## 2023-08-10 NOTE — PROGRESS NOTES
"Lois Parmar is a 73 y.o. female returns for     Chief Complaint   Patient presents with    Right Shoulder - Follow-up    Right Hip - Follow-up       HISTORY OF PRESENT ILLNESS: Patient is here today for follow up of right shoulder and right hip.  Patient states that her shoulder is about the same.  She states that she is doing okay.  She is managing reasonably well.    In regards to her hip, she reports occasional severe pain.  She has numbness occasionally as well.  She reports some groin pain but mostly it is buttock and lateral hip pain.  No new injury.       CONCURRENT MEDICAL HISTORY:    The following portions of the patient's history were reviewed and updated as appropriate: allergies, current medications, past family history, past medical history, past social history, past surgical history and problem list.     ROS  No fevers or chills.  No chest pain or shortness of air.  No GI or  disturbances.    PHYSICAL EXAMINATION:       Ht 154.9 cm (61\")   Wt 111 kg (245 lb)   LMP  (LMP Unknown) Comment: 1980 w BSO  BMI 46.29 kg/mý     Physical Exam  Constitutional:       General: She is not in acute distress.     Appearance: Normal appearance.   Pulmonary:      Effort: Pulmonary effort is normal. No respiratory distress.   Neurological:      Mental Status: She is alert and oriented to person, place, and time.           Right Hip Exam     Comments:  Tender over greater trochanter.  Good internal and external rotation.  Negative Stinchfield test.  Good distal pulses and sensation.      Right Shoulder Exam     Comments:  Shoulder flexion 120 degrees.  Shoulder abduction 110 degrees.  Positive Beverly Mak test.  Strength 4/5 with abduction.                      ASSESSMENT:    Diagnoses and all orders for this visit:    Trochanteric bursitis, right hip  -     Large Joint Arthrocentesis: R greater trochanteric bursa    Acute pain of right shoulder    Hip pain, acute, right  -     Large Joint Arthrocentesis: " R greater trochanteric bursa    Obstructive sleep apnea syndrome    Mild intermittent asthma without complication    Essential hypertension    Morbid obesity with BMI of 45.0-49.9, adult          PLAN    Patient is progressing reasonably well in regards to her right shoulder.  She continues having difficulty in the right hip.  She has pain with activities and pain with prolonged standing and walking.  We discussed proceeding with a steroid injection into the trochanteric bursa.  Continue home exercise program.  Slowly increase activity as tolerated.  We discussed the possibility of further evaluation of the lumbar spine if her symptoms persist.    Large Joint Arthrocentesis: R greater trochanteric bursa  Date/Time: 8/10/2023 11:05 AM  Consent given by: patient  Site marked: site marked  Timeout: Immediately prior to procedure a time out was called to verify the correct patient, procedure, equipment, support staff and site/side marked as required   Supporting Documentation  Indications: pain   Procedure Details  Location: hip - R greater trochanteric bursa  Preparation: Patient was prepped and draped in the usual sterile fashion  Needle size: 22 G  Approach: lateral  Medications administered: 40 mg triamcinolone acetonide 40 MG/ML; 2 mL lidocaine 1 %  Patient tolerance: patient tolerated the procedure well with no immediate complications       Return if symptoms worsen or fail to improve, for recheck.    Curt Augustine MD

## 2023-08-11 ENCOUNTER — TELEPHONE (OUTPATIENT)
Dept: ONCOLOGY | Facility: CLINIC | Age: 73
End: 2023-08-11

## 2023-08-11 NOTE — TELEPHONE ENCOUNTER
REQUEST FOR APPOINTMENT OR APPOINTMENT CHANGE    What is the appointment for: Reschedule 08/11/2023 cancelled appt for Iron Infusion    Reason for appointment request or change: Sick    If rescheduling, when was the original appointment: 08/11/2023     Requested day that works best: has therapy Tues., Wed., & Thurs      Additional notes: Call back# [phone] or [phone]

## 2023-08-12 RX ORDER — TRIAMCINOLONE ACETONIDE 40 MG/ML
40 INJECTION, SUSPENSION INTRA-ARTICULAR; INTRAMUSCULAR
Status: COMPLETED | OUTPATIENT
Start: 2023-08-10 | End: 2023-08-10

## 2023-08-12 RX ORDER — LIDOCAINE HYDROCHLORIDE 10 MG/ML
2 INJECTION, SOLUTION INFILTRATION; PERINEURAL
Status: COMPLETED | OUTPATIENT
Start: 2023-08-10 | End: 2023-08-10

## 2023-08-16 ENCOUNTER — OFFICE VISIT (OUTPATIENT)
Dept: FAMILY MEDICINE CLINIC | Facility: CLINIC | Age: 73
End: 2023-08-16
Payer: MEDICARE

## 2023-08-16 VITALS
SYSTOLIC BLOOD PRESSURE: 124 MMHG | DIASTOLIC BLOOD PRESSURE: 76 MMHG | TEMPERATURE: 98 F | OXYGEN SATURATION: 99 % | WEIGHT: 242.6 LBS | HEIGHT: 61 IN | BODY MASS INDEX: 45.8 KG/M2 | HEART RATE: 96 BPM

## 2023-08-16 DIAGNOSIS — M70.61 TROCHANTERIC BURSITIS, RIGHT HIP: ICD-10-CM

## 2023-08-16 DIAGNOSIS — R53.83 FATIGUE, UNSPECIFIED TYPE: ICD-10-CM

## 2023-08-16 DIAGNOSIS — R06.09 DOE (DYSPNEA ON EXERTION): Chronic | ICD-10-CM

## 2023-08-16 DIAGNOSIS — D63.1 ANEMIA DUE TO STAGE 3A CHRONIC KIDNEY DISEASE: Chronic | ICD-10-CM

## 2023-08-16 DIAGNOSIS — N18.31 ANEMIA DUE TO STAGE 3A CHRONIC KIDNEY DISEASE: Chronic | ICD-10-CM

## 2023-08-16 DIAGNOSIS — E66.01 MORBID OBESITY: Chronic | ICD-10-CM

## 2023-08-16 DIAGNOSIS — I10 ESSENTIAL HYPERTENSION: Primary | Chronic | ICD-10-CM

## 2023-08-16 NOTE — PROGRESS NOTES
Chief Complaint  Follow-up (2 week on issues. She states she has a little more energy since having iron infusions)    Subjective        History of Present Illness    Lois Parmar has multiple chronic complex medical problems including type 2 diabetes, hypertension, hyperlipidemia, hypothyroidism, chronic kidney disease, anemia, cervical and lumbar radiculopathy, lumbar spinal stenosis, morbid obesity, among other medical issues.       She comes in for 2-week follow up on several issues including severe fatigue, generalized weakness dyspnea on exertion, most likely multifactorial and principally related to deconditioning and morbid obesity, for which she agreed to try weekly Mounjaro.  Her weight is down 10 pounds in past 2 weeks after two weeks of the 2.5 mg dose and taking the first injection of higher 0.5 mg dose yesterday. She is managing constipation with increasing her Benefiber to twice daily and using 2 OTC stool softeners daily, otherwise tolerating Mounjaro without difficulty.  Acetylcholine receptor antibody testing was negative, ruling out myasthenia gravis as a source of her weakness and fatigue.    She is continuing to experience dyspnea on exertion, although, improved with even 10-pound weight loss.  We continue to encourage aggressive weight loss, which should be easier to achieve with the weekly Mounjaro. Per her request, we also referred to neuromuscular specialist for evaluation of persistent dyspnea on exertion and worsening muscle weakness due to her concern a neuromuscular disorder could be producing progressive episodic muscle weakness, stiff muscles, dyspnea on exertion.  She has not been contacted with appointment yet.  She was still short of breath by the time she walked to our office today, but did a little better than 2 weeks ago.    Dr. Augustine injected right hip last week to address trochanteric bursitis and she continues PT three days weekly with nice improvement in right hip pain.  "    She has noticed improved energy level with IV iron infusions.  Dr. De La Torre is helping address her anemia.    Objective   Vital Signs:  /76   Pulse 96   Temp 98 øF (36.7 øC)   Ht 154.9 cm (61\")   Wt 110 kg (242 lb 9.6 oz)   SpO2 99%   BMI 45.84 kg/mý   Estimated body mass index is 45.84 kg/mý as calculated from the following:    Height as of this encounter: 154.9 cm (61\").    Weight as of this encounter: 110 kg (242 lb 9.6 oz).         Physical Exam  Vitals reviewed.   Constitutional:       General: She is not in acute distress.     Appearance: She is well-developed.      Comments: Pleasant female, morbidly obese.    HENT:      Head: Normocephalic and atraumatic.      Nose:      Right Sinus: No maxillary sinus tenderness or frontal sinus tenderness.      Left Sinus: No maxillary sinus tenderness or frontal sinus tenderness.      Mouth/Throat:      Mouth: No oral lesions.      Pharynx: Uvula midline.      Tonsils: No tonsillar exudate.   Eyes:      Conjunctiva/sclera: Conjunctivae normal.      Pupils: Pupils are equal, round, and reactive to light.   Neck:      Thyroid: No thyroid mass or thyromegaly.      Vascular: No carotid bruit or JVD.      Trachea: Trachea normal. No tracheal deviation.   Cardiovascular:      Rate and Rhythm: Normal rate and regular rhythm. No extrasystoles are present.     Chest Wall: PMI is not displaced.      Heart sounds: Normal heart sounds. No murmur heard.  Pulmonary:      Effort: Pulmonary effort is normal. No accessory muscle usage or respiratory distress.      Breath sounds: Normal breath sounds. No decreased breath sounds, wheezing, rhonchi or rales.   Abdominal:      Palpations: Abdomen is soft.      Comments: Obese abdomen limits exam.   Musculoskeletal:      Cervical back: Neck supple.   Lymphadenopathy:      Cervical: No cervical adenopathy.   Skin:     General: Skin is warm and dry.      Findings: No rash.      Nails: There is no clubbing.   Neurological:      Mental " Status: She is alert and oriented to person, place, and time. Mental status is at baseline.      Cranial Nerves: No cranial nerve deficit.      Coordination: Coordination normal.   Psychiatric:         Mood and Affect: Mood normal.         Speech: Speech normal.         Behavior: Behavior normal.         Thought Content: Thought content normal.         Judgment: Judgment normal.          Result Review :  The following data was reviewed by: Marshal Warner MD on 08/16/2023:  Common labs          2/17/2023    10:30 3/22/2023    12:01 7/25/2023    10:48   Common Labs   Glucose 109  92  114    BUN 25  38  21    Creatinine 1.24  1.62  1.16    Sodium 138  143  140    Potassium 4.4  4.7  4.1    Chloride 102  104  101    Calcium 9.7  9.7  9.8    Albumin 4.7  4.9  4.5    Total Bilirubin 0.4  0.4  0.4    Alkaline Phosphatase 48  60  48    AST (SGOT) 22  19  21    ALT (SGPT) 18  18  15    WBC 9.04  11.38  9.44    Hemoglobin 12.0  11.9  11.6    Hematocrit 35.8  36.6  35.2    Platelets 328  333  322    Total Cholesterol 154      Triglycerides 278      HDL Cholesterol 52      LDL Cholesterol  59      Hemoglobin A1C 6.40      Microalbumin, Urine 3.1        Data reviewed : Consultant notes Dr. Augustine    Future Appointments   Date Time Provider Department Center   8/17/2023  1:15 PM [facility] OP INFU CHAIR 04 [facility] OPI [facility]   8/31/2023 11:00 AM Marshal Warner MD [facility] POW [facility]   9/21/2023  9:20 AM Curt Augustine MD [facility] OSCR MAD [facility]   10/18/2023  2:00 PM Homer Tony APRN [facility] POD POW None   10/26/2023  1:45 PM Ranjeet De La Torre MD [facility] ONC POW MAD             Assessment and Plan   Diagnoses and all orders for this visit:    1. Essential hypertension (Primary)    2. PALOMINO (dyspnea on exertion)    3. Anemia due to stage 3a chronic kidney disease    4. Morbid obesity    5. Trochanteric bursitis, right hip    6. Fatigue, unspecified type               She is congratulated on her initial weight loss and encouraged to intensify her  diet, activity and weight loss efforts.  The higher dose of Mounjaro should give greater appetite suppression to help achieve additional weight loss, which will improve her chronic health issues including dyspnea on exertion. I recommended she continue current strategy plan to manage constipation with the Benefiber and stool softeners adding Miralax or OTC laxative if she goes more than 3 days without BM.  Steadily increase physical activity/exercise by doing small amounts several times daily    The referral to [place] [facility] clinic has been made per patient request. She is awaiting appointment.        Right hip pain improved with recent injection by Dr. Augustine as well as physical therapy sessions.      She has had improvement in energy level with iron infusions and is scheduled for one more tomorrow.  Her anemia is multifactorial with components of iron and vitamin B12 deficiency and chronic kidney disease.  We will be seeing her back in a couple of weeks with fasting labs prior.    Return 08/31/2023 for routine follow up with fasting labs one week prior.    Scribed for Dr. Warner by [facility].      Follow Up   Return for Next scheduled follow up.  Patient was given instructions and counseling regarding her condition or for health maintenance advice. Please see specific information pulled into the AVS if appropriate.

## 2023-08-16 NOTE — PATIENT INSTRUCTIONS
Exercising to Lose Weight  Getting regular exercise is important for everyone. It is especially important if you are overweight. Being overweight increases your risk of heart disease, stroke, diabetes, high blood pressure, and several types of cancer. Exercising, and reducing the calories you consume, can help you lose weight and improve fitness and health.  Exercise can be moderate or vigorous intensity. To lose weight, most people need to do a certain amount of moderate or vigorous-intensity exercise each week.  How can exercise affect me?  You lose weight when you exercise enough to burn more calories than you eat. Exercise also reduces body fat and builds muscle. The more muscle you have, the more calories you burn. Exercise also:  Improves mood.  Reduces stress and tension.  Improves your overall fitness, flexibility, and endurance.  Increases bone strength.  Moderate-intensity exercise    Moderate-intensity exercise is any activity that gets you moving enough to burn at least three times more energy (calories) than if you were sitting.  Examples of moderate exercise include:  Walking a mile in 15 minutes.  Doing light yard work.  Biking at an easy pace.  Most people should get at least 150 minutes of moderate-intensity exercise a week to maintain their body weight.  Vigorous-intensity exercise  Vigorous-intensity exercise is any activity that gets you moving enough to burn at least six times more calories than if you were sitting. When you exercise at this intensity, you should be working hard enough that you are not able to carry on a conversation.  Examples of vigorous exercise include:  Running.  Playing a team sport, such as football, basketball, and soccer.  Jumping rope.  Most people should get at least 75 minutes a week of vigorous exercise to maintain their body weight.  What actions can I take to lose weight?  The amount of exercise you need to lose weight depends on:  Your age.  The type of  exercise.  Any health conditions you have.  Your overall physical ability.  Talk to your health care provider about how much exercise you need and what types of activities are safe for you.  Nutrition    Make changes to your diet as told by your health care provider or diet and nutrition specialist (dietitian). This may include:  Eating fewer calories.  Eating more protein.  Eating less unhealthy fats.  Eating a diet that includes fresh fruits and vegetables, whole grains, low-fat dairy products, and lean protein.  Avoiding foods with added fat, salt, and sugar.  Drink plenty of water while you exercise to prevent dehydration or heat stroke.  Activity  Choose an activity that you enjoy and set realistic goals. Your health care provider can help you make an exercise plan that works for you.  Exercise at a moderate or vigorous intensity most days of the week.  The intensity of exercise may vary from person to person. You can tell how intense a workout is for you by paying attention to your breathing and heartbeat. Most people will notice their breathing and heartbeat get faster with more intense exercise.  Do resistance training twice each week, such as:  Push-ups.  Sit-ups.  Lifting weights.  Using resistance bands.  Getting short amounts of exercise can be just as helpful as long, structured periods of exercise. If you have trouble finding time to exercise, try doing these things as part of your daily routine:  Get up, stretch, and walk around every 30 minutes throughout the day.  Go for a walk during your lunch break.  Park your car farther away from your destination.  If you take public transportation, get off one stop early and walk the rest of the way.  Make phone calls while standing up and walking around.  Take the stairs instead of elevators or escalators.  Wear comfortable clothes and shoes with good support.  Do not exercise so much that you hurt yourself, feel dizzy, or get very short of breath.  Where to  find more information  [place] Department of Health and Human Services: www.hhs.gov  Centers for Disease Control and Prevention: www.cdc.gov  Contact a health care provider:  Before starting a new exercise program.  If you have questions or concerns about your weight.  If you have a medical problem that keeps you from exercising.  Get help right away if:  You have any of the following while exercising:  Injury.  Dizziness.  Difficulty breathing or shortness of breath that does not go away when you stop exercising.  Chest pain.  Rapid heartbeat.  These symptoms may represent a serious problem that is an emergency. Do not wait to see if the symptoms will go away. Get medical help right away. Call your local emergency services (911 in the [place] Do not drive yourself to the hospital.  Summary  Getting regular exercise is especially important if you are overweight.  Being overweight increases your risk of heart disease, stroke, diabetes, high blood pressure, and several types of cancer.  Losing weight happens when you burn more calories than you eat.  Reducing the amount of calories you eat, and getting regular moderate or vigorous exercise each week, helps you lose weight.  This information is not intended to replace advice given to you by your health care provider. Make sure you discuss any questions you have with your health care provider.  Document Revised: 02/13/2022 Document Reviewed: 02/13/2022  [employer] Patient Education c 2023 [employer]  Calorie Counting for Weight Loss  Calories are units of energy. Your body needs a certain number of calories from food to keep going throughout the day. When you eat or drink more calories than your body needs, your body stores the extra calories mostly as fat. When you eat or drink fewer calories than your body needs, your body burns fat to get the energy it needs.  Calorie counting means keeping track of how many calories you eat and drink each day. Calorie counting can be  helpful if you need to lose weight. If you eat fewer calories than your body needs, you should lose weight. Ask your health care provider what a healthy weight is for you.  For calorie counting to work, you will need to eat the right number of calories each day to lose a healthy amount of weight per week. A dietitian can help you figure out how many calories you need in a day and will suggest ways to reach your calorie goal.  A healthy amount of weight to lose each week is usually 1-2 lb (0.5-0.9 kg). This usually means that your daily calorie intake should be reduced by 500-750 calories.  Eating 1,200-1,500 calories a day can help most women lose weight.  Eating 1,500-1,800 calories a day can help most men lose weight.  What do I need to know about calorie counting?  Work with your health care provider or dietitian to determine how many calories you should get each day. To meet your daily calorie goal, you will need to:  Find out how many calories are in each food that you would like to eat. Try to do this before you eat.  Decide how much of the food you plan to eat.  Keep a food log. Do this by writing down what you ate and how many calories it had.  To successfully lose weight, it is important to balance calorie counting with a healthy lifestyle that includes regular activity.  Where do I find calorie information?    The number of calories in a food can be found on a Nutrition Facts label. If a food does not have a Nutrition Facts label, try to look up the calories online or ask your dietitian for help.  Remember that calories are listed per serving. If you choose to have more than one serving of a food, you will have to multiply the calories per serving by the number of servings you plan to eat. For example, the label on a package of bread might say that a serving size is 1 slice and that there are 90 calories in a serving. If you eat 1 slice, you will have eaten 90 calories. If you eat 2 slices, you will have  eaten 180 calories.  How do I keep a food log?  After each time that you eat, record the following in your food log as soon as possible:  What you ate. Be sure to include toppings, sauces, and other extras on the food.  How much you ate. This can be measured in cups, ounces, or number of items.  How many calories were in each food and drink.  The total number of calories in the food you ate.  Keep your food log near you, such as in a pocket-sized notebook or on an shad or website on your mobile phone. Some programs will calculate calories for you and show you how many calories you have left to meet your daily goal.  What are some portion-control tips?  Know how many calories are in a serving. This will help you know how many servings you can have of a certain food.  Use a measuring cup to measure serving sizes. You could also try weighing out portions on a kitchen scale. With time, you will be able to estimate serving sizes for some foods.  Take time to put servings of different foods on your favorite plates or in your favorite bowls and cups so you know what a serving looks like.  Try not to eat straight from a food's packaging, such as from a bag or box. Eating straight from the package makes it hard to see how much you are eating and can lead to overeating. Put the amount you would like to eat in a cup or on a plate to make sure you are eating the right portion.  Use smaller plates, glasses, and bowls for smaller portions and to prevent overeating.  Try not to multitask. For example, avoid watching TV or using your computer while eating. If it is time to eat, sit down at a table and enjoy your food. This will help you recognize when you are full. It will also help you be more mindful of what and how much you are eating.  What are tips for following this plan?  Reading food labels  Check the calorie count compared with the serving size. The serving size may be smaller than what you are used to eating.  Check the  source of the calories. Try to choose foods that are high in protein, fiber, and vitamins, and low in saturated fat, trans fat, and sodium.  Shopping  Read nutrition labels while you shop. This will help you make healthy decisions about which foods to buy.  Pay attention to nutrition labels for low-fat or fat-free foods. These foods sometimes have the same number of calories or more calories than the full-fat versions. They also often have added sugar, starch, or salt to make up for flavor that was removed with the fat.  Make a grocery list of lower-calorie foods and stick to it.  Cooking  Try to cook your favorite foods in a healthier way. For example, try baking instead of frying.  Use low-fat dairy products.  Meal planning  Use more fruits and vegetables. One-half of your plate should be fruits and vegetables.  Include lean proteins, such as chicken, turkey, and fish.  Lifestyle  Each week, aim to do one of the followin minutes of moderate exercise, such as walking.  75 minutes of vigorous exercise, such as running.  General information  Know how many calories are in the foods you eat most often. This will help you calculate calorie counts faster.  Find a way of tracking calories that works for you. Get creative. Try different apps or programs if writing down calories does not work for you.  What foods should I eat?    Eat nutritious foods. It is better to have a nutritious, high-calorie food, such as an avocado, than a food with few nutrients, such as a bag of potato chips.  Use your calories on foods and drinks that will fill you up and will not leave you hungry soon after eating.  Examples of foods that fill you up are nuts and nut butters, vegetables, lean proteins, and high-fiber foods such as whole grains. High-fiber foods are foods with more than 5 g of fiber per serving.  Pay attention to calories in drinks. Low-calorie drinks include water and unsweetened drinks.  The items listed above may not be  a complete list of foods and beverages you can eat. Contact a dietitian for more information.  What foods should I limit?  Limit foods or drinks that are not good sources of vitamins, minerals, or protein or that are high in unhealthy fats. These include:  Candy.  Other sweets.  Sodas, specialty coffee drinks, alcohol, and juice.  The items listed above may not be a complete list of foods and beverages you should avoid. Contact a dietitian for more information.  How do I count calories when eating out?  Pay attention to portions. Often, portions are much larger when eating out. Try these tips to keep portions smaller:  Consider sharing a meal instead of getting your own.  If you get your own meal, eat only half of it. Before you start eating, ask for a container and put half of your meal into it.  When available, consider ordering smaller portions from the menu instead of full portions.  Pay attention to your food and drink choices. Knowing the way food is cooked and what is included with the meal can help you eat fewer calories.  If calories are listed on the menu, choose the lower-calorie options.  Choose dishes that include vegetables, fruits, whole grains, low-fat dairy products, and lean proteins.  Choose items that are boiled, broiled, grilled, or steamed. Avoid items that are buttered, battered, fried, or served with cream sauce. Items labeled as crispy are usually fried, unless stated otherwise.  Choose water, low-fat milk, unsweetened iced tea, or other drinks without added sugar. If you want an alcoholic beverage, choose a lower-calorie option, such as a glass of wine or light beer.  Ask for dressings, sauces, and syrups on the side. These are usually high in calories, so you should limit the amount you eat.  If you want a salad, choose a garden salad and ask for grilled meats. Avoid extra toppings such as sullivan, cheese, or fried items. Ask for the dressing on the side, or ask for olive oil and vinegar or  lemon to use as dressing.  Estimate how many servings of a food you are given. Knowing serving sizes will help you be aware of how much food you are eating at restaurants.  Where to find more information  Centers for Disease Control and Prevention: www.cdc.gov  [place] Department of Agriculture: myplate.gov  Summary  Calorie counting means keeping track of how many calories you eat and drink each day. If you eat fewer calories than your body needs, you should lose weight.  A healthy amount of weight to lose per week is usually 1-2 lb (0.5-0.9 kg). This usually means reducing your daily calorie intake by 500-750 calories.  The number of calories in a food can be found on a Nutrition Facts label. If a food does not have a Nutrition Facts label, try to look up the calories online or ask your dietitian for help.  Use smaller plates, glasses, and bowls for smaller portions and to prevent overeating.  Use your calories on foods and drinks that will fill you up and not leave you hungry shortly after a meal.  This information is not intended to replace advice given to you by your health care provider. Make sure you discuss any questions you have with your health care provider.  Document Revised: 01/28/2021 Document Reviewed: 01/28/2021  [email or web address] Patient Education c 2023 [employer]

## 2023-08-17 ENCOUNTER — INFUSION (OUTPATIENT)
Dept: ONCOLOGY | Facility: HOSPITAL | Age: 73
End: 2023-08-17
Payer: MEDICARE

## 2023-08-17 VITALS
DIASTOLIC BLOOD PRESSURE: 72 MMHG | RESPIRATION RATE: 20 BRPM | HEART RATE: 85 BPM | SYSTOLIC BLOOD PRESSURE: 156 MMHG | TEMPERATURE: 98.1 F

## 2023-08-17 DIAGNOSIS — D50.9 IRON DEFICIENCY ANEMIA, UNSPECIFIED IRON DEFICIENCY ANEMIA TYPE: ICD-10-CM

## 2023-08-17 DIAGNOSIS — T45.4X5A ADVERSE EFFECT OF IRON, INITIAL ENCOUNTER: Primary | ICD-10-CM

## 2023-08-17 PROCEDURE — A9270 NON-COVERED ITEM OR SERVICE: HCPCS | Performed by: INTERNAL MEDICINE

## 2023-08-17 PROCEDURE — 63710000001 DIPHENHYDRAMINE PER 50 MG: Performed by: INTERNAL MEDICINE

## 2023-08-17 PROCEDURE — 25010000002 IRON SUCROSE PER 1 MG: Performed by: INTERNAL MEDICINE

## 2023-08-17 RX ORDER — FAMOTIDINE 20 MG/1
20 TABLET, FILM COATED ORAL ONCE
Status: CANCELLED | OUTPATIENT
Start: 2023-08-19

## 2023-08-17 RX ORDER — FAMOTIDINE 10 MG/ML
20 INJECTION, SOLUTION INTRAVENOUS AS NEEDED
Status: DISCONTINUED | OUTPATIENT
Start: 2023-08-17 | End: 2023-08-17 | Stop reason: HOSPADM

## 2023-08-17 RX ORDER — FAMOTIDINE 10 MG/ML
20 INJECTION, SOLUTION INTRAVENOUS AS NEEDED
OUTPATIENT
Start: 2023-08-19

## 2023-08-17 RX ORDER — DIPHENHYDRAMINE HYDROCHLORIDE 50 MG/ML
50 INJECTION INTRAMUSCULAR; INTRAVENOUS AS NEEDED
Status: DISCONTINUED | OUTPATIENT
Start: 2023-08-17 | End: 2023-08-17 | Stop reason: HOSPADM

## 2023-08-17 RX ORDER — DIPHENHYDRAMINE HYDROCHLORIDE 50 MG/ML
50 INJECTION INTRAMUSCULAR; INTRAVENOUS AS NEEDED
OUTPATIENT
Start: 2023-08-19

## 2023-08-17 RX ORDER — FAMOTIDINE 20 MG/1
20 TABLET, FILM COATED ORAL ONCE
Status: DISCONTINUED | OUTPATIENT
Start: 2023-08-17 | End: 2023-08-17 | Stop reason: HOSPADM

## 2023-08-17 RX ORDER — DIPHENHYDRAMINE HCL 25 MG
25 CAPSULE ORAL ONCE
Status: COMPLETED | OUTPATIENT
Start: 2023-08-17 | End: 2023-08-17

## 2023-08-17 RX ORDER — SODIUM CHLORIDE 9 MG/ML
250 INJECTION, SOLUTION INTRAVENOUS ONCE
Status: CANCELLED | OUTPATIENT
Start: 2023-08-19

## 2023-08-17 RX ORDER — SODIUM CHLORIDE 9 MG/ML
250 INJECTION, SOLUTION INTRAVENOUS ONCE
Status: COMPLETED | OUTPATIENT
Start: 2023-08-17 | End: 2023-08-17

## 2023-08-17 RX ORDER — DIPHENHYDRAMINE HCL 25 MG
25 CAPSULE ORAL ONCE
Status: CANCELLED | OUTPATIENT
Start: 2023-08-19

## 2023-08-17 RX ADMIN — SODIUM CHLORIDE 250 ML: 9 INJECTION, SOLUTION INTRAVENOUS at 13:07

## 2023-08-17 RX ADMIN — DIPHENHYDRAMINE HYDROCHLORIDE 25 MG: 25 CAPSULE ORAL at 13:08

## 2023-08-17 RX ADMIN — IRON SUCROSE 200 MG: 20 INJECTION, SOLUTION INTRAVENOUS at 13:40

## 2023-08-24 ENCOUNTER — LAB (OUTPATIENT)
Dept: LAB | Facility: OTHER | Age: 73
End: 2023-08-24
Payer: MEDICARE

## 2023-08-24 DIAGNOSIS — R06.09 DYSPNEA ON EXERTION: ICD-10-CM

## 2023-08-24 DIAGNOSIS — E55.9 VITAMIN D DEFICIENCY: Chronic | ICD-10-CM

## 2023-08-24 DIAGNOSIS — E78.1 HYPERTRIGLYCERIDEMIA: Chronic | ICD-10-CM

## 2023-08-24 DIAGNOSIS — E11.69 HYPERLIPIDEMIA ASSOCIATED WITH TYPE 2 DIABETES MELLITUS: Chronic | ICD-10-CM

## 2023-08-24 DIAGNOSIS — N18.31 ANEMIA DUE TO STAGE 3A CHRONIC KIDNEY DISEASE: Chronic | ICD-10-CM

## 2023-08-24 DIAGNOSIS — G47.33 OBSTRUCTIVE SLEEP APNEA SYNDROME: Chronic | ICD-10-CM

## 2023-08-24 DIAGNOSIS — E66.01 MORBID OBESITY: Chronic | ICD-10-CM

## 2023-08-24 DIAGNOSIS — E78.5 HYPERLIPIDEMIA ASSOCIATED WITH TYPE 2 DIABETES MELLITUS: Chronic | ICD-10-CM

## 2023-08-24 DIAGNOSIS — N18.31 TYPE 2 DIABETES MELLITUS WITH STAGE 3A CHRONIC KIDNEY DISEASE, WITHOUT LONG-TERM CURRENT USE OF INSULIN: ICD-10-CM

## 2023-08-24 DIAGNOSIS — D63.1 ANEMIA DUE TO STAGE 3A CHRONIC KIDNEY DISEASE: Chronic | ICD-10-CM

## 2023-08-24 DIAGNOSIS — E03.9 ACQUIRED HYPOTHYROIDISM: Chronic | ICD-10-CM

## 2023-08-24 DIAGNOSIS — N18.31 STAGE 3A CHRONIC KIDNEY DISEASE: Chronic | ICD-10-CM

## 2023-08-24 DIAGNOSIS — E11.22 TYPE 2 DIABETES MELLITUS WITH STAGE 3A CHRONIC KIDNEY DISEASE, WITHOUT LONG-TERM CURRENT USE OF INSULIN: ICD-10-CM

## 2023-08-24 LAB
ALBUMIN SERPL-MCNC: 4.7 G/DL (ref 3.5–5)
ALBUMIN/GLOB SERPL: 1.3 G/DL (ref 1.1–1.8)
ALP SERPL-CCNC: 52 U/L (ref 38–126)
ALT SERPL W P-5'-P-CCNC: 17 U/L
ANION GAP SERPL CALCULATED.3IONS-SCNC: 11 MMOL/L (ref 5–15)
AST SERPL-CCNC: 24 U/L (ref 14–36)
BASOPHILS # BLD AUTO: 0.09 10*3/MM3 (ref 0–0.2)
BASOPHILS NFR BLD AUTO: 1.2 % (ref 0–1.5)
BILIRUB SERPL-MCNC: 0.5 MG/DL (ref 0–1.2)
BUN SERPL-MCNC: 26 MG/DL (ref 7–23)
BUN/CREAT SERPL: 20.5 (ref 7–25)
CALCIUM SPEC-SCNC: 9.7 MG/DL (ref 8.4–10.2)
CHLORIDE SERPL-SCNC: 101 MMOL/L (ref 101–112)
CO2 SERPL-SCNC: 26 MMOL/L (ref 22–30)
CREAT SERPL-MCNC: 1.27 MG/DL (ref 0.52–1.04)
DEPRECATED RDW RBC AUTO: 51.7 FL (ref 37–54)
EGFRCR SERPLBLD CKD-EPI 2021: 44.5 ML/MIN/1.73
EOSINOPHIL # BLD AUTO: 0.22 10*3/MM3 (ref 0–0.4)
EOSINOPHIL NFR BLD AUTO: 2.8 % (ref 0.3–6.2)
ERYTHROCYTE [DISTWIDTH] IN BLOOD BY AUTOMATED COUNT: 14.9 % (ref 12.3–15.4)
GLOBULIN UR ELPH-MCNC: 3.7 GM/DL (ref 2.3–3.5)
GLUCOSE SERPL-MCNC: 88 MG/DL (ref 70–99)
HBA1C MFR BLD: 6.2 % (ref 4.8–5.6)
HCT VFR BLD AUTO: 37.1 % (ref 34–46.6)
HGB BLD-MCNC: 12.2 G/DL (ref 12–15.9)
LYMPHOCYTES # BLD AUTO: 1.49 10*3/MM3 (ref 0.7–3.1)
LYMPHOCYTES NFR BLD AUTO: 19.1 % (ref 19.6–45.3)
MCH RBC QN AUTO: 31.9 PG (ref 26.6–33)
MCHC RBC AUTO-ENTMCNC: 32.9 G/DL (ref 31.5–35.7)
MCV RBC AUTO: 96.9 FL (ref 79–97)
MONOCYTES # BLD AUTO: 0.57 10*3/MM3 (ref 0.1–0.9)
MONOCYTES NFR BLD AUTO: 7.3 % (ref 5–12)
NEUTROPHILS NFR BLD AUTO: 5.45 10*3/MM3 (ref 1.7–7)
NEUTROPHILS NFR BLD AUTO: 69.6 % (ref 42.7–76)
PLATELET # BLD AUTO: 293 10*3/MM3 (ref 140–450)
PMV BLD AUTO: 8.8 FL (ref 6–12)
POTASSIUM SERPL-SCNC: 4.4 MMOL/L (ref 3.4–5)
PROT SERPL-MCNC: 8.4 G/DL (ref 6.3–8.6)
RBC # BLD AUTO: 3.83 10*6/MM3 (ref 3.77–5.28)
SODIUM SERPL-SCNC: 138 MMOL/L (ref 137–145)
TRIGL SERPL-MCNC: 107 MG/DL
WBC NRBC COR # BLD: 7.82 10*3/MM3 (ref 3.4–10.8)

## 2023-08-24 PROCEDURE — 82607 VITAMIN B-12: CPT | Performed by: INTERNAL MEDICINE

## 2023-08-24 PROCEDURE — 83721 ASSAY OF BLOOD LIPOPROTEIN: CPT | Performed by: INTERNAL MEDICINE

## 2023-08-24 PROCEDURE — 84478 ASSAY OF TRIGLYCERIDES: CPT | Performed by: INTERNAL MEDICINE

## 2023-08-24 PROCEDURE — 83036 HEMOGLOBIN GLYCOSYLATED A1C: CPT | Performed by: INTERNAL MEDICINE

## 2023-08-24 PROCEDURE — 36415 COLL VENOUS BLD VENIPUNCTURE: CPT | Performed by: INTERNAL MEDICINE

## 2023-08-24 PROCEDURE — 82306 VITAMIN D 25 HYDROXY: CPT | Performed by: INTERNAL MEDICINE

## 2023-08-24 PROCEDURE — 84439 ASSAY OF FREE THYROXINE: CPT | Performed by: INTERNAL MEDICINE

## 2023-08-24 PROCEDURE — 85025 COMPLETE CBC W/AUTO DIFF WBC: CPT | Performed by: INTERNAL MEDICINE

## 2023-08-24 PROCEDURE — 84443 ASSAY THYROID STIM HORMONE: CPT | Performed by: INTERNAL MEDICINE

## 2023-08-24 PROCEDURE — 80053 COMPREHEN METABOLIC PANEL: CPT | Performed by: INTERNAL MEDICINE

## 2023-08-25 LAB
25(OH)D3 SERPL-MCNC: 50.6 NG/ML (ref 30–100)
ARTICHOKE IGE QN: 43 MG/DL (ref 0–100)
T4 FREE SERPL-MCNC: 1.55 NG/DL (ref 0.93–1.7)
TSH SERPL DL<=0.05 MIU/L-ACNC: 0.88 UIU/ML (ref 0.27–4.2)
VIT B12 BLD-MCNC: 775 PG/ML (ref 211–946)

## 2023-08-28 NOTE — PROGRESS NOTES
Chief Complaint  Follow-up (6 month lab f/u)    Subjective        History of Present Illness    Lois Parmar presents to the office for  follow up of multiple serious complex chronic medical issues including hypothyroidism, hyperlipidemia, hypertriglyceridemia, type 2 diabetes, chronic kidney disease, osteoarthritis of the knees and hips, morbid obesity, hypertensive heart disease, asthma, iron deficiency anemia and vitamin B12 deficiency anemia, among other issues complicated by severe obesity.  Dr. De La Torre is following patient's iron deficiency and vitamin B12 deficiency anemia.  Her hemoglobin is improved with this set of labs.    Recently, we saw patient in the office for severe fatigue, generalized weakness dyspnea on exertion, most likely multifactorial and principally related to deconditioning and morbid obesity, for which she agreed to try weekly Mounjaro.  Acetylcholine receptor antibody testing was negative, ruling out myasthenia gravis as a source of her weakness and fatigue.  She is also dropping items frequently and continues to have legitimate concerns about the possibility of some degenerative neuromuscular disorder.  We have referred her to the specialist at [employer] to evaluate this possibility.  She continues to experience impressive PALOMINO.  She has noticed some mild improvement in shortness of breath at home by using albuterol inhaler prior to exertion.    I am so very proud of her.  In addition to starting Mounjaro, she has markedly reduced her carbohydrate intake and calorie intake.  She is increasing her exercise and physical activity.  She continues physical therapy to work with her low back and hip issues.  She has lost 16 pounds this month.  She is currently very motivated to lose weight.  BMI is improved at 44.6    Diabetes improved with A1c 6.2.  We added weekly Mounjaro one month ago.     The patient's relevant past medical, surgical, and social history was reviewed in [facility].    Lab  "results are reviewed with the patient today. CBC unremarkable. Normal liver function.  Hypothyroidism at goal with current dose of Synthroid.  LDL 43 with Crestor/Zetia combination.      Objective   Vital Signs:  /70   Pulse 86   Ht 154.9 cm (61\")   Wt 107 kg (236 lb)   SpO2 95%   BMI 44.59 kg/mý   Estimated body mass index is 44.59 kg/mý as calculated from the following:    Height as of this encounter: 154.9 cm (61\").    Weight as of this encounter: 107 kg (236 lb).          Physical Exam  Vitals reviewed.   Constitutional:       General: She is not in acute distress.     Appearance: She is well-developed.      Comments: Very pleasant female   HENT:      Head: Normocephalic and atraumatic.      Nose:      Right Sinus: No maxillary sinus tenderness or frontal sinus tenderness.      Left Sinus: No maxillary sinus tenderness or frontal sinus tenderness.      Mouth/Throat:      Mouth: No oral lesions.      Pharynx: Uvula midline.      Tonsils: No tonsillar exudate.   Eyes:      Conjunctiva/sclera: Conjunctivae normal.      Pupils: Pupils are equal, round, and reactive to light.   Neck:      Thyroid: No thyroid mass or thyromegaly.      Vascular: No carotid bruit or JVD.      Trachea: Trachea normal. No tracheal deviation.      Comments: Short, thick neck  Cardiovascular:      Rate and Rhythm: Normal rate and regular rhythm. No extrasystoles are present.     Chest Wall: PMI is not displaced.      Heart sounds: Normal heart sounds. No murmur heard.  Pulmonary:      Effort: Pulmonary effort is normal. No accessory muscle usage or respiratory distress.      Breath sounds: No decreased breath sounds, wheezing, rhonchi or rales.      Comments: Moderate air movement bilaterally with some diminished excursion on expiratory phase posteriorly  Abdominal:      General: Bowel sounds are normal. There is no distension.      Palpations: Abdomen is soft.      Tenderness: There is no abdominal tenderness.   Musculoskeletal: "      Cervical back: Neck supple.   Lymphadenopathy:      Cervical: No cervical adenopathy.   Skin:     General: Skin is warm and dry.      Findings: No rash.      Nails: There is no clubbing.   Neurological:      Mental Status: She is alert and oriented to person, place, and time. Mental status is at baseline.      Cranial Nerves: No cranial nerve deficit.      Coordination: Coordination normal.   Psychiatric:         Mood and Affect: Mood normal.         Speech: Speech normal.         Behavior: Behavior normal.         Thought Content: Thought content normal.         Judgment: Judgment normal.          Result Review :  The following data was reviewed by: Marshal Warner MD on 08/31/2023:  CMP          3/22/2023    12:01 7/25/2023    10:48 8/24/2023    09:19   CMP   Glucose 92  114  88    BUN 38  21  26    Creatinine 1.62  1.16  1.27    EGFR 33.2  49.6  44.5    Sodium 143  140  138    Potassium 4.7  4.1  4.4    Chloride 104  101  101    Calcium 9.7  9.8  9.7    Total Protein 8.8  8.2  8.4    Albumin 4.9  4.5  4.7    Globulin 3.9  3.7  3.7    Total Bilirubin 0.4  0.4  0.5    Alkaline Phosphatase 60  48  52    AST (SGOT) 19  21  24    ALT (SGPT) 18  15  17    Albumin/Globulin Ratio 1.3  1.2  1.3    BUN/Creatinine Ratio 23.5  18.1  20.5    Anion Gap 15.0  8.0  11.0      CBC w/diff          3/22/2023    12:01 7/25/2023    10:48 8/24/2023    09:19   CBC w/Diff   WBC 11.38  9.44  7.82    RBC 3.78  3.60  3.83    Hemoglobin 11.9  11.6  12.2    Hematocrit 36.6  35.2  37.1    MCV 96.8  97.8  96.9    MCH 31.5  32.2  31.9    MCHC 32.5  33.0  32.9    RDW 15.2  14.0  14.9    Platelets 333  322  293    Neutrophil Rel % 73.0  76.2  69.6    Lymphocyte Rel % 17.8  12.7  19.1    Monocyte Rel % 6.9  6.6  7.3    Eosinophil Rel % 1.9  4.0  2.8    Basophil Rel % 0.4  0.5  1.2      Lipid Panel          2/17/2023    10:30 8/24/2023    09:19   Lipid Panel   Total Cholesterol 154     Triglycerides 278  107    HDL Cholesterol 52     VLDL  Cholesterol 43     LDL Cholesterol  59  43    LDL/HDL Ratio 0.89       TSH          2/17/2023    10:30 8/24/2023    09:19   TSH   TSH 3.610  0.884      A1C Last 3 Results          2/17/2023    10:30 8/24/2023    09:19   HGBA1C Last 3 Results   Hemoglobin A1C 6.40  6.20                   Assessment and Plan   Diagnoses and all orders for this visit:    1. Type 2 diabetes mellitus with stage 3a chronic kidney disease, without long-term current use of insulin (Primary)  -     CBC Auto Differential; Future  -     Comprehensive Metabolic Panel; Future  -     Hemoglobin A1c; Future  -     Lipid Panel; Future  -     Microalbumin / Creatinine Urine Ratio - Urine, Clean Catch; Future  -     Uric Acid; Future    2. Hypertensive heart disease with chronic diastolic congestive heart failure  -     BNP; Future    3. Essential hypertension  -     Comprehensive Metabolic Panel; Future    4. Hypertriglyceridemia  -     Lipid Panel; Future    5. Hyperlipidemia associated with type 2 diabetes mellitus  -     Lipid Panel; Future    6. Acquired hypothyroidism  -     TSH; Future  -     T4, free; Future    7. Morbid obesity  -     Comprehensive Metabolic Panel; Future    8. Vitamin D deficiency    9. Obstructive sleep apnea syndrome    10. PALOMINO (dyspnea on exertion)    11. Anemia due to stage 3a chronic kidney disease  -     CBC Auto Differential; Future  -     Ferritin; Future  -     Iron Profile; Future  -     Magnesium; Future  -     Uric Acid; Future    12. Megaloblastic anemia due to vitamin B>12< deficiency  -     CBC Auto Differential; Future  -     Vitamin B12; Future    13. Chronic bilateral low back pain with bilateral sciatica    14. Elevated sed rate  -     Sedimentation Rate; Future    Diabetes is currently very well controlled.  Continue current doses of Mounjaro and Jardiance.  Continue the vastly improved compliance with diabetic diet, activity/exercise, weight loss goals    Hypertensive heart disease appears to be stable  and I am hopeful symptoms will improve with aggressive weight loss.  Check a BNP with next labs.  Other than spironolactone, she is not requiring any diuretic.  Jardiance has been helpful in this regard.  Continue current blood pressure meds.  We will likely be reducing Norvasc in the future if she continues to lose weight aggressively.    Lipids are much improved, especially triglycerides.  I praised her for the reduced carbohydrate intake.  Continue Crestor and also Zetia for now.    Continue CPAP for sleep apnea, stable.  Continue Breo inhaler and albuterol rescue inhaler for asthma/COPD.  We reviewed the benefits of aggressive weight loss.    Continue current dose Synthroid.  Hypothyroidism at goal.    Her chronic kidney disease is stable.  Continue to hydrate well.  Continue to avoid NSAIDs and other nephrotoxic drugs.    Continue the current interventions for multifactorial anemia with large component of anemia of chronic kidney disease.  I appreciate Dr. De La Torre's assistance    Her sed rate was elevated earlier this year.  I will recheck that with next labs.    Continue her other current vitamin and mineral supplements, stable.    Return to clinic in 2 months to reassess diabetes and weight loss and hypertension, among other issues.  I do not anticipate needing any labs at that time.  During the appointment today, I had told her I would then like to have her next labs repeated 4 months from current labs, but that would have us checking labs in the midst of holiday season.  If she is doing well, we will probably not repeat labs until early March.  I have ordered those labs today.    We have checked with the neuromuscular disorder clinic at [place].  They have her referral, but they have not contacted her to schedule an appointment yet.  We are giving Lois the phone number for this clinic so that she can call them to schedule an appointment in order to speed up the process     I spent 44 minutes caring for  Lois on this date of service. This time includes time spent by me in the following activities:preparing for the visit, reviewing tests, performing a medically appropriate examination and/or evaluation , counseling and educating the patient/family/caregiver, ordering medications, tests, or procedures, referring and communicating with other health care professionals , and documenting information in the medical record      Follow Up   Return in about 4 months (around 12/31/2023) for Next scheduled follow up - labs 1 week prior.  Patient was given instructions and counseling regarding her condition or for health maintenance advice. Please see specific information pulled into the AVS if appropriate.

## 2023-08-31 ENCOUNTER — TELEPHONE (OUTPATIENT)
Dept: ORTHOPEDIC SURGERY | Facility: CLINIC | Age: 73
End: 2023-08-31
Payer: MEDICARE

## 2023-08-31 ENCOUNTER — OFFICE VISIT (OUTPATIENT)
Dept: FAMILY MEDICINE CLINIC | Facility: CLINIC | Age: 73
End: 2023-08-31
Payer: MEDICARE

## 2023-08-31 VITALS
BODY MASS INDEX: 44.56 KG/M2 | HEART RATE: 86 BPM | SYSTOLIC BLOOD PRESSURE: 126 MMHG | OXYGEN SATURATION: 95 % | HEIGHT: 61 IN | DIASTOLIC BLOOD PRESSURE: 70 MMHG | WEIGHT: 236 LBS

## 2023-08-31 DIAGNOSIS — R70.0 ELEVATED SED RATE: ICD-10-CM

## 2023-08-31 DIAGNOSIS — M70.61 TROCHANTERIC BURSITIS, RIGHT HIP: Primary | ICD-10-CM

## 2023-08-31 DIAGNOSIS — N18.31 ANEMIA DUE TO STAGE 3A CHRONIC KIDNEY DISEASE: Chronic | ICD-10-CM

## 2023-08-31 DIAGNOSIS — G47.33 OBSTRUCTIVE SLEEP APNEA SYNDROME: Chronic | ICD-10-CM

## 2023-08-31 DIAGNOSIS — E78.5 HYPERLIPIDEMIA ASSOCIATED WITH TYPE 2 DIABETES MELLITUS: Chronic | ICD-10-CM

## 2023-08-31 DIAGNOSIS — E78.1 HYPERTRIGLYCERIDEMIA: Chronic | ICD-10-CM

## 2023-08-31 DIAGNOSIS — E66.01 MORBID OBESITY: Chronic | ICD-10-CM

## 2023-08-31 DIAGNOSIS — E03.9 ACQUIRED HYPOTHYROIDISM: Chronic | ICD-10-CM

## 2023-08-31 DIAGNOSIS — R06.09 DOE (DYSPNEA ON EXERTION): ICD-10-CM

## 2023-08-31 DIAGNOSIS — D63.1 ANEMIA DUE TO STAGE 3A CHRONIC KIDNEY DISEASE: Chronic | ICD-10-CM

## 2023-08-31 DIAGNOSIS — D53.1 MEGALOBLASTIC ANEMIA: Chronic | ICD-10-CM

## 2023-08-31 DIAGNOSIS — I11.0 HYPERTENSIVE HEART DISEASE WITH CHRONIC DIASTOLIC CONGESTIVE HEART FAILURE: Chronic | ICD-10-CM

## 2023-08-31 DIAGNOSIS — N18.31 TYPE 2 DIABETES MELLITUS WITH STAGE 3A CHRONIC KIDNEY DISEASE, WITHOUT LONG-TERM CURRENT USE OF INSULIN: Primary | Chronic | ICD-10-CM

## 2023-08-31 DIAGNOSIS — E11.69 HYPERLIPIDEMIA ASSOCIATED WITH TYPE 2 DIABETES MELLITUS: Chronic | ICD-10-CM

## 2023-08-31 DIAGNOSIS — M54.41 CHRONIC BILATERAL LOW BACK PAIN WITH BILATERAL SCIATICA: Chronic | ICD-10-CM

## 2023-08-31 DIAGNOSIS — G89.29 CHRONIC BILATERAL LOW BACK PAIN WITH BILATERAL SCIATICA: Chronic | ICD-10-CM

## 2023-08-31 DIAGNOSIS — M54.42 CHRONIC BILATERAL LOW BACK PAIN WITH BILATERAL SCIATICA: Chronic | ICD-10-CM

## 2023-08-31 DIAGNOSIS — I50.32 HYPERTENSIVE HEART DISEASE WITH CHRONIC DIASTOLIC CONGESTIVE HEART FAILURE: Chronic | ICD-10-CM

## 2023-08-31 DIAGNOSIS — E11.22 TYPE 2 DIABETES MELLITUS WITH STAGE 3A CHRONIC KIDNEY DISEASE, WITHOUT LONG-TERM CURRENT USE OF INSULIN: Primary | Chronic | ICD-10-CM

## 2023-08-31 DIAGNOSIS — I10 ESSENTIAL HYPERTENSION: Chronic | ICD-10-CM

## 2023-08-31 DIAGNOSIS — E55.9 VITAMIN D DEFICIENCY: Chronic | ICD-10-CM

## 2023-08-31 NOTE — TELEPHONE ENCOUNTER
DR DAVENPORT.  [facility] AN ORDER FOR THE RIGHT HIP.

## 2023-09-21 ENCOUNTER — OFFICE VISIT (OUTPATIENT)
Dept: ORTHOPEDIC SURGERY | Facility: CLINIC | Age: 73
End: 2023-09-21
Payer: MEDICARE

## 2023-09-21 VITALS — WEIGHT: 231.7 LBS | BODY MASS INDEX: 43.75 KG/M2 | HEIGHT: 61 IN

## 2023-09-21 DIAGNOSIS — J45.20 MILD INTERMITTENT ASTHMA WITHOUT COMPLICATION: ICD-10-CM

## 2023-09-21 DIAGNOSIS — M75.101 ROTATOR CUFF SYNDROME OF RIGHT SHOULDER: ICD-10-CM

## 2023-09-21 DIAGNOSIS — M25.551 RIGHT HIP PAIN: Primary | ICD-10-CM

## 2023-09-21 DIAGNOSIS — G47.33 OBSTRUCTIVE SLEEP APNEA SYNDROME: ICD-10-CM

## 2023-09-21 PROCEDURE — 99213 OFFICE O/P EST LOW 20 MIN: CPT | Performed by: ORTHOPAEDIC SURGERY

## 2024-03-27 NOTE — PROGRESS NOTES
Brief Postoperative Note      Patient: Ranulfo Hernandez  YOB: 1955  MRN: [id number]    Date of Procedure: 3/27/2024    Pre-Op Diagnosis Codes:     * Osteomyelitis, acute, ankle or foot, right (HCC) [M86.171]    Post-Op Diagnosis: Same       Procedure(s):  RIGHT FOOT GREAT TOE INCISION AND DRAINAGE WITH BONE RESECTION (MAC W/LOCAL)    Surgeon(s):  Juan Pablo Antoine DPM    Assistant:  None    Anesthesia: Monitor Anesthesia Care    Estimated Blood Loss (mL): Minimal    Complications: None    Specimens:   ID Type Source Tests Collected by Time Destination   1 : Distal phalanx great toe right foot Tissue Tissue SURGICAL PATHOLOGY Juan Pablo Antoine DPM 3/27/2024 1501    2 : Proximal phalanx great toe right foot Tissue Tissue SURGICAL PATHOLOGY Juan Pablo Antoine DPM 3/27/2024 1502    A : Ulcer right foot culture Swab Cul de sac CULTURE, ANAEROBIC, CULTURE, WOUND Juan Pablo Antoine DPM 3/27/2024 1454        Implants:  None      Drains: None    Findings: Necrotic distal tip great toe right foot      Electronically signed by Juan Pablo Antoine DPM on 3/27/2024 at 3:32 PM   Lois Parmar  1950  72 y.o. female  PCP: Marshal Warner MD: 2/8/22  BS: 98 per pt     Patient presents to clinic today for diabetic foot care.    08/25/2022        Chief Complaint   Patient presents with   • Left Foot - Follow-up     Diabetic foot care   • Right Foot - Follow-up     Diabetic foot care           History of Present Illness    Lois Parmar is a 72 y.o. female who presents for diabetic foot exam, nail care.  No new issues.    Past Medical History:   Diagnosis Date   • Acquired hypothyroidism - On Synthroid    • Allergic rhinitis    • Allergy to alpha-gal 07/17/2020   • Anemia    • Asthma - mild intermittent    • Cardiac disease    • Carpal tunnel syndrome - Bilateral    • Colitis    • COVID-19 2022   • Degeneration of cervical intervertebral disc    • Diabetes (HCC)    • Disorder of lumbar spine    • Diverticulosis of large intestine 01/17/2019   • Essential hypertension    • Essential hypertension    • Fatigue    • History of myocardial infarct at age greater than 60 years 10/17/2018   • Hyperlipidemia - Improved with Zocor    • Hyperlipidemia associated with type 2 diabetes mellitus (HCC) 07/28/2021   • Hypertriglyceridemia 01/18/2021   • IFG (impaired fasting glucose) 10/17/2018   • Impaired fasting glycaemia    • Iron deficiency anemia - Improved    • Lumbar radiculopathy    • Megaloblastic anemia due to vitamin B>12< deficiency    • Moderate persistent asthma without complication 04/09/2021   • Morbid obesity (HCC)    • Osteoarthritis of knee - Bilateral    • Osteoarthritis of multiple joints - Left knee    • Osteoporosis    • Pain in left hip    • Sleep apnea - On CPAP    • Spasm of back muscles    • Stage 3 chronic kidney disease (HCC) 10/23/2019   • Stage 3a chronic kidney disease (HCC) 10/23/2019   • Thyroid dysfunction    • Tubular adenoma of colon - removed during colonoscopy, 11/2018. 12/03/2018   • Type 2 diabetes mellitus with stage 3a chronic kidney disease, without long-term  current use of insulin ([facility]) 04/09/2021   • Type 2 diabetes mellitus without complication, without long-term current use of insulin ([facility]) 04/09/2021   • Vitamin D deficiency          Past Surgical History:   Procedure Laterality Date   • CARPAL TUNNEL RELEASE Right    • COLONOSCOPY N/A 11/30/2018    Procedure: COLONOSCOPY;  Surgeon: Jimmie Sutton MD;  Location: [facility] ENDOSCOPY;  Service: General   • COLONOSCOPY N/A 4/4/2022    Procedure: COLONOSCOPY;  Surgeon: Jimmie Sutton MD;  Location: [facility] ENDOSCOPY;  Service: General;  Laterality: N/A;  tattoo 7 ml  @ cecum    • ENDOSCOPY  09/17/2012    Normal esophagus.  Gastritis.  Normal duodenum   • ENDOSCOPY N/A 11/30/2018    Procedure: ESOPHAGOGASTRODUODENOSCOPY WITH ANESTHESIA;  Surgeon: Jimmie Sutton MD;  Location: [facility] ENDOSCOPY;  Service: General   • ENDOSCOPY AND COLONOSCOPY  09/17/2012    Internal & external hemorrhoids found.   • HYSTERECTOMY  1980    w bso   • JOINT REPLACEMENT      left knee   • LUMBAR LAMINECTOMY  2011    s/p lumbar laminectomy and fusion   • NECK SURGERY     • OOPHORECTOMY  1980   • SHOULDER SURGERY  12/07/2015    Arthroscopy of the left shoudler with rotator cuff repair, Vijay procedure, and subacromial decompression.   • SHOULDER SURGERY Right    • TOTAL KNEE ARTHROPLASTY Left          Family History   Problem Relation Age of Onset   • Breast cancer Mother    • Ovarian cancer Mother    • Cancer Mother    • Heart disease Mother    • Osteoporosis Mother    • Diabetes Mother    • Hypertension Mother    • Breast cancer Sister    • Cancer Sister    • Osteoporosis Sister    • Breast cancer Other    • Heart disease Other    • Hypertension Other    • Lung disease Other    • Diabetes Other    • Cancer Other    • Cancer Brother          Social History     Socioeconomic History   • Marital status:    Tobacco Use   • Smoking status: Never Smoker   • Smokeless tobacco: Never Used   Vaping Use   • Vaping Use: Never used    Substance and Sexual Activity   • Alcohol use: No   • Drug use: No   • Sexual activity: Defer         Current Outpatient Medications   Medication Sig Dispense Refill   • albuterol sulfate  (90 Base) MCG/ACT inhaler Inhale 2 puffs Every 4 (Four) Hours As Needed for Wheezing. 18 g 11   • amLODIPine (NORVASC) 5 MG tablet Take 1 tablet by mouth 2 (Two) Times a Day.     • ascorbic acid (VITAMIN C) 1000 MG tablet Take 1,000 mg by mouth Daily.     • aspirin 81 MG chewable tablet Chew 81 mg Daily.     • Breo Ellipta 100-25 MCG/INH inhaler INHALE 1 PUFF BY MOUTH DAILY **RINSE MOUTH AFTER EACH USE**     • Cholecalciferol 125 MCG (5000 UT) tablet Take 5,000 Units by mouth Daily.     • Cymbalta 60 MG capsule TAKE 1 CAPSULE EVERY DAY 90 capsule 3   • diphenhydrAMINE (BENADRYL) 25 mg capsule Take 50 mg by mouth.     • docusate sodium (COLACE) 100 MG capsule Take 100 mg by mouth 2 (Two) Times a Day As Needed.     • EPINEPHrine 0.3 MG/0.3ML solution prefilled syringe Inject  as directed.     • ezetimibe (ZETIA) 10 MG tablet Take 1 tablet by mouth Daily.     • famotidine (PEPCID) 40 MG tablet TAKE 1 TABLET BY MOUTH EVERY NIGHT. THIS REPLACES ZANTAC 90 tablet 1   • folic acid (FOLVITE) 1 MG tablet Take 1 tablet by mouth 3 (Three) Times a Week. 36 tablet 1   • glucose blood test strip tests BID, Diagnosis: 250.00, 401.9     • labetalol (NORMODYNE) 300 MG tablet Take 300 mg by mouth 2 (Two) Times a Day.     • levothyroxine (SYNTHROID, LEVOTHROID) 88 MCG tablet TAKE 1 TABLET EVERY DAY 90 tablet 3   • loratadine (CLARITIN) 10 MG tablet Take 10 mg by mouth daily.     • metFORMIN (GLUCOPHAGE) 500 MG tablet Take 1 tablet by mouth 2 (Two) Times a Day With Meals. 180 tablet 3   • olmesartan (BENICAR) 40 MG tablet Take 40 mg by mouth Daily.     • Omega-3 Fatty Acids (fish oil) 1000 MG capsule capsule Take 1 capsule by mouth 2 (Two) Times a Day.     • Prevacid 30 MG capsule Take 1 capsule by mouth Daily. 90 capsule 3   • rosuvastatin  "(CRESTOR) 40 MG tablet Take 40 mg by mouth Daily.     • spironolactone (ALDACTONE) 25 MG tablet TAKE 1 TABLET EVERY DAY FOR FLUID 90 tablet 1   • vitamin B-12 (CYANOCOBALAMIN) 1000 MCG tablet TAKE 1 TABLET EVERY DAY 90 tablet 1   • indapamide (LOZOL) 2.5 MG tablet Take 2.5 mg by mouth Daily.       No current facility-administered medications for this visit.         OBJECTIVE    Pulse 94   Ht 154.9 cm (61\")   Wt 117 kg (258 lb 9.6 oz)   SpO2 97%   BMI 48.86 kg/m²       Review of Systems   Constitutional: Positive for fatigue.   HENT: Positive for hearing loss.    Eyes: Negative.    Respiratory: Positive for shortness of breath and wheezing.    Cardiovascular: Negative.    Gastrointestinal: Positive for constipation.   Endocrine: Negative.    Genitourinary: Negative.    Musculoskeletal: Positive for back pain and joint swelling.        Joint pain  Ankle pain   Skin: Negative.    Allergic/Immunologic: Negative.    Neurological: Positive for numbness.   Hematological: Negative.    Psychiatric/Behavioral: Negative.          Diabetic Foot Exam Performed and Monofilament Test Performed       Constitutional: she appears well-developed and well-nourished.   HEENT: Normocephalic. Atraumatic  CV: No tenderness. RRR  Resp: Non-labored respiration. No wheezes.   Psychiatric: she has a normal mood and affect. her   behavior is normal.      Lower Extremity Exam:  Vascular: DP/PT pulses palpable 1+.   Negative hair growth.   1+ perimalleolar edema  Neuro: Protective sensation intact, b/l.  Light touch sensation intact, b/l  DTRs intact  Integument: No open wounds or lesions.  Skin quality normal  Nails 1-5 b/l thickened, elongated with subungual debris. +pain on palpation*  No masses  Webspaces c/d/i  Musculoskeletal: LE muscle strength 4/5  Gait assisted with cane  Mild, flexible hammertoe deformity toes 2 through 4 bilateral.  Ankle ROM normal, b/l              ASSESSMENT AND PLAN    Diagnoses and all orders for this " visit:    1. Diabetic foot (HCC) (Primary)    2. Onychomycosis    3. Pain in toes of both feet    4. Hammer toes of both feet      -Comprehensive DM foot exam performed. Patient educated on importance of tight glucose control and daily foot checks.   -Patient educated on padding techniques for hammertoes.  Proper extra depth diabetic shoe gear.  Limit barefoot walking.  -Nails 1-5 b/l debrided in length and thickness with nail nipper to decrease pain, fungal load and risk of infection  -Follow up 3 months PRN          This document has been electronically signed by Xander Burch DPM on August 25, 2022 11:35 CDT       Much of this encounter note is an electronic transcription/translation of spoken language to printed text.   Xander Burch DPM  8/25/2022  11:35 CDT

## (undated) DEVICE — SINGLE-USE BIOPSY FORCEPS: Brand: RADIAL JAW 4

## (undated) DEVICE — BITEBLOCK ENDO W/STRAP 60F A/ LF DISP

## (undated) DEVICE — CANN SMPL SOFTECH BIFLO ETCO2 A/M 7FT

## (undated) DEVICE — TRAP FLD MINIVAC MEGADYNE 100ML

## (undated) DEVICE — Device: Brand: SPOT EX ENDOSCOPIC TATTOO

## (undated) DEVICE — SINGLE USE INJECTOR: Brand: SINGLE USE INJECTOR